# Patient Record
Sex: FEMALE | Race: WHITE | NOT HISPANIC OR LATINO | Employment: OTHER | URBAN - METROPOLITAN AREA
[De-identification: names, ages, dates, MRNs, and addresses within clinical notes are randomized per-mention and may not be internally consistent; named-entity substitution may affect disease eponyms.]

---

## 2017-03-24 DIAGNOSIS — A31.0 MAI (MYCOBACTERIUM AVIUM-INTRACELLULARE) INFECTION: Primary | ICD-10-CM

## 2017-03-31 ENCOUNTER — HOSPITAL ENCOUNTER (OUTPATIENT)
Dept: PULMONOLOGY | Facility: HOSPITAL | Age: 77
Discharge: HOME | End: 2017-03-31
Attending: INTERNAL MEDICINE
Payer: MEDICARE

## 2017-03-31 ENCOUNTER — HOSPITAL ENCOUNTER (OUTPATIENT)
Dept: RADIOLOGY | Facility: HOSPITAL | Age: 77
Discharge: HOME | End: 2017-03-31
Attending: INTERNAL MEDICINE
Payer: MEDICARE

## 2017-03-31 DIAGNOSIS — A31.0 MAI (MYCOBACTERIUM AVIUM-INTRACELLULARE) INFECTION: ICD-10-CM

## 2017-03-31 DIAGNOSIS — R06.02 SOB (SHORTNESS OF BREATH): ICD-10-CM

## 2017-03-31 PROCEDURE — 94010 BREATHING CAPACITY TEST: CPT

## 2017-03-31 PROCEDURE — 94060 EVALUATION OF WHEEZING: CPT

## 2017-03-31 PROCEDURE — 71250 CT THORAX DX C-: CPT | Mod: TC

## 2017-03-31 PROCEDURE — 94726 PLETHYSMOGRAPHY LUNG VOLUMES: CPT

## 2017-03-31 PROCEDURE — 94729 DIFFUSING CAPACITY: CPT

## 2017-03-31 PROCEDURE — 71250 CT THORAX DX C-: CPT | Mod: 26,,, | Performed by: RADIOLOGY

## 2017-04-04 NOTE — PROCEDURES
04/03/17   Pulmonary Function Test    Spirometry reveals moderate obstructive lung disease without significant response to bronchodilator challenge.  Lung volumes reveal air trapping but no hyperinflation or restriction.  DLCO is mildly reduced.  There has been no significant change compared to study of 8/23/16.    Rosalba Camarillo MD  4/3/2017  8:02 PM

## 2017-07-27 DIAGNOSIS — Z12.31 SCREENING MAMMOGRAM, ENCOUNTER FOR: Primary | ICD-10-CM

## 2017-07-27 DIAGNOSIS — M85.80 OSTEOPENIA: ICD-10-CM

## 2017-10-25 ENCOUNTER — HOSPITAL ENCOUNTER (OUTPATIENT)
Dept: RADIOLOGY | Facility: HOSPITAL | Age: 77
Discharge: HOME | End: 2017-10-25
Attending: INTERNAL MEDICINE
Payer: MEDICARE

## 2017-10-25 VITALS — WEIGHT: 167 LBS | HEIGHT: 68 IN | BODY MASS INDEX: 25.31 KG/M2

## 2017-10-25 DIAGNOSIS — M85.80 OSTEOPENIA: ICD-10-CM

## 2017-10-25 DIAGNOSIS — Z12.31 SCREENING MAMMOGRAM, ENCOUNTER FOR: ICD-10-CM

## 2017-10-25 PROCEDURE — 77080 DXA BONE DENSITY AXIAL: CPT | Mod: TC,PO

## 2017-10-25 PROCEDURE — 77067 SCR MAMMO BI INCL CAD: CPT | Mod: TC

## 2018-08-09 DIAGNOSIS — J44.9 COPD, MODERATE: Primary | ICD-10-CM

## 2018-08-10 DIAGNOSIS — H81.09 MENIERE DISEASE: ICD-10-CM

## 2018-08-10 DIAGNOSIS — H83.2X1 LABYRINTHINE DYSFUNCTION OF RIGHT EAR: Primary | ICD-10-CM

## 2018-09-05 ENCOUNTER — CLINICAL SUPPORT (OUTPATIENT)
Dept: REHABILITATION | Facility: HOSPITAL | Age: 78
End: 2018-09-05
Payer: MEDICARE

## 2018-09-05 DIAGNOSIS — R29.898 WEAKNESS OF BOTH LOWER EXTREMITIES: ICD-10-CM

## 2018-09-05 DIAGNOSIS — R26.89 DECREASED FUNCTIONAL MOBILITY: ICD-10-CM

## 2018-09-05 DIAGNOSIS — R26.81 UNSTEADY GAIT: ICD-10-CM

## 2018-09-05 DIAGNOSIS — R26.89 BALANCE PROBLEMS: ICD-10-CM

## 2018-09-05 PROCEDURE — 97162 PT EVAL MOD COMPLEX 30 MIN: CPT | Mod: PO

## 2018-09-05 PROCEDURE — G8979 MOBILITY GOAL STATUS: HCPCS | Mod: CK,PO

## 2018-09-05 PROCEDURE — G8978 MOBILITY CURRENT STATUS: HCPCS | Mod: CL,PO

## 2018-09-05 NOTE — PLAN OF CARE
[facility]  Physical Therapy Initial Evaluation    Name: Marilia Moise  Clinic Number: [id number]    Therapy Diagnosis:   Encounter Diagnoses   Name Primary?    Weakness of both lower extremities     Balance problems     Unsteady gait     Decreased functional mobility      Physician: Norah Ashford MD    Physician Orders: PT Eval and Treat   Medical Diagnosis from Referral: Labyrinthine dysfunction of right ear, Type 2 diabetes mellitus, uncontrolled, with neuropathy, Meniere disease  Evaluation Date: 9/5/2018  Authorization Period Expiration: 08/10/2019  Plan of Care Expiration: 11/05/2018  Visit # / Visits authorized: 1/ 1    Time In: 11:00  Time Out: 12:00  Total Billable Time: 60 minutes    Precautions: Standard and Fall    Subjective   Date of onset: Chronic  History of current condition - MARILIA reports: she was initially diagnosed with Meniere's Disease when she was 24 years old and does have a history of falls but has been falling more since breaking her ankles 3 years ago. Her most recent falls were a week ago when she fell backwards onto the bed after getting up ne morning and then she fell again in her bathroom. She did break both ankles 3 years ago and did receive home health therapy but nothing after being discharged from home health. She reports that her walking is limited more by her breathing than her balance. She does have a cane and a rollator but only uses the rollator when she has to go somewhere where there might not be a place to sit. Inside the home she has her furniture arranged so there is always something to hold as she walks through the house. She has to use a shower chair as she is unable to maintain her balance while standing to shower. She gets dizzy when trying to look between two objects, when trying to watch the news ticker on the bottom of the screen on television, difficulty ascending/descending steps, and gets dizzy when laying on her R side.         Past Medical History:   Diagnosis Date    Asthma     Diabetes mellitus     Hypertension     Lipidemia      Xi Moise  has a past surgical history that includes Hysterectomy;  section; Ankle surgery (Left); REMOVAL-HARDWARE-ANKLE (Left, 2016); OPEN REDUCTION INTERNAL FIXATION-ANKLE (Left, 10/27/2015); and EXAM UNDER ANESTHESIA (Right, 10/27/2015).    Xi has a current medication list which includes the following prescription(s): albuterol sulfate, atorvastatin, b-complex with vitamin c, escitalopram oxalate, fluticasone, fluticasone-salmeterol 500-50 mcg/dose, gabapentin, hydrochlorothiazide, hydrocodone-acetaminophen, insulin nph, lisinopril, magnesium oxide, metformin, metoprolol succinate, potassium gluconate, and vitamin d.    Review of patient's allergies indicates:  No Known Allergies     Imaging, none    Prior Therapy:  PT for ankles  Social History: two story home, bedroom on first floor, lives alone  Occupation: retired  Prior Level of Function: independent  Current Level of Function: independent    Pain:  Current 5/10, worst 10/10, best 1/10   Location:  back   Description: Dull, Throbbing and Tight  Aggravating Factors: Standing, Bending, Walking and Lifting  Easing Factors: pain medication, ice and heating pad    Pts goals: To not hurt    Objective     Observation: Patient is 78 year old female, who presents to the clinic in no apparent distress. She is wheezing with exhalation.       Posture: wide JAMES in standing with arms held away from her side.       Gait: shuffling gait, wide JAMES, often reaches outside JAMES to touch walls for balance.     Range of Motion: AROM (PROM):    Cervical Range of Motion:    Degrees Pain   Flexion 60    Extension 50    Right Rotation 80    Left Rotation 85    Right Side Bending 40    Left Side Bending 35 Ringing in ears     Hip Left Right   Flexion WNL WNL   Abduction WNL WNL     Knee Left Right   Extension WNL WNL   Flexion WNL WNL       Ankle  Left Right   Dorsiflexion WNL WNL   Plantarflexion WNL WNL   Inversion WNL WNL   Eversion WNL WNL       Strength:  Hip Left Right   Flexion 3/5 3/5   Abduction 3/5 3/5   Extension 3/5 3/5     Knee Left Right   Extension 4/5 4+/5   Flexion 5/5 5/5     Ankle Left Right   Dorsiflexion 4/5 5/5   Plantarflexion 5/5 5/5   Inversion 45 4/5   Eversion 4/5 4/5       Special Tests:  TUG 9:15 seconds   Sit to stand 7 reps   DGI 12/24   SLS  R 3.4 sec, L 5.82 sec   Saccades negative   Smooth pursuits Positive R   VOR Positive R     Joint Mobility: N/A    Palpation: N/A    Sensation: loss of protective sensation B feet 0/5 monofilament testing    Flexibility: N/A        CMS Impairment/Limitation/Restriction for FOTO vertigo Survey    Therapist reviewed FOTO scores for Marilia Moise on 9/5/2018.   FOTO documents entered into [employer] - see Media section.    Limitation Score: 61%  Category: Mobility    Current : CL = least 60% but < 80% impaired, limited or restricted  Goal: CK = at least 40% but < 60% impaired, limited or restricted  Discharge: N/A at this time         TREATMENT   Treatment Time In: 11:50  Treatment Time Out: 12:00  Total Treatment time separate from Evaluation: 10 minutes    MARILIA participated in gait training to improve functional mobility and safety for 10  minutes, including:  Correct sequence of events with SPC on even surfaces indoors.    Home Exercises and Patient Education Provided    Education provided:   - compliance with HEP.  - use of SPC indoors and rollator when ambulating in the community to decrease her fall risk.    Written Home Exercises Provided: Patient instructed to cont prior HEP.  Exercises were reviewed and MARILIA was able to demonstrate them prior to the end of the session.  MARILIA demonstrated good  understanding of the education provided.     See EMR under POC for exercises provided 9/5/2018.    Assessment   Marilia is a 78 y.o. female referred to outpatient Physical Therapy with a medical  diagnosis of Labyrinthine dysfunction of right ear, Type 2 diabetes mellitus, uncontrolled, with neuropathy, Meniere disease. Pt presents with B LE weakness, balance issues, and unsteady gait along with a history of falls. Her scores on DGI, TUG, Sit to Stand, and single leg stance all indicate she is at high risk for falls. Her loss of protective sensation and vestibular ocular dysfunction contribute to her balance issues. Her current score on FOTO Vertigo places her in the 60%<80% impaired, limited, or restricted category.     Pt prognosis is Fair.   Pt will benefit from skilled outpatient Physical Therapy to address the deficits stated above and in the chart below, provide pt/family education, and to maximize pt's level of independence.     Plan of care discussed with patient: Yes  Pt's spiritual, cultural and educational needs considered and patient is agreeable to the plan of care and goals as stated below:     Anticipated Barriers for therapy: breathing issues, asthma    Medical Necessity is demonstrated by the following  History  Co-morbidities and personal factors that may impact the plan of care Co-morbidities:   COPD/asthma, depression and history of CVA    Personal Factors:   attitudes     high   Examination  Body Structures and Functions, activity limitations and participation restrictions that may impact the plan of care Body Regions:   lower extremities    Body Systems:    strength  balance  gait    Participation Restrictions:   Difficulty with prolonged standing, ambulation, transfers, performing her usual ADLs, ascending/descending steps    Activity limitations:   Learning and applying knowledge  no deficits    General Tasks and Commands  no deficits    Communication  no deficits    Mobility  lifting and carrying objects  walking    Self care  washing oneself (bathing, drying, washing hands)    Domestic Life  shopping  cooking  doing house work (cleaning house, washing dishes,  laundry)    Interactions/Relationships  no deficits    Life Areas  no deficits    Community and Social Life  community life  recreation and leisure         moderate   Clinical Presentation evolving clinical presentation with changing clinical characteristics moderate   Decision Making/ Complexity Score: moderate     Goals:  Short Term Goals: 4 weeks   1. This patient will be independent with a basic HEP.  2. This patient will increase B LE strength by 1 grade in order to be able to perform shower transfers with no difficulty.   3. This patient will be able to ambulate greater than 150 feet on even surfaces with a normal gait pattern and no LOB with least restrictive AD.  4. Patient will be able to achieve greater than or equal to 16/24 on the Dynamic Gait Index decreasing patient's risk for falling on even surfaces.   5. Patient will be able to achieve greater than or equal to 50 on the FOTO Vertigo decreasing patient's risk for falling and improving patient to 40%<60% impaired, limited, or restricted category.   Long Term Goals: 8 weeks   1. This patient will be independent with an updated HEP.  2. This patient will increase B LE strength to 5/5 in order to be able to ascend/desccend stairs with a reciprocal gait pattern with no difficulty.   3. This patient will be able to ambulate greater than 200 yards on even surfaces with a normal gait pattern and no LOB with least restrictive AD.  4. Patient will be able to achieve greater than or equal to 21/24 on the Dynamic Gait Index decreasing patient's risk for falling on uneven surfaces.   5. Patient will be able to achieve greater than or equal to 60 on the FOTO Vertigo decreasing patient's risk for falling and improving patient to 40%<60% impaired, limited, or restricted category.     Plan   Plan of care Certification: 9/5/2018 to 11/5/18.    Outpatient Physical Therapy 2 times weekly for 8 weeks to include the following interventions: Gait Training, Manual Therapy,  Moist Heat/ Ice, Neuromuscular Re-ed, Patient Education and Therapeutic Exercise.     Tonie Vasquez, PT

## 2018-09-08 ENCOUNTER — HOSPITAL ENCOUNTER (EMERGENCY)
Facility: HOSPITAL | Age: 78
Discharge: HOME | End: 2018-09-08
Attending: EMERGENCY MEDICINE
Payer: MEDICARE

## 2018-09-08 VITALS
HEART RATE: 96 BPM | RESPIRATION RATE: 16 BRPM | BODY MASS INDEX: 24.86 KG/M2 | HEIGHT: 68 IN | TEMPERATURE: 98 F | DIASTOLIC BLOOD PRESSURE: 78 MMHG | OXYGEN SATURATION: 97 % | SYSTOLIC BLOOD PRESSURE: 172 MMHG | WEIGHT: 164 LBS

## 2018-09-08 DIAGNOSIS — R06.02 SHORTNESS OF BREATH: ICD-10-CM

## 2018-09-08 DIAGNOSIS — J45.901 MILD ASTHMA EXACERBATION: Primary | ICD-10-CM

## 2018-09-08 DIAGNOSIS — R06.2 WHEEZING: ICD-10-CM

## 2018-09-08 LAB
ALBUMIN SERPL BCP-MCNC: 4.1 G/DL
ALP SERPL-CCNC: 109 U/L
ALT SERPL W/O P-5'-P-CCNC: 13 U/L
ANION GAP SERPL CALC-SCNC: 11 MMOL/L
AST SERPL-CCNC: 14 U/L
BILIRUB SERPL-MCNC: 0.6 MG/DL
BILIRUB UR QL STRIP: NEGATIVE
BNP SERPL-MCNC: 16 PG/ML
BUN SERPL-MCNC: 28 MG/DL
CALCIUM SERPL-MCNC: 9.8 MG/DL
CHLORIDE SERPL-SCNC: 103 MMOL/L
CLARITY UR: CLEAR
CO2 SERPL-SCNC: 25 MMOL/L
COLOR UR: YELLOW
CREAT SERPL-MCNC: 1.5 MG/DL
D DIMER PPP IA.FEU-MCNC: 0.3 MG/L FEU
ERYTHROCYTE [DISTWIDTH] IN BLOOD BY AUTOMATED COUNT: 14.3 %
EST. GFR  (AFRICAN AMERICAN): 38 ML/MIN/1.73 M^2
EST. GFR  (NON AFRICAN AMERICAN): 33 ML/MIN/1.73 M^2
GLUCOSE SERPL-MCNC: 199 MG/DL
GLUCOSE UR QL STRIP: ABNORMAL
HCT VFR BLD AUTO: 38.5 %
HGB BLD-MCNC: 12.5 G/DL
HGB UR QL STRIP: NEGATIVE
KETONES UR QL STRIP: ABNORMAL
LEUKOCYTE ESTERASE UR QL STRIP: NEGATIVE
MCH RBC QN AUTO: 28.3 PG
MCHC RBC AUTO-ENTMCNC: 32.5 G/DL
MCV RBC AUTO: 87 FL
NITRITE UR QL STRIP: NEGATIVE
PH UR STRIP: 6 [PH] (ref 5–8)
PLATELET # BLD AUTO: 268 K/UL
PMV BLD AUTO: 8.3 FL
POTASSIUM SERPL-SCNC: 4.4 MMOL/L
PROT SERPL-MCNC: 6.6 G/DL
PROT UR QL STRIP: NEGATIVE
RBC # BLD AUTO: 4.41 M/UL
SODIUM SERPL-SCNC: 139 MMOL/L
SP GR UR STRIP: >=1.03 (ref 1–1.03)
TROPONIN I SERPL DL<=0.01 NG/ML-MCNC: <0.006 NG/ML
URN SPEC COLLECT METH UR: ABNORMAL
UROBILINOGEN UR STRIP-ACNC: NEGATIVE EU/DL
WBC # BLD AUTO: 8.67 K/UL

## 2018-09-08 PROCEDURE — 80053 COMPREHEN METABOLIC PANEL: CPT

## 2018-09-08 PROCEDURE — 84484 ASSAY OF TROPONIN QUANT: CPT

## 2018-09-08 PROCEDURE — 93005 ELECTROCARDIOGRAM TRACING: CPT

## 2018-09-08 PROCEDURE — 96374 THER/PROPH/DIAG INJ IV PUSH: CPT

## 2018-09-08 PROCEDURE — 85027 COMPLETE CBC AUTOMATED: CPT

## 2018-09-08 PROCEDURE — 25000242 PHARM REV CODE 250 ALT 637 W/ HCPCS: Performed by: EMERGENCY MEDICINE

## 2018-09-08 PROCEDURE — 81003 URINALYSIS AUTO W/O SCOPE: CPT

## 2018-09-08 PROCEDURE — 93010 ELECTROCARDIOGRAM REPORT: CPT | Mod: ,,, | Performed by: INTERNAL MEDICINE

## 2018-09-08 PROCEDURE — 63600175 PHARM REV CODE 636 W HCPCS: Performed by: EMERGENCY MEDICINE

## 2018-09-08 PROCEDURE — 85379 FIBRIN DEGRADATION QUANT: CPT

## 2018-09-08 PROCEDURE — 83880 ASSAY OF NATRIURETIC PEPTIDE: CPT

## 2018-09-08 PROCEDURE — 99284 EMERGENCY DEPT VISIT MOD MDM: CPT | Mod: 25

## 2018-09-08 PROCEDURE — 94640 AIRWAY INHALATION TREATMENT: CPT

## 2018-09-08 RX ORDER — ALBUTEROL SULFATE 2.5 MG/.5ML
2.5 SOLUTION RESPIRATORY (INHALATION) ONCE
Status: COMPLETED | OUTPATIENT
Start: 2018-09-08 | End: 2018-09-08

## 2018-09-08 RX ORDER — METHYLPREDNISOLONE SOD SUCC 125 MG
80 VIAL (EA) INJECTION
Status: COMPLETED | OUTPATIENT
Start: 2018-09-08 | End: 2018-09-08

## 2018-09-08 RX ADMIN — METHYLPREDNISOLONE SODIUM SUCCINATE 80 MG: 125 INJECTION, POWDER, FOR SOLUTION INTRAMUSCULAR; INTRAVENOUS at 08:09

## 2018-09-08 RX ADMIN — ALBUTEROL SULFATE 2.5 MG: 2.5 SOLUTION RESPIRATORY (INHALATION) at 08:09

## 2018-09-09 PROBLEM — R26.81 UNSTEADY GAIT: Status: ACTIVE | Noted: 2018-09-09

## 2018-09-09 PROBLEM — R29.898 WEAKNESS OF BOTH LOWER EXTREMITIES: Status: ACTIVE | Noted: 2018-09-09

## 2018-09-09 PROBLEM — R26.89 DECREASED FUNCTIONAL MOBILITY: Status: ACTIVE | Noted: 2018-09-09

## 2018-09-09 PROBLEM — R26.89 BALANCE PROBLEMS: Status: ACTIVE | Noted: 2018-09-09

## 2018-09-09 NOTE — ED PROVIDER NOTES
Encounter Date: 2018    SCRIBE #1 NOTE: I, Ovi Isidro, am scribing for, and in the presence of,  Dr. Zimmerman. I have scribed the entire note.       History     Chief Complaint   Patient presents with    Chest Pain     Patient reports CP and SOB. Pt fell 2 weeks ago and landed on her back on the L side.     Xi Moise is a 78 y.o. female who  has a past medical history of RIRI infection, Asthma, Diabetes mellitus, Hypertension, and Lipidemia.    The patient presents to the ED due to CP described as tightness with SOB for the past 2 weeks, worse today. Symptoms are associated with dry cough x 1 month. Patient reports that she fell 2 weeks ago out of bed, landing on her back and hitting her left side on her nightstand. She also reports a second fall in her bathroom, and has been having mild back pain since that time. Patient states that she was around multiple people smoking today, which she feels exacerbated her CP and SOB. Patient denies any abdominal pain, fever, nausea, vomiting, or any other concerning symptoms. The patient has a history of recurrent pneumonia and MALAIKA infection. She regularly takes Albuterol and uses inhalers and breathing treatments at home, but has not used any treatments today, as she has been out running errands all day. Patient sees her Pulmonology physician every 6 months, and is scheduled to be seen in October. She reports no history of cardiac issues.      The history is provided by the patient.     Review of patient's allergies indicates:  No Known Allergies  Past Medical History:   Diagnosis Date    Asthma     Diabetes mellitus     Hypertension     Lipidemia      Past Surgical History:   Procedure Laterality Date    ANKLE SURGERY Left      SECTION      x 3    EXAM UNDER ANESTHESIA Right 10/27/2015    [facility]    HYSTERECTOMY      OPEN REDUCTION INTERNAL FIXATION-ANKLE Left 10/27/2015    [facility]     REMOVAL-HARDWARE-ANKLE Left 8/2/2016    [facility]     Family History   Problem Relation Age of Onset    Hypertension Daughter      Social History     Tobacco Use    Smoking status: Never Smoker   Substance Use Topics    Alcohol use: No    Drug use: No     Review of Systems   Constitutional: Negative for chills and fever.   HENT: Negative for sore throat.    Respiratory: Positive for cough (dry) and shortness of breath.    Cardiovascular: Positive for chest pain.   Gastrointestinal: Negative for abdominal pain, nausea and vomiting.   Genitourinary: Negative for dysuria, frequency and urgency.   Musculoskeletal: Negative for back pain, neck pain and neck stiffness.   Skin: Negative for rash and wound.   Neurological: Negative for syncope and weakness.   Hematological: Does not bruise/bleed easily.   Psychiatric/Behavioral: Negative for agitation, behavioral problems and confusion.     Physical Exam     Initial Vitals [09/08/18 1933]   BP Pulse Resp Temp SpO2   (!) 182/81 109 (!) 24 98.7 °F (37.1 °C) 95 %      MAP       --         Physical Exam    Nursing note and vitals reviewed.  Constitutional: She appears well-developed and well-nourished. She is not diaphoretic. No distress.   Well-appearing, in NAD   HENT:   Head: Normocephalic and atraumatic.   Mouth/Throat: Oropharynx is clear and moist.   Eyes: EOM are normal. Pupils are equal, round, and reactive to light.   Neck: No tracheal deviation present.   Cardiovascular: Normal rate, regular rhythm, normal heart sounds and intact distal pulses.   Pulmonary/Chest: No stridor. No respiratory distress. She has wheezes. She has no rhonchi. She has no rales.   Fine inspiratory and expiratory wheezing; no rales or rhonchi   Abdominal: Soft. Bowel sounds are normal. She exhibits no distension and no mass. There is no tenderness.   Musculoskeletal: Normal range of motion. She exhibits no edema or tenderness.   No midline TTP, no back  TTP  No pitting edema to the legs   Neurological: She is alert and oriented to person, place, and time. She has normal strength. No cranial nerve deficit or sensory deficit.   Skin: Skin is warm and dry. Capillary refill takes less than 2 seconds. No pallor.   Psychiatric: She has a normal mood and affect. Her behavior is normal. Thought content normal.       ED Course   Procedures  Labs Reviewed   CBC WITHOUT DIFFERENTIAL - Abnormal; Notable for the following components:       Result Value    MPV 8.3 (*)     All other components within normal limits   COMPREHENSIVE METABOLIC PANEL - Abnormal; Notable for the following components:    Glucose 199 (*)     BUN, Bld 28 (*)     Creatinine 1.5 (*)     eGFR if  38 (*)     eGFR if non  33 (*)     All other components within normal limits   URINALYSIS - Abnormal; Notable for the following components:    Specific Gravity, UA >=1.030 (*)     Glucose, UA 1+ (*)     Ketones, UA Trace (*)     All other components within normal limits   TROPONIN I   B-TYPE NATRIURETIC PEPTIDE   D DIMER, QUANTITATIVE     EKG Readings: (Independently Interpreted)   Rhythm: Sinus Tachycardia. Heart Rate: 105.   No ST changes, no ischemia, normal intervals  Compared to prior EKG from 08/2016, grossly stable without significant change     X-Rays:   Independently Interpreted Readings:   Other Readings:  Reviewed by myself, read by radiology.    Imaging Results          X-Ray Chest AP Portable (Final result)  Result time 09/08/18 20:15:53    Final result by Mario Hurtado MD (09/08/18 20:15:53)                 Impression:      Mild bibasilar reticulonodular opacities and small effusions versus scarring at the costophrenic angles.  Overall, no detrimental change in lung aeration when compared with 08/01/2016.  Findings could reflect chronic infectious or inflammatory process as described on prior CTs.      Electronically signed by: Mario Hurtado  "MD  Date:    09/08/2018  Time:    20:15             Narrative:    EXAMINATION:  XR CHEST AP PORTABLE    CLINICAL HISTORY:  Provided history is "shortness of breath;  ".    TECHNIQUE:  One view of the chest.    COMPARISON:  Chest radiograph, 08/01/2016.  CT chest, 03/31/2017.    FINDINGS:  Cardiac wires overlie the chest.  Cardiac silhouette is not enlarged.  There are coarsened interstitial lung markings and mild bibasilar reticulonodular opacities as seen on prior CT.  Suspect small bilateral pleural effusions versus scarring at the costophrenic angles.  No detrimental change in lung aeration.                              Medical Decision Making:   Initial Assessment:   This is a 78 y.o. Female with history of asthma, CKD, and MALAIKA who presents with anterior chest tightness associated with Sob starting earlier today. She reports a history of asthma, but has not used her inhalers today. On arrival, patient is with mild diffuse wheezing and in NAD. Will treat asthma, obtain cardiac evaluation and CXR, and reassess.  Differential Diagnosis:   Differential Diagnosis includes, but is not limited to:  PE, MI/ACS, pneumothorax, pericardial effusion/tamonade, pneumonia, lung abscess, pericarditis/myocarditis, pleural effusion, lung mass, CHF exacerbation, asthma exacerbation, COPD exacerbation, aspirated/ingested foreign body, airway obstruction, CO poisoning, anemia, metabolic derangement, allergy/atopy, influenza, viral URI, viral syndrome.    Clinical Tests:   Lab Tests: Ordered and Reviewed  Radiological Study: Ordered and Reviewed  Medical Tests: Ordered and Reviewed  ED Management:  EKG without acute arrhythmia or ischemia.  CXR consistent with chronic scarring, no acute infiltrate or detrimental change from prior chest x-rays.  Labs grossly unremarkable.    21:13  On reassessment after breathing treatment and Solumedrol, patient reports significant improvement; she denies any CP or SOB at this time. Patient reports " she has all of her prescriptions and inhalers at home and is comfortable with resuming scheduled medications and follow up with PCP and pulmonology as scheduled.  Given return precautions including worsening symptoms or any other concerns.  Upon re-evaluation, the patient's status has improved.  After complete ED evaluation, clinical impression is most consistent with asthma exacerbation.  At this time, I feel there is no emergent condition requiring further evaluation or admission. I believe the patient is stable for discharge from the ED. The patient and any additional family present were updated with test results, overall clinical impression, and recommended further plan of care. All questions were answered. The patient expressed understanding and agreed with current plan for discharge with PCP follow-up within 1 week. Strict return precautions were provided, including return/worsening of current symptoms or any other concerns.                         Clinical Impression:     1. Mild asthma exacerbation    2. Shortness of breath    3. Wheezing      Disposition:   Disposition: Discharged  Condition: Stable       I, Dr. Bruce Zimmerman, personally performed the services described in this documentation. All medical record entries made by the scribe were at my direction and in my presence.  I have reviewed the chart and agree that the record reflects my personal performance and is accurate and complete.     Bruce Zimmerman MD.  9:17 PM 09/08/2018               Bruce Zimmerman MD  09/08/18 [id number]

## 2018-09-11 ENCOUNTER — CLINICAL SUPPORT (OUTPATIENT)
Dept: REHABILITATION | Facility: HOSPITAL | Age: 78
End: 2018-09-11
Payer: MEDICARE

## 2018-09-11 DIAGNOSIS — R29.898 WEAKNESS OF BOTH LOWER EXTREMITIES: ICD-10-CM

## 2018-09-11 DIAGNOSIS — R26.89 DECREASED FUNCTIONAL MOBILITY: ICD-10-CM

## 2018-09-11 DIAGNOSIS — R26.89 BALANCE PROBLEMS: ICD-10-CM

## 2018-09-11 DIAGNOSIS — R26.81 UNSTEADY GAIT: ICD-10-CM

## 2018-09-11 PROCEDURE — 97110 THERAPEUTIC EXERCISES: CPT | Mod: PO

## 2018-09-11 NOTE — PROGRESS NOTES
"  Physical Therapy Daily Treatment Note     Name: Marilia Moise  Clinic Number: [id number]    Therapy Diagnosis:   Encounter Diagnoses   Name Primary?    Weakness of both lower extremities     Balance problems     Unsteady gait     Decreased functional mobility      Physician: Norah Ashford MD    Visit Date: 9/11/2018  Physician Orders: PT Eval and Treat   Medical Diagnosis from Referral: Labyrinthine dysfunction of right ear, Type 2 diabetes mellitus, uncontrolled, with neuropathy, Meniere disease  Evaluation Date: 9/5/2018  Authorization Period Expiration: 08/10/2019  Plan of Care Expiration: 11/05/2018  Visit # / Visits authorized: 2/ 12    Time In: 1:00  Time Out: 1:40    TIMED  Procedure Min.   TE  40                     UNTIMED  Procedure Min.             Total Billable Time: 40 minutes    Precautions: Standard and Fall    Subjective      Pt reports: she was not compliant with home exercise program given last session. She has not been using her cane when walking in the house.   Response to previous treatment:increased muscle soreness after initial eval  Functional change: none    Pain: 2/10  Location: generalized    Objective     MARILIA received therapeutic exercises to develop strength for 40 minutes including:      Date  09/11/18   VISIT 2/12   Gcode 2/10  10/09/18   FOTO 2/5   Cap Visit   Cap Total 90.96  173.84   POC exp 11/09/18   MT --   Long Sit HS --   Gastroc Str. Next   Ankle   DF  PF  Inv  Ever GTB  1 x 10  1 x 10  1 x 10  1 x 10       SAQ 2 x 10   SLR 1 x 10   Hip Abd 1 x 10 RTB   Katharina Hip Add 15 x 3" w/ ball   LAQs 1 x 10   Seated Hip Flex Next?   HS curls Next?       Hip Abd --   Hip Flex --   Hip Ext --           Step Ups --   Step Downs --   Gait --   CP declined   Initials DG       Home Exercises Provided and Patient Education Provided     Education provided:   - compliance with using an AD when ambulating at all the time.  - compliance with HEP.    Written Home Exercises Provided:ankle " resistance band exercises 4 ways, SLR, hip abd, hip add, seated knee ext.  Exercises were reviewed and MARILIA was able to demonstrate them prior to the end of the session.  MARILIA demonstrated good  understanding of the education provided.     Pt received a written copy of exercises to perform at home.   See EMR under patient instructions for exercises given.     MARILIA demonstrated good  understanding of the education provided.     Assessment     Patient was able to perform all of today's activities with no increase in symptoms prior to leaving the clinic. She required frequent cues to avoid substitutions with all exercises, especially ankle inversion and eversion.   MARILIA is progressing well towards her goals.   Pt prognosis is Fair.     Pt will continue to benefit from skilled outpatient physical therapy to address the deficits listed in the problem list box on initial evaluation, provide pt/family education and to maximize pt's level of independence in the home and community environment.     Pt's spiritual, cultural and educational needs considered and pt agreeable to plan of care and goals.    Anticipated barriers to physical therapy: breathing issues, asthma    Goals:   Short Term Goals: 4 weeks   1. This patient will be independent with a basic HEP.  2. This patient will increase B LE strength by 1 grade in order to be able to perform shower transfers with no difficulty.   3. This patient will be able to ambulate greater than 150 feet on even surfaces with a normal gait pattern and no LOB with least restrictive AD.  4. Patient will be able to achieve greater than or equal to 16/24 on the Dynamic Gait Index decreasing patient's risk for falling on even surfaces.   5. Patient will be able to achieve greater than or equal to 50 on the FOTO Vertigo decreasing patient's risk for falling and improving patient to 40%<60% impaired, limited, or restricted category.   Long Term Goals: 8 weeks   1. This patient will be  independent with an updated HEP.  2. This patient will increase B LE strength to 5/5 in order to be able to ascend/desccend stairs with a reciprocal gait pattern with no difficulty.   3. This patient will be able to ambulate greater than 200 yards on even surfaces with a normal gait pattern and no LOB with least restrictive AD.  4. Patient will be able to achieve greater than or equal to 21/24 on the Dynamic Gait Index decreasing patient's risk for falling on uneven surfaces.   5. Patient will be able to achieve greater than or equal to 60 on the FOTO Vertigo decreasing patient's risk for falling and improving patient to 40%<60% impaired, limited, or restricted category.     Plan     Continue with the plan of care and progress as the patient tolerates.     Tonie Vasquez, PT

## 2018-09-11 NOTE — PATIENT INSTRUCTIONS
Dorsiflexion: Resisted        Facing anchor, tubing around left foot, pull toward face.   Repeat 10 times per set. Do 3 sets per session. Do 2 sessions per day.     [email or web address]     Copyright © [employer]. All rights reserved.   Plantar Flexion: Resisted        [place] behind, tubing around left foot, press down.  Repeat 10 times per set. Do 3 sets per session. Do 2 sessions per day.      https://[email or web address]     Copyright © [employer]. All rights reserved.   Inversion: Resisted        Cross legs with right leg underneath, foot in tubing loop. Hold tubing around other foot to resist and turn foot in.  Repeat 10 times per set. Do 3 sets per session. Do 2 sessions per day.     https://[email or web address]/12     Copyright © [employer]. All rights reserved.   Eversion: Resisted        With right foot in tubing loop, hold tubing around other foot to resist and turn foot out.  Repeat 10 times per set. Do 3 sets per session. Do 2 sessions per day.     https://[email or web address]/14     Copyright © [employer]. All rights reserved.     Strengthening: Hip Adduction - Isometric        With ball or folded pillow between knees, squeeze knees together. Hold 3 seconds.  Repeat 10 times per set. Do 3 sets per session. Do 2 sessions per day.     https://[email or web address]     Copyright © [employer]. All rights reserved.     Strengthening: Straight Leg Raise (Phase 1)        Tighten muscles on front of right thigh, then lift leg from surface, keeping knee locked. Repeat with the left thigh.  Repeat 10 times per set. Do 3 sets per session. Do 2 sessions per day.     [email or web address]     Copyright © [employer]. All rights reserved.     Strengthening: Hip Abductor - Resisted        With band looped around both legs above knees, push thighs apart.  Repeat 10 times per set. Do 3 sets per session. Do 2 sessions per day.     https://[email or web address]     Copyright © [employer]. All rights reserved.       Seated Leg Extension        Sit on chair, tighten abs and straighten knee and  return. Repeat with other leg.  Do 3 sets of 10 repetitions.    Copyright © [employer]. All rights reserved.

## 2018-09-18 ENCOUNTER — CLINICAL SUPPORT (OUTPATIENT)
Dept: REHABILITATION | Facility: HOSPITAL | Age: 78
End: 2018-09-18
Payer: MEDICARE

## 2018-09-18 DIAGNOSIS — R26.81 UNSTEADY GAIT: ICD-10-CM

## 2018-09-18 DIAGNOSIS — R26.89 DECREASED FUNCTIONAL MOBILITY: ICD-10-CM

## 2018-09-18 DIAGNOSIS — R29.898 WEAKNESS OF BOTH LOWER EXTREMITIES: ICD-10-CM

## 2018-09-18 DIAGNOSIS — R26.89 BALANCE PROBLEMS: ICD-10-CM

## 2018-09-18 PROCEDURE — 97110 THERAPEUTIC EXERCISES: CPT | Mod: PO

## 2018-09-18 NOTE — PATIENT INSTRUCTIONS
Stretching: Calf - Towel        Sit with knee straight and towel looped around left foot. Gently pull on towel until stretch is felt in calf. Hold 10 seconds.  Repeat 10 times per set. Do 1 sets per session. Do 2 sessions per day.     [email or web address]         Supine Hamstring Stretch    Raise your leg with belt around heel of foot. Raise leg until a stretch is felt in the back of the thigh. Keep knee straight. Hold 10 seconds.   Repeat 10 times each side per set. Do 1 sets per session. Do 2 sessions per day.      Seated Marching    Sit, both feet flat. Lift right knee toward ceiling. Lower and lift left knee toward the ceiling. Repeat, alternating sides.  Complete 1 sets of 10 repetitions. Perform 2 sessions per day.        Hamstring Curl: Resisted (Sitting)        Facing anchor with tubing on right ankle, leg straight out, bend knee.  Repeat 10 times per set. Do 1 sets per session. Do 2 sessions per day.      [email or web address]

## 2018-09-25 ENCOUNTER — CLINICAL SUPPORT (OUTPATIENT)
Dept: REHABILITATION | Facility: HOSPITAL | Age: 78
End: 2018-09-25
Payer: MEDICARE

## 2018-09-25 DIAGNOSIS — R26.89 DECREASED FUNCTIONAL MOBILITY: ICD-10-CM

## 2018-09-25 DIAGNOSIS — R29.898 WEAKNESS OF BOTH LOWER EXTREMITIES: ICD-10-CM

## 2018-09-25 DIAGNOSIS — R26.89 BALANCE PROBLEMS: ICD-10-CM

## 2018-09-25 DIAGNOSIS — R26.81 UNSTEADY GAIT: ICD-10-CM

## 2018-09-25 PROCEDURE — 97110 THERAPEUTIC EXERCISES: CPT | Mod: PO

## 2018-09-25 NOTE — PROGRESS NOTES
"  Physical Therapy Daily Treatment Note     Name: Marilia Moise  Clinic Number: [id number]    Therapy Diagnosis:   Encounter Diagnoses   Name Primary?    Weakness of both lower extremities     Balance problems     Unsteady gait     Decreased functional mobility      Physician: Norah Ashford MD    Visit Date: 9/25/2018  Physician Orders: PT Eval and Treat   Medical Diagnosis from Referral: Labyrinthine dysfunction of right ear, Type 2 diabetes mellitus, uncontrolled, with neuropathy, Meniere disease  Evaluation Date: 9/5/2018  Authorization Period Expiration: 08/10/2019  Plan of Care Expiration: 11/05/2018  Visit # / Visits authorized: 4/ 12    Time In: 1:00  Time Out: 2:00    TIMED  Procedure Min.   TE  30   TE sup 30NC                 UNTIMED  Procedure Min.             Total Billable Time: 30 minutes    Precautions: Standard and Fall    Subjective      Pt reports: she was not compliant with home exercise program, only performed them twice since last visit. She reports not feeling well today, so she been using her cane today.   Response to previous treatment: increased muscle soreness   Functional change: none    Pain: 4/10  Location: generalized    Objective     MARILIA received therapeutic exercises to develop strength for 30 minutes including:      Date  09/25/18 09/18/18 09/11/18   VISIT 4/12 3/12 2/12   Gcode 4/10  10/09/18 3/10  10/09/18 2/10  10/09/18   FOTO 4/5 3/5 2/5   Cap Visit   Cap Total 60.64  325.44 90.96  264.80 90.96  173.84   POC exp 11/09/18 11/09/18 11/09/18         Table:      HSS 10 x 10" 10 x 10" --   Gastroc Str. 10 x 10" 10 x 10" Next   Ankle   DF  PF  Inv  Ever GTB  2 x 10  2 x 10  2 x 10  2 x 10 GTB  2 x 10  2 x 10  2 x 10  2 x 10 GTB  1 x 10  1 x 10  1 x 10  1 x 10   SAQ 2 x 10 x 1# 3 x 10 2 x 10   Hip Abd 3 x 10 RTB 2 x 10 RTB 1 x 10 RTB   Katharina Hip Add 20 x 3" w/ ball 20 x 3" w/ ball 15 x 3" w/ ball   SLR 3 x 10 2 x 10 1 x 10   Seated:      LAQs 1 x 15 1 x 15 1 x 10   Seated Hip Flex " 1 x 10 1 x 10 Next?   HS curls oot 1 x 10 GTB Next?   Standing:      Hip Abd -- -- --   Hip Flex -- -- --   Hip Ext -- -- --   Step Ups -- -- --   Step Downs -- -- --   Gait -- -- --   CP declined declined declined         Initials DG GWA 1/6 DG       Home Exercises Provided and Patient Education Provided     Education provided:   - compliance with using an AD when ambulating at all the time.  - compliance with HEP.    Written Home Exercises Provided: continue with HEP.  Exercises were reviewed and MARILIA was able to demonstrate them prior to the end of the session.  MARILIA demonstrated good  understanding of the education provided.     Pt received a written copy of exercises to perform at home.   See EMR under patient instructions for exercises given.     MARILIA demonstrated good  understanding of the education provided.     Assessment     Patient continues to require cues to perform her exercises correctly, she was able to perform all of today's progressions with no increase in symptoms prior to leaving the clinic.     MARILIA is progressing well towards her goals.   Pt prognosis is Fair.     Pt will continue to benefit from skilled outpatient physical therapy to address the deficits listed in the problem list box on initial evaluation, provide pt/family education and to maximize pt's level of independence in the home and community environment.     Pt's spiritual, cultural and educational needs considered and pt agreeable to plan of care and goals.    Anticipated barriers to physical therapy: breathing issues, asthma    Goals:   Short Term Goals: 4 weeks   1. This patient will be independent with a basic HEP.  2. This patient will increase B LE strength by 1 grade in order to be able to perform shower transfers with no difficulty.   3. This patient will be able to ambulate greater than 150 feet on even surfaces with a normal gait pattern and no LOB with least restrictive AD.  4. Patient will be able to achieve greater  than or equal to 16/24 on the Dynamic Gait Index decreasing patient's risk for falling on even surfaces.   5. Patient will be able to achieve greater than or equal to 50 on the FOTO Vertigo decreasing patient's risk for falling and improving patient to 40%<60% impaired, limited, or restricted category.     Long Term Goals: 8 weeks   1. This patient will be independent with an updated HEP.  2. This patient will increase B LE strength to 5/5 in order to be able to ascend/desccend stairs with a reciprocal gait pattern with no difficulty.   3. This patient will be able to ambulate greater than 200 yards on even surfaces with a normal gait pattern and no LOB with least restrictive AD.  4. Patient will be able to achieve greater than or equal to 21/24 on the Dynamic Gait Index decreasing patient's risk for falling on uneven surfaces.   5. Patient will be able to achieve greater than or equal to 60 on the FOTO Vertigo decreasing patient's risk for falling and improving patient to 40%<60% impaired, limited, or restricted category.     Plan     Continue with the plan of care and progress as the patient tolerates.     Tonie Vasquez, PT

## 2018-10-31 DIAGNOSIS — A31.0 NONTUBERCULOUS MYCOBACTERIAL DISEASE OF LUNG: Primary | ICD-10-CM

## 2019-03-12 ENCOUNTER — HOSPITAL ENCOUNTER (OUTPATIENT)
Dept: RADIOLOGY | Facility: HOSPITAL | Age: 79
Discharge: HOME | End: 2019-03-12
Attending: INTERNAL MEDICINE
Payer: MEDICARE

## 2019-03-12 DIAGNOSIS — A31.0 NONTUBERCULOUS MYCOBACTERIAL DISEASE OF LUNG: ICD-10-CM

## 2019-03-12 PROCEDURE — 71046 X-RAY EXAM CHEST 2 VIEWS: CPT | Mod: TC,FY,PO

## 2019-04-15 ENCOUNTER — HOSPITAL ENCOUNTER (EMERGENCY)
Facility: HOSPITAL | Age: 79
Discharge: HOME | End: 2019-04-15
Payer: MEDICARE

## 2019-04-15 VITALS
HEIGHT: 68 IN | TEMPERATURE: 98 F | BODY MASS INDEX: 24.25 KG/M2 | OXYGEN SATURATION: 99 % | DIASTOLIC BLOOD PRESSURE: 75 MMHG | HEART RATE: 78 BPM | RESPIRATION RATE: 18 BRPM | SYSTOLIC BLOOD PRESSURE: 169 MMHG | WEIGHT: 160 LBS

## 2019-04-15 DIAGNOSIS — M25.572 LEFT ANKLE PAIN: ICD-10-CM

## 2019-04-15 DIAGNOSIS — M54.42 ACUTE LEFT-SIDED LOW BACK PAIN WITH LEFT-SIDED SCIATICA: Primary | ICD-10-CM
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
## 2019-04-15 PROCEDURE — 99283 EMERGENCY DEPT VISIT LOW MDM: CPT | Mod: 25,ER

## 2019-04-15 PROCEDURE — 25000003 PHARM REV CODE 250: Mod: ER | Performed by: PHYSICIAN ASSISTANT

## 2019-04-15 RX ORDER — METHOCARBAMOL 500 MG/1
500 TABLET, FILM COATED ORAL 2 TIMES DAILY PRN
Qty: 14 TABLET | Refills: 0 | Status: SHIPPED | OUTPATIENT
Start: 2019-04-15 | End: 2019-04-22

## 2019-04-15 RX ORDER — CLOPIDOGREL BISULFATE 75 MG/1
75 TABLET ORAL DAILY
COMMUNITY
End: 2023-03-06 | Stop reason: SDUPTHER

## 2019-04-15 RX ORDER — METHOCARBAMOL 500 MG/1
500 TABLET, FILM COATED ORAL
Status: COMPLETED | OUTPATIENT
Start: 2019-04-15 | End: 2019-04-15

## 2019-04-15 RX ORDER — FLUTICASONE FUROATE AND VILANTEROL 200; 25 UG/1; UG/1
1 POWDER RESPIRATORY (INHALATION) DAILY
COMMUNITY
End: 2023-03-06

## 2019-04-15 RX ADMIN — METHOCARBAMOL TABLETS 500 MG: 500 TABLET, COATED ORAL at 01:04

## 2019-04-15 NOTE — ED PROVIDER NOTES
Encounter Date: 4/15/2019       History     Chief Complaint   Patient presents with    Hip Pain     PT reports left hip and leg pain x 3 weeks after a fall    Leg Pain     Patient is a 79-year-old female presenting to ED today with complaints of left ankle pain and and left leg pain from hip down to her toes.  Patient reports falling down from a stepladder 3 weeks ago landing on her left leg.  Patient denies any head injury or LOC to during the incident.  Patient reports initially not experiencing any discomfort although she has progressively experience persistently worsening constant pain. Patient reports pain is worse with ambulation.  Patient reports intermittent shooting pain down left buttocks.  Patient denies any saddle paresthesias, urinary/bowel dysfunction or any other physical complaints this time.  Patient with baseline diabetic neuropathies for which she takes gabapentin for.  No alleviating factors noted. No other complaints at this time.    The history is provided by the patient.     Review of patient's allergies indicates:  No Known Allergies  Past Medical History:   Diagnosis Date    Asthma     Diabetes mellitus     Hypertension     Lipidemia      Past Surgical History:   Procedure Laterality Date    ANKLE SURGERY Left      SECTION      x 3    EXAM UNDER ANESTHESIA Right 10/27/2015    Performed by [facility] at [facility]    HYSTERECTOMY      OPEN REDUCTION INTERNAL FIXATION-ANKLE Left 10/27/2015    [facility]    REMOVAL-HARDWARE-ANKLE Left 2016    Performed by [facility] at [facility] OR     Family History   Problem Relation Age of Onset    Hypertension Daughter      Social History     Tobacco Use    Smoking status: Never Smoker    Smokeless tobacco: Never Used   Substance Use Topics    Alcohol use: No    Drug use: No     Review of Systems   Constitutional: Negative for chills, diaphoresis, fatigue and fever.   HENT: Negative for  congestion, ear discharge, ear pain, facial swelling, nosebleeds, sinus pain, sore throat and trouble swallowing.    Eyes: Negative for photophobia, pain, redness and visual disturbance.   Respiratory: Negative for cough, choking, chest tightness, shortness of breath, wheezing and stridor.    Cardiovascular: Negative for chest pain, palpitations and leg swelling.   Gastrointestinal: Negative for abdominal distention, abdominal pain, blood in stool, constipation, diarrhea, nausea and vomiting.   Endocrine: Negative.    Genitourinary: Negative for difficulty urinating, dysuria, flank pain, hematuria and pelvic pain.   Musculoskeletal: Positive for arthralgias. Negative for back pain, myalgias and neck pain.   Skin: Negative.    Allergic/Immunologic: Negative.    Neurological: Negative for dizziness, tremors, syncope, weakness, light-headedness, numbness and headaches.   Hematological: Negative.    Psychiatric/Behavioral: Negative.        Physical Exam     Initial Vitals [04/15/19 1240]   BP Pulse Resp Temp SpO2   (!) 178/79 76 20 97.7 °F (36.5 °C) 96 %      MAP       --         Physical Exam    Nursing note and vitals reviewed.  Constitutional: Vital signs are normal. She appears well-developed and well-nourished. She is not diaphoretic. No distress.   Patient is a 79-year-old female sitting upright in bed in no acute distress, nontoxic, AAO x4 and breathing comfortably on room air.   HENT:   Head: Normocephalic and atraumatic.   Right Ear: External ear normal.   Left Ear: External ear normal.   Nose: Nose normal.   Mouth/Throat: Oropharynx is clear and moist. No oropharyngeal exudate.   Eyes: Conjunctivae and EOM are normal. Pupils are equal, round, and reactive to light.   Neck: Trachea normal and normal range of motion. Neck supple.   Cardiovascular: Normal rate, regular rhythm, normal heart sounds and intact distal pulses.   Pulses:       Radial pulses are 2+ on the right side, and 2+ on the left side.         Dorsalis pedis pulses are 2+ on the right side, and 2+ on the left side.        Posterior tibial pulses are 2+ on the right side, and 2+ on the left side.   Pulmonary/Chest: Breath sounds normal. No respiratory distress. She has no wheezes. She exhibits no tenderness.   Abdominal: Soft. Bowel sounds are normal. She exhibits no distension. There is no tenderness. There is no rigidity, no rebound, no guarding and no CVA tenderness.   Musculoskeletal: Normal range of motion. She exhibits tenderness. She exhibits no edema.   Left hip full active range of motion in regards to flexion, extension, internal and external rotation without difficulty.  Left hip joint benign.  Left knee normal patellar tracking, full flexion and extension, no joint laxity in varus and valgus testing.  Stable left knee joint.  Left ankle full plantar flexion and dorsiflexion with mild appreciable discomfort.  No bony deformity.  Negative Achilles testing.  Distal pulses intact, normal sensation throughout.  Left lower lumbar muscular region mild tenderness with reproducible left-sided sciatic discomfort with deep palpation of the area.  Generalized lumbar midline spinal tenderness with no focal/point tenderness, no step-off or bony deformity.   Neurological: She is alert and oriented to person, place, and time. She has normal strength. She displays no tremor. No sensory deficit. She exhibits normal muscle tone. She displays no seizure activity. Coordination normal. GCS eye subscore is 4. GCS verbal subscore is 5. GCS motor subscore is 6.   Skin: Skin is warm and dry. Capillary refill takes less than 2 seconds.         ED Course   Procedures  Labs Reviewed - No data to display       Imaging Results          X-Ray Ankle Complete Left (Final result)  Result time 04/15/19 13:50:46    Final result by Amado Flynn III, MD (04/15/19 13:50:46)                 Impression:      No acute findings.  Chronic findings, as above.      Electronically signed  by: Amado Flynn MD  Date:    04/15/2019  Time:    13:50             Narrative:    EXAMINATION:  XR ANKLE COMPLETE 3 VIEW LEFT    CLINICAL HISTORY:  Pain in left ankle and joints of left foot    FINDINGS:  Comparison is made to prior x-ray from 07/27/2016 there has been interval hardware removal from the distal tibia and fibula without evidence of acute complication.  There are chronic healed bimalleolar fractures.  No evidence of fracture nonunion.  No acute fracture identified.  Joint alignment is anatomic.  No radiographic evidence of osteomyelitis.  No advanced arthritic change is evident.                               X-Ray Lumbar Spine Ap And Lateral (Final result)  Result time 04/15/19 13:53:18    Final result by Amado Flynn III, MD (04/15/19 13:53:18)                 Impression:      No acute abnormality identified in the lumbar spine.      Electronically signed by: Amado Flynn MD  Date:    04/15/2019  Time:    13:53             Narrative:    EXAMINATION:  XR LUMBAR SPINE AP AND LATERAL    CLINICAL HISTORY:  Low back pain, minor trauma;    TECHNIQUE:  AP, lateral and spot images were performed of the lumbar spine.    COMPARISON:  None    FINDINGS:  There is no evidence of fracture.  Stable mild thoracolumbar dextroscoliosis.  Vertebral body alignment is within normal limits.  There is degenerative disc disease from T12-L1 through L2-3 with disc height loss, endplate osteophytes and intradiscal vacuum phenomena at L1-2.  There are bridging calcifications across the L5-S1 disc with minimal disc height loss.  Small anterior osteophytes at L3-4 without significant disc height loss.  The remaining intervertebral disc heights appear relatively well preserved. The visualized sacrum and sacroiliac joints appear intact.                                 Medical Decision Making:   Initial Assessment:   Patient is a 79-year-old female presenting to ED today with complaints of left ankle pain and and left leg  pain from hip down to her toes.  Patient reports falling down from a stepladder 3 weeks ago landing on her left leg.  Patient denies any head injury or LOC to during the incident.  Patient reports initially not experiencing any discomfort although she has progressively experience persistently worsening constant pain. Patient reports pain is worse with ambulation.  Patient reports intermittent shooting pain down left buttocks.  Patient denies any saddle paresthesias, urinary/bowel dysfunction or any other physical complaints this time.  Patient with baseline diabetic neuropathies for which she takes gabapentin for.  No alleviating factors noted. No other complaints at this time.  Differential Diagnosis:   Ankle sprain  Sciatica  Muscle strain  Muscle spasm    Clinical Tests:   Radiological Study: Ordered and Reviewed  ED Management:  Discussed care plan with patient.  Discussed x-ray imaging of left ankle which was benign showing stable old healed fractures postoperative healing.  Also discussed x-ray imaging of lumbar spine which is benign.  Mild early arthritis.  Patient with fully intact neurovascular examination, able to ambulate despite her discomfort.  Patient educated on symptomatic management of her discomfort as well as the importance of PCP follow-up an ED return precautions.  Patient on blood thinner daily, already on gabapentin as well, will at [facility] to assist.  Patient is stable, no acute distress, nontoxic, neurovascular intact vital signs stable, all questions answered.                      Clinical Impression:       ICD-10-CM ICD-9-CM   1. Acute left-sided low back pain with left-sided sciatica M54.42 724.2     724.3   2. Left ankle pain M25.572 719.47   3. Fall, initial encounter W19.XXXA E888.9                                Lizzy Harrison PA-C  04/15/19 2931

## 2019-04-15 NOTE — ED NOTES
Patient states that the pain started after a fall 3 weeks ago. Patient states that the pain is constant. Patient states that the pain radiates from her left lower extremity up to her shoulder. Patient states that she's been using OTC medication and creams with out any relief.

## 2019-04-15 NOTE — DISCHARGE INSTRUCTIONS
Ambulate and range as tolerated.  Elevate and ice left ankle for discomfort.  Apply warm compresses to left lower back/upper buttocks.  Take prescribed medication to assist management.  Follow up with PCP for continued care.  Return to ED with any worsening of symptoms or condition.

## 2019-06-17 ENCOUNTER — HOSPITAL ENCOUNTER (EMERGENCY)
Facility: HOSPITAL | Age: 79
Discharge: HOME | End: 2019-06-17
Attending: EMERGENCY MEDICINE
Payer: MEDICARE

## 2019-06-17 VITALS
WEIGHT: 162 LBS | DIASTOLIC BLOOD PRESSURE: 103 MMHG | HEART RATE: 72 BPM | SYSTOLIC BLOOD PRESSURE: 224 MMHG | TEMPERATURE: 98 F | RESPIRATION RATE: 15 BRPM | BODY MASS INDEX: 24.63 KG/M2 | OXYGEN SATURATION: 96 %

## 2019-06-17 DIAGNOSIS — M53.3 COCCYX PAIN: ICD-10-CM

## 2019-06-17 DIAGNOSIS — R10.2 PELVIC PAIN: ICD-10-CM

## 2019-06-17 DIAGNOSIS — M54.50 ACUTE LOW BACK PAIN WITHOUT SCIATICA, UNSPECIFIED BACK PAIN LATERALITY: Primary | ICD-10-CM

## 2019-06-17 PROCEDURE — 96372 THER/PROPH/DIAG INJ SC/IM: CPT | Mod: ER

## 2019-06-17 PROCEDURE — 63600175 PHARM REV CODE 636 W HCPCS: Mod: ER | Performed by: EMERGENCY MEDICINE

## 2019-06-17 PROCEDURE — 99284 EMERGENCY DEPT VISIT MOD MDM: CPT | Mod: 25,ER

## 2019-06-17 RX ORDER — METHOCARBAMOL 500 MG/1
1000 TABLET, FILM COATED ORAL 3 TIMES DAILY
Qty: 30 TABLET | Refills: 0 | Status: SHIPPED | OUTPATIENT
Start: 2019-06-17 | End: 2019-06-22

## 2019-06-17 RX ORDER — KETOROLAC TROMETHAMINE 30 MG/ML
30 INJECTION, SOLUTION INTRAMUSCULAR; INTRAVENOUS
Status: COMPLETED | OUTPATIENT
Start: 2019-06-17 | End: 2019-06-17

## 2019-06-17 RX ORDER — TRAMADOL HYDROCHLORIDE 50 MG/1
50 TABLET ORAL EVERY 6 HOURS PRN
Qty: 20 TABLET | Refills: 0 | Status: SHIPPED | OUTPATIENT
Start: 2019-06-17 | End: 2023-03-06

## 2019-06-17 RX ADMIN — KETOROLAC TROMETHAMINE 30 MG: 30 INJECTION, SOLUTION INTRAMUSCULAR at 08:06

## 2019-06-17 NOTE — DISCHARGE INSTRUCTIONS
Moist heat  Avoid lifting  Take your blood pressure medication on return home  Follow-up with your primary care provider in 3-5 days  Return for worsening symptoms.

## 2019-06-17 NOTE — ED PROVIDER NOTES
Encounter Date: 2019       History     Chief Complaint   Patient presents with    Back Pain     I fell on my tail bone and then i picked up a cinder block and fell again. My tail bone and the right side of my lower back are hurting.     79-year-old female presents to the emergency department plan low back pain and tailbone pain onset Thursday after attempting to lift patient states she subsequently fell.  Patient denies any saddle anesthesia incontinence or ft drop.        Review of patient's allergies indicates:  No Known Allergies  Past Medical History:   Diagnosis Date    Asthma     Diabetes mellitus     Hypertension     Lipidemia      Past Surgical History:   Procedure Laterality Date    ANKLE SURGERY Left      SECTION      x 3    EXAM UNDER ANESTHESIA Right 10/27/2015    [facility]    HYSTERECTOMY      OPEN REDUCTION INTERNAL FIXATION-ANKLE Left 10/27/2015    [facility]    REMOVAL-HARDWARE-ANKLE Left 2016    [facility]     Family History   Problem Relation Age of Onset    Hypertension Daughter      Social History     Tobacco Use    Smoking status: Never Smoker    Smokeless tobacco: Never Used   Substance Use Topics    Alcohol use: No    Drug use: No     Review of Systems   HENT: Negative.    Respiratory: Negative.    Cardiovascular: Negative.    Gastrointestinal: Negative.    Genitourinary: Negative.    Musculoskeletal: Positive for back pain.   All other systems reviewed and are negative.      Physical Exam     Initial Vitals [19 0822]   BP Pulse Resp Temp SpO2   (!) 224/103 72 15 98 °F (36.7 °C) 96 %      MAP       --         Physical Exam    Constitutional: She appears well-developed and well-nourished.   HENT:   Head: Normocephalic.   Eyes: Pupils are equal, round, and reactive to light.   Pulmonary/Chest: No respiratory distress.   Abdominal: Soft. Bowel sounds are  normal.   Musculoskeletal:   Positive tenderness from L3 L4 through S1.         ED Course   Procedures  Labs Reviewed - No data to display       Imaging Results    None       X-Rays:   Independently Interpreted Readings:   Other Readings:  LS spine pelvis sacrum and coccyx films were reviewed and no acute fractures noted                         Clinical Impression:       ICD-10-CM ICD-9-CM   1. Acute low back pain without sciatica, unspecified back pain laterality M54.5 724.2   2. Pelvic pain R10.2 [id number]   3. Coccyx pain M53.3 724.79                                Jin Friedman DO  06/17/19 0910

## 2019-06-17 NOTE — ED NOTES
Pt reports 4 days ago this happened. I havent been home bc I came home from work. I havent taken my bp medicine.

## 2019-06-26 ENCOUNTER — HOSPITAL ENCOUNTER (EMERGENCY)
Facility: HOSPITAL | Age: 79
Discharge: HOME | End: 2019-06-26
Attending: FAMILY MEDICINE
Payer: MEDICARE

## 2019-06-26 VITALS
SYSTOLIC BLOOD PRESSURE: 133 MMHG | DIASTOLIC BLOOD PRESSURE: 64 MMHG | BODY MASS INDEX: 25.01 KG/M2 | HEIGHT: 68 IN | RESPIRATION RATE: 18 BRPM | TEMPERATURE: 98 F | WEIGHT: 165 LBS | HEART RATE: 89 BPM | OXYGEN SATURATION: 96 %

## 2019-06-26 DIAGNOSIS — R10.2 PELVIC PAIN: Primary | ICD-10-CM

## 2019-06-26 DIAGNOSIS — R52 PAIN: ICD-10-CM

## 2019-06-26 DIAGNOSIS — M54.50 ACUTE BILATERAL LOW BACK PAIN WITHOUT SCIATICA: ICD-10-CM

## 2019-06-26 PROCEDURE — 63600175 PHARM REV CODE 636 W HCPCS: Mod: ER | Performed by: FAMILY MEDICINE

## 2019-06-26 PROCEDURE — 99284 EMERGENCY DEPT VISIT MOD MDM: CPT | Mod: 25,ER

## 2019-06-26 PROCEDURE — 96372 THER/PROPH/DIAG INJ SC/IM: CPT | Mod: ER

## 2019-06-26 RX ORDER — CYCLOBENZAPRINE HCL 10 MG
10 TABLET ORAL 3 TIMES DAILY PRN
Qty: 30 TABLET | Refills: 0 | Status: SHIPPED | OUTPATIENT
Start: 2019-06-26 | End: 2023-03-06

## 2019-06-26 RX ORDER — DICLOFENAC SODIUM 50 MG/1
50 TABLET, DELAYED RELEASE ORAL 2 TIMES DAILY
Qty: 20 TABLET | Refills: 0 | Status: SHIPPED | OUTPATIENT
Start: 2019-06-26 | End: 2023-03-06

## 2019-06-26 RX ORDER — KETOROLAC TROMETHAMINE 30 MG/ML
30 INJECTION, SOLUTION INTRAMUSCULAR; INTRAVENOUS
Status: COMPLETED | OUTPATIENT
Start: 2019-06-26 | End: 2019-06-26

## 2019-06-26 RX ADMIN — KETOROLAC TROMETHAMINE 30 MG: 30 INJECTION, SOLUTION INTRAMUSCULAR at 01:06

## 2019-06-26 NOTE — ED PROVIDER NOTES
Encounter Date: 2019       History     Chief Complaint   Patient presents with    Back Pain     Lower back pain      79-year-old female slipped and fell about a week ago complains of bilateral pelvic pain. She was here in the ER and had x-rays of spine which were negative.  She was discharged on tramadol and Robaxin.  Patient claims her pain did not get better and persist.  Especially while sitting and sleeping in the bed.  There is no tingling or numbness in lower legs.  No bowel or bladder incontinence.  Patient tried to make appointment with PCP which is 2 weeks away from now.  S pain persists she came to the ER further evaluation management.    The history is provided by the patient.     Review of patient's allergies indicates:  No Known Allergies  Past Medical History:   Diagnosis Date    Asthma     Diabetes mellitus     Hypertension     Lipidemia      Past Surgical History:   Procedure Laterality Date    ANKLE SURGERY Left      SECTION      x 3    EXAM UNDER ANESTHESIA Right 10/27/2015    [facility]    HYSTERECTOMY      OPEN REDUCTION INTERNAL FIXATION-ANKLE Left 10/27/2015    [facility]    REMOVAL-HARDWARE-ANKLE Left 2016    [facility]     Family History   Problem Relation Age of Onset    Hypertension Daughter      Social History     Tobacco Use    Smoking status: Never Smoker    Smokeless tobacco: Never Used   Substance Use Topics    Alcohol use: No    Drug use: No     Review of Systems   Constitutional: Negative for activity change, appetite change, chills and fever.   HENT: Negative for congestion, ear discharge, rhinorrhea, sinus pressure, sinus pain, sore throat and trouble swallowing.    Eyes: Negative for photophobia, pain, discharge, redness, itching and visual disturbance.   Respiratory: Negative for cough, chest tightness, shortness of breath and wheezing.     Cardiovascular: Negative for chest pain, palpitations and leg swelling.   Gastrointestinal: Negative for abdominal distention, abdominal pain, constipation, diarrhea, nausea and vomiting.   Genitourinary: Negative for dysuria, flank pain, frequency and hematuria.   Musculoskeletal: Positive for back pain. Negative for gait problem, neck pain and neck stiffness.   Skin: Negative for rash and wound.   Neurological: Negative for dizziness, tremors, seizures, syncope, speech difficulty, weakness, light-headedness, numbness and headaches.   Psychiatric/Behavioral: Negative for behavioral problems, confusion, hallucinations and sleep disturbance. The patient is not nervous/anxious.    All other systems reviewed and are negative.      Physical Exam     Initial Vitals [06/26/19 1256]   BP Pulse Resp Temp SpO2   133/64 89 18 98.4 °F (36.9 °C) 96 %      MAP       --         Physical Exam    Nursing note and vitals reviewed.  Constitutional: Vital signs are normal. She appears well-developed and well-nourished. She is active.   HENT:   Head: Normocephalic and atraumatic.   Nose: Nose normal.   Mouth/Throat: Oropharynx is clear and moist.   Eyes: Conjunctivae and lids are normal.   Neck: Trachea normal, normal range of motion and full passive range of motion without pain. Neck supple. Normal range of motion present. No neck rigidity.   Cardiovascular: Normal rate, regular rhythm, S1 normal, S2 normal, normal heart sounds, intact distal pulses and normal pulses.   Pulmonary/Chest: Breath sounds normal.   Abdominal: Soft. Normal appearance and bowel sounds are normal. She exhibits no distension. There is no tenderness.   Musculoskeletal: Normal range of motion.        Left hip: She exhibits tenderness. She exhibits normal range of motion, normal strength and no deformity.        Legs:  Lymphadenopathy:     She has no cervical adenopathy.   Neurological: She is alert and oriented to person, place, and time. She has normal  strength and normal reflexes. No cranial nerve deficit or sensory deficit. GCS score is 15. GCS eye subscore is 4. GCS verbal subscore is 5. GCS motor subscore is 6.   Skin: Skin is warm and intact. Capillary refill takes less than 2 seconds. No abrasion, no bruising and no rash noted.   Psychiatric: She has a normal mood and affect. Her speech is normal and behavior is normal. Judgment and thought content normal. She is not actively hallucinating. Cognition and memory are normal. She is attentive.         ED Course   Procedures  Labs Reviewed - No data to display       Imaging Results    None          Medical Decision Making:   Differential Diagnosis:   Pelvic fracture, hip fracture, lumbar fracture, contusion, muscle strain.  Clinical Tests:   Radiological Study: Ordered and Reviewed  ED Management:  Patient seen last week for fall on her back had several x-rays done which without any acute fractures.  This patient continued to have pain and has difficulty in sitting and rolling in her bed.  She is able to bear weight and walk normally.  An x-ray of pelvis is negative except for arthritis.  She is given Toradol in the ER along with a prescription for diclofenac sodium and Flexeril.  Advised to follow up with the primary care physician if symptoms persist or worsen.                      Clinical Impression:       ICD-10-CM ICD-9-CM   1. Pelvic pain R10.2 [id number]   2. Pain R52 780.96   3. Acute bilateral low back pain without sciatica M54.5 724.2     338.19         Disposition:   Disposition: Discharged  Condition: Stable                        Parker Santacruz MD  06/26/19 3065

## 2019-08-18 ENCOUNTER — HOSPITAL ENCOUNTER (EMERGENCY)
Facility: HOSPITAL | Age: 79
Discharge: HOME | End: 2019-08-18
Attending: FAMILY MEDICINE
Payer: MEDICARE

## 2019-08-18 VITALS
TEMPERATURE: 99 F | OXYGEN SATURATION: 96 % | RESPIRATION RATE: 18 BRPM | HEART RATE: 83 BPM | SYSTOLIC BLOOD PRESSURE: 148 MMHG | DIASTOLIC BLOOD PRESSURE: 64 MMHG | WEIGHT: 165 LBS | BODY MASS INDEX: 25.01 KG/M2 | HEIGHT: 68 IN
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
## 2019-08-18 DIAGNOSIS — L03.116 CELLULITIS AND ABSCESS OF LEFT LEG: ICD-10-CM

## 2019-08-18 DIAGNOSIS — L02.416 CELLULITIS AND ABSCESS OF LEFT LEG: ICD-10-CM

## 2019-08-18 LAB — POCT GLUCOSE: 270 MG/DL (ref 70–110)

## 2019-08-18 PROCEDURE — 82962 GLUCOSE BLOOD TEST: CPT | Mod: ER

## 2019-08-18 PROCEDURE — 25000003 PHARM REV CODE 250: Mod: ER | Performed by: FAMILY MEDICINE

## 2019-08-18 PROCEDURE — 10060 I&D ABSCESS SIMPLE/SINGLE: CPT

## 2019-08-18 PROCEDURE — 99283 EMERGENCY DEPT VISIT LOW MDM: CPT | Mod: 25,ER

## 2019-08-18 RX ORDER — SULFAMETHOXAZOLE AND TRIMETHOPRIM 800; 160 MG/1; MG/1
1 TABLET ORAL 2 TIMES DAILY
Qty: 14 TABLET | Refills: 0 | Status: SHIPPED | OUTPATIENT
Start: 2019-08-18 | End: 2019-08-25

## 2019-08-18 RX ADMIN — BACITRACIN ZINC, NEOMYCIN SULFATE, POLYMYXIN B SULFATE 1 EACH: 3.5; 5000; 4 OINTMENT TOPICAL at 11:08

## 2019-08-18 RX ADMIN — LIDOCAINE HYDROCHLORIDE 5 ML: 10; .005 INJECTION, SOLUTION EPIDURAL; INFILTRATION; INTRACAUDAL; PERINEURAL at 11:08

## 2019-08-18 NOTE — ED PROVIDER NOTES
Encounter Date: 2019       History     Chief Complaint   Patient presents with    Insect Bite     Pt with c/o wound to L lower back that patient noticed yesterday. Pt reports wound drained yesterday but was unable to tell what drain out of the wound. pt reports she does have brown recluse spiders in her home. Her daughter was bitten by one last week. Pt denies fever.      79-year-old female presents with chief complaint of insect bite to her left buttock.  Patient reports has a history of diabetes.  Initially thought she had a and bite and was able to express some drainage from the area.  Has been applying antibiotic ointment but does not appear to be getting any better.  Patient reports became concerned that maybe she had an abscess tear.  Patient also reports has a history of diabetes.        Review of patient's allergies indicates:  No Known Allergies  Past Medical History:   Diagnosis Date    Asthma     Diabetes mellitus     Hypertension     Lipidemia      Past Surgical History:   Procedure Laterality Date    ANKLE SURGERY Left      SECTION      x 3    EXAM UNDER ANESTHESIA Right 10/27/2015    Performed [facility] at [facility]    HYSTERECTOMY      OPEN REDUCTION INTERNAL FIXATION-ANKLE Left 10/27/2015    [facility]    REMOVAL-HARDWARE-ANKLE Left 2016    Per[facility] by [facility] at [facility]     Family History   Problem Relation Age of Onset    Hypertension Daughter      Social History     Tobacco Use    Smoking status: Never Smoker    Smokeless tobacco: Never Used   Substance Use Topics    Alcohol use: No    Drug use: No     Review of Systems   Constitutional: Negative for chills and fever.   Skin: Positive for wound.   All other systems reviewed and are negative.      Physical Exam     Initial Vitals [19 1051]   BP Pulse Resp Temp SpO2   (!) 148/64 83 18 98.6 °F (37 °C) 96 %      MAP       --         Physical Exam    Nursing  note and vitals reviewed.  Constitutional: She appears well-developed and well-nourished.   HENT:   Head: Normocephalic and atraumatic.   Eyes: EOM are normal. Pupils are equal, round, and reactive to light.   Neck: Normal range of motion. Neck supple.   Cardiovascular: Normal rate, regular rhythm and normal heart sounds.   Pulmonary/Chest: Breath sounds normal.   Abdominal: Soft. Bowel sounds are normal.   Musculoskeletal: Normal range of motion.   Neurological: She is alert and oriented to person, place, and time. She has normal strength. GCS score is 15. GCS eye subscore is 4. GCS verbal subscore is 5. GCS motor subscore is 6.   Skin: Skin is warm. Capillary refill takes less than 2 seconds.   Psychiatric: She has a normal mood and affect. Her behavior is normal.         ED Course   I & D - Incision and Drainage  Date/Time: 8/18/2019 11:19 AM  Performed by: Brien Poe MD  Authorized by: Brien Poe MD   Pre-operative diagnosis: abscess  Post-operative diagnosis: abscess  Consent Done: Not Needed  Type: abscess  Location: left buttock.  Anesthesia: local infiltration    Anesthesia:  Local Anesthetic: lidocaine 1% with epinephrine  Anesthetic total: 2 mL  Patient sedated: no  Scalpel size: 11  Incision type: single straight  Complexity: simple  Drainage: pus  Drainage amount: scant  Wound treatment: incision  Complications: No  Specimens: No  Implants: No  Patient tolerance: Patient tolerated the procedure well with no immediate complications        Labs Reviewed   POCT GLUCOSE - Abnormal; Notable for the following components:       Result Value    POCT Glucose 270 (*)     All other components within normal limits   POCT GLUCOSE MONITORING CONTINUOUS          Imaging Results    None                               Clinical Impression:       ICD-10-CM ICD-9-CM   1. Insect bite, initial encounter W57.XXXA 919.4     E906.4   2. Cellulitis and abscess of left leg L03.116 682.6    L02.416                                  Brien Poe MD  08/18/19 3221

## 2019-08-21 ENCOUNTER — HOSPITAL ENCOUNTER (EMERGENCY)
Facility: HOSPITAL | Age: 79
Discharge: HOME | End: 2019-08-21
Attending: FAMILY MEDICINE
Payer: MEDICARE

## 2019-08-21 VITALS
WEIGHT: 165 LBS | RESPIRATION RATE: 18 BRPM | OXYGEN SATURATION: 95 % | SYSTOLIC BLOOD PRESSURE: 165 MMHG | DIASTOLIC BLOOD PRESSURE: 72 MMHG | BODY MASS INDEX: 25.01 KG/M2 | HEIGHT: 68 IN | TEMPERATURE: 99 F | HEART RATE: 82 BPM

## 2019-08-21 DIAGNOSIS — R21 RASH DUE TO ALLERGY: ICD-10-CM

## 2019-08-21 DIAGNOSIS — L02.31 ABSCESS, GLUTEAL, LEFT: Primary | ICD-10-CM

## 2019-08-21 DIAGNOSIS — T78.40XA RASH DUE TO ALLERGY: ICD-10-CM

## 2019-08-21 PROCEDURE — S0077 INJECTION, CLINDAMYCIN PHOSP: HCPCS | Mod: ER | Performed by: FAMILY MEDICINE

## 2019-08-21 PROCEDURE — 25000003 PHARM REV CODE 250: Mod: ER | Performed by: FAMILY MEDICINE

## 2019-08-21 PROCEDURE — 63600175 PHARM REV CODE 636 W HCPCS: Mod: ER | Performed by: FAMILY MEDICINE

## 2019-08-21 PROCEDURE — 99284 EMERGENCY DEPT VISIT MOD MDM: CPT | Mod: 25,ER

## 2019-08-21 PROCEDURE — 96372 THER/PROPH/DIAG INJ SC/IM: CPT | Mod: ER

## 2019-08-21 RX ORDER — DIPHENHYDRAMINE HCL 25 MG
25 CAPSULE ORAL
Status: COMPLETED | OUTPATIENT
Start: 2019-08-21 | End: 2019-08-21

## 2019-08-21 RX ORDER — CLINDAMYCIN PHOSPHATE 150 MG/ML
600 INJECTION, SOLUTION INTRAVENOUS
Status: COMPLETED | OUTPATIENT
Start: 2019-08-21 | End: 2019-08-21

## 2019-08-21 RX ORDER — PREDNISONE 20 MG/1
60 TABLET ORAL
Status: COMPLETED | OUTPATIENT
Start: 2019-08-21 | End: 2019-08-21

## 2019-08-21 RX ORDER — CLINDAMYCIN HYDROCHLORIDE 300 MG/1
300 CAPSULE ORAL EVERY 8 HOURS
Qty: 30 CAPSULE | Refills: 1 | Status: SHIPPED | OUTPATIENT
Start: 2019-08-21 | End: 2023-03-06

## 2019-08-21 RX ADMIN — PREDNISONE 60 MG: 20 TABLET ORAL at 12:08

## 2019-08-21 RX ADMIN — DIPHENHYDRAMINE HYDROCHLORIDE 25 MG: 25 CAPSULE ORAL at 12:08

## 2019-08-21 RX ADMIN — CLINDAMYCIN PHOSPHATE 600 MG: 150 INJECTION, SOLUTION INTRAMUSCULAR; INTRAVENOUS at 12:08

## 2019-08-21 NOTE — ED PROVIDER NOTES
Encounter Date: 2019       History     Chief Complaint   Patient presents with    Allergic Reaction     Pt reports she started taking Bactrim on 19 and yesterday started breaking out with a rash to her R leg. Pt denies any other complaints.      Patient claims to have insect bite left gluteal area.  She was seen and prescribed Bactrim.  Today she complains of rash in her right lower leg.  Left gluteal area has been draining pus.  No fever.  No nausea or vomiting. No chest pain or shortness of breath. Rash is not itching.  Patient does not know if she has any allergies to Bactrim.    The history is provided by the patient.     Review of patient's allergies indicates:  No Known Allergies  Past Medical History:   Diagnosis Date    Asthma     Diabetes mellitus     Hypertension     Lipidemia      Past Surgical History:   Procedure Laterality Date    ANKLE SURGERY Left      SECTION      x 3    EXAM UNDER ANESTHESIA Right 10/27/2015    [facility]    HYSTERECTOMY      OPEN REDUCTION INTERNAL FIXATION-ANKLE Left 10/27/2015    [facility]    REMOVAL-HARDWARE-ANKLE Left 2016    Per[facility] by [facility] at [facility]     Family History   Problem Relation Age of Onset    Hypertension Daughter      Social History     Tobacco Use    Smoking status: Never Smoker    Smokeless tobacco: Never Used   Substance Use Topics    Alcohol use: No    Drug use: No     Review of Systems   Constitutional: Negative for activity change, appetite change, chills and fever.   HENT: Negative for congestion.    Eyes: Negative for pain, discharge, redness and itching.   Respiratory: Negative for shortness of breath and wheezing.    Cardiovascular: Negative for chest pain and palpitations.   Gastrointestinal: Negative for abdominal distention, abdominal pain, nausea and vomiting.   Genitourinary: Negative for dysuria.   Musculoskeletal: Negative  for neck pain and neck stiffness.   Skin: Positive for rash.   Neurological: Negative for dizziness.   All other systems reviewed and are negative.      Physical Exam     Initial Vitals [08/21/19 1202]   BP Pulse Resp Temp SpO2   (!) 165/72 82 18 98.6 °F (37 °C) 95 %      MAP       --         Physical Exam    Nursing note and vitals reviewed.  Constitutional: Vital signs are normal. She appears well-developed and well-nourished. She is active.   HENT:   Head: Normocephalic and atraumatic.   Nose: Nose normal.   Mouth/Throat: Oropharynx is clear and moist.   Eyes: Conjunctivae and lids are normal.   Neck: Trachea normal, normal range of motion and full passive range of motion without pain. Neck supple. Normal range of motion present. No neck rigidity.   Cardiovascular: Normal rate, regular rhythm, S1 normal, S2 normal, normal heart sounds, intact distal pulses and normal pulses.   Pulmonary/Chest: Breath sounds normal. No respiratory distress. She has no wheezes. She has no rales.   Abdominal: Soft. Normal appearance and bowel sounds are normal. She exhibits no distension. There is no tenderness. There is no guarding.   Musculoskeletal: Normal range of motion.   Lymphadenopathy:     She has no cervical adenopathy.   Neurological: She is alert and oriented to person, place, and time. She has normal strength and normal reflexes. No cranial nerve deficit or sensory deficit. GCS score is 15. GCS eye subscore is 4. GCS verbal subscore is 5. GCS motor subscore is 6.   Skin: Skin is warm and intact. Capillary refill takes less than 2 seconds. Rash noted. No abrasion and no bruising noted. Rash is macular.   Macular erythematous rash in right lower leg.  Left gluteal area insect bite looks like an abscess.  Wound is draining pus.  I squeezed out approximately 2 cc of pus from the wound and dressed.   Psychiatric: She has a normal mood and affect. Her speech is normal and behavior is normal. Judgment and thought content  normal. She is not actively hallucinating. Cognition and memory are normal. She is attentive.         ED Course   Procedures  Labs Reviewed - No data to display       Imaging Results    None          Medical Decision Making:   Initial Assessment:   Left gluteal abscess  Questionable allergy to Bactrim.  Differential Diagnosis:   Left gluteal abscess., cellulitis, allergic reaction.  ED Management:  Left gluteal abscess has been drained by squeezing pus out of it without any further procedure.  Surrounding cellulitis noted.  Right lower leg with macular rash noted.  Patient is advised to stop Bactrim.  She is given clindamycin 600 mg IM in ER.  Prednisone 60 mg p.o. and Benadryl.  Patient antibiotics will be changed to clindamycin 300 mg 3 times a day.  She is advised to follow up with PCP in 2 days for evaluation.  Follow up ER with any worsening symptoms like spreading rash, fever, nausea or vomiting.                      Clinical Impression:       ICD-10-CM ICD-9-CM   1. Abscess, gluteal, left L02.31 682.5   2. Rash due to allergy R21 782.1         Disposition:   Disposition: Discharged  Condition: Stable                        Parker Santacruz MD  08/21/19 7875

## 2019-12-19 ENCOUNTER — TELEPHONE (OUTPATIENT)
Dept: UROLOGY | Facility: CLINIC | Age: 79
End: 2019-12-19

## 2019-12-19 NOTE — TELEPHONE ENCOUNTER
DR. GASTON PROBIOTIC STUDY    Returned patient's call regarding interest in Probiotic Study. States she has recurrent urinary tract infections however there are no documented cultures in her chart. States she gets antibiotics refilled whenever she thinks she has an infection: pelvic and back pain, frequency and urgency. Offered an appointment for an evaluation and asked her to obtain culture reports. Mailed a consent form for review. She will contact me when she becomes symptomatic.

## 2020-06-13 ENCOUNTER — HOSPITAL ENCOUNTER (EMERGENCY)
Facility: HOSPITAL | Age: 80
Discharge: HOME | End: 2020-06-13
Attending: EMERGENCY MEDICINE
Payer: MEDICARE

## 2020-06-13 VITALS
HEART RATE: 77 BPM | SYSTOLIC BLOOD PRESSURE: 188 MMHG | BODY MASS INDEX: 24.86 KG/M2 | TEMPERATURE: 98 F | HEIGHT: 68 IN | RESPIRATION RATE: 16 BRPM | WEIGHT: 164 LBS | OXYGEN SATURATION: 98 % | DIASTOLIC BLOOD PRESSURE: 81 MMHG

## 2020-06-13 DIAGNOSIS — M54.50 ACUTE BILATERAL LOW BACK PAIN WITHOUT SCIATICA: Primary | ICD-10-CM

## 2020-06-13 PROCEDURE — 25000003 PHARM REV CODE 250: Mod: ER | Performed by: EMERGENCY MEDICINE

## 2020-06-13 PROCEDURE — 96372 THER/PROPH/DIAG INJ SC/IM: CPT | Mod: ER

## 2020-06-13 PROCEDURE — 63600175 PHARM REV CODE 636 W HCPCS: Mod: ER | Performed by: EMERGENCY MEDICINE

## 2020-06-13 PROCEDURE — 99284 EMERGENCY DEPT VISIT MOD MDM: CPT | Mod: 25,ER

## 2020-06-13 RX ORDER — DEXAMETHASONE SODIUM PHOSPHATE 4 MG/ML
8 INJECTION, SOLUTION INTRA-ARTICULAR; INTRALESIONAL; INTRAMUSCULAR; INTRAVENOUS; SOFT TISSUE
Status: COMPLETED | OUTPATIENT
Start: 2020-06-13 | End: 2020-06-13

## 2020-06-13 RX ORDER — TRAMADOL HYDROCHLORIDE AND ACETAMINOPHEN 37.5; 325 MG/1; MG/1
1 TABLET, FILM COATED ORAL EVERY 6 HOURS PRN
Qty: 9 TABLET | Refills: 0 | Status: SHIPPED | OUTPATIENT
Start: 2020-06-13 | End: 2023-03-06

## 2020-06-13 RX ORDER — TRAMADOL HYDROCHLORIDE 50 MG/1
50 TABLET ORAL
Status: COMPLETED | OUTPATIENT
Start: 2020-06-13 | End: 2020-06-13

## 2020-06-13 RX ORDER — KETOROLAC TROMETHAMINE 30 MG/ML
30 INJECTION, SOLUTION INTRAMUSCULAR; INTRAVENOUS
Status: COMPLETED | OUTPATIENT
Start: 2020-06-13 | End: 2020-06-13

## 2020-06-13 RX ADMIN — KETOROLAC TROMETHAMINE 30 MG: 30 INJECTION, SOLUTION INTRAMUSCULAR at 10:06

## 2020-06-13 RX ADMIN — TRAMADOL HYDROCHLORIDE 50 MG: 50 TABLET, FILM COATED ORAL at 10:06

## 2020-06-13 RX ADMIN — DEXAMETHASONE SODIUM PHOSPHATE 8 MG: 4 INJECTION, SOLUTION INTRAMUSCULAR; INTRAVENOUS at 10:06

## 2020-06-13 NOTE — ED PROVIDER NOTES
Chief Complaint  Chief Complaint   Patient presents with    Back Pain     Pt reports back pain X few months. Denies urinary symptoms. Pt reports dry cough. No falls or injuries. MD at bedside.        HPI  Xi Moise is a 80 y.o. female who presents with back pain.  Patient reports bilateral low back pain.  She reports that she has this pain every single day.  She is very familiar with that and there is no change today.  She reports the pain is moderate.  She denies any urinary symptoms or burning or irritation or frequency.  She denies any trauma or sudden no direct blow recently.  She reports that she has an appointment this Thursday with her doctor but just could not wait.  She normally takes in an NSAID but has not gotten significant relief.  No exacerbating or relieving factors    Past medical history  Past Medical History:   Diagnosis Date    Asthma     Diabetes mellitus     Hypertension     Lipidemia        Current Medications  No current facility-administered medications for this encounter.     Current Outpatient Medications:     ALBUTEROL SULFATE (PROAIR HFA INHL), Inhale into the lungs., Disp: , Rfl:     atorvastatin (LIPITOR) 80 MG tablet, Take 80 mg by mouth once daily., Disp: , Rfl:     B-complex with vitamin C (Z-BEC OR EQUIV) tablet, Take 1 tablet by mouth once daily., Disp: , Rfl:     clindamycin (CLEOCIN) 300 MG capsule, Take 1 capsule (300 mg total) by mouth every 8 (eight) hours., Disp: 30 capsule, Rfl: 1    clopidogrel (PLAVIX) 75 mg tablet, Take 75 mg by mouth once daily., Disp: , Rfl:     cyclobenzaprine (FLEXERIL) 10 MG tablet, Take 1 tablet (10 mg total) by mouth 3 (three) times daily as needed for Muscle spasms., Disp: 30 tablet, Rfl: 0    diclofenac (VOLTAREN) 50 MG EC tablet, Take 1 tablet (50 mg total) by mouth 2 (two) times daily., Disp: 20 tablet, Rfl: 0    escitalopram oxalate (LEXAPRO) 10 MG tablet, Take 10 mg by mouth once daily., Disp: , Rfl:     fluticasone  (FLONASE) 50 mcg/actuation nasal spray, 1 spray by Each Nare route once daily., Disp: , Rfl:     fluticasone-salmeterol 500-50 mcg/dose (ADVAIR DISKUS) 500-50 mcg/dose DsDv diskus inhaler, Inhale 1 puff into the lungs 2 (two) times daily., Disp: , Rfl:     fluticasone-vilanterol (BREO ELLIPTA) 200-25 mcg/dose DsDv diskus inhaler, Inhale 1 puff into the lungs once daily. Controller, Disp: , Rfl:     gabapentin (NEURONTIN) 300 MG capsule, Take 1 capsule (300 mg total) by mouth 2 (two) times daily., Disp: 60 capsule, Rfl: 0    hydrochlorothiazide (HYDRODIURIL) 25 MG tablet, Take 25 mg by mouth once daily., Disp: , Rfl:     hydrocodone-acetaminophen 5-325mg (NORCO) 5-325 mg per tablet, Take 1 tablet by mouth every 6 (six) hours as needed for Pain., Disp: 15 tablet, Rfl: 0    insulin NPH (NOVOLIN N) 100 unit/mL injection, Inject 15 Units into the skin 2 (two) times daily before meals., Disp: 108 mL, Rfl: 0    lisinopril (PRINIVIL,ZESTRIL) 30 MG tablet, Take 1 tablet (30 mg total) by mouth once daily., Disp: 90 tablet, Rfl: 3    magnesium oxide (MAG-OX) 400 mg tablet, Take 400 mg by mouth once daily., Disp: , Rfl:     metformin (GLUCOPHAGE) 1000 MG tablet, Take 1,000 mg by mouth 2 (two) times daily with meals., Disp: , Rfl:     metoprolol succinate (TOPROL-XL) 100 MG 24 hr tablet, Take 100 mg by mouth once daily., Disp: , Rfl:     potassium gluconate 595 mg (99 mg) TbSR, Take by mouth., Disp: , Rfl:     traMADol (ULTRAM) 50 mg tablet, Take 1 tablet (50 mg total) by mouth every 6 (six) hours as needed for Pain., Disp: 20 tablet, Rfl: 0    tramadol-acetaminophen 37.5-325 mg (ULTRACET) 37.5-325 mg Tab, Take 1 tablet by mouth every 6 (six) hours as needed for Pain., Disp: 9 tablet, Rfl: 0    vitamin D 185 MG Tab, Take 185 mg by mouth once daily., Disp: , Rfl:     Allergies  Review of patient's allergies indicates:   Allergen Reactions    Sulfa (sulfonamide antibiotics) Hives       Surgical history  Past  Surgical History:   Procedure Laterality Date    ANKLE SURGERY Left      SECTION      x 3    HYSTERECTOMY         Social history  Social History     Socioeconomic History    Marital status: Single     Spouse name: Not on file    Number of children: Not on file    Years of education: Not on file    Highest education level: Not on file   Occupational History    Not on file   Social Needs    Financial resource strain: Not on file    Food insecurity     Worry: Not on file     Inability: Not on file    Transportation needs     Medical: Not on file     Non-medical: Not on file   Tobacco Use    Smoking status: Never Smoker    Smokeless tobacco: Never Used   Substance and Sexual Activity    Alcohol use: No    Drug use: No    Sexual activity: Not on file   Lifestyle    Physical activity     Days per week: Not on file     Minutes per session: Not on file    Stress: Not on file   Relationships    Social connections     Talks on phone: Not on file     Gets together: Not on file     Attends Zoroastrian service: Not on file     Active member of club or organization: Not on file     Attends meetings of clubs or organizations: Not on file     Relationship status: Not on file   Other Topics Concern    Not on file   Social History Narrative    Not on file       Family History  Family History   Problem Relation Age of Onset    Hypertension Daughter        Review of systems  Constitutional: No fever or weakness.  Eyes: No redness, pain, or discharge.  HENT: No ear pain, no sudden onset headache, no throat pain.  Respiratory: No wheezing, cough, or shortness of breath.  Cardiovascular: No chest pain or palpitations.  GI: No abdominal pain, nausea, vomiting, or diarrhea.  Neurologic: No new focal weakness or sensory changes.  All systems otherwise negative except as noted in ROS and HPI    Physical Exam  Vital signs: BP (!) 188/81 Comment: Md aware  Pulse 77   Temp 98.3 °F (36.8 °C) (Oral)   Resp 16   Ht  "5' 8" (1.727 m)   Wt 74.4 kg (164 lb)   SpO2 98%   BMI 24.94 kg/m²   Constitutional: No acute distress.  Well developed, alert, oriented and appropriate.  HENT: Normocephalic, atraumatic. Normal ear, nose, and throat.  Eyes: PERRL, EOMI, normal conjunctiva.  Neck: Normal range of motion, no tenderness; supple.  Respiratory: Nonlabored breathing with normal breath sounds.  Cardiovascular: RRR with no pulse deficit.  GI: Soft, nontender, no rebound or guarding.  No flank tenderness  Musculoskeletal: Normal ROM, no tenderness, injury, or edema.  Skin: Warm, dry skin without infection or injury.  Neurologic: Normal motor, sensation with no new focal deficit.  Psychiatric: Affect normal, judgement normal, mood normal.  No SI, HI, and not gravely disabled.    Labs  Pertinent labs reviewed (see chart for details)  Labs Reviewed - No data to display    ECG    ECG interpreted by ED MD    Radiology  No orders to display       Procedures  Procedures    Medications   ketorolac injection 30 mg (30 mg Intramuscular Given 6/13/20 1046)   dexamethasone injection 8 mg (8 mg Intramuscular Given 6/13/20 1046)   traMADoL tablet 50 mg (50 mg Oral Given 6/13/20 1045)       ED course and medical decision making    ED Course as of Jun 13 1516   Sat Jun 13, 2020   1115 Blood pressure improved.  Diastolic in the 80s.  Patient will still follow-up outpatient for re-evaluation    [MB]      ED Course User Index  [MB] Pritesh Dixon MD       Patient feels good comfortable plan to go home.  She will follow up outpatient and have her blood pressure re-evaluated.  She understands reasons to return emergency department including any change or worsening symptoms.  We briefly discussed testing the urine but the patient politely declined urinalysis testing at this time    Disposition    Patient discharged in stable condition      Final impression  1. Acute bilateral low back pain without sciatica        Critical care time spent with this " patient was 0 minutes excluding the procedure time.          Pritesh Dixon MD  06/13/20 [id number]

## 2020-06-24 ENCOUNTER — HOSPITAL ENCOUNTER (OUTPATIENT)
Dept: RADIOLOGY | Facility: HOSPITAL | Age: 80
Discharge: HOME | End: 2020-06-24
Attending: ANESTHESIOLOGY
Payer: MEDICARE

## 2020-06-24 DIAGNOSIS — M47.896 OTHER OSTEOARTHRITIS OF SPINE, LUMBAR REGION: ICD-10-CM

## 2020-06-24 PROCEDURE — 72100 X-RAY EXAM L-S SPINE 2/3 VWS: CPT | Mod: TC,FY,PO

## 2020-08-18 DIAGNOSIS — M85.80 DECREASED BONE MASS: Primary | ICD-10-CM

## 2020-09-24 ENCOUNTER — HOSPITAL ENCOUNTER (OUTPATIENT)
Dept: RADIOLOGY | Facility: HOSPITAL | Age: 80
Discharge: HOME | End: 2020-09-24
Attending: INTERNAL MEDICINE
Payer: MEDICARE

## 2020-09-24 DIAGNOSIS — J47.9 BRONCHIECTASIS WITHOUT ACUTE EXACERBATION: ICD-10-CM

## 2020-09-24 PROCEDURE — 71250 CT THORAX DX C-: CPT | Mod: TC,PO

## 2020-10-11 ENCOUNTER — HOSPITAL ENCOUNTER (EMERGENCY)
Facility: HOSPITAL | Age: 80
Discharge: HOME | End: 2020-10-11
Attending: EMERGENCY MEDICINE
Payer: MEDICARE

## 2020-10-11 VITALS
DIASTOLIC BLOOD PRESSURE: 99 MMHG | HEIGHT: 68 IN | WEIGHT: 160 LBS | TEMPERATURE: 98 F | BODY MASS INDEX: 24.25 KG/M2 | SYSTOLIC BLOOD PRESSURE: 209 MMHG | HEART RATE: 67 BPM | RESPIRATION RATE: 18 BRPM | OXYGEN SATURATION: 96 %

## 2020-10-11 DIAGNOSIS — N39.0 ACUTE UTI (URINARY TRACT INFECTION): Primary | ICD-10-CM

## 2020-10-11 LAB
BACTERIA #/AREA URNS AUTO: ABNORMAL /HPF
BILIRUB UR QL STRIP: ABNORMAL
CLARITY UR REFRACT.AUTO: ABNORMAL
COLOR UR AUTO: ABNORMAL
GLUCOSE UR QL STRIP: ABNORMAL
HGB UR QL STRIP: ABNORMAL
KETONES UR QL STRIP: NEGATIVE
LEUKOCYTE ESTERASE UR QL STRIP: ABNORMAL
MICROSCOPIC COMMENT: ABNORMAL
NITRITE UR QL STRIP: POSITIVE
PH UR STRIP: 7 [PH] (ref 5–8)
PROT UR QL STRIP: ABNORMAL
RBC #/AREA URNS AUTO: 1 /HPF (ref 0–4)
SP GR UR STRIP: 1.01 (ref 1–1.03)
URN SPEC COLLECT METH UR: ABNORMAL
UROBILINOGEN UR STRIP-ACNC: >=8 EU/DL
WBC #/AREA URNS AUTO: 25 /HPF (ref 0–5)

## 2020-10-11 PROCEDURE — 87186 SC STD MICRODIL/AGAR DIL: CPT | Mod: ER

## 2020-10-11 PROCEDURE — 87077 CULTURE AEROBIC IDENTIFY: CPT | Mod: ER

## 2020-10-11 PROCEDURE — 87088 URINE BACTERIA CULTURE: CPT | Mod: ER

## 2020-10-11 PROCEDURE — 81000 URINALYSIS NONAUTO W/SCOPE: CPT | Mod: ER

## 2020-10-11 PROCEDURE — 87086 URINE CULTURE/COLONY COUNT: CPT | Mod: ER

## 2020-10-11 PROCEDURE — 63600175 PHARM REV CODE 636 W HCPCS: Mod: ER | Performed by: EMERGENCY MEDICINE

## 2020-10-11 PROCEDURE — 99284 EMERGENCY DEPT VISIT MOD MDM: CPT | Mod: 25,ER

## 2020-10-11 PROCEDURE — 25000003 PHARM REV CODE 250: Mod: ER | Performed by: EMERGENCY MEDICINE

## 2020-10-11 PROCEDURE — 96372 THER/PROPH/DIAG INJ SC/IM: CPT | Mod: ER

## 2020-10-11 RX ORDER — TRAMADOL HYDROCHLORIDE 50 MG/1
50 TABLET ORAL
Status: COMPLETED | OUTPATIENT
Start: 2020-10-11 | End: 2020-10-11

## 2020-10-11 RX ORDER — KETOROLAC TROMETHAMINE 30 MG/ML
30 INJECTION, SOLUTION INTRAMUSCULAR; INTRAVENOUS
Status: COMPLETED | OUTPATIENT
Start: 2020-10-11 | End: 2020-10-11

## 2020-10-11 RX ORDER — CEFTRIAXONE 1 G/1
1 INJECTION, POWDER, FOR SOLUTION INTRAMUSCULAR; INTRAVENOUS
Status: COMPLETED | OUTPATIENT
Start: 2020-10-11 | End: 2020-10-11

## 2020-10-11 RX ORDER — CEPHALEXIN 250 MG/1
250 CAPSULE ORAL EVERY 12 HOURS
Qty: 10 CAPSULE | Refills: 0 | Status: SHIPPED | OUTPATIENT
Start: 2020-10-11 | End: 2020-10-16

## 2020-10-11 RX ORDER — PHENAZOPYRIDINE HYDROCHLORIDE 100 MG/1
200 TABLET, FILM COATED ORAL
Status: DISCONTINUED | OUTPATIENT
Start: 2020-10-11 | End: 2020-10-11

## 2020-10-11 RX ADMIN — KETOROLAC TROMETHAMINE 30 MG: 30 INJECTION, SOLUTION INTRAMUSCULAR; INTRAVENOUS at 10:10

## 2020-10-11 RX ADMIN — TRAMADOL HYDROCHLORIDE 50 MG: 50 TABLET, COATED ORAL at 10:10

## 2020-10-11 RX ADMIN — CEFTRIAXONE SODIUM 1 G: 1 INJECTION, POWDER, FOR SOLUTION INTRAMUSCULAR; INTRAVENOUS at 11:10

## 2020-10-11 NOTE — ED NOTES
Ceftriaxone not available in [facility].  Flaca RN and this RN attempted to pull and even override.  Phone call to pharmacy. Patient and provider aware

## 2020-10-11 NOTE — ED NOTES
Ceftriaxone available, was pulled earlier by another RN and waiting for med to mix with sterile water.  Med given

## 2020-10-11 NOTE — ED PROVIDER NOTES
Chief Complaint  Chief Complaint   Patient presents with    Abdominal Pain     Pt with c/o lower abdominal pain with urinary frequency that started last night       HPI  Xi Moise is a 80 y.o. female who presents with midline lower abdominal pain as well as urinary frequency.  She reports that she has had this many times.  She drinks a lot of caffeine and not as much water as she could.  She denies any vomiting.  No diarrhea.  She reports the pain is moderate exacerbated by touching her lower abdomen and relieved by nothing.  No trauma or suspicious food.  No fever cough    Past medical history  Past Medical History:   Diagnosis Date    Asthma     Diabetes mellitus     Hypertension     Lipidemia        Current Medications  No current facility-administered medications for this encounter.     Current Outpatient Medications:     ALBUTEROL SULFATE (PROAIR HFA INHL), Inhale into the lungs., Disp: , Rfl:     atorvastatin (LIPITOR) 80 MG tablet, Take 80 mg by mouth once daily., Disp: , Rfl:     B-complex with vitamin C (Z-BEC OR EQUIV) tablet, Take 1 tablet by mouth once daily., Disp: , Rfl:     cephALEXin (KEFLEX) 250 MG capsule, Take 1 capsule (250 mg total) by mouth every 12 (twelve) hours. for 5 days, Disp: 10 capsule, Rfl: 0    clindamycin (CLEOCIN) 300 MG capsule, Take 1 capsule (300 mg total) by mouth every 8 (eight) hours., Disp: 30 capsule, Rfl: 1    clopidogrel (PLAVIX) 75 mg tablet, Take 75 mg by mouth once daily., Disp: , Rfl:     cyclobenzaprine (FLEXERIL) 10 MG tablet, Take 1 tablet (10 mg total) by mouth 3 (three) times daily as needed for Muscle spasms., Disp: 30 tablet, Rfl: 0    diclofenac (VOLTAREN) 50 MG EC tablet, Take 1 tablet (50 mg total) by mouth 2 (two) times daily., Disp: 20 tablet, Rfl: 0    escitalopram oxalate (LEXAPRO) 10 MG tablet, Take 10 mg by mouth once daily., Disp: , Rfl:     fluticasone (FLONASE) 50 mcg/actuation nasal spray, 1 spray by Each Nare route once daily.,  Disp: , Rfl:     fluticasone-salmeterol 500-50 mcg/dose (ADVAIR DISKUS) 500-50 mcg/dose DsDv diskus inhaler, Inhale 1 puff into the lungs 2 (two) times daily., Disp: , Rfl:     fluticasone-vilanterol (BREO ELLIPTA) 200-25 mcg/dose DsDv diskus inhaler, Inhale 1 puff into the lungs once daily. Controller, Disp: , Rfl:     gabapentin (NEURONTIN) 300 MG capsule, Take 1 capsule (300 mg total) by mouth 2 (two) times daily., Disp: 60 capsule, Rfl: 0    hydrochlorothiazide (HYDRODIURIL) 25 MG tablet, Take 25 mg by mouth once daily., Disp: , Rfl:     hydrocodone-acetaminophen 5-325mg (NORCO) 5-325 mg per tablet, Take 1 tablet by mouth every 6 (six) hours as needed for Pain., Disp: 15 tablet, Rfl: 0    insulin NPH (NOVOLIN N) 100 unit/mL injection, Inject 15 Units into the skin 2 (two) times daily before meals., Disp: 108 mL, Rfl: 0    lisinopril (PRINIVIL,ZESTRIL) 30 MG tablet, Take 1 tablet (30 mg total) by mouth once daily., Disp: 90 tablet, Rfl: 3    magnesium oxide (MAG-OX) 400 mg tablet, Take 400 mg by mouth once daily., Disp: , Rfl:     metformin (GLUCOPHAGE) 1000 MG tablet, Take 1,000 mg by mouth 2 (two) times daily with meals., Disp: , Rfl:     metoprolol succinate (TOPROL-XL) 100 MG 24 hr tablet, Take 100 mg by mouth once daily., Disp: , Rfl:     potassium gluconate 595 mg (99 mg) TbSR, Take by mouth., Disp: , Rfl:     traMADol (ULTRAM) 50 mg tablet, Take 1 tablet (50 mg total) by mouth every 6 (six) hours as needed for Pain., Disp: 20 tablet, Rfl: 0    tramadol-acetaminophen 37.5-325 mg (ULTRACET) 37.5-325 mg Tab, Take 1 tablet by mouth every 6 (six) hours as needed for Pain., Disp: 9 tablet, Rfl: 0    vitamin D 185 MG Tab, Take 185 mg by mouth once daily., Disp: , Rfl:     Allergies  Review of patient's allergies indicates:   Allergen Reactions    Bactrim [sulfamethoxazole-trimethoprim] Hives    Sulfa (sulfonamide antibiotics) Hives       Surgical history  Past Surgical History:   Procedure  "Laterality Date    ANKLE SURGERY Left      SECTION      x 3    HYSTERECTOMY         Social history  Social History     Socioeconomic History    Marital status: Single     Spouse name: Not on file    Number of children: Not on file    Years of education: Not on file    Highest education level: Not on file   Occupational History    Not on file   Social Needs    Financial resource strain: Not on file    Food insecurity     Worry: Not on file     Inability: Not on file    Transportation needs     Medical: Not on file     Non-medical: Not on file   Tobacco Use    Smoking status: Never Smoker    Smokeless tobacco: Never Used   Substance and Sexual Activity    Alcohol use: No    Drug use: No    Sexual activity: Not on file   Lifestyle    Physical activity     Days per week: Not on file     Minutes per session: Not on file    Stress: Not on file   Relationships    Social connections     Talks on phone: Not on file     Gets together: Not on file     Attends Buddhist service: Not on file     Active member of club or organization: Not on file     Attends meetings of clubs or organizations: Not on file     Relationship status: Not on file   Other Topics Concern    Not on file   Social History Narrative    Not on file       Family History  Family History   Problem Relation Age of Onset    Hypertension Daughter        Review of systems  Constitutional: No fever or weakness.  Musculoskeletal: No injury; full range of motion.  Skin: No rash, abscess, or laceration.  Neurologic: No new focal weakness or sensory changes.  All systems otherwise negative except as noted in ROS and HPI    Physical Exam  Vital signs: BP (!) 209/99 (BP Location: Left arm, Patient Position: Sitting)   Pulse 67   Temp 98.2 °F (36.8 °C) (Oral)   Resp 18   Ht 5' 8" (1.727 m)   Wt 72.6 kg (160 lb)   SpO2 96%   BMI 24.33 kg/m²   Constitutional: No acute distress.  Well developed, alert, oriented and appropriate.  HENT: " Normocephalic, atraumatic. Normal ear, nose, and throat.  Eyes: PERRL, EOMI, normal conjunctiva.  Neck: Normal range of motion, no tenderness; supple.  Respiratory: Nonlabored breathing with normal breath sounds.  Cardiovascular: RRR with no pulse deficit.  GI:  Mild tenderness in the midline lower abdomen but no rebound or guarding or peritonitis  Musculoskeletal: Normal ROM, no tenderness, injury, or edema.  Skin: Warm, dry skin without infection or injury.  Neurologic: Normal motor, sensation with no new focal deficit.  Psychiatric: Affect normal, judgement normal, mood normal.  No SI, HI, and not gravely disabled.    Labs  Pertinent labs reviewed (see chart for details)  Labs Reviewed   URINALYSIS, REFLEX TO URINE CULTURE - Abnormal; Notable for the following components:       Result Value    Color, UA Orange (*)     Appearance, UA Hazy (*)     Protein, UA Trace (*)     Glucose, UA 2+ (*)     Bilirubin (UA) 3+ (*)     Occult Blood UA 1+ (*)     Nitrite, UA Positive (*)     Urobilinogen, UA >=8.0 (*)     Leukocytes, UA 3+ (*)     All other components within normal limits    Narrative:     Specimen Source->Urine   URINALYSIS MICROSCOPIC - Abnormal; Notable for the following components:    WBC, UA 25 (*)     Bacteria Many (*)     All other components within normal limits    Narrative:     Specimen Source->Urine   CULTURE, URINE       ECG    ECG interpreted by ED MD    Radiology  No orders to display       Procedures  Procedures    Medications   ketorolac injection 30 mg (30 mg Intramuscular Given 10/11/20 1010)   traMADoL tablet 50 mg (50 mg Oral Given 10/11/20 1009)   cefTRIAXone injection 1 g (1 g Intramuscular Given 10/11/20 1113)       ED course           Medical Decision Making:    History:  Old medical records:  I decided to obtain old medical records.  Old records summarized:  Seen in June in the ER for low back pain by me    Initial assessment:  80-year-old female with lower abdominal pain and urinary  frequency    Differential diagnosis:  Appendicitis, diverticulitis, cholecystitis, urinary tract infection, pyelonephritis, ovarian cyst, ovarian abscess, ovarian torsion, vaginal infection, retained tampon or foreign body, gastritis or GERD, traumatic injury or internal bleeding, colitis, gastroenteritis, or pancreatitis    Clinical tests:   Labs:  Ordered and reviewed      ED management:  Patient was having comfortable plan to go home at this time and understands reasons to return emergency department.  She will work on avoiding dehydrating substances like caffeine and alcohol and increase p.o. plain water consumption      Disposition    Patient discharged in stable condition      Final impression  1. Acute UTI (urinary tract infection)        Critical care time spent with this patient was 0 minutes excluding the procedure time.          Pritesh Dixon MD  10/11/20 0884

## 2020-10-14 LAB — BACTERIA UR CULT: ABNORMAL

## 2020-11-11 DIAGNOSIS — S09.90XA HEAD TRAUMA, INITIAL ENCOUNTER: ICD-10-CM

## 2020-11-11 DIAGNOSIS — R27.0 ATAXIA, UNSPECIFIED: ICD-10-CM

## 2020-11-13 ENCOUNTER — TELEPHONE (OUTPATIENT)
Dept: NEUROLOGY | Facility: CLINIC | Age: 80
End: 2020-11-13

## 2020-11-13 NOTE — TELEPHONE ENCOUNTER
Called pt to schedule neurology appt. Pt stated she fell about 1 month ago and hit her head real hard. She has been having problems walking ever sense. She stated that she is suppose to have CT of brain done as well. I scheduled the CT for pt. Instructed not sure if she should be seen with movement specialist or TBI specialist. Instructed I would get clarification and let her know but to have the CT done. Pt verbalized understanding.

## 2020-11-17 ENCOUNTER — HOSPITAL ENCOUNTER (OUTPATIENT)
Dept: RADIOLOGY | Facility: HOSPITAL | Age: 80
Discharge: HOME | End: 2020-11-17
Attending: INTERNAL MEDICINE
Payer: MEDICARE

## 2020-11-17 DIAGNOSIS — R27.0 ATAXIA, UNSPECIFIED: ICD-10-CM

## 2020-11-17 DIAGNOSIS — S09.90XA HEAD TRAUMA, INITIAL ENCOUNTER: ICD-10-CM

## 2020-11-17 PROCEDURE — 70450 CT HEAD/BRAIN W/O DYE: CPT | Mod: TC,PO

## 2020-12-01 NOTE — TELEPHONE ENCOUNTER
Called and spoke with pt. She had the CT scan done. She will be seeing Dr. Jaffe tomorrow (at [facility]). She stated she is feeling much better and the chirping in her head has gone. If she still needs appt she will call back to schedule.

## 2021-01-15 ENCOUNTER — IMMUNIZATION (OUTPATIENT)
Dept: FAMILY MEDICINE | Facility: CLINIC | Age: 81
End: 2021-01-15
Payer: MEDICARE

## 2021-01-15 DIAGNOSIS — Z23 NEED FOR VACCINATION: Primary | ICD-10-CM

## 2021-01-15 PROCEDURE — 91300 COVID-19, MRNA, LNP-S, PF, 30 MCG/0.3 ML DOSE VACCINE: CPT | Mod: PBBFAC | Performed by: FAMILY MEDICINE

## 2021-02-02 ENCOUNTER — HOSPITAL ENCOUNTER (OUTPATIENT)
Dept: RADIOLOGY | Facility: HOSPITAL | Age: 81
Discharge: HOME | End: 2021-02-02
Attending: INTERNAL MEDICINE
Payer: MEDICARE

## 2021-02-02 DIAGNOSIS — M85.80 DECREASED BONE MASS: ICD-10-CM

## 2021-02-02 PROCEDURE — 77080 DXA BONE DENSITY AXIAL: CPT | Mod: GA,TC,PO

## 2021-02-05 ENCOUNTER — IMMUNIZATION (OUTPATIENT)
Dept: FAMILY MEDICINE | Facility: CLINIC | Age: 81
End: 2021-02-05
Payer: MEDICARE

## 2021-02-05 DIAGNOSIS — Z23 NEED FOR VACCINATION: Primary | ICD-10-CM

## 2021-02-05 PROCEDURE — 91300 COVID-19, MRNA, LNP-S, PF, 30 MCG/0.3 ML DOSE VACCINE: CPT | Mod: PBBFAC | Performed by: FAMILY MEDICINE

## 2021-02-05 PROCEDURE — 0002A COVID-19, MRNA, LNP-S, PF, 30 MCG/0.3 ML DOSE VACCINE: CPT | Mod: PBBFAC | Performed by: FAMILY MEDICINE

## 2021-09-04 ENCOUNTER — HOSPITAL ENCOUNTER (EMERGENCY)
Facility: HOSPITAL | Age: 81
Discharge: HOME | End: 2021-09-04
Attending: EMERGENCY MEDICINE
Payer: MEDICARE

## 2021-09-04 VITALS
RESPIRATION RATE: 20 BRPM | HEIGHT: 68 IN | TEMPERATURE: 98 F | SYSTOLIC BLOOD PRESSURE: 204 MMHG | BODY MASS INDEX: 25.46 KG/M2 | DIASTOLIC BLOOD PRESSURE: 96 MMHG | OXYGEN SATURATION: 98 % | WEIGHT: 168 LBS | HEART RATE: 73 BPM

## 2021-09-04 DIAGNOSIS — M54.50 LOW BACK PAIN WITHOUT SCIATICA, UNSPECIFIED BACK PAIN LATERALITY, UNSPECIFIED CHRONICITY: Primary | ICD-10-CM

## 2021-09-04 DIAGNOSIS — I10 UNCONTROLLED HYPERTENSION: ICD-10-CM

## 2021-09-04 DIAGNOSIS — M54.9 BACK PAIN, UNSPECIFIED BACK LOCATION, UNSPECIFIED BACK PAIN LATERALITY, UNSPECIFIED CHRONICITY: ICD-10-CM

## 2021-09-04 DIAGNOSIS — R05.9 COUGH: ICD-10-CM

## 2021-09-04 LAB
BILIRUB UR QL STRIP: NEGATIVE
CLARITY UR: CLEAR
COLOR UR: YELLOW
GLUCOSE UR QL STRIP: ABNORMAL
HGB UR QL STRIP: ABNORMAL
KETONES UR QL STRIP: NEGATIVE
LEUKOCYTE ESTERASE UR QL STRIP: NEGATIVE
NITRITE UR QL STRIP: NEGATIVE
PH UR STRIP: 6 [PH] (ref 5–8)
PROT UR QL STRIP: ABNORMAL
SP GR UR STRIP: 1.02 (ref 1–1.03)
URN SPEC COLLECT METH UR: ABNORMAL
UROBILINOGEN UR STRIP-ACNC: NEGATIVE EU/DL

## 2021-09-04 PROCEDURE — 81003 URINALYSIS AUTO W/O SCOPE: CPT | Performed by: EMERGENCY MEDICINE

## 2021-09-04 PROCEDURE — 99284 EMERGENCY DEPT VISIT MOD MDM: CPT | Mod: 25

## 2021-09-04 PROCEDURE — 96372 THER/PROPH/DIAG INJ SC/IM: CPT

## 2021-09-04 PROCEDURE — 25000003 PHARM REV CODE 250: Performed by: EMERGENCY MEDICINE

## 2021-09-04 PROCEDURE — 63600175 PHARM REV CODE 636 W HCPCS: Performed by: EMERGENCY MEDICINE

## 2021-09-04 RX ORDER — METHOCARBAMOL 500 MG/1
500 TABLET, FILM COATED ORAL
Status: COMPLETED | OUTPATIENT
Start: 2021-09-04 | End: 2021-09-04

## 2021-09-04 RX ORDER — DEXAMETHASONE SODIUM PHOSPHATE 4 MG/ML
4 INJECTION, SOLUTION INTRA-ARTICULAR; INTRALESIONAL; INTRAMUSCULAR; INTRAVENOUS; SOFT TISSUE
Status: COMPLETED | OUTPATIENT
Start: 2021-09-04 | End: 2021-09-04

## 2021-09-04 RX ORDER — METHOCARBAMOL 750 MG/1
750 TABLET, FILM COATED ORAL 3 TIMES DAILY
Qty: 30 TABLET | Refills: 0 | Status: SHIPPED | OUTPATIENT
Start: 2021-09-04 | End: 2021-09-09

## 2021-09-04 RX ADMIN — METHOCARBAMOL TABLETS 500 MG: 500 TABLET, COATED ORAL at 09:09

## 2021-09-04 RX ADMIN — DEXAMETHASONE SODIUM PHOSPHATE 4 MG: 4 INJECTION, SOLUTION INTRAMUSCULAR; INTRAVENOUS at 10:09

## 2022-03-29 ENCOUNTER — HOSPITAL ENCOUNTER (EMERGENCY)
Facility: HOSPITAL | Age: 82
Discharge: HOME | End: 2022-03-29
Attending: EMERGENCY MEDICINE
Payer: MEDICARE

## 2022-03-29 VITALS
OXYGEN SATURATION: 100 % | HEART RATE: 89 BPM | DIASTOLIC BLOOD PRESSURE: 79 MMHG | WEIGHT: 168 LBS | RESPIRATION RATE: 15 BRPM | TEMPERATURE: 98 F | SYSTOLIC BLOOD PRESSURE: 171 MMHG | BODY MASS INDEX: 25.54 KG/M2

## 2022-03-29 DIAGNOSIS — R10.32 LEFT LOWER QUADRANT PAIN: ICD-10-CM
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
## 2022-03-29 DIAGNOSIS — S80.00XA CONTUSION OF KNEE, UNSPECIFIED LATERALITY, INITIAL ENCOUNTER: ICD-10-CM

## 2022-03-29 LAB
ALBUMIN SERPL BCP-MCNC: 4.3 G/DL (ref 3.5–5.2)
ALP SERPL-CCNC: 113 U/L (ref 38–126)
ALT SERPL W/O P-5'-P-CCNC: 22 U/L (ref 10–44)
ANION GAP SERPL CALC-SCNC: 12 MMOL/L (ref 8–16)
AST SERPL-CCNC: 24 U/L (ref 15–46)
BACTERIA #/AREA URNS AUTO: NORMAL /HPF
BASOPHILS # BLD AUTO: 0.04 K/UL (ref 0–0.2)
BASOPHILS NFR BLD: 0.5 % (ref 0–1.9)
BILIRUB SERPL-MCNC: 0.7 MG/DL (ref 0.1–1)
BILIRUB UR QL STRIP: NEGATIVE
CALCIUM SERPL-MCNC: 9.3 MG/DL (ref 8.7–10.5)
CHLORIDE SERPL-SCNC: 95 MMOL/L (ref 95–110)
CLARITY UR REFRACT.AUTO: CLEAR
CO2 SERPL-SCNC: 26 MMOL/L (ref 23–29)
COLOR UR AUTO: YELLOW
CREAT SERPL-MCNC: 0.94 MG/DL (ref 0.5–1.4)
DIFFERENTIAL METHOD: ABNORMAL
EOSINOPHIL # BLD AUTO: 0.3 K/UL (ref 0–0.5)
EOSINOPHIL NFR BLD: 3.2 % (ref 0–8)
ERYTHROCYTE [DISTWIDTH] IN BLOOD BY AUTOMATED COUNT: 13.2 % (ref 11.5–14.5)
EST. GFR  (AFRICAN AMERICAN): >60 ML/MIN/1.73 M^2
EST. GFR  (NON AFRICAN AMERICAN): 56.7 ML/MIN/1.73 M^2
GLUCOSE SERPL-MCNC: 415 MG/DL (ref 70–110)
GLUCOSE UR QL STRIP: ABNORMAL
HCT VFR BLD AUTO: 42.2 % (ref 37–48.5)
HGB BLD-MCNC: 13.9 G/DL (ref 12–16)
HGB UR QL STRIP: NEGATIVE
IMM GRANULOCYTES # BLD AUTO: 0.02 K/UL (ref 0–0.04)
IMM GRANULOCYTES NFR BLD AUTO: 0.2 % (ref 0–0.5)
KETONES UR QL STRIP: NEGATIVE
LEUKOCYTE ESTERASE UR QL STRIP: NEGATIVE
LIPASE SERPL-CCNC: 146 U/L (ref 23–300)
LYMPHOCYTES # BLD AUTO: 2.3 K/UL (ref 1–4.8)
LYMPHOCYTES NFR BLD: 28 % (ref 18–48)
MCH RBC QN AUTO: 30.2 PG (ref 27–31)
MCHC RBC AUTO-ENTMCNC: 32.9 G/DL (ref 32–36)
MCV RBC AUTO: 92 FL (ref 82–98)
MICROSCOPIC COMMENT: NORMAL
MONOCYTES # BLD AUTO: 0.6 K/UL (ref 0.3–1)
MONOCYTES NFR BLD: 6.7 % (ref 4–15)
NEUTROPHILS # BLD AUTO: 5.1 K/UL (ref 1.8–7.7)
NEUTROPHILS NFR BLD: 61.4 % (ref 38–73)
NITRITE UR QL STRIP: NEGATIVE
NRBC BLD-RTO: 0 /100 WBC
PH UR STRIP: 6 [PH] (ref 5–8)
PLATELET # BLD AUTO: 275 K/UL (ref 150–450)
PMV BLD AUTO: 8.7 FL (ref 9.2–12.9)
POTASSIUM SERPL-SCNC: 4.3 MMOL/L (ref 3.5–5.1)
PROT SERPL-MCNC: 7 G/DL (ref 6–8.4)
PROT UR QL STRIP: NEGATIVE
RBC # BLD AUTO: 4.61 M/UL (ref 4–5.4)
SODIUM SERPL-SCNC: 133 MMOL/L (ref 136–145)
SP GR UR STRIP: 1.01 (ref 1–1.03)
URN SPEC COLLECT METH UR: ABNORMAL
UROBILINOGEN UR STRIP-ACNC: NEGATIVE EU/DL
UUN UR-MCNC: 29 MG/DL (ref 7–17)
WBC # BLD AUTO: 8.22 K/UL (ref 3.9–12.7)
WBC #/AREA URNS AUTO: 2 /HPF (ref 0–5)
YEAST UR QL AUTO: NORMAL

## 2022-03-29 PROCEDURE — 96374 THER/PROPH/DIAG INJ IV PUSH: CPT | Mod: ER,59

## 2022-03-29 PROCEDURE — 93005 ELECTROCARDIOGRAM TRACING: CPT | Mod: ER

## 2022-03-29 PROCEDURE — 93010 ELECTROCARDIOGRAM REPORT: CPT | Mod: ,,, | Performed by: INTERNAL MEDICINE

## 2022-03-29 PROCEDURE — 80053 COMPREHEN METABOLIC PANEL: CPT | Mod: ER | Performed by: EMERGENCY MEDICINE

## 2022-03-29 PROCEDURE — 99285 EMERGENCY DEPT VISIT HI MDM: CPT | Mod: 25,ER

## 2022-03-29 PROCEDURE — 93010 EKG 12-LEAD: ICD-10-PCS | Mod: ,,, | Performed by: INTERNAL MEDICINE

## 2022-03-29 PROCEDURE — 25500020 PHARM REV CODE 255: Mod: ER | Performed by: EMERGENCY MEDICINE

## 2022-03-29 PROCEDURE — 94760 N-INVAS EAR/PLS OXIMETRY 1: CPT | Mod: ER

## 2022-03-29 PROCEDURE — 85025 COMPLETE CBC W/AUTO DIFF WBC: CPT | Mod: ER | Performed by: EMERGENCY MEDICINE

## 2022-03-29 PROCEDURE — 63600175 PHARM REV CODE 636 W HCPCS: Mod: ER | Performed by: EMERGENCY MEDICINE

## 2022-03-29 PROCEDURE — 96375 TX/PRO/DX INJ NEW DRUG ADDON: CPT | Mod: ER

## 2022-03-29 PROCEDURE — 81000 URINALYSIS NONAUTO W/SCOPE: CPT | Mod: ER | Performed by: EMERGENCY MEDICINE

## 2022-03-29 PROCEDURE — 83690 ASSAY OF LIPASE: CPT | Mod: ER | Performed by: EMERGENCY MEDICINE

## 2022-03-29 RX ORDER — FENTANYL CITRATE 50 UG/ML
25 INJECTION, SOLUTION INTRAMUSCULAR; INTRAVENOUS
Status: COMPLETED | OUTPATIENT
Start: 2022-03-29 | End: 2022-03-29

## 2022-03-29 RX ORDER — ONDANSETRON 2 MG/ML
4 INJECTION INTRAMUSCULAR; INTRAVENOUS
Status: COMPLETED | OUTPATIENT
Start: 2022-03-29 | End: 2022-03-29

## 2022-03-29 RX ADMIN — FENTANYL CITRATE 25 MCG: 50 INJECTION INTRAMUSCULAR; INTRAVENOUS at 03:03

## 2022-03-29 RX ADMIN — ONDANSETRON 4 MG: 2 INJECTION INTRAMUSCULAR; INTRAVENOUS at 03:03

## 2022-03-29 RX ADMIN — IOHEXOL 100 ML: 350 INJECTION, SOLUTION INTRAVENOUS at 04:03
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
## 2022-09-14 ENCOUNTER — HOSPITAL ENCOUNTER (EMERGENCY)
Facility: HOSPITAL | Age: 82
Discharge: HOME | End: 2022-09-15
Attending: EMERGENCY MEDICINE
Payer: MEDICARE

## 2022-09-14 VITALS
OXYGEN SATURATION: 92 % | BODY MASS INDEX: 26 KG/M2 | WEIGHT: 171 LBS | DIASTOLIC BLOOD PRESSURE: 67 MMHG | HEART RATE: 67 BPM | SYSTOLIC BLOOD PRESSURE: 147 MMHG | RESPIRATION RATE: 22 BRPM | TEMPERATURE: 99 F

## 2022-09-14 DIAGNOSIS — M25.511 ACUTE PAIN OF RIGHT SHOULDER: ICD-10-CM

## 2022-09-14 DIAGNOSIS — Z79.4 TYPE 2 DIABETES MELLITUS WITH HYPERGLYCEMIA, WITH LONG-TERM CURRENT USE OF INSULIN: ICD-10-CM

## 2022-09-14 DIAGNOSIS — S80.11XA CONTUSION OF RIGHT LOWER LEG, INITIAL ENCOUNTER: ICD-10-CM

## 2022-09-14 DIAGNOSIS — R29.6 FREQUENT FALLS: Primary | ICD-10-CM

## 2022-09-14 DIAGNOSIS — J45.901 EXACERBATION OF ASTHMA, UNSPECIFIED ASTHMA SEVERITY, UNSPECIFIED WHETHER PERSISTENT: ICD-10-CM

## 2022-09-14 DIAGNOSIS — E11.65 TYPE 2 DIABETES MELLITUS WITH HYPERGLYCEMIA, WITH LONG-TERM CURRENT USE OF INSULIN: ICD-10-CM

## 2022-09-14 LAB
ALBUMIN SERPL BCP-MCNC: 4 G/DL (ref 3.5–5.2)
ALP SERPL-CCNC: 74 U/L (ref 38–126)
ALT SERPL W/O P-5'-P-CCNC: 20 U/L (ref 10–44)
ANION GAP SERPL CALC-SCNC: 9 MMOL/L (ref 8–16)
AST SERPL-CCNC: 31 U/L (ref 15–46)
BASOPHILS # BLD AUTO: 0.07 K/UL (ref 0–0.2)
BASOPHILS NFR BLD: 0.9 % (ref 0–1.9)
BILIRUB SERPL-MCNC: 0.6 MG/DL (ref 0.1–1)
CALCIUM SERPL-MCNC: 8.7 MG/DL (ref 8.7–10.5)
CHLORIDE SERPL-SCNC: 100 MMOL/L (ref 95–110)
CO2 SERPL-SCNC: 28 MMOL/L (ref 23–29)
CREAT SERPL-MCNC: 0.83 MG/DL (ref 0.5–1.4)
DIFFERENTIAL METHOD: ABNORMAL
EOSINOPHIL # BLD AUTO: 0.6 K/UL (ref 0–0.5)
EOSINOPHIL NFR BLD: 7 % (ref 0–8)
ERYTHROCYTE [DISTWIDTH] IN BLOOD BY AUTOMATED COUNT: 13.3 % (ref 11.5–14.5)
EST. GFR  (NO RACE VARIABLE): >60 ML/MIN/1.73 M^2
GLUCOSE SERPL-MCNC: 280 MG/DL (ref 70–110)
HCT VFR BLD AUTO: 40 % (ref 37–48.5)
HGB BLD-MCNC: 13.2 G/DL (ref 12–16)
IMM GRANULOCYTES # BLD AUTO: 0.02 K/UL (ref 0–0.04)
IMM GRANULOCYTES NFR BLD AUTO: 0.3 % (ref 0–0.5)
LYMPHOCYTES # BLD AUTO: 3.2 K/UL (ref 1–4.8)
LYMPHOCYTES NFR BLD: 40 % (ref 18–48)
MCH RBC QN AUTO: 30.3 PG (ref 27–31)
MCHC RBC AUTO-ENTMCNC: 33 G/DL (ref 32–36)
MCV RBC AUTO: 92 FL (ref 82–98)
MONOCYTES # BLD AUTO: 0.5 K/UL (ref 0.3–1)
MONOCYTES NFR BLD: 6.6 % (ref 4–15)
NEUTROPHILS # BLD AUTO: 3.6 K/UL (ref 1.8–7.7)
NEUTROPHILS NFR BLD: 45.2 % (ref 38–73)
NRBC BLD-RTO: 0 /100 WBC
NT-PROBNP SERPL-MCNC: 187 PG/ML (ref 5–1800)
PLATELET # BLD AUTO: 234 K/UL (ref 150–450)
PLATELET BLD QL SMEAR: ABNORMAL
PMV BLD AUTO: 9.1 FL (ref 9.2–12.9)
POTASSIUM SERPL-SCNC: 4.2 MMOL/L (ref 3.5–5.1)
PROT SERPL-MCNC: 6.2 G/DL (ref 6–8.4)
RBC # BLD AUTO: 4.36 M/UL (ref 4–5.4)
SODIUM SERPL-SCNC: 137 MMOL/L (ref 136–145)
TROPONIN I SERPL-MCNC: <0.012 NG/ML (ref 0.01–0.03)
UUN UR-MCNC: 25 MG/DL (ref 7–17)
WBC # BLD AUTO: 7.87 K/UL (ref 3.9–12.7)
WBC NRBC COR # BLD: 7.87 K/UL (ref 3.9–12.7)

## 2022-09-14 PROCEDURE — 99900035 HC TECH TIME PER 15 MIN (STAT): Mod: ER

## 2022-09-14 PROCEDURE — 27100098 HC SPACER: Mod: ER

## 2022-09-14 PROCEDURE — 84484 ASSAY OF TROPONIN QUANT: CPT | Mod: ER | Performed by: EMERGENCY MEDICINE

## 2022-09-14 PROCEDURE — 80053 COMPREHEN METABOLIC PANEL: CPT | Mod: ER | Performed by: EMERGENCY MEDICINE

## 2022-09-14 PROCEDURE — 93010 ELECTROCARDIOGRAM REPORT: CPT | Mod: ,,, | Performed by: INTERNAL MEDICINE

## 2022-09-14 PROCEDURE — 99285 EMERGENCY DEPT VISIT HI MDM: CPT | Mod: 25,ER

## 2022-09-14 PROCEDURE — 94760 N-INVAS EAR/PLS OXIMETRY 1: CPT | Mod: ER

## 2022-09-14 PROCEDURE — 93010 EKG 12-LEAD: ICD-10-PCS | Mod: ,,, | Performed by: INTERNAL MEDICINE

## 2022-09-14 PROCEDURE — 93005 ELECTROCARDIOGRAM TRACING: CPT | Mod: ER

## 2022-09-14 PROCEDURE — 83880 ASSAY OF NATRIURETIC PEPTIDE: CPT | Mod: ER | Performed by: EMERGENCY MEDICINE

## 2022-09-14 PROCEDURE — 85025 COMPLETE CBC W/AUTO DIFF WBC: CPT | Mod: ER | Performed by: EMERGENCY MEDICINE

## 2022-09-14 PROCEDURE — 25000242 PHARM REV CODE 250 ALT 637 W/ HCPCS: Mod: ER | Performed by: EMERGENCY MEDICINE

## 2022-09-14 PROCEDURE — 94640 AIRWAY INHALATION TREATMENT: CPT | Mod: ER

## 2022-09-14 RX ORDER — ALBUTEROL SULFATE 90 UG/1
4 AEROSOL, METERED RESPIRATORY (INHALATION) ONCE
Status: COMPLETED | OUTPATIENT
Start: 2022-09-14 | End: 2022-09-14

## 2022-09-14 RX ADMIN — ALBUTEROL SULFATE 4 PUFF: 90 AEROSOL, METERED RESPIRATORY (INHALATION) at 05:09

## 2022-09-14 NOTE — ED PROVIDER NOTES
Encounter Date: 2022       History     Chief Complaint   Patient presents with    Fall     Reports to ED c c/o fall. States this is 3rd fall in 2 weeks. Reports R shoulder pain. Unable to use R arm. Reports hit head from previous fall. +blood thinners      Xi Moise is a 82 y.o. female who  has a past medical history of Asthma, Diabetes mellitus, Hypertension, and Lipidemia.    The patient presents to the ED due to of fall.  Patient reports she has had 3 falls in the past 2-3 weeks.  The 1st fall she states she slipped.  Other 2nd fall she was helping set up a mattress when the mattress unfolded suddenly and caused her to fall.  Today she was painting and bent over and fell on to her right shoulder.  She denies loss of consciousness she recalls the incident but is unclear how she fell.  She did not hit her head on this fall but hit her head on her previous fall. She reports intermittent headache and a mild increase of her shortness of breath over the past week.  She has a history of asthma.  She denies any chest pain abdominal pain numbness new numbness or any other symptoms.  Her right shoulder is hurting her she reports pain to her right leg    Review of patient's allergies indicates:   Allergen Reactions    Bactrim [sulfamethoxazole-trimethoprim] Hives    Sulfa (sulfonamide antibiotics) Hives     Past Medical History:   Diagnosis Date    Asthma     Diabetes mellitus     Hypertension     Lipidemia      Past Surgical History:   Procedure Laterality Date    ANKLE SURGERY Left      SECTION      x 3    HYSTERECTOMY       Family History   Problem Relation Age of Onset    Hypertension Daughter      Social History     Tobacco Use    Smoking status: Never    Smokeless tobacco: Never   Substance Use Topics    Alcohol use: No    Drug use: No     Review of Systems   Constitutional:  Negative for chills and fever.        Increased falls   HENT:  Negative for sore throat.    Respiratory:  Positive for  shortness of breath. Negative for cough.    Cardiovascular:  Negative for chest pain.   Gastrointestinal:  Negative for nausea and vomiting.   Genitourinary:  Negative for dysuria, frequency and urgency.   Musculoskeletal:  Positive for arthralgias. Negative for back pain, neck pain and neck stiffness.   Skin:  Negative for rash and wound.   Neurological:  Negative for syncope and weakness.   Hematological:  Does not bruise/bleed easily.   Psychiatric/Behavioral:  Negative for agitation, behavioral problems and confusion.      Physical Exam     Initial Vitals [09/14/22 1640]   BP Pulse Resp Temp SpO2   (!) 147/67 73 18 98.5 °F (36.9 °C) 95 %      MAP       --         Physical Exam    Constitutional: No distress.   HENT:   Head: Normocephalic and atraumatic.   Eyes: Conjunctivae are normal.   Cardiovascular:  Intact distal pulses.           Pulmonary/Chest: No respiratory distress.   Abdominal: Abdomen is soft. She exhibits no distension. There is no abdominal tenderness. There is no rebound.   Musculoskeletal:      Comments: No C/T/L-spine tenderness      RUE:  tenderness to palpation of right shoulder/trapezius.  No elbow forearm or wrist tenderness.  Radial pulse palpable    Left upper extremity:  No apparent tenderness to palpation.     Right lower extremity:  DP pulse palpable small hematoma to anterior shin.  No knee tenderness or hip tenderness    Left lower extremity:  No apparent tenderness     Neurological: She is alert. She has normal strength. No cranial nerve deficit or sensory deficit.   Skin: Skin is warm and dry.   Psychiatric: She has a normal mood and affect.       ED Course   Procedures  Labs Reviewed   CBC W/ AUTO DIFFERENTIAL   NT-PRO NATRIURETIC PEPTIDE   TROPONIN I   COMPREHENSIVE METABOLIC PANEL          Imaging Results              CT Head Without Contrast (In process)  Result time 09/14/22 18:07:13                     X-Ray Tibia Fibula 2 View Right (In process)                      X-Ray  Chest AP Portable (In process)  Result time 09/14/22 18:05:42                     X-ray Shoulder 2 or More Views Right (In process)    Procedure changed from X-Ray Shoulder Trauma Right                    Medications   albuterol inhaler 4 puff (4 puffs Inhalation Given 9/14/22 1724)     Medical Decision Making:   Initial Assessment:   82-year-old woman with 3 falls.  Vital signs are stable here.  On chart review patient has a history of frequent falls.  Given this is her 3rd fall will plan to obtain labs and reassess.  Differential Diagnosis:   Differential Diagnosis includes, but is not limited to:  Polytrauma, fall/syncope, traumatic SAH/intracranial bleed, skull/c-spine/facial fracture, concussion, neck injury, chest trauma, intraabdominal bleed, solid organ injury, pelvic fracture, long bone fracture/dislocation, nerve injury/palsy, vascular injury, hemarthrosis, septic joint, osteoarthritis, compartment syndrome, rhabdomyolysis, soft tissue contusion, muscle strain, ligament tear/sprain, foreign body, laceration, abrasion.             ED Course as of 09/15/22 1123   Wed Sep 14, 2022   1719 EKG: Rate 73.  Sinus rhythm with occasional PVCs.  No STEMI. [RN]   1806 Patient is resting comfortably on reassessment.  She is pending lab work at this time.  Care will be signed out to oncoming physician with labs imaging and reassessment pending for disposition [RN]      ED Course User Index  [RN] Layton Mensah Jr., MD                 Clinical Impression:   Final diagnoses:  [W19.XXXA] Falls  [W19.XXXA] Fall     Portions of this note were dictated using voice recognition software and may contain dictation related errors in spelling/grammar/syntax not found on text review            Layton Mensah Jr., MD  09/14/22 1807       Layton Mensah Jr., MD  09/15/22 1124

## 2023-01-19 ENCOUNTER — TELEPHONE (OUTPATIENT)
Dept: FAMILY MEDICINE | Facility: CLINIC | Age: 83
End: 2023-01-19
Payer: MEDICARE

## 2023-01-19 DIAGNOSIS — E11.9 TYPE 2 DIABETES MELLITUS WITHOUT COMPLICATION, UNSPECIFIED WHETHER LONG TERM INSULIN USE: ICD-10-CM

## 2023-01-19 DIAGNOSIS — E78.5 HYPERLIPIDEMIA, UNSPECIFIED HYPERLIPIDEMIA TYPE: Primary | ICD-10-CM

## 2023-01-19 NOTE — TELEPHONE ENCOUNTER
NEED to get records from her PCP before her office visit so I have baseline for patient.    Then I need Fasting labs and URINE and NEW patient visit for diabetes, lab results.

## 2023-01-19 NOTE — TELEPHONE ENCOUNTER
Patient advised of message, said she will come by tomorrow to sign OSWALDO- advised her tat NP Dolores will like records before I can schedule appt.

## 2023-01-19 NOTE — TELEPHONE ENCOUNTER
Patient said she will like to establish care with you- non concerns- said she is a diabetic and have HTN and need help with her diabetes.

## 2023-01-19 NOTE — TELEPHONE ENCOUNTER
----- Message from Jennifer Rowland sent at 1/19/2023  8:55 AM CST -----  Type:  Needs Medical Advice    Who Called:  pt  Symptoms (please be specific):  would like to get a call back to schedule NP appt      Would the patient rather a call back or a response via [facility]?  Call   Best Call Back Number:  [phone]  Additional Information:

## 2023-01-20 NOTE — TELEPHONE ENCOUNTER
Patient came by office today to sign OSWALDO to get records from her previous Dr(Byron Feng). Office number is [phone].

## 2023-01-30 LAB
LEFT EYE DM RETINOPATHY: NEGATIVE
RIGHT EYE DM RETINOPATHY: NEGATIVE

## 2023-01-31 ENCOUNTER — TELEPHONE (OUTPATIENT)
Dept: FAMILY MEDICINE | Facility: CLINIC | Age: 83
End: 2023-01-31
Payer: MEDICARE

## 2023-01-31 DIAGNOSIS — E11.69 HYPERLIPIDEMIA ASSOCIATED WITH TYPE 2 DIABETES MELLITUS: ICD-10-CM

## 2023-01-31 DIAGNOSIS — N18.2 TYPE 2 DIABETES MELLITUS WITH STAGE 2 CHRONIC KIDNEY DISEASE, WITHOUT LONG-TERM CURRENT USE OF INSULIN: ICD-10-CM

## 2023-01-31 DIAGNOSIS — N18.2 HYPERTENSIVE HEART AND KIDNEY DISEASE WITHOUT HEART FAILURE AND WITH STAGE 2 CHRONIC KIDNEY DISEASE: Primary | ICD-10-CM

## 2023-01-31 DIAGNOSIS — E11.22 TYPE 2 DIABETES MELLITUS WITH STAGE 2 CHRONIC KIDNEY DISEASE, WITHOUT LONG-TERM CURRENT USE OF INSULIN: ICD-10-CM

## 2023-01-31 DIAGNOSIS — I25.10 ATHEROSCLEROSIS OF CORONARY ARTERY, UNSPECIFIED VESSEL OR LESION TYPE, UNSPECIFIED WHETHER ANGINA PRESENT, UNSPECIFIED WHETHER NATIVE OR TRANSPLANTED HEART: ICD-10-CM

## 2023-01-31 DIAGNOSIS — E78.5 HYPERLIPIDEMIA ASSOCIATED WITH TYPE 2 DIABETES MELLITUS: ICD-10-CM

## 2023-01-31 DIAGNOSIS — I13.10 HYPERTENSIVE HEART AND KIDNEY DISEASE WITHOUT HEART FAILURE AND WITH STAGE 2 CHRONIC KIDNEY DISEASE: Primary | ICD-10-CM

## 2023-01-31 DIAGNOSIS — E11.22 CKD STAGE 2 DUE TO TYPE 2 DIABETES MELLITUS: ICD-10-CM

## 2023-01-31 DIAGNOSIS — N18.2 CKD STAGE 2 DUE TO TYPE 2 DIABETES MELLITUS: ICD-10-CM

## 2023-01-31 DIAGNOSIS — E11.42 DIABETIC POLYNEUROPATHY ASSOCIATED WITH TYPE 2 DIABETES MELLITUS: ICD-10-CM

## 2023-01-31 NOTE — TELEPHONE ENCOUNTER
I call Dr Son office to see when was OSWALDO was faxed I told her medical records was faxed on 01/24, I call patient, she inform me she is having upper eyelid surgery on 03/15 and will need a preop clearance, patient will drop off papers tomorrow, I schedule patient for appt on 03/06 to establish care/preop. Please advised if this ok.

## 2023-01-31 NOTE — TELEPHONE ENCOUNTER
----- Message from Magdalena Braxton sent at 1/31/2023  9:40 AM CST -----  Needs advice from nurse:      Who Called:pt  Regarding:patient would like to become established, needs to surgical clearance before 3/15  Would the patient rather a call back or VIA [facility]?  Best Call Back number:[phone]  Additional Info:

## 2023-02-02 ENCOUNTER — PATIENT OUTREACH (OUTPATIENT)
Dept: ADMINISTRATIVE | Facility: HOSPITAL | Age: 83
End: 2023-02-02
Payer: MEDICARE

## 2023-02-09 PROBLEM — I25.10 ATHEROSCLEROSIS OF CORONARY ARTERY: Status: ACTIVE | Noted: 2023-02-09

## 2023-02-09 PROBLEM — E11.42 DIABETIC POLYNEUROPATHY ASSOCIATED WITH TYPE 2 DIABETES MELLITUS: Status: ACTIVE | Noted: 2023-02-09

## 2023-02-09 PROBLEM — J30.9 ALLERGIC RHINITIS: Status: ACTIVE | Noted: 2018-10-31

## 2023-02-09 PROBLEM — N18.2 HYPERTENSIVE HEART AND KIDNEY DISEASE WITHOUT HEART FAILURE AND WITH STAGE 2 CHRONIC KIDNEY DISEASE: Status: ACTIVE | Noted: 2023-02-09

## 2023-02-09 PROBLEM — I13.10 HYPERTENSIVE HEART AND KIDNEY DISEASE WITHOUT HEART FAILURE AND WITH STAGE 2 CHRONIC KIDNEY DISEASE: Status: ACTIVE | Noted: 2023-02-09

## 2023-02-09 NOTE — TELEPHONE ENCOUNTER
Schedule fasting labs and urine PRIOR to office visit - ordered through [facility] due to [facility].  Make sure it is done a few days before visit.

## 2023-02-10 ENCOUNTER — TELEPHONE (OUTPATIENT)
Dept: FAMILY MEDICINE | Facility: CLINIC | Age: 83
End: 2023-02-10
Payer: MEDICARE

## 2023-02-10 NOTE — TELEPHONE ENCOUNTER
----- Message from Woody Dang sent at 2/10/2023  9:08 AM CST -----  .Type:  Patient Returning Call    Who Called:Pt  Who Left Message for Patient:Isis  Would the patient rather a call back or a response via [facility]? Call  Best Call Back Number:[phone]

## 2023-02-10 NOTE — TELEPHONE ENCOUNTER
HPI Comments: Patient is a 60 yo male with bilateral leg swelling and pain, worse on left. States first noticed symptoms middle of last week, worsening since that time. Barrera Duet the bus [place] from [place] for an event for his sister. States been taking tylenol for pain and to prevent fevers for the past few days. No nausea or vomiting, no abdominal pain or chest pain, no further complaints. Febrile on arrival, no trauma or falls    Patient is a 59 y.o. male presenting with lower extremity edema. The history is provided by the patient. No  was used. Leg Swelling    Pertinent negatives include no back pain and no neck pain. History reviewed. No pertinent past medical history. Past Surgical History:   Procedure Laterality Date    HX ORTHOPAEDIC      back         History reviewed. No pertinent family history. Social History     Social History    Marital status: N/A     Spouse name: N/A    Number of children: N/A    Years of education: N/A     Occupational History    Not on file. Social History Main Topics    Smoking status: Never Smoker    Smokeless tobacco: Never Used    Alcohol use No    Drug use: No    Sexual activity: Not on file     Other Topics Concern    Not on file     Social History Narrative    No narrative on file         ALLERGIES: Review of patient's allergies indicates no known allergies. Review of Systems   Constitutional: Negative for chills and fever. HENT: Negative for rhinorrhea and sore throat. Eyes: Negative for visual disturbance. Respiratory: Negative for cough and shortness of breath. Cardiovascular: Positive for leg swelling. Negative for chest pain. Gastrointestinal: Negative for abdominal pain, diarrhea, nausea and vomiting. Genitourinary: Negative for dysuria. Musculoskeletal: Negative for back pain and neck pain. Skin: Positive for color change and rash (left leg warm and swollen, red).    Neurological: Negative for Patient advised of message, labs and urine schedule for 02/20.   weakness and headaches. Psychiatric/Behavioral: The patient is not nervous/anxious. Vitals:    02/13/18 1335   BP: 128/74   Pulse: (!) 120   Resp: 16   Temp: (!) 100.9 °F (38.3 °C)   SpO2: 96%   Weight: 113.4 kg (250 lb)   Height: 5' 8\" (1.727 m)            Physical Exam   Constitutional: He is oriented to person, place, and time. He appears well-developed and well-nourished. HENT:   Head: Normocephalic. Right Ear: External ear normal.   Left Ear: External ear normal.   Eyes: Conjunctivae and EOM are normal. Pupils are equal, round, and reactive to light. Neck: Normal range of motion. Neck supple. No tracheal deviation present. Cardiovascular: Normal rate, regular rhythm, normal heart sounds and intact distal pulses. No murmur heard. Pulmonary/Chest: Effort normal and breath sounds normal. No respiratory distress. Abdominal: Soft. He exhibits no distension. There is no tenderness. There is no rebound. Musculoskeletal: Normal range of motion. He exhibits edema. Left leg swollen, red, pitting edema to level of mid calf. Right leg mildly swollen without warmth or erythema appreciated. No neck stiffness or pain, non-tender to palpation of C, T and L spine. DP pulse 2+ bilaterally   Neurological: He is alert and oriented to person, place, and time. No cranial nerve deficit. Skin: No rash noted. Nursing note and vitals reviewed.        MDM  Number of Diagnoses or Management Options  Fever, unspecified fever cause: new and requires workup  Left leg swelling: new and requires workup     Amount and/or Complexity of Data Reviewed  Clinical lab tests: ordered and reviewed  Tests in the radiology section of CPT®: ordered and reviewed  Tests in the medicine section of CPT®: ordered and reviewed  Review and summarize past medical records: yes    Risk of Complications, Morbidity, and/or Mortality  Presenting problems: high  Diagnostic procedures: high  Management options: high    Patient Progress  Patient progress: stable        ED Course       Procedures     Recent Results (from the past 12 hour(s))   CBC WITH AUTOMATED DIFF    Collection Time: 02/13/18  3:33 PM   Result Value Ref Range    WBC 13.7 (H) 4.3 - 11.1 K/uL    RBC 3.44 (L) 4.23 - 5.67 M/uL    HGB 8.5 (L) 13.6 - 17.2 g/dL    HCT 27.0 (L) 41.1 - 50.3 %    MCV 78.5 (L) 79.6 - 97.8 FL    MCH 24.7 (L) 26.1 - 32.9 PG    MCHC 31.5 31.4 - 35.0 g/dL    RDW 16.0 (H) 11.9 - 14.6 %    PLATELET 210 (H) 337 - 450 K/uL    MPV 9.3 (L) 10.8 - 14.1 FL    DF AUTOMATED      NEUTROPHILS 73 43 - 78 %    LYMPHOCYTES 13 13 - 44 %    MONOCYTES 12 4.0 - 12.0 %    EOSINOPHILS 1 0.5 - 7.8 %    BASOPHILS 0 0.0 - 2.0 %    IMMATURE GRANULOCYTES 1 0.0 - 5.0 %    ABS. NEUTROPHILS 10.0 (H) 1.7 - 8.2 K/UL    ABS. LYMPHOCYTES 1.7 0.5 - 4.6 K/UL    ABS. MONOCYTES 1.6 (H) 0.1 - 1.3 K/UL    ABS. EOSINOPHILS 0.1 0.0 - 0.8 K/UL    ABS. BASOPHILS 0.0 0.0 - 0.2 K/UL    ABS. IMM. GRANS. 0.2 0.0 - 0.5 K/UL   METABOLIC PANEL, COMPREHENSIVE    Collection Time: 02/13/18  3:33 PM   Result Value Ref Range    Sodium 137 136 - 145 mmol/L    Potassium 4.3 3.5 - 5.1 mmol/L    Chloride 102 98 - 107 mmol/L    CO2 27 21 - 32 mmol/L    Anion gap 8 7 - 16 mmol/L    Glucose 134 (H) 65 - 100 mg/dL    BUN 20 8 - 23 MG/DL    Creatinine 1.17 0.8 - 1.5 MG/DL    GFR est AA >60 >60 ml/min/1.73m2    GFR est non-AA >60 >60 ml/min/1.73m2    Calcium 9.7 8.3 - 10.4 MG/DL    Bilirubin, total 1.3 (H) 0.2 - 1.1 MG/DL    ALT (SGPT) 141 (H) 12 - 65 U/L    AST (SGOT) 174 (H) 15 - 37 U/L    Alk.  phosphatase 212 (H) 50 - 136 U/L    Protein, total 8.1 6.3 - 8.2 g/dL    Albumin 2.0 (L) 3.2 - 4.6 g/dL    Globulin 6.1 (H) 2.3 - 3.5 g/dL    A-G Ratio 0.3 (L) 1.2 - 3.5     TROPONIN I    Collection Time: 02/13/18  3:33 PM   Result Value Ref Range    Troponin-I, Qt. <0.04 0.02 - 0.05 NG/ML   BNP    Collection Time: 02/13/18  3:33 PM   Result Value Ref Range    BNP 2 pg/mL   EKG, 12 LEAD, INITIAL    Collection Time: 02/13/18 3:39 PM   Result Value Ref Range    Ventricular Rate 107 BPM    Atrial Rate 107 BPM    P-R Interval 164 ms    QRS Duration 80 ms    Q-T Interval 340 ms    QTC Calculation (Bezet) 453 ms    Calculated P Axis 65 degrees    Calculated R Axis 49 degrees    Calculated T Axis 54 degrees    Diagnosis       !! AGE AND GENDER SPECIFIC ECG ANALYSIS !! Sinus tachycardia  Otherwise normal ECG  No previous ECGs available  Confirmed by Yanni Pavon MD (), Jasmin Rico ([id number]) on 2/13/2018 4:16:51 PM       Xr Chest Pa Lat    Result Date: 2/13/2018  PA AND LATERAL CHEST X-RAY  2/13/2018 HISTORY:  Fever and leg swelling COMPARISON:  None FINDINGS:  PA and lateral views demonstrate small linear band of scarring or atelectasis in the lingula. No pulmonary edema, focal infiltrate, or pleural effusion. Heart is not enlarged. IMPRESSION:  Minimal linear atelectasis or scarring lingula. No focal infiltrate or pulmonary edema. Duplex Lower Ext Venous Left    Result Date: 2/13/2018  LEFT LOWER EXTREMITY VENOUS ULTRASOUND INDICATION:  pain, swelling following 12 hour trip COMPARISON: None. FINDINGS: Duplex Doppler sonography of the left lower extremity was performed from the groin to the calf. The left common femoral, femoral and popliteal veins are compressible with normal color-flow and wave forms and response to physiologic maneuvers including Valsalva and augmentation. Anechoic fluid collection in the soft tissues suggests popliteal cyst, probably ruptured. IMPRESSION:  No deep venous thrombosis identified. 60 yo male fever and leg pain/swelling/redness      Fever, elevated WBC, cellulitis of right leg. Anemia of unknown etiology or chronicity (fecal occult negative).   BC drawn, BSA, admit for further management

## 2023-02-17 ENCOUNTER — TELEPHONE (OUTPATIENT)
Dept: FAMILY MEDICINE | Facility: CLINIC | Age: 83
End: 2023-02-17
Payer: MEDICARE

## 2023-02-17 DIAGNOSIS — E11.22 TYPE 2 DIABETES MELLITUS WITH STAGE 2 CHRONIC KIDNEY DISEASE, WITHOUT LONG-TERM CURRENT USE OF INSULIN: Primary | ICD-10-CM

## 2023-02-17 DIAGNOSIS — N18.2 TYPE 2 DIABETES MELLITUS WITH STAGE 2 CHRONIC KIDNEY DISEASE, WITHOUT LONG-TERM CURRENT USE OF INSULIN: Primary | ICD-10-CM

## 2023-02-21 LAB
ALBUMIN SERPL-MCNC: 4.5 G/DL (ref 3.6–5.1)
ALBUMIN/CREAT UR: 13 MCG/MG CREAT
ALBUMIN/GLOB SERPL: 1.8 (CALC) (ref 1–2.5)
ALP SERPL-CCNC: 76 U/L (ref 37–153)
ALT SERPL-CCNC: 16 U/L (ref 6–29)
AST SERPL-CCNC: 20 U/L (ref 10–35)
BASOPHILS # BLD AUTO: 51 CELLS/UL (ref 0–200)
BASOPHILS NFR BLD AUTO: 0.6 %
BILIRUB SERPL-MCNC: 0.9 MG/DL (ref 0.2–1.2)
BUN SERPL-MCNC: 18 MG/DL (ref 7–25)
BUN/CREAT SERPL: 17 (CALC) (ref 6–22)
CALCIUM SERPL-MCNC: 9.9 MG/DL (ref 8.6–10.4)
CHLORIDE SERPL-SCNC: 98 MMOL/L (ref 98–110)
CHOLEST SERPL-MCNC: 169 MG/DL
CHOLEST/HDLC SERPL: 2.6 (CALC)
CO2 SERPL-SCNC: 31 MMOL/L (ref 20–32)
CREAT SERPL-MCNC: 1.05 MG/DL (ref 0.6–0.95)
CREAT UR-MCNC: 55 MG/DL (ref 20–275)
EGFR: 53 ML/MIN/1.73M2
EOSINOPHIL # BLD AUTO: 289 CELLS/UL (ref 15–500)
EOSINOPHIL NFR BLD AUTO: 3.4 %
ERYTHROCYTE [DISTWIDTH] IN BLOOD BY AUTOMATED COUNT: 13.6 % (ref 11–15)
GLOBULIN SER CALC-MCNC: 2.5 G/DL (CALC) (ref 1.9–3.7)
GLUCOSE SERPL-MCNC: 133 MG/DL (ref 65–99)
HBA1C MFR BLD: 7.4 % OF TOTAL HGB
HCT VFR BLD AUTO: 43.4 % (ref 35–45)
HDLC SERPL-MCNC: 66 MG/DL
HGB BLD-MCNC: 14.3 G/DL (ref 11.7–15.5)
LDLC SERPL CALC-MCNC: 72 MG/DL (CALC)
LYMPHOCYTES # BLD AUTO: 2933 CELLS/UL (ref 850–3900)
LYMPHOCYTES NFR BLD AUTO: 34.5 %
MCH RBC QN AUTO: 29.1 PG (ref 27–33)
MCHC RBC AUTO-ENTMCNC: 32.9 G/DL (ref 32–36)
MCV RBC AUTO: 88.4 FL (ref 80–100)
MICROALBUMIN UR-MCNC: 0.7 MG/DL
MONOCYTES # BLD AUTO: 578 CELLS/UL (ref 200–950)
MONOCYTES NFR BLD AUTO: 6.8 %
NEUTROPHILS # BLD AUTO: 4650 CELLS/UL (ref 1500–7800)
NEUTROPHILS NFR BLD AUTO: 54.7 %
NONHDLC SERPL-MCNC: 103 MG/DL (CALC)
PLATELET # BLD AUTO: 329 THOUSAND/UL (ref 140–400)
PMV BLD REES-ECKER: 8.7 FL (ref 7.5–12.5)
POTASSIUM SERPL-SCNC: 4 MMOL/L (ref 3.5–5.3)
PROT SERPL-MCNC: 7 G/DL (ref 6.1–8.1)
RBC # BLD AUTO: 4.91 MILLION/UL (ref 3.8–5.1)
SODIUM SERPL-SCNC: 139 MMOL/L (ref 135–146)
TRIGL SERPL-MCNC: 217 MG/DL
TSH SERPL-ACNC: 3.42 MIU/L (ref 0.4–4.5)
WBC # BLD AUTO: 8.5 THOUSAND/UL (ref 3.8–10.8)

## 2023-03-06 ENCOUNTER — OFFICE VISIT (OUTPATIENT)
Dept: FAMILY MEDICINE | Facility: CLINIC | Age: 83
End: 2023-03-06
Payer: MEDICARE

## 2023-03-06 VITALS
SYSTOLIC BLOOD PRESSURE: 134 MMHG | DIASTOLIC BLOOD PRESSURE: 68 MMHG | WEIGHT: 167.31 LBS | OXYGEN SATURATION: 96 % | TEMPERATURE: 98 F | BODY MASS INDEX: 26.26 KG/M2 | HEART RATE: 94 BPM | HEIGHT: 67 IN

## 2023-03-06 DIAGNOSIS — Z79.4 TYPE 2 DIABETES MELLITUS WITH STAGE 3A CHRONIC KIDNEY DISEASE, WITH LONG-TERM CURRENT USE OF INSULIN: Primary | ICD-10-CM

## 2023-03-06 DIAGNOSIS — Z86.73 HISTORY OF CVA (CEREBROVASCULAR ACCIDENT) WITHOUT RESIDUAL DEFICITS: Chronic | ICD-10-CM

## 2023-03-06 DIAGNOSIS — Z01.818 PREOPERATIVE GENERAL PHYSICAL EXAMINATION: ICD-10-CM

## 2023-03-06 DIAGNOSIS — J44.9 COPD, MODERATE: ICD-10-CM

## 2023-03-06 DIAGNOSIS — R91.8 ABNORMAL CHEST X-RAY WITH MULTIPLE LUNG NODULES: ICD-10-CM

## 2023-03-06 DIAGNOSIS — E11.22 TYPE 2 DIABETES MELLITUS WITH STAGE 3A CHRONIC KIDNEY DISEASE, WITH LONG-TERM CURRENT USE OF INSULIN: Primary | ICD-10-CM

## 2023-03-06 DIAGNOSIS — K21.9 CHRONIC GERD: ICD-10-CM

## 2023-03-06 DIAGNOSIS — I13.10 HYPERTENSIVE HEART AND KIDNEY DISEASE WITHOUT HEART FAILURE AND WITH STAGE 3A CHRONIC KIDNEY DISEASE: ICD-10-CM

## 2023-03-06 DIAGNOSIS — H02.403 PTOSIS OF BOTH EYELIDS: ICD-10-CM

## 2023-03-06 DIAGNOSIS — N18.31 HYPERTENSIVE HEART AND KIDNEY DISEASE WITHOUT HEART FAILURE AND WITH STAGE 3A CHRONIC KIDNEY DISEASE: ICD-10-CM

## 2023-03-06 DIAGNOSIS — Z86.19 HISTORY OF MAC INFECTION: Chronic | ICD-10-CM

## 2023-03-06 DIAGNOSIS — I25.119 ATHEROSCLEROSIS OF NATIVE CORONARY ARTERY OF NATIVE HEART WITH ANGINA PECTORIS: ICD-10-CM

## 2023-03-06 DIAGNOSIS — I70.0 AORTIC ATHEROSCLEROSIS: ICD-10-CM

## 2023-03-06 DIAGNOSIS — J84.10 CALCIFIED GRANULOMA OF LUNG: ICD-10-CM

## 2023-03-06 DIAGNOSIS — M85.80 OSTEOPENIA, UNSPECIFIED LOCATION: ICD-10-CM

## 2023-03-06 DIAGNOSIS — N18.30 CKD STAGE 3 DUE TO TYPE 2 DIABETES MELLITUS: ICD-10-CM

## 2023-03-06 DIAGNOSIS — J47.9 BRONCHIECTASIS WITHOUT COMPLICATION: ICD-10-CM

## 2023-03-06 DIAGNOSIS — N18.31 TYPE 2 DIABETES MELLITUS WITH STAGE 3A CHRONIC KIDNEY DISEASE, WITH LONG-TERM CURRENT USE OF INSULIN: Primary | ICD-10-CM

## 2023-03-06 DIAGNOSIS — Z79.02 LONG TERM (CURRENT) USE OF ANTITHROMBOTICS/ANTIPLATELETS: ICD-10-CM

## 2023-03-06 DIAGNOSIS — F33.41 RECURRENT MAJOR DEPRESSIVE DISORDER, IN PARTIAL REMISSION: ICD-10-CM

## 2023-03-06 DIAGNOSIS — E11.42 DIABETIC POLYNEUROPATHY ASSOCIATED WITH TYPE 2 DIABETES MELLITUS: ICD-10-CM

## 2023-03-06 DIAGNOSIS — E11.22 CKD STAGE 3 DUE TO TYPE 2 DIABETES MELLITUS: ICD-10-CM

## 2023-03-06 DIAGNOSIS — E78.5 HYPERLIPIDEMIA ASSOCIATED WITH TYPE 2 DIABETES MELLITUS: ICD-10-CM

## 2023-03-06 DIAGNOSIS — E11.69 HYPERLIPIDEMIA ASSOCIATED WITH TYPE 2 DIABETES MELLITUS: ICD-10-CM

## 2023-03-06 PROCEDURE — 3075F SYST BP GE 130 - 139MM HG: CPT | Mod: CPTII,S$GLB,, | Performed by: NURSE PRACTITIONER

## 2023-03-06 PROCEDURE — 1160F RVW MEDS BY RX/DR IN RCRD: CPT | Mod: CPTII,S$GLB,, | Performed by: NURSE PRACTITIONER

## 2023-03-06 PROCEDURE — 1101F PR PT FALLS ASSESS DOC 0-1 FALLS W/OUT INJ PAST YR: ICD-10-PCS | Mod: CPTII,S$GLB,, | Performed by: NURSE PRACTITIONER

## 2023-03-06 PROCEDURE — 3078F DIAST BP <80 MM HG: CPT | Mod: CPTII,S$GLB,, | Performed by: NURSE PRACTITIONER

## 2023-03-06 PROCEDURE — 99205 PR OFFICE/OUTPT VISIT, NEW, LEVL V, 60-74 MIN: ICD-10-PCS | Mod: S$GLB,,, | Performed by: NURSE PRACTITIONER

## 2023-03-06 PROCEDURE — 1159F PR MEDICATION LIST DOCUMENTED IN MEDICAL RECORD: ICD-10-PCS | Mod: CPTII,S$GLB,, | Performed by: NURSE PRACTITIONER

## 2023-03-06 PROCEDURE — 99205 OFFICE O/P NEW HI 60 MIN: CPT | Mod: S$GLB,,, | Performed by: NURSE PRACTITIONER

## 2023-03-06 PROCEDURE — 3288F PR FALLS RISK ASSESSMENT DOCUMENTED: ICD-10-PCS | Mod: CPTII,S$GLB,, | Performed by: NURSE PRACTITIONER

## 2023-03-06 PROCEDURE — 99417 PR PROLONGED SVC, OUTPT, W/WO DIRECT PT CONTACT,  EA ADDTL 15 MIN: ICD-10-PCS | Mod: S$GLB,,, | Performed by: NURSE PRACTITIONER

## 2023-03-06 PROCEDURE — 99499 UNLISTED E&M SERVICE: CPT | Mod: S$GLB,,, | Performed by: NURSE PRACTITIONER

## 2023-03-06 PROCEDURE — 3288F FALL RISK ASSESSMENT DOCD: CPT | Mod: CPTII,S$GLB,, | Performed by: NURSE PRACTITIONER

## 2023-03-06 PROCEDURE — 1101F PT FALLS ASSESS-DOCD LE1/YR: CPT | Mod: CPTII,S$GLB,, | Performed by: NURSE PRACTITIONER

## 2023-03-06 PROCEDURE — 99999 PR PBB SHADOW E&M-EST. PATIENT-LVL V: ICD-10-PCS | Mod: PBBFAC,,, | Performed by: NURSE PRACTITIONER

## 2023-03-06 PROCEDURE — 1126F AMNT PAIN NOTED NONE PRSNT: CPT | Mod: CPTII,S$GLB,, | Performed by: NURSE PRACTITIONER

## 2023-03-06 PROCEDURE — 1126F PR PAIN SEVERITY QUANTIFIED, NO PAIN PRESENT: ICD-10-PCS | Mod: CPTII,S$GLB,, | Performed by: NURSE PRACTITIONER

## 2023-03-06 PROCEDURE — 99999 PR PBB SHADOW E&M-EST. PATIENT-LVL V: CPT | Mod: PBBFAC,,, | Performed by: NURSE PRACTITIONER

## 2023-03-06 PROCEDURE — 1160F PR REVIEW ALL MEDS BY PRESCRIBER/CLIN PHARMACIST DOCUMENTED: ICD-10-PCS | Mod: CPTII,S$GLB,, | Performed by: NURSE PRACTITIONER

## 2023-03-06 PROCEDURE — 99417 PROLNG OP E/M EACH 15 MIN: CPT | Mod: S$GLB,,, | Performed by: NURSE PRACTITIONER

## 2023-03-06 PROCEDURE — 99499 RISK ADDL DX/OHS AUDIT: ICD-10-PCS | Mod: S$GLB,,, | Performed by: NURSE PRACTITIONER

## 2023-03-06 PROCEDURE — 3078F PR MOST RECENT DIASTOLIC BLOOD PRESSURE < 80 MM HG: ICD-10-PCS | Mod: CPTII,S$GLB,, | Performed by: NURSE PRACTITIONER

## 2023-03-06 PROCEDURE — 3075F PR MOST RECENT SYSTOLIC BLOOD PRESS GE 130-139MM HG: ICD-10-PCS | Mod: CPTII,S$GLB,, | Performed by: NURSE PRACTITIONER

## 2023-03-06 PROCEDURE — 1159F MED LIST DOCD IN RCRD: CPT | Mod: CPTII,S$GLB,, | Performed by: NURSE PRACTITIONER

## 2023-03-06 RX ORDER — GABAPENTIN 300 MG/1
300 CAPSULE ORAL 2 TIMES DAILY
Qty: 180 CAPSULE | Refills: 3 | Status: SHIPPED | OUTPATIENT
Start: 2023-03-06 | End: 2024-01-30 | Stop reason: SDUPTHER

## 2023-03-06 RX ORDER — AMLODIPINE BESYLATE 2.5 MG/1
2.5 TABLET ORAL
COMMUNITY
Start: 2022-12-24 | End: 2023-03-06 | Stop reason: SDUPTHER

## 2023-03-06 RX ORDER — MULTIVIT WITH IRON,MINERALS
TABLET ORAL
COMMUNITY
End: 2023-04-03

## 2023-03-06 RX ORDER — AMLODIPINE BESYLATE 2.5 MG/1
2.5 TABLET ORAL DAILY
Qty: 30 TABLET | Refills: 0 | Status: SHIPPED | OUTPATIENT
Start: 2023-03-06 | End: 2023-04-03

## 2023-03-06 RX ORDER — METFORMIN HYDROCHLORIDE 1000 MG/1
1000 TABLET ORAL 2 TIMES DAILY WITH MEALS
Qty: 180 TABLET | Refills: 3 | Status: SHIPPED | OUTPATIENT
Start: 2023-03-06 | End: 2023-07-10 | Stop reason: SDUPTHER

## 2023-03-06 RX ORDER — ALBUTEROL SULFATE 90 UG/1
2 AEROSOL, METERED RESPIRATORY (INHALATION) EVERY 4 HOURS PRN
COMMUNITY

## 2023-03-06 RX ORDER — LISINOPRIL 5 MG/1
5 TABLET ORAL DAILY
Qty: 30 TABLET | Refills: 0 | Status: SHIPPED | OUTPATIENT
Start: 2023-03-06 | End: 2023-04-03

## 2023-03-06 RX ORDER — LISINOPRIL 30 MG/1
40 TABLET ORAL
COMMUNITY
End: 2023-03-06

## 2023-03-06 RX ORDER — ESCITALOPRAM OXALATE 10 MG/1
10 TABLET ORAL DAILY
Qty: 30 TABLET | Refills: 0 | Status: SHIPPED | OUTPATIENT
Start: 2023-03-06 | End: 2023-04-03 | Stop reason: SDUPTHER

## 2023-03-06 RX ORDER — ONDANSETRON HYDROCHLORIDE 8 MG/1
8 TABLET, FILM COATED ORAL EVERY 8 HOURS PRN
COMMUNITY

## 2023-03-06 RX ORDER — VITAMIN B COMPLEX
1 CAPSULE ORAL DAILY
COMMUNITY
End: 2023-04-03 | Stop reason: SDUPTHER

## 2023-03-06 RX ORDER — FLUTICASONE PROPIONATE 50 MCG
2 SPRAY, SUSPENSION (ML) NASAL
COMMUNITY
End: 2023-03-06 | Stop reason: SDUPTHER

## 2023-03-06 RX ORDER — LANOLIN ALCOHOL/MO/W.PET/CERES
1000 CREAM (GRAM) TOPICAL DAILY
COMMUNITY

## 2023-03-06 RX ORDER — DULOXETIN HYDROCHLORIDE 60 MG/1
60 CAPSULE, DELAYED RELEASE ORAL
COMMUNITY
Start: 2023-02-01 | End: 2023-03-06 | Stop reason: SDUPTHER

## 2023-03-06 RX ORDER — DICLOFENAC POTASSIUM 50 MG/1
50 TABLET, FILM COATED ORAL 2 TIMES DAILY
COMMUNITY
Start: 2022-10-03 | End: 2023-04-03

## 2023-03-06 RX ORDER — GABAPENTIN 300 MG/1
300 CAPSULE ORAL 2 TIMES DAILY
COMMUNITY
End: 2023-03-06 | Stop reason: SDUPTHER

## 2023-03-06 RX ORDER — ESCITALOPRAM OXALATE 20 MG/1
20 TABLET ORAL
COMMUNITY
Start: 2022-12-29 | End: 2023-03-06 | Stop reason: DRUGHIGH

## 2023-03-06 RX ORDER — METFORMIN HYDROCHLORIDE 1000 MG/1
1000 TABLET, FILM COATED, EXTENDED RELEASE ORAL 2 TIMES DAILY
COMMUNITY
End: 2023-03-06 | Stop reason: SDUPTHER

## 2023-03-06 RX ORDER — PANTOPRAZOLE SODIUM 40 MG/1
40 TABLET, DELAYED RELEASE ORAL
COMMUNITY
End: 2023-04-03

## 2023-03-06 RX ORDER — MECLIZINE HYDROCHLORIDE 25 MG/1
25 TABLET ORAL 2 TIMES DAILY PRN
COMMUNITY
Start: 2022-11-30

## 2023-03-06 RX ORDER — ASCORBIC ACID 500 MG
500 TABLET ORAL DAILY
COMMUNITY

## 2023-03-06 RX ORDER — NEOMYCIN SULFATE, POLYMYXIN B SULFATE AND DEXAMETHASONE 3.5; 10000; 1 MG/ML; [USP'U]/ML; MG/ML
1 SUSPENSION/ DROPS OPHTHALMIC 3 TIMES DAILY
COMMUNITY
End: 2023-04-03

## 2023-03-06 RX ORDER — ATORVASTATIN CALCIUM 80 MG/1
80 TABLET, FILM COATED ORAL DAILY
Qty: 90 TABLET | Refills: 3 | Status: SHIPPED | OUTPATIENT
Start: 2023-03-06 | End: 2024-01-30

## 2023-03-06 RX ORDER — DULOXETIN HYDROCHLORIDE 60 MG/1
60 CAPSULE, DELAYED RELEASE ORAL DAILY
Qty: 90 CAPSULE | Refills: 3 | Status: SHIPPED | OUTPATIENT
Start: 2023-03-06 | End: 2023-11-24

## 2023-03-06 RX ORDER — METOPROLOL SUCCINATE 100 MG/1
100 TABLET, EXTENDED RELEASE ORAL DAILY
Qty: 30 TABLET | Refills: 0 | Status: SHIPPED | OUTPATIENT
Start: 2023-03-06 | End: 2023-04-03

## 2023-03-06 RX ORDER — TIOTROPIUM BROMIDE INHALATION SPRAY 3.12 UG/1
2 SPRAY, METERED RESPIRATORY (INHALATION)
COMMUNITY
End: 2023-03-06

## 2023-03-06 RX ORDER — CLOPIDOGREL BISULFATE 75 MG/1
75 TABLET ORAL DAILY
Qty: 90 TABLET | Refills: 3 | Status: SHIPPED | OUTPATIENT
Start: 2023-03-06 | End: 2024-01-30 | Stop reason: SDUPTHER

## 2023-03-06 RX ORDER — CHOLECALCIFEROL (VITAMIN D3) 25 MCG
1000 TABLET ORAL
COMMUNITY
End: 2023-04-03 | Stop reason: ALTCHOICE

## 2023-03-06 NOTE — PROGRESS NOTES
"Subjective:       Patient ID: Xi Moise is a 83 y.o. female.    Chief Complaint: Establish Care and Pre-op Exam (Eyelids lifted on 03/15)    [facility]    ####NEW PATIENT - Previous patient of Dr. Son####    Patient is an 83 year old white female with Aortic Atherosclerosis, History of Stroke, Type 2 Diabetes, CKD Stage 3, diabetic neuropathy, Hypertension/Hypertensive heart disease, Hyperlipidemia, moderate COPD/Asthma with Bronchiectasis with history of Mycobacterium avium-intracellulare infection, Calcified granuloma of lung, Recurrent Depression, chronic GERD, and Osteopenia that is here today to establish care with new provider and treat chronic health problems.  Patient is also here today for PRE-OPERATIVE exam for eyelid surgery on 3/15/2023.  She had her MEDICARE WELLNESS EXAM with [facility] last done on 5/5/2022.    Aortic Atherosclerosis and CAD  Seen on CT chest  4/28/2016 and more recent CT  ON Plavix 75 mg daily,  Atorvastatin 80 mg daily, ACE, BB, and CCB    History of Stroke  Around 2016  On Plavix 75 mg daily    Type 2 Diabetes  Taking NPH insulin 15 units twice daily before meals, metformin a 1000 mg twice daily  Fasting blood glucose 133 with hemoglobin A1c 7.4%.    Diabetic urine screening is negative   Will increase the NPH to 16 units twice daily  Recheck in 3 months.    CDK Stage 3  Creatinine 1.05 with eGFR 53  AVOID NSAIDs  Monitoring    Diabetic Neuropathy  On Cymbalta 60 mg daily and gabapentin 300 mg twice daily    Hypertension/hypertensive heart disease  Taking lisinopril 5 mg daily, metoprolol  mg daily, and amlodipine 2.5 mg daily.  /68 (BP Location: Right arm, Patient Position: Sitting, BP Method: Large (Manual))   Pulse 94   Temp 98.2 °F (36.8 °C) (Temporal)   Ht 5' 6.54" (1.69 m)   Wt 75.9 kg (167 lb 5.3 oz)   SpO2 96%   BMI 26.58 kg/m²     Hyperlipidemia   Taking atorvastatin 80 mg daily   LDL 72   Triglycerides elevated at 217   Advised on lifestyle " modifications    Bronchiectasis, COPD/Asthma Overlap, History of Pulmonary MAC, SHANTI positive  Noted on last Pulmonary Progress note - Dr. Lee at [facility] - see under encounters  Patient reports now followed by Dr. Alannah Noe with [facility] - will request records  Reports only on Albuterol inhaler prn  Reports NO controller inhalers    Recurrent depression  Currently taking Cymbalta 60 mg and Lexapro 20 mg daily  Reports she is doing well and wants to wean off medicine  Will decrease the Lexapro to 10 mg daily and reassess at next visit    Osteopenia  Last DEXA scan 2/2/21  Advised to take calcium with vitamin-D supplementation    Chronic GERD  Taking Protonix 40 mg daily    Current labs:  CBC within normal limits  CMP with impaired fasting glucose 133, CKD stage 3 with creatinine 1.05 and GFR 53, liver enzymes normal   Cholesterol controlled on present medicine LDL 72   Hemoglobin A1c 7.4%   TSH within normal limits  Diabetic urine screen negative    Health maintenance:   Up-to-date    Preoperative exam for eyelid surgery: See form below      Component      Latest Ref Rng & Units 2/20/2023 9/14/2022 3/29/2022   WBC      3.8 - 10.8 Thousand/uL 8.5 7.87 8.22   WBC-Corrected for NRBC's      3.90 - 12.70 K/uL  7.87    RBC      3.80 - 5.10 Million/uL 4.91 4.36 4.61   Hemoglobin      11.7 - 15.5 g/dL 14.3 13.2 13.9   Hematocrit      35.0 - 45.0 % 43.4 40.0 42.2   MCV      80.0 - 100.0 fL 88.4 92 92   MCH      27.0 - 33.0 pg 29.1 30.3 30.2   MCHC      32.0 - 36.0 g/dL 32.9 33.0 32.9   RDW      11.0 - 15.0 % 13.6 13.3 13.2   Platelets      140 - 400 Thousand/uL 329 234 275   MPV      7.5 - 12.5 fL 8.7 9.1 (L) 8.7 (L)   Immature Granulocytes      0.0 - 0.5 %  0.3 0.2   Gran # (ANC)      1.8 - 7.7 K/uL  3.6 5.1   Immature Grans (Abs)      0.00 - 0.04 K/uL  0.02 0.02   Lymph #      850 - 3,900 cells/uL 2,933 3.2 2.3   Mono #      200 - 950 cells/uL 578 0.5 0.6   Eos #      15 - 500 cells/uL 289 0.6 (H) 0.3   Baso  #      0 - 200 cells/uL 51 0.07 0.04   nRBC      0 /100 WBC  0 0   Gran %      38.0 - 73.0 %  45.2 61.4   Lymph %      % 34.5 40.0 28.0   Mono %      % 6.8 6.6 6.7   Eosinophil %      % 3.4 7.0 3.2   Basophil %      % 0.6 0.9 0.5   Platelet Estimate        Appears normal    Differential Method        Automated Automated   Neutrophils, Abs      1,500 - 7,800 cells/uL 4,650     Neutrophils Relative      % 54.7     Glucose      65 - 99 mg/dL 133 (H) 280 (H) 415 (H)   BUN      7 - 25 mg/dL 18 25 (H) 29 (H)   Creatinine      0.60 - 0.95 mg/dL 1.05 (H) 0.83 0.94   eGFR      > OR = 60 mL/min/1.73m2 53 (L)     BUN/CREAT RATIO      6 - 22 (calc) 17     Sodium      135 - 146 mmol/L 139 137 133 (L)   Potassium      3.5 - 5.3 mmol/L 4.0 4.2 4.3   Chloride      98 - 110 mmol/L 98 100 95   CO2      20 - 32 mmol/L 31 28 26   Calcium      8.6 - 10.4 mg/dL 9.9 8.7 9.3   PROTEIN TOTAL      6.1 - 8.1 g/dL 7.0 6.2 7.0   Albumin      3.6 - 5.1 g/dL 4.5 4.0 4.3   Globulin, Total      1.9 - 3.7 g/dL (calc) 2.5     Albumin/Globulin Ratio      1.0 - 2.5 (calc) 1.8     BILIRUBIN TOTAL      0.2 - 1.2 mg/dL 0.9 0.6 0.7   Alkaline Phosphatase      37 - 153 U/L 76 74 113   AST      10 - 35 U/L 20 31 24   ALT      6 - 29 U/L 16 20 22   Cholesterol      <200 mg/dL 169     HDL      > OR = 50 mg/dL 66     Triglycerides      <150 mg/dL 217 (H)     LDL Cholesterol External      mg/dL (calc) 72     HDL/Cholesterol Ratio      <5.0 (calc) 2.6     Non HDL Chol. (LDL+VLDL)      <130 mg/dL (calc) 103     Hemoglobin A1C External      <5.7 % of total Hgb 7.4 (H)     TSH      0.40 - 4.50 mIU/L 3.42       Component      Latest Ref Rng & Units 2/21/2023   Creatinine, Urine      20 - 275 mg/dL 55     Review of Systems   Constitutional:  Negative for chills and fever.   HENT: Negative.     Eyes:         Eyelid surgery scheduled - drooping eyelids   Respiratory: Negative.     Cardiovascular: Negative.    Gastrointestinal: Negative.    Genitourinary: Negative.   "  Neurological: Negative.    Psychiatric/Behavioral: Negative.         Objective:     Vitals:    03/06/23 1333   BP: 134/68   BP Location: Right arm   Patient Position: Sitting   BP Method: Large (Manual)   Pulse: 94   Temp: 98.2 °F (36.8 °C)   TempSrc: Temporal   SpO2: 96%   Weight: 75.9 kg (167 lb 5.3 oz)   Height: 5' 6.54" (1.69 m)          Physical Exam  Constitutional:       General: She is not in acute distress.     Appearance: Normal appearance. She is not ill-appearing, toxic-appearing or diaphoretic.      Comments: Body mass index is 26.58 kg/m².   HENT:      Head: Normocephalic and atraumatic.      Right Ear: External ear normal.      Left Ear: External ear normal.      Nose: No congestion or rhinorrhea.      Mouth/Throat:      Pharynx: No oropharyngeal exudate or posterior oropharyngeal erythema.   Eyes:      Extraocular Movements: Extraocular movements intact.      Conjunctiva/sclera: Conjunctivae normal.      Comments: Drooping eyelids   Cardiovascular:      Rate and Rhythm: Normal rate and regular rhythm.   Pulmonary:      Effort: Pulmonary effort is normal. No respiratory distress.      Breath sounds: Normal breath sounds.   Abdominal:      General: Bowel sounds are normal. There is no distension.      Palpations: Abdomen is soft. There is no mass.      Tenderness: There is no abdominal tenderness. There is no guarding.      Hernia: No hernia is present.   Musculoskeletal:      Right lower leg: No edema.      Left lower leg: No edema.   Skin:     General: Skin is warm and dry.   Neurological:      Mental Status: She is alert and oriented to person, place, and time.   Psychiatric:         Mood and Affect: Mood normal.         Behavior: Behavior normal.         Thought Content: Thought content normal.         Judgment: Judgment normal.         Assessment:         ICD-10-CM ICD-9-CM   1. Type 2 diabetes mellitus with stage 3a chronic kidney disease, with long-term current use of insulin  E11.22 250.40    " N18.31 585.3    Z79.4 V58.67   2. CKD stage 3 due to type 2 diabetes mellitus  E11.22 250.40    N18.30 585.3   3. Diabetic polyneuropathy associated with type 2 diabetes mellitus  E11.42 250.60     357.2   4. 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 Hyperlipidemia associated with type 2 diabetes mellitus  E11.69 250.80    E78.5 272.4   6. Recurrent major depressive disorder, in partial remission  F33.41 296.35   7. COPD, moderate  J44.9 496   8. Calcified granuloma of lung  J84.10 515   9. Bronchiectasis without complication  J47.9 494.0   10. Abnormal chest x-ray with multiple lung nodules  R91.8 793.19   11. History of MAC infection  Z86.19 V12.09   12. Aortic atherosclerosis  I70.0 440.0   13. Atherosclerosis of native coronary artery of native heart with angina pectoris  I25.119 414.01     413.9   14. Long term (current) use of antithrombotics/antiplatelets  Z79.02 V58.63   15. History of CVA (cerebrovascular accident) without residual deficits  Z86.73 V12.54   16. Chronic GERD  K21.9 530.81   17. BMI 26.0-26.9,adult  Z68.26 V85.22   18. Osteopenia, unspecified location  M85.80 733.90   19. Preoperative general physical examination  Z01.818 V72.83   20. Ptosis of both eyelids  H02.403 374.30       Plan:       Type 2 diabetes mellitus with stage 3a chronic kidney disease, with long-term current use of insulin  Increase NPH to 16 units twice daily.  Continue metformin present dose   Repeat labs and follow-up in 4 months  -     Discontinue: insulin  unit/mL injection; Inject 16 Units into the skin 2 (two) times daily before meals.  Dispense: 28.8 mL; Refill: 3  -     metFORMIN (GLUCOPHAGE) 1000 MG tablet; Take 1 tablet (1,000 mg total) by mouth 2 (two) times daily with meals.  Dispense: 180 tablet; Refill: 3  -     Cancel: Comprehensive Metabolic Panel; Future; Expected date: 06/05/2023  -     Cancel: Hemoglobin  A1C; Future; Expected date: 06/05/2023  -     insulin  unit/mL injection; Inject 16 Units into the skin 2 (two) times daily before meals.  Dispense: 30 mL; Refill: 3  -     Hemoglobin A1C; Future; Expected date: 06/05/2023  -     Comprehensive Metabolic Panel; Future; Expected date: 06/05/2023    CKD stage 3 due to type 2 diabetes mellitus  Avoid NSAID use and we will continue to monitor    Diabetic polyneuropathy associated with type 2 diabetes mellitus  Continue Cymbalta and gabapentin at present doses  -     DULoxetine (CYMBALTA) 60 MG capsule; Take 1 capsule (60 mg total) by mouth once daily.  Dispense: 90 capsule; Refill: 3  -     gabapentin (NEURONTIN) 300 MG capsule; Take 1 capsule (300 mg total) by mouth 2 (two) times daily.  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  Recheck in 4 months  -     lisinopriL (PRINIVIL,ZESTRIL) 5 MG tablet; Take 1 tablet (5 mg total) by mouth once daily.  Dispense: 30 tablet; Refill: 0  -     amLODIPine (NORVASC) 2.5 MG tablet; Take 1 tablet (2.5 mg total) by mouth once daily.  Dispense: 30 tablet; Refill: 0  -     metoprolol succinate (TOPROL-XL) 100 MG 24 hr tablet; Take 1 tablet (100 mg total) by mouth once daily.  Dispense: 30 tablet; Refill: 0    Hyperlipidemia associated with type 2 diabetes mellitus  Continue atorvastatin at present dose.  Recheck in 12 months  -     atorvastatin (LIPITOR) 80 MG tablet; Take 1 tablet (80 mg total) by mouth once daily.  Dispense: 90 tablet; Refill: 3    Recurrent major depressive disorder, in partial remission  Decrease Lexapro down to 10 mg daily.  Continue Cymbalta 60 mg daily.  Recheck in 4 months  -     EScitalopram oxalate (LEXAPRO) 10 MG tablet; Take 1 tablet (10 mg total) by mouth once daily.  Dispense: 30 tablet; Refill: 0  -     DULoxetine (CYMBALTA) 60 MG capsule;  Take 1 capsule (60 mg total) by mouth once daily.  Dispense: 90 capsule; Refill: 3    COPD, moderate  Monitor by pulmonologist Dr. Noe    Calcified granuloma of lung  Monitor by pulmonologist Dr. Noe    Bronchiectasis without complication  Monitor by pulmonologist Dr. Noe    Abnormal chest x-ray with multiple lung nodules  Monitor by pulmonologist Dr. Noe    History of MAC infection  Monitor by pulmonologist Dr. Noe    Aortic atherosclerosis  On Plavix and statin therapy    Atherosclerosis of native coronary artery of native heart with angina pectoris  On Plavix and statin therapy    Long term (current) use of antithrombotics/antiplatelets    History of CVA (cerebrovascular accident) without residual deficits  On Plavix and statin therapy  -     clopidogreL (PLAVIX) 75 mg tablet; Take 1 tablet (75 mg total) by mouth once daily.  Dispense: 90 tablet; Refill: 3    Chronic GERD  Controlled on Protonix 40 mg daily    BMI 26.0-26.9,adult    Osteopenia, unspecified location  Advised to take calcium with vitamin-D supplement    Preoperative general physical examination  Medically clear for surgery.  See preoperative clearance form above.  Advised to renally dose any medicines.    Ptosis of both eyelids  Surgery scheduled      Follow up in about 4 months (around 7/6/2023) for fasting labs [facility] and follow up.     Patient's Medications   New Prescriptions    ESCITALOPRAM OXALATE (LEXAPRO) 10 MG TABLET    Take 1 tablet (10 mg total) by mouth once daily.    LISINOPRIL (PRINIVIL,ZESTRIL) 5 MG TABLET    Take 1 tablet (5 mg total) by mouth once daily.   Previous Medications    ALBUTEROL (PROVENTIL/VENTOLIN HFA) 90 MCG/ACTUATION INHALER    Inhale 2 puffs into the lungs.    ASCORBIC ACID, VITAMIN C, (VITAMIN C) 500 MG TABLET    Take 500 mg by mouth.    B COMPLEX VITAMINS CAPSULE    Take 1 capsule by mouth once daily.    CAMPHOR-METHYL SALICYL-MENTHOL 3.1-10-6 % PTMD    Apply topically 2 (two) times daily.     CYANOCOBALAMIN (VITAMIN B-12) 1000 MCG TABLET    Take 1,000 mcg by mouth.    DICLOFENAC (CATAFLAM) 50 MG TABLET    Take 50 mg by mouth 2 (two) times daily.    FLUTICASONE (FLONASE) 50 MCG/ACTUATION NASAL SPRAY    1 spray by Each Nare route once daily.    GABAPENTIN (NEURONTIN) 300 MG CAPSULE    Take 1 capsule (300 mg total) by mouth 2 (two) times daily.    MAGNESIUM OXIDE (MAG-OX) 400 MG TABLET    Take 400 mg by mouth once daily.    MECLIZINE (ANTIVERT) 25 MG TABLET    Take 25 mg by mouth 2 (two) times daily as needed.    MULTIVITAMIN CAPSULE    Take 1 capsule by mouth once daily.    NEOMYCIN-POLYMYXIN-DEXAMETHASONE (MAXITROL) 3.5MG/ML-10,000 UNIT/ML-0.1 % DRPS    1 drop 3 (three) times daily.    ONDANSETRON (ZOFRAN) 8 MG TABLET    Take 8 mg by mouth every 8 (eight) hours as needed.    PANTOPRAZOLE (PROTONIX) 40 MG TABLET    Take 40 mg by mouth.    POTASSIUM GLUCONATE 2.5 MEQ TAB    Take by mouth.    POTASSIUM GLUCONATE 595 MG (99 MG) TBSR    Take by mouth.    VITAMIN D (VITAMIN D3) 1000 UNITS TAB    Take 1,000 Units by mouth.   Modified Medications    Modified Medication Previous Medication    AMLODIPINE (NORVASC) 2.5 MG TABLET amLODIPine (NORVASC) 2.5 MG tablet       Take 1 tablet (2.5 mg total) by mouth once daily.    Take 2.5 mg by mouth.    ATORVASTATIN (LIPITOR) 80 MG TABLET atorvastatin (LIPITOR) 80 MG tablet       Take 1 tablet (80 mg total) by mouth once daily.    Take 80 mg by mouth once daily.    CLOPIDOGREL (PLAVIX) 75 MG TABLET clopidogrel (PLAVIX) 75 mg tablet       Take 1 tablet (75 mg total) by mouth once daily.    Take 75 mg by mouth once daily.    DULOXETINE (CYMBALTA) 60 MG CAPSULE DULoxetine (CYMBALTA) 60 MG capsule       Take 1 capsule (60 mg total) by mouth once daily.    Take 60 mg by mouth.    GABAPENTIN (NEURONTIN) 300 MG CAPSULE gabapentin (NEURONTIN) 300 MG capsule       Take 1 capsule (300 mg total) by mouth 2 (two) times daily.    Take 300 mg by mouth 2 (two) times daily.    INSULIN NPH  100 UNIT/ML INJECTION insulin NPH (NOVOLIN N) 100 unit/mL injection       Inject 16 Units into the skin 2 (two) times daily before meals.    Inject 15 Units into the skin 2 (two) times daily before meals.    METFORMIN (GLUCOPHAGE) 1000 MG TABLET metformin (GLUCOPHAGE) 1000 MG tablet       Take 1 tablet (1,000 mg total) by mouth 2 (two) times daily with meals.    Take 1,000 mg by mouth 2 (two) times daily with meals.    METOPROLOL SUCCINATE (TOPROL-XL) 100 MG 24 HR TABLET metoprolol succinate (TOPROL-XL) 100 MG 24 hr tablet       Take 1 tablet (100 mg total) by mouth once daily.    Take 100 mg by mouth once daily.   Discontinued Medications    ALBUTEROL SULFATE (PROAIR HFA INHL)    Inhale into the lungs.    B-COMPLEX WITH VITAMIN C (Z-BEC OR EQUIV) TABLET    Take 1 tablet by mouth once daily.    CLINDAMYCIN (CLEOCIN) 300 MG CAPSULE    Take 1 capsule (300 mg total) by mouth every 8 (eight) hours.    CYCLOBENZAPRINE (FLEXERIL) 10 MG TABLET    Take 1 tablet (10 mg total) by mouth 3 (three) times daily as needed for Muscle spasms.    DICLOFENAC (VOLTAREN) 50 MG EC TABLET    Take 1 tablet (50 mg total) by mouth 2 (two) times daily.    ESCITALOPRAM OXALATE (LEXAPRO) 10 MG TABLET    Take 10 mg by mouth once daily.    ESCITALOPRAM OXALATE (LEXAPRO) 20 MG TABLET    Take 20 mg by mouth.    FLUTICASONE PROPIONATE (FLONASE) 50 MCG/ACTUATION NASAL SPRAY    2 sprays by Nasal route.    FLUTICASONE-SALMETEROL 500-50 MCG/DOSE (ADVAIR DISKUS) 500-50 MCG/DOSE DSDV DISKUS INHALER    Inhale 1 puff into the lungs 2 (two) times daily.    FLUTICASONE-VILANTEROL (BREO ELLIPTA) 200-25 MCG/DOSE DSDV DISKUS INHALER    Inhale 1 puff into the lungs once daily. Controller    HYDROCHLOROTHIAZIDE (HYDRODIURIL) 25 MG TABLET    Take 25 mg by mouth once daily.    HYDROCODONE-ACETAMINOPHEN 5-325MG (NORCO) 5-325 MG PER TABLET    Take 1 tablet by mouth every 6 (six) hours as needed for Pain.    INSULIN  UNIT/ML INJECTION    Inject 15 Units into the  skin 2 (two) times daily before meals.    LISINOPRIL (PRINIVIL,ZESTRIL) 30 MG TABLET    Take 1 tablet (30 mg total) by mouth once daily.    LISINOPRIL (PRINIVIL,ZESTRIL) 30 MG TABLET    Take 40 mg by mouth.    METFORMIN (GLUMETZA) 1000 MG (MOD) 24HR TABLET    Take 1,000 mg by mouth 2 (two) times daily.    TIOTROPIUM BROMIDE (SPIRIVA RESPIMAT) 2.5 MCG/ACTUATION INHALER    Inhale 2 puffs into the lungs.    TRAMADOL (ULTRAM) 50 MG TABLET    Take 1 tablet (50 mg total) by mouth every 6 (six) hours as needed for Pain.    TRAMADOL-ACETAMINOPHEN 37.5-325 MG (ULTRACET) 37.5-325 MG TAB    Take 1 tablet by mouth every 6 (six) hours as needed for Pain.    VITAMIN D 185 MG TAB    Take 185 mg by mouth once daily.       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    Lipidemia     Meniere disease 2016    Pulmonary Mycobacterium avium-intracellulare infection 2016    followed by Pulmonologist at [facility]    Type 2 diabetes mellitus with stage 2 chronic kidney disease, without long-term current use of insulin 10/28/2015       Past Surgical History:   Procedure Laterality Date    ANKLE SURGERY Left     CATARACT EXTRACTION, BILATERAL       SECTION      x 3    eyelid surgery      HYSTERECTOMY         Family History   Problem Relation Age of Onset    Depression Mother     Bipolar disorder Mother     Diabetes Father     Hypertension Father     Hyperlipidemia Father     Heart disease Father      Hypertension Daughter     Early death Maternal Aunt        Social History     Socioeconomic History    Marital status: Single   Tobacco Use    Smoking status: Never    Smokeless tobacco: Never   Substance and Sexual Activity    Alcohol use: No    Drug use: No

## 2023-03-09 PROBLEM — I25.119 ATHEROSCLEROSIS OF NATIVE CORONARY ARTERY OF NATIVE HEART WITH ANGINA PECTORIS: Status: ACTIVE | Noted: 2023-02-09

## 2023-03-09 PROBLEM — Z79.02 LONG TERM (CURRENT) USE OF ANTITHROMBOTICS/ANTIPLATELETS: Status: ACTIVE | Noted: 2023-03-09

## 2023-03-09 PROBLEM — I70.0 AORTIC ATHEROSCLEROSIS: Status: ACTIVE | Noted: 2023-03-09

## 2023-03-10 PROBLEM — M85.80 OSTEOPENIA: Status: ACTIVE | Noted: 2023-03-10

## 2023-03-10 PROBLEM — R26.89 DECREASED FUNCTIONAL MOBILITY: Status: RESOLVED | Noted: 2018-09-09 | Resolved: 2023-03-10

## 2023-03-10 PROBLEM — J47.9 BRONCHIECTASIS WITHOUT COMPLICATION: Status: ACTIVE | Noted: 2023-03-10

## 2023-03-10 PROBLEM — J98.4 CALCIFIED GRANULOMA OF LUNG: Status: ACTIVE | Noted: 2023-03-10

## 2023-03-10 PROBLEM — R26.81 UNSTEADY GAIT: Status: RESOLVED | Noted: 2018-09-09 | Resolved: 2023-03-10

## 2023-03-10 PROBLEM — K21.9 CHRONIC GERD: Status: ACTIVE | Noted: 2023-03-10

## 2023-03-10 PROBLEM — J84.10 CALCIFIED GRANULOMA OF LUNG: Status: ACTIVE | Noted: 2023-03-10

## 2023-03-31 ENCOUNTER — TELEPHONE (OUTPATIENT)
Dept: FAMILY MEDICINE | Facility: CLINIC | Age: 83
End: 2023-03-31
Payer: MEDICARE

## 2023-03-31 NOTE — TELEPHONE ENCOUNTER
----- Message from Hussein Britton sent at 3/31/2023  3:52 PM CDT -----  Contact: Pt  .Type:  Needs Medical Advice    Who Called: Pt  Would the patient rather a call back or a response via [facility]? call  Best Call Back Number: [phone]  Additional Information:   Pt stated she will be dropping off a urine specimen today.  Pt stated she was told by Dr. Bernal that she could drop it off anytime she had one.   Pt stated if today is not a good day to drop it off to please let her know.

## 2023-04-02 DIAGNOSIS — I13.10 HYPERTENSIVE HEART AND KIDNEY DISEASE WITHOUT HEART FAILURE AND WITH STAGE 3A CHRONIC KIDNEY DISEASE: ICD-10-CM

## 2023-04-02 DIAGNOSIS — N18.31 HYPERTENSIVE HEART AND KIDNEY DISEASE WITHOUT HEART FAILURE AND WITH STAGE 3A CHRONIC KIDNEY DISEASE: ICD-10-CM

## 2023-04-03 ENCOUNTER — OFFICE VISIT (OUTPATIENT)
Dept: FAMILY MEDICINE | Facility: CLINIC | Age: 83
End: 2023-04-03
Payer: MEDICARE

## 2023-04-03 VITALS
SYSTOLIC BLOOD PRESSURE: 124 MMHG | HEART RATE: 80 BPM | WEIGHT: 167 LBS | DIASTOLIC BLOOD PRESSURE: 68 MMHG | HEIGHT: 67 IN | OXYGEN SATURATION: 97 % | TEMPERATURE: 99 F | BODY MASS INDEX: 26.21 KG/M2

## 2023-04-03 DIAGNOSIS — G89.29 CHRONIC TOE PAIN, RIGHT FOOT: ICD-10-CM

## 2023-04-03 DIAGNOSIS — E11.42 DIABETIC POLYNEUROPATHY ASSOCIATED WITH TYPE 2 DIABETES MELLITUS: ICD-10-CM

## 2023-04-03 DIAGNOSIS — Z79.4 TYPE 2 DIABETES MELLITUS WITH STAGE 3A CHRONIC KIDNEY DISEASE, WITH LONG-TERM CURRENT USE OF INSULIN: ICD-10-CM

## 2023-04-03 DIAGNOSIS — E11.22 TYPE 2 DIABETES MELLITUS WITH STAGE 3A CHRONIC KIDNEY DISEASE, WITH LONG-TERM CURRENT USE OF INSULIN: ICD-10-CM

## 2023-04-03 DIAGNOSIS — M79.674 CHRONIC TOE PAIN, RIGHT FOOT: ICD-10-CM

## 2023-04-03 DIAGNOSIS — N18.31 TYPE 2 DIABETES MELLITUS WITH STAGE 3A CHRONIC KIDNEY DISEASE, WITH LONG-TERM CURRENT USE OF INSULIN: ICD-10-CM

## 2023-04-03 DIAGNOSIS — N18.31 HYPERTENSIVE HEART AND KIDNEY DISEASE WITHOUT HEART FAILURE AND WITH STAGE 3A CHRONIC KIDNEY DISEASE: ICD-10-CM

## 2023-04-03 DIAGNOSIS — F33.0 MILD EPISODE OF RECURRENT MAJOR DEPRESSIVE DISORDER: Primary | ICD-10-CM

## 2023-04-03 DIAGNOSIS — I13.10 HYPERTENSIVE HEART AND KIDNEY DISEASE WITHOUT HEART FAILURE AND WITH STAGE 3A CHRONIC KIDNEY DISEASE: ICD-10-CM

## 2023-04-03 PROCEDURE — 99214 OFFICE O/P EST MOD 30 MIN: CPT | Mod: S$GLB,,, | Performed by: NURSE PRACTITIONER

## 2023-04-03 PROCEDURE — 1159F MED LIST DOCD IN RCRD: CPT | Mod: CPTII,S$GLB,, | Performed by: NURSE PRACTITIONER

## 2023-04-03 PROCEDURE — 1101F PR PT FALLS ASSESS DOC 0-1 FALLS W/OUT INJ PAST YR: ICD-10-PCS | Mod: CPTII,S$GLB,, | Performed by: NURSE PRACTITIONER

## 2023-04-03 PROCEDURE — 1159F PR MEDICATION LIST DOCUMENTED IN MEDICAL RECORD: ICD-10-PCS | Mod: CPTII,S$GLB,, | Performed by: NURSE PRACTITIONER

## 2023-04-03 PROCEDURE — 1126F PR PAIN SEVERITY QUANTIFIED, NO PAIN PRESENT: ICD-10-PCS | Mod: CPTII,S$GLB,, | Performed by: NURSE PRACTITIONER

## 2023-04-03 PROCEDURE — 3288F PR FALLS RISK ASSESSMENT DOCUMENTED: ICD-10-PCS | Mod: CPTII,S$GLB,, | Performed by: NURSE PRACTITIONER

## 2023-04-03 PROCEDURE — 1160F PR REVIEW ALL MEDS BY PRESCRIBER/CLIN PHARMACIST DOCUMENTED: ICD-10-PCS | Mod: CPTII,S$GLB,, | Performed by: NURSE PRACTITIONER

## 2023-04-03 PROCEDURE — 3078F PR MOST RECENT DIASTOLIC BLOOD PRESSURE < 80 MM HG: ICD-10-PCS | Mod: CPTII,S$GLB,, | Performed by: NURSE PRACTITIONER

## 2023-04-03 PROCEDURE — 1126F AMNT PAIN NOTED NONE PRSNT: CPT | Mod: CPTII,S$GLB,, | Performed by: NURSE PRACTITIONER

## 2023-04-03 PROCEDURE — 99999 PR PBB SHADOW E&M-EST. PATIENT-LVL IV: CPT | Mod: PBBFAC,,, | Performed by: NURSE PRACTITIONER

## 2023-04-03 PROCEDURE — 3078F DIAST BP <80 MM HG: CPT | Mod: CPTII,S$GLB,, | Performed by: NURSE PRACTITIONER

## 2023-04-03 PROCEDURE — 1160F RVW MEDS BY RX/DR IN RCRD: CPT | Mod: CPTII,S$GLB,, | Performed by: NURSE PRACTITIONER

## 2023-04-03 PROCEDURE — 1101F PT FALLS ASSESS-DOCD LE1/YR: CPT | Mod: CPTII,S$GLB,, | Performed by: NURSE PRACTITIONER

## 2023-04-03 PROCEDURE — 3074F PR MOST RECENT SYSTOLIC BLOOD PRESSURE < 130 MM HG: ICD-10-PCS | Mod: CPTII,S$GLB,, | Performed by: NURSE PRACTITIONER

## 2023-04-03 PROCEDURE — 3288F FALL RISK ASSESSMENT DOCD: CPT | Mod: CPTII,S$GLB,, | Performed by: NURSE PRACTITIONER

## 2023-04-03 PROCEDURE — 99999 PR PBB SHADOW E&M-EST. PATIENT-LVL IV: ICD-10-PCS | Mod: PBBFAC,,, | Performed by: NURSE PRACTITIONER

## 2023-04-03 PROCEDURE — 99499 RISK ADDL DX/OHS AUDIT: ICD-10-PCS | Mod: S$GLB,,, | Performed by: NURSE PRACTITIONER

## 2023-04-03 PROCEDURE — 3074F SYST BP LT 130 MM HG: CPT | Mod: CPTII,S$GLB,, | Performed by: NURSE PRACTITIONER

## 2023-04-03 PROCEDURE — 99214 PR OFFICE/OUTPT VISIT, EST, LEVL IV, 30-39 MIN: ICD-10-PCS | Mod: S$GLB,,, | Performed by: NURSE PRACTITIONER

## 2023-04-03 PROCEDURE — 99499 UNLISTED E&M SERVICE: CPT | Mod: S$GLB,,, | Performed by: NURSE PRACTITIONER

## 2023-04-03 RX ORDER — PYRIDOXINE HCL (VITAMIN B6) 100 MG
100 TABLET ORAL DAILY
COMMUNITY
End: 2023-12-08

## 2023-04-03 RX ORDER — METOPROLOL SUCCINATE 100 MG/1
TABLET, EXTENDED RELEASE ORAL
Qty: 30 TABLET | Refills: 3 | Status: SHIPPED | OUTPATIENT
Start: 2023-04-03 | End: 2023-04-03 | Stop reason: SDUPTHER

## 2023-04-03 RX ORDER — ESCITALOPRAM OXALATE 10 MG/1
10 TABLET ORAL DAILY
Qty: 90 TABLET | Refills: 1 | Status: SHIPPED | OUTPATIENT
Start: 2023-04-03 | End: 2023-07-10 | Stop reason: SDUPTHER

## 2023-04-03 RX ORDER — CIPROFLOXACIN HYDROCHLORIDE 3 MG/ML
SOLUTION/ DROPS OPHTHALMIC
COMMUNITY
Start: 2023-02-20 | End: 2023-04-03 | Stop reason: ALTCHOICE

## 2023-04-03 RX ORDER — LISINOPRIL 5 MG/1
TABLET ORAL
Qty: 30 TABLET | Refills: 3 | Status: SHIPPED | OUTPATIENT
Start: 2023-04-03 | End: 2023-04-03 | Stop reason: SDUPTHER

## 2023-04-03 RX ORDER — AMLODIPINE BESYLATE 2.5 MG/1
TABLET ORAL
Qty: 30 TABLET | Refills: 3 | Status: SHIPPED | OUTPATIENT
Start: 2023-04-03 | End: 2023-07-10 | Stop reason: HOSPADM

## 2023-04-03 RX ORDER — CHROMIUM PICOLINATE 200 MCG
200 TABLET ORAL DAILY
COMMUNITY
End: 2024-03-01

## 2023-04-03 RX ORDER — CIPROFLOXACIN 500 MG/1
500 TABLET ORAL 2 TIMES DAILY
COMMUNITY
End: 2023-05-16

## 2023-04-03 RX ORDER — NEOMYCIN SULFATE, POLYMYXIN B SULFATE, AND DEXAMETHASONE 3.5; 10000; 1 MG/G; [USP'U]/G; MG/G
OINTMENT OPHTHALMIC
COMMUNITY
Start: 2023-02-20 | End: 2023-04-03

## 2023-04-03 RX ORDER — ESCITALOPRAM OXALATE 20 MG/1
20 TABLET ORAL DAILY
COMMUNITY
End: 2023-04-03 | Stop reason: DRUGHIGH

## 2023-04-03 RX ORDER — CHOLECALCIFEROL (VITAMIN D3) 50 MCG
2000 TABLET ORAL DAILY
COMMUNITY

## 2023-04-03 RX ORDER — RIBOFLAVIN (VITAMIN B2) 100 MG
1 TABLET ORAL DAILY
COMMUNITY
End: 2023-12-07

## 2023-04-03 RX ORDER — MAGNESIUM 250 MG
500 TABLET ORAL DAILY
COMMUNITY

## 2023-04-03 RX ORDER — NITROFURANTOIN 25; 75 MG/1; MG/1
100 CAPSULE ORAL 2 TIMES DAILY
COMMUNITY
End: 2023-05-16

## 2023-04-03 RX ORDER — GLUCOSAMINE/CHONDRO SU A 500-400 MG
1 TABLET ORAL 3 TIMES DAILY
COMMUNITY
End: 2023-11-24

## 2023-04-03 RX ORDER — PREDNISOLONE ACETATE 10 MG/ML
SUSPENSION/ DROPS OPHTHALMIC
COMMUNITY
Start: 2023-02-20 | End: 2023-04-03 | Stop reason: ALTCHOICE

## 2023-04-03 RX ORDER — LISINOPRIL 5 MG/1
5 TABLET ORAL DAILY
Qty: 90 TABLET | Refills: 1 | Status: SHIPPED | OUTPATIENT
Start: 2023-04-03 | End: 2023-07-10 | Stop reason: SDUPTHER

## 2023-04-03 RX ORDER — METOPROLOL SUCCINATE 100 MG/1
100 TABLET, EXTENDED RELEASE ORAL DAILY
Qty: 90 TABLET | Refills: 1 | Status: ON HOLD | OUTPATIENT
Start: 2023-04-03 | End: 2023-11-25 | Stop reason: HOSPADM

## 2023-04-03 NOTE — PROGRESS NOTES
"Subjective:       Patient ID: Xi Moise is a 83 y.o. female.    Chief Complaint: Follow-up (4 weeks F/U)    HPI    Patient is an 83 year old white female with Aortic Atherosclerosis, History of Stroke, Type 2 Diabetes, CKD Stage 3, diabetic neuropathy, Hypertension/Hypertensive heart disease, Hyperlipidemia, moderate COPD/Asthma with Bronchiectasis with history of Mycobacterium avium-intracellulare infection, Calcified granuloma of lung, Recurrent Depression, chronic GERD, and Osteopenia that is here today follow up. She had her MEDICARE WELLNESS EXAM with [facility] last done on 5/5/2022.     Aortic Atherosclerosis and CAD  Seen on CT chest  4/28/2016 and more recent CT  ON Plavix 75 mg daily,  Atorvastatin 80 mg daily, ACE, BB, and CCB     History of Stroke  Around 2016  On Plavix 75 mg daily     Type 2 Diabetes  Taking NPH insulin 16 units twice daily before meals, metformin a 1000 mg twice daily  Fasting blood glucose 133 with hemoglobin A1c 7.4%.    Diabetic urine screening was negative   Increase the NPH to 16 units twice daily last month with stricter lifestyle modifications  Recheck in 3 months.     CDK Stage 3  Creatinine 1.05 with eGFR 53 last month  AVOID NSAIDs  Monitoring     Diabetic Neuropathy  On Cymbalta 60 mg daily and gabapentin 300 mg twice daily  Complaint of right little toe pain - asking for referral to podiatry     Hypertension/hypertensive heart disease  Taking lisinopril 5 mg daily, metoprolol  mg daily, and amlodipine 2.5 mg daily.  /68 (BP Location: Left arm, Patient Position: Sitting, BP Method: Large (Manual))   Pulse 80   Temp 99 °F (37.2 °C) (Temporal)   Ht 5' 6.54" (1.69 m)   Wt 75.8 kg (167 lb)   SpO2 97%   BMI 26.52 kg/m²     Hyperlipidemia   Taking atorvastatin 80 mg daily   LDL 72   Triglycerides elevated at 217   Advised on lifestyle modifications     Bronchiectasis, COPD/Asthma Overlap, History of Pulmonary MAC, SHANTI positive  Noted on last Pulmonary " "Progress note - Dr. Lee at [facility] - see under encounters  Patient reports now followed by Dr. Alannah Noe with [facility] - will request records  Reports only on Albuterol inhaler prn  Reports NO controller inhalers     Recurrent depression  Currently taking Cymbalta 60 mg ONLY  She was on Lexapro 20 mg daily that we were going to WEAN down to 10 mg daily last month  Patient reports the pharmacy did NOT fill the prescription for Lexapro 10 mg that I sent last month and she RAN OUT OF Lexapro 20 mg for past month - so stopped cold-turkey  Complains of depression and increased crying  Will start back on Lexapro 10 mg daily - advised to make sure the pharmacy fills her medication.    Osteopenia  Last DEXA scan 2/2/21  Advised to take calcium with vitamin-D supplementation     Chronic GERD  Taking Protonix 40 mg daily      Review of Systems   Musculoskeletal:  Positive for arthralgias.        Right little toe pain   Neurological:  Positive for numbness.        Diabetic neuropathy   Psychiatric/Behavioral:  Positive for dysphoric mood.        Objective:     Vitals:    04/03/23 1044   BP: 124/68   BP Location: Left arm   Patient Position: Sitting   BP Method: Large (Manual)   Pulse: 80   Temp: 99 °F (37.2 °C)   TempSrc: Temporal   SpO2: 97%   Weight: 75.8 kg (167 lb)   Height: 5' 6.54" (1.69 m)          Physical Exam  Constitutional:       General: She is not in acute distress.     Appearance: Normal appearance. She is not ill-appearing, toxic-appearing or diaphoretic.      Comments: Body mass index is 26.52 kg/m².     HENT:      Head: Normocephalic and atraumatic.   Eyes:      Extraocular Movements: Extraocular movements intact.      Conjunctiva/sclera: Conjunctivae normal.   Cardiovascular:      Rate and Rhythm: Normal rate and regular rhythm.   Pulmonary:      Effort: Pulmonary effort is normal. No respiratory distress.   Abdominal:      General: There is no distension.   Musculoskeletal:         General: " Normal range of motion.      Right lower leg: No edema.      Left lower leg: No edema.   Skin:     General: Skin is warm and dry.   Neurological:      Mental Status: She is alert and oriented to person, place, and time.   Psychiatric:         Mood and Affect: Mood normal.         Behavior: Behavior normal.         Thought Content: Thought content normal.         Judgment: Judgment normal.         Assessment:         ICD-10-CM ICD-9-CM   1. Mild episode of recurrent major depressive disorder  F33.0 296.31   2. Type 2 diabetes mellitus with stage 3a chronic kidney disease, with long-term current use of insulin  E11.22 250.40    N18.31 585.3    Z79.4 V58.67   3. 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 Diabetic polyneuropathy associated with type 2 diabetes mellitus  E11.42 250.60     357.2   5. Chronic toe pain, right foot  M79.674 729.5    G89.29 338.29       Plan:       Mild episode of recurrent major depressive disorder  Get back on Lexapro 10 mg daily  Continue Cymbalta 60 mg daily  Follow up in couple months.  -     EScitalopram oxalate (LEXAPRO) 10 MG tablet; Take 1 tablet (10 mg total) by mouth once daily.  Dispense: 90 tablet; Refill: 1    Type 2 diabetes mellitus with stage 3a chronic kidney disease, with long-term current use of insulin  Metformin 1000 mg twice daily and insulin 16 units twice daily  Stricter lifestyel modifications  Recheck in 3 months.    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  Follow up in 3 months.  -     lisinopriL (PRINIVIL,ZESTRIL) 5 MG tablet; Take 1 tablet (5 mg total) by mouth once daily.  Dispense: 90 tablet; Refill: 1  -     metoprolol succinate (TOPROL-XL) 100 MG 24 hr tablet; Take 1 tablet (100 mg total) by mouth once daily.  Dispense: 90 tablet; Refill: 1    Diabetic polyneuropathy  associated with type 2 diabetes mellitus  On cymbalta and gabapentin    Chronic toe pain, right foot  Asking for refer podiatry  -     Ambulatory referral/consult to Podiatry; Future; Expected date: 04/10/2023      Follow up in about 3 months (around 7/3/2023) for fasting labs and follow up.     Patient's Medications   New Prescriptions    No medications on file   Previous Medications    ALBUTEROL (PROVENTIL/VENTOLIN HFA) 90 MCG/ACTUATION INHALER    Inhale 2 puffs into the lungs.    AMLODIPINE (NORVASC) 2.5 MG TABLET    TAKE ONE TABLET BY MOUTH EVERY DAY    ASCORBIC ACID, VITAMIN C, (VITAMIN C) 500 MG TABLET    Take 500 mg by mouth.    ATORVASTATIN (LIPITOR) 80 MG TABLET    Take 1 tablet (80 mg total) by mouth once daily.    CAMPHOR-METHYL SALICYL-MENTHOL 3.1-10-6 % PTMD    Apply topically 2 (two) times daily.    CHOLECALCIFEROL, VITAMIN D3, (VITAMIN D3) 50 MCG (2,000 UNIT) TAB    Take by mouth once daily.    CHROMIUM PICOLINATE 200 MCG TAB    Take by mouth.    CIPROFLOXACIN HCL (CIPRO) 500 MG TABLET    Take 500 mg by mouth 2 (two) times daily.    CLOPIDOGREL (PLAVIX) 75 MG TABLET    Take 1 tablet (75 mg total) by mouth once daily.    CYANOCOBALAMIN (VITAMIN B-12) 1000 MCG TABLET    Take 1,000 mcg by mouth.    D-MANNOSE ORAL    Take by mouth.    DULOXETINE (CYMBALTA) 60 MG CAPSULE    Take 1 capsule (60 mg total) by mouth once daily.    FLUTICASONE (FLONASE) 50 MCG/ACTUATION NASAL SPRAY    1 spray by Each Nare route once daily.    GABAPENTIN (NEURONTIN) 300 MG CAPSULE    Take 1 capsule (300 mg total) by mouth 2 (two) times daily.    GLUCOSAMINE-CHONDROITIN 500-400 MG TABLET    Take 1 tablet by mouth 3 (three) times daily.    INSULIN  UNIT/ML INJECTION    Inject 16 Units into the skin 2 (two) times daily before meals.    MAGNESIUM ORAL    Take 500 mg by mouth once daily.    MECLIZINE (ANTIVERT) 25 MG TABLET    Take 25 mg by mouth 2 (two) times daily as needed.    METFORMIN (GLUCOPHAGE) 1000 MG TABLET    Take 1  tablet (1,000 mg total) by mouth 2 (two) times daily with meals.    MULTIVIT-MIN/FA/LYCOPEN/LUTEIN (SENTRY SENIOR ORAL)    Take by mouth.    MULTIVITAMIN CAPSULE    Take 1 capsule by mouth once daily.    NITROFURANTOIN, MACROCRYSTAL-MONOHYDRATE, (MACROBID) 100 MG CAPSULE    Take 100 mg by mouth 2 (two) times daily.    ONDANSETRON (ZOFRAN) 8 MG TABLET    Take 8 mg by mouth every 8 (eight) hours as needed.    POTASSIUM GLUCONATE 595 MG (99 MG) TBSR    Take by mouth.    PYRIDOXINE, VITAMIN B6, (B-6) 100 MG TAB    Take 50 mg by mouth once daily.    VITAMIN A PALMITATE 3,000 MCG (10,000 UNIT) TAB    Take by mouth.   Modified Medications    Modified Medication Previous Medication    ESCITALOPRAM OXALATE (LEXAPRO) 10 MG TABLET EScitalopram oxalate (LEXAPRO) 10 MG tablet       Take 1 tablet (10 mg total) by mouth once daily.    Take 1 tablet (10 mg total) by mouth once daily.    LISINOPRIL (PRINIVIL,ZESTRIL) 5 MG TABLET lisinopriL (PRINIVIL,ZESTRIL) 5 MG tablet       Take 1 tablet (5 mg total) by mouth once daily.    TAKE ONE TABLET BY MOUTH EVERY DAY    METOPROLOL SUCCINATE (TOPROL-XL) 100 MG 24 HR TABLET metoprolol succinate (TOPROL-XL) 100 MG 24 hr tablet       Take 1 tablet (100 mg total) by mouth once daily.    TAKE ONE TABLET BY MOUTH EVERY DAY   Discontinued Medications    B COMPLEX VITAMINS CAPSULE    Take 1 capsule by mouth once daily.    CIPROFLOXACIN HCL (CILOXAN) 0.3 % OPHTHALMIC SOLUTION        DICLOFENAC (CATAFLAM) 50 MG TABLET    Take 50 mg by mouth 2 (two) times daily.    ESCITALOPRAM OXALATE (LEXAPRO) 20 MG TABLET    Take 20 mg by mouth once daily.    MAGNESIUM OXIDE (MAG-OX) 400 MG TABLET    Take 400 mg by mouth once daily.    NEOMYCIN-POLYMYXIN-DEXAMETHASONE (DEXACINE) 3.5 MG/G-10,000 UNIT/G-0.1 % OINT        NEOMYCIN-POLYMYXIN-DEXAMETHASONE (MAXITROL) 3.5MG/ML-10,000 UNIT/ML-0.1 % DRPS    1 drop 3 (three) times daily.    PANTOPRAZOLE (PROTONIX) 40 MG TABLET    Take 40 mg by mouth.    POTASSIUM GLUCONATE  2.5 MEQ TAB    Take by mouth.    PREDNISOLONE ACETATE (PRED FORTE) 1 % DRPS        VITAMIN D (VITAMIN D3) 1000 UNITS TAB    Take 1,000 Units by mouth.       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    Lipidemia     Meniere disease 2016    Pulmonary Mycobacterium avium-intracellulare infection 2016    follow[facility] Pulmonologist at [facility]    Type 2 diabetes mellitus with stage 2 chronic kidney disease, without long-term current use of insulin 10/28/2015       Past Surgical History:   Procedure Laterality Date    ANKLE SURGERY Left     CATARACT EXTRACTION, BILATERAL       SECTION      x 3    eyelid surgery      HYSTERECTOMY         Family History   Problem Relation Age of Onset    Depression Mother     Bipolar disorder Mother     Diabetes Father     Hypertension Father     Hyperlipidemia Father     Heart disease Father     Hypertension Daughter     Early death Maternal Aunt        Social History     Socioeconomic History    Marital status: Single   Tobacco Use    Smoking status: Never     Passive exposure: Past    Smokeless tobacco: Never   Substance and Sexual Activity    Alcohol use: No    Drug use: No

## 2023-04-11 DIAGNOSIS — M79.671 RIGHT FOOT PAIN: Primary | ICD-10-CM

## 2023-04-12 ENCOUNTER — OFFICE VISIT (OUTPATIENT)
Dept: PODIATRY | Facility: CLINIC | Age: 83
End: 2023-04-12
Payer: MEDICARE

## 2023-04-12 VITALS
BODY MASS INDEX: 26.43 KG/M2 | WEIGHT: 164.44 LBS | SYSTOLIC BLOOD PRESSURE: 149 MMHG | HEART RATE: 64 BPM | DIASTOLIC BLOOD PRESSURE: 75 MMHG | HEIGHT: 66 IN | RESPIRATION RATE: 18 BRPM

## 2023-04-12 DIAGNOSIS — S92.354A CLOSED NONDISPLACED FRACTURE OF FIFTH METATARSAL BONE OF RIGHT FOOT, INITIAL ENCOUNTER: ICD-10-CM

## 2023-04-12 DIAGNOSIS — S92.514A CLOSED NONDISPLACED FRACTURE OF PROXIMAL PHALANX OF LESSER TOE OF RIGHT FOOT, INITIAL ENCOUNTER: Primary | ICD-10-CM

## 2023-04-12 PROCEDURE — 1159F PR MEDICATION LIST DOCUMENTED IN MEDICAL RECORD: ICD-10-PCS | Mod: CPTII,S$GLB,, | Performed by: PODIATRIST

## 2023-04-12 PROCEDURE — 1160F PR REVIEW ALL MEDS BY PRESCRIBER/CLIN PHARMACIST DOCUMENTED: ICD-10-PCS | Mod: CPTII,S$GLB,, | Performed by: PODIATRIST

## 2023-04-12 PROCEDURE — 1159F MED LIST DOCD IN RCRD: CPT | Mod: CPTII,S$GLB,, | Performed by: PODIATRIST

## 2023-04-12 PROCEDURE — 3077F PR MOST RECENT SYSTOLIC BLOOD PRESSURE >= 140 MM HG: ICD-10-PCS | Mod: CPTII,S$GLB,, | Performed by: PODIATRIST

## 2023-04-12 PROCEDURE — 28510 TREATMENT OF TOE FRACTURE: CPT | Mod: 51,T9,S$GLB, | Performed by: PODIATRIST

## 2023-04-12 PROCEDURE — 28470 PR CLOSED RX METATARSAL FX: ICD-10-PCS | Mod: RT,S$GLB,, | Performed by: PODIATRIST

## 2023-04-12 PROCEDURE — 3078F PR MOST RECENT DIASTOLIC BLOOD PRESSURE < 80 MM HG: ICD-10-PCS | Mod: CPTII,S$GLB,, | Performed by: PODIATRIST

## 2023-04-12 PROCEDURE — 3288F FALL RISK ASSESSMENT DOCD: CPT | Mod: CPTII,S$GLB,, | Performed by: PODIATRIST

## 2023-04-12 PROCEDURE — 99203 OFFICE O/P NEW LOW 30 MIN: CPT | Mod: 57,25,S$GLB, | Performed by: PODIATRIST

## 2023-04-12 PROCEDURE — 1125F PR PAIN SEVERITY QUANTIFIED, PAIN PRESENT: ICD-10-PCS | Mod: CPTII,S$GLB,, | Performed by: PODIATRIST

## 2023-04-12 PROCEDURE — 3077F SYST BP >= 140 MM HG: CPT | Mod: CPTII,S$GLB,, | Performed by: PODIATRIST

## 2023-04-12 PROCEDURE — 28510 PR CLOSED RX TOE FX: ICD-10-PCS | Mod: 51,T9,S$GLB, | Performed by: PODIATRIST

## 2023-04-12 PROCEDURE — 1101F PT FALLS ASSESS-DOCD LE1/YR: CPT | Mod: CPTII,S$GLB,, | Performed by: PODIATRIST

## 2023-04-12 PROCEDURE — 99999 PR PBB SHADOW E&M-EST. PATIENT-LVL IV: ICD-10-PCS | Mod: PBBFAC,,, | Performed by: PODIATRIST

## 2023-04-12 PROCEDURE — 1125F AMNT PAIN NOTED PAIN PRSNT: CPT | Mod: CPTII,S$GLB,, | Performed by: PODIATRIST

## 2023-04-12 PROCEDURE — 28470 CLTX METATARSAL FX WO MNP EA: CPT | Mod: RT,S$GLB,, | Performed by: PODIATRIST

## 2023-04-12 PROCEDURE — 3288F PR FALLS RISK ASSESSMENT DOCUMENTED: ICD-10-PCS | Mod: CPTII,S$GLB,, | Performed by: PODIATRIST

## 2023-04-12 PROCEDURE — 1101F PR PT FALLS ASSESS DOC 0-1 FALLS W/OUT INJ PAST YR: ICD-10-PCS | Mod: CPTII,S$GLB,, | Performed by: PODIATRIST

## 2023-04-12 PROCEDURE — 3078F DIAST BP <80 MM HG: CPT | Mod: CPTII,S$GLB,, | Performed by: PODIATRIST

## 2023-04-12 PROCEDURE — 99999 PR PBB SHADOW E&M-EST. PATIENT-LVL IV: CPT | Mod: PBBFAC,,, | Performed by: PODIATRIST

## 2023-04-12 PROCEDURE — 99203 PR OFFICE/OUTPT VISIT, NEW, LEVL III, 30-44 MIN: ICD-10-PCS | Mod: 57,25,S$GLB, | Performed by: PODIATRIST

## 2023-04-12 PROCEDURE — 1160F RVW MEDS BY RX/DR IN RCRD: CPT | Mod: CPTII,S$GLB,, | Performed by: PODIATRIST

## 2023-04-12 NOTE — PATIENT INSTRUCTIONS
Fractures of the Fifth Metatarsal  What Is a Fifth Metatarsal Fracture?   Fractures (breaks) are common in the fifth metatarsal--the long bone on the outside of the foot that connects to the little toe. Two types of fractures that often occur in the fifth metatarsal are:           Dancers Fracture - A distal (closer to the 5th toe) 5th metatarsal shaft Dancers fracture is an injury that is usually treated non-operatively. The fracture has a very high rate of healing with non-operative treatment. Treatment involves relative rest and time to allow the fracture to heal. Typically, patients are placed in a walking boot. Although weight bearing is allowed, for the first few weeks, they will have to significantly limit their walking and may need crutches due to pain and discomfort. As the swelling settles and the fracture starts to heal, they can begin walking more extensively in the boot. This is a frustrating injury because it takes a long time for healing to occur. Patients are often still symptomatic 6 to 8 weeks or more after this injury. They can usually resume dance activities by 10-12 weeks and return to performance level by 19 weeks.  It can be many months before the bone is completely healed and a full recovery has been achieved. Surgery may be needed when there is complete displacement of the fracture fragments or failure to heal with non-operative treatment after several months. In these cases, the bone fragments are repositioned and stabilized with screws and/or a plate.  Avulsion fracture. In an avulsion fracture, a small piece of bone is pulled off the main portion of the bone by a tendon or ligament. This type of fracture is the result of an injury in which the ankle rolls. Avulsion fractures are often overlooked when they occur with an ankle sprain.   Surgery may be needed when there is complete displacement of the fracture fragments or failure to heal with non-operative treatment after several  months. In these cases, the bone fragments are repositioned and stabilized with screws and/or a plate.  Kincaid fracture. Kincaid fractures occur in a small area of the fifth metatarsal that receives less blood and is therefore more prone to difficulties in healing. A Kincaid fracture can be either a stress fracture (a tiny hairline break that occurs over time) or an acute (sudden) break. Kincaid fractures are caused by overuse, repetitive stress or trauma. They are less common and more difficult to treat than avulsion fractures. Other types of fractures can occur in the fifth metatarsal. Examples include midshaft fractures, which usually result from trauma or twisting, and fractures of the metatarsal head and neck.  Symptoms  Avulsion and Kincaid fractures have the same signs and symptoms. These include:  Pain, swelling and tenderness on the outside of the foot   Difficulty walking   Bruising  Diagnosis  Anyone who has symptoms of a fifth metatarsal fracture should see a foot and ankle surgeon as soon as possible for proper diagnosis and treatment. To arrive at a diagnosis, the surgeon will ask how the injury occurred or when the pain started. The foot will be examined, with the doctor gently pressing on different areas of the foot to determine where there is pain. The surgeon will also order x-rays. Because a Kincaid fracture sometimes does not show up on initial x-rays, additional imaging studies may be needed.  Nonsurgical Treatment  Until you are able to see a foot and ankle surgeon, the RICE method of care should be performed:  Rest: Stay off the injured foot. Walking may cause further injury.   Ice: Apply an ice pack to the injured area, placing a thin towel between the ice and the skin. Use ice for 20 minutes and then wait at least 40 minutes before icing again.   Compression: An elastic wrap should be used to control swelling.   Elevation: The foot should be raised slightly above the level of your heart to reduce  swelling.    The foot and ankle surgeon may use one of these nonsurgical options for treatment of a fifth metatarsal fracture:  Immobilization. Depending on the severity of the injury, the foot is kept immobile with a cast, cast boot or stiff-soled shoe. Crutches may also be needed to avoid placing weight on the injured foot.   Bone stimulation. A pain-free external device is used to speed the healing of some fractures. Bone stimulation, most commonly used for Kincaid fractures, may be used as part of the treatment or following an inadequate response to immobilization.  When Is Surgery Needed?  If the injury involves a displaced bone, multiple breaks or has failed to adequately heal, surgery may be required. The foot and ankle surgeon will determine the type of procedure that is best suited to the individual patient.      Toe and Metatarsal Fractures (Broken Toes)  The structure of the foot is complex, consisting of bones, muscles, tendons and other soft tissues. Of the 28 bones in the foot, 19 are toe bones (phalanges) and metatarsal bones (the long bones in the midfoot). Fractures of the toe and metatarsal bones are common and require evaluation by a specialist. A foot and ankle surgeon should be seen for proper diagnosis and treatment, even if initial treatment has been received in an emergency room.  What Is a Fracture?  A fracture is a break in the bone. Fractures can be divided into two categories: traumatic fractures and stress fractures.      Traumatic fractures (also called acute fractures) are caused by a direct blow or impact, such as seriously stubbing your toe. Traumatic fractures can be displaced or nondisplaced. If the fracture is displaced, the bone is broken in such a way that it has changed in position (malpositioned).  Signs and symptoms of a traumatic fracture include:  You may hear a sound at the time of the break.   Pinpoint pain (pain at the place of impact) at the time the fracture occurs and  perhaps for a few hours later, but often the pain goes away after several hours.   Crooked or abnormal appearance of the toe.   Bruising and swelling the next day.  It is not true that if you can walk on it, it's not broken. Evaluation by a foot and ankle surgeon is always recommended.  Stress fractures are tiny hairline breaks usually caused by repetitive stress. Stress fractures often afflict athletes who, for example, too rapidly increase their running mileage. They can also be caused by an abnormal foot structure, deformities or osteoporosis. Improper footwear may also lead to stress fractures. Stress fractures should not be ignored. They require proper medical attention to heal correctly.  Symptoms of stress fractures include:  Pain with or after normal activity   Pain that goes away when resting and then returns when standing or during activity   Pinpoint pain (pain at the site of the fracture) when touched   Swelling but no bruising  Consequences of Improper Treatment  Some people say that the doctor can't do anything for a broken bone in the foot. This is usually not true. In fact, if a fractured toe or metatarsal bone is not treated correctly, serious complications may develop. For example:  A deformity in the bony architecture, which may limit the ability to move the foot or cause difficulty in fitting shoes.   Arthritis, which may be caused by a fracture in a joint (the juncture where two bones meet), or may be a result of angular deformities that develop when a displaced fracture is severe or has not been properly corrected.   Chronic pain and deformity.   Nonunion, or failure to heal, can lead to subsequent surgery or chronic pain.  Treatment of Toe Fractures  Fractures of the toe bones are almost always traumatic fractures. Treatment for traumatic fractures depends on the break itself and may include these options:  Rest. Sometimes rest is all that is needed to treat a traumatic fracture of the  toe.   Splinting. The toe may be fitted with a splint to keep it in a fixed position.   Rigid or stiff-soled shoe. Wearing a stiff-soled shoe protects the toe and helps keep it properly positioned. Use of a postoperative shoe or bootwalker is also helpful.   Daniel taping the fractured toe to another toe is sometimes appropriate, but in other cases, it may be harmful.   Surgery. If the break is badly displaced or if the joint is affected, surgery may be necessary. Surgery often involves the use of fixation devices, such as pins.  Treatment of Metatarsal Fractures  Breaks in the metatarsal bones may be either stress or traumatic fractures. Certain kinds of fractures of the metatarsal bones present unique challenges.  For example, sometimes a fracture of the first metatarsal bone (behind the big toe) can lead to arthritis. Since the big toe is used so frequently and bears more weight than other toes, arthritis in that area can make it painful to walk, bend or even stand.  Another type of break, called a Kincaid fracture, occurs at the base of the fifth metatarsal bone (behind the little toe). It is often misdiagnosed as an ankle sprain, and misdiagnosis can have serious consequences since sprains and fractures require different treatments. Your foot and ankle surgeon is an expert in correctly identifying these conditions as well as other problems of the foot.  Treatment of metatarsal fractures depends on the type and extent of the fracture and may include:  Rest. Sometimes rest is the only treatment needed to promote healing of a stress or traumatic fracture of a metatarsal bone.   Avoid the offending activity. Because stress fractures result from repetitive stress, it is important to avoid the activity that led to the fracture. Crutches or a wheelchair are sometimes required to offload weight from the foot to give it time to heal.   Immobilization, casting or rigid shoe. A stiff-soled shoe or other form of  immobilization may be used to protect the fractured bone while it is healing. Use of a postoperative shoe or bootwalker is also helpful.   Surgery. Some traumatic fractures of the metatarsal bones require surgery, especially if the break is badly displaced.   Follow-up care. Your foot and ankle surgeon will provide instructions for care following surgical or nonsurgical treatment. Physical therapy, exercises and rehabilitation may be included in a schedule for return to normal activities.      Bone Healing    How Does a Bone Heal?    All broken bones go through the same healing process. This is true whether a bone has been cut as part of a surgical procedure or fractured through an injury.     The bone healing process has three overlapping stages: inflammation, bone production and bone remodeling.        Inflammation starts immediately after the bone is fractured and lasts for several days. When the bone is fractured, there is bleeding into the area, leading to inflammation and clotting of blood at the fracture site. This provides the initial structural stability and framework for producing new bone.        Bone production begins when the clotted blood formed by inflammation is replaced with fibrous tissue and cartilage (known as soft callus). As healing progresses, the soft callus is replaced with hard bone (known as hard callus), which is visible on x-rays several weeks after the fracture.        Bone remodeling, the final phase of bone healing, goes on for several months. In remodeling, bone continues to form and becomes compact, returning to its original shape. In addition, blood circulation in the area improves. Once adequate bone healing has occurred, weightbearing (such as standing or walking) encourages bone remodeling.?  How Long Does Bone Healing Take?   Bone generally takes 6 to 12 weeks to heal to a significant degree. In general, children's bones heal faster than those of adults. The foot and ankle  surgeon will determine when the patient is ready to bear weight on the area. This will depend on the location and severity of the fracture, the type of surgical procedure performed and other considerations.    What Helps Promote Bone Healing?  If a bone will be cut during a planned surgical procedure, some steps can be taken pre- and postoperatively to help optimize healing. The surgeon may offer advice on diet and nutritional supplements that are essential to bone growth. Smoking cessation and adequate control of blood sugar levels in people living with diabetes are important. Smoking and high glucose levels interfere with bone healing.     For all patients with fractured bones, immobilization is a critical part of treatment because any movement of bone fragments slows down the initial healing process. Depending on the type of fracture or surgical procedure, the surgeon may use some form of fixation (such as screws, plates or wires) on the fractured bone and/or a cast to keep the bone from moving. During the immobilization period, weightbearing is restricted as instructed by the surgeon.     Once the bone is adequately healed, physical therapy often plays a key role in rehabilitation. An exercise program designed for the patient can help in regaining strength and balance and can assist in returning to normal activities.    What Can Hinder Bone Healing?   A wide variety of factors can slow down the healing process. These include:    Movement of the bone fragments; weightbearing too soon  Smoking, which constricts the blood vessels and decreases circulation  Medical conditions, such as diabetes, hormone-related problems or vascular disease  Some medications, such as corticosteroids and other immunosuppressants  Fractures that are severe, complicated or become infected  Advanced age  Poor nutrition or impaired metabolism  Low levels of calcium and vitamin D     How Can Slow Healing Be Treated?   If the bone is not  healing as well as expected or fails to heal, the foot and ankle surgeon can choose from a variety of treatment options to enhance bone growth, such as continued immobilization for a longer period, bone stimulation or surgery with bone grafting or use of bone growth proteins      Toe and Metatarsal Fractures (Broken Toes)        The structure of the foot is complex, consisting of bones, muscles, tendons and other soft tissues. Of the 28 bones in the foot, 19 are toe bones (phalanges) and metatarsal bones (the long bones in the midfoot). Fractures of the toe and metatarsal bones are common and require evaluation by a specialist. A foot and ankle surgeon should be seen for proper diagnosis and treatment, even if initial treatment has been received in an emergency room.    What Is a Fracture?  A fracture is a break in the bone. Fractures can be divided into two categories: traumatic fractures and stress fractures.     Traumatic fractures (also called acute fractures) are caused by a direct blow or impact, such as seriously stubbing your toe. Traumatic fractures can be displaced or nondisplaced. If the fracture is displaced, the bone is broken in such a way that it has changed in position (malpositioned).    Signs and symptoms of a traumatic fracture include:    You may hear a sound at the time of the break.  Pinpoint pain (pain at the place of impact) at the time the fracture occurs and perhaps for a few hours later, but often the pain goes away after several hours.  Crooked or abnormal appearance of the toe.  Bruising and swelling the next day.     It is not true that if you can walk on it, its not broken. Evaluation by a foot and ankle surgeon is always recommended.    Stress fractures are tiny hairline breaks usually caused by repetitive stress. Stress fractures often afflict athletes who, for example, too rapidly increase their running mileage. They can also be caused by an abnormal foot structure, deformities  or osteoporosis. Improper footwear may also lead to stress fractures. Stress fractures should not be ignored. They require proper medical attention to heal correctly.    Symptoms of stress fractures include:    Pain with or after normal activity  Pain that goes away when resting and then returns when standing or during activity  Pinpoint pain (pain at the site of the fracture) when touched  Swelling but no bruising     Consequences of Improper Treatment  Some people say that the doctor cant do anything for a broken bone in the foot. This is usually not true. In fact, if a fractured toe or metatarsal bone is not treated correctly, serious complications may develop. For example:    A deformity in the bony architecture, which may limit the ability to move the foot or cause difficulty in fitting shoes.  Arthritis, which may be caused by a fracture in a joint (the juncture where two bones meet), or may be a result of angular deformities that develop when a displaced fracture is severe or has not been properly corrected.  Chronic pain and deformity.  Nonunion, or failure to heal, can lead to subsequent surgery or chronic pain.     Treatment of Toe Fractures  Fractures of the toe bones are almost always traumatic fractures. Treatment for traumatic fractures depends on the break itself and may include these options:    Rest. Sometimes rest is all that is needed to treat a traumatic fracture of the toe.  Splinting. The toe may be fitted with a splint to keep it in a fixed position.  Rigid or stiff-soled shoe. Wearing a stiff-soled shoe protects the toe and helps keep it properly positioned. Use of a postoperative shoe or bootwalker is also helpful.  Daniel taping the fractured toe to another toe is sometimes appropriate, but in other cases, it may be harmful.  Surgery. If the break is badly displaced or if the joint is affected, surgery may be necessary. Surgery often involves the use of fixation devices, such as pins.      Treatment of Metatarsal Fractures  Breaks in the metatarsal bones may be either stress or traumatic fractures. Certain kinds of fractures of the metatarsal bones present unique challenges.    For example, sometimes a fracture of the first metatarsal bone (behind the big toe) can lead to arthritis. Since the big toe is used so frequently and bears more weight than other toes, arthritis in that area can make it painful to walk, bend or even stand.    Another type of break, called a Kincaid fracture, occurs at the base of the fifth metatarsal bone (behind the little toe). It is often misdiagnosed as an ankle sprain, and misdiagnosis can have serious consequences since sprains and fractures require different treatments. Your foot and ankle surgeon is an expert in correctly identifying these conditions as well as other problems of the foot.    Treatment of metatarsal fractures depends on the type and extent of the fracture and may include:    Rest. Sometimes rest is the only treatment needed to promote healing of a stress or traumatic fracture of a metatarsal bone.  Avoid the offending activity. Because stress fractures result from repetitive stress, it is important to avoid the activity that led to the fracture. Crutches or a wheelchair are sometimes required to offload weight from the foot to give it time to heal.  Immobilization, casting or rigid shoe. A stiff-soled shoe or other form of immobilization may be used to protect the fractured bone while it is healing. Use of a postoperative shoe or bootwalker is also helpful.  Surgery. Some traumatic fractures of the metatarsal bones require surgery, especially if the break is badly displaced.  Follow-up care. Your foot and ankle surgeon will provide instructions for care following surgical or nonsurgical treatment. Physical therapy, exercises and rehabilitation may be included in a schedule for return to normal activities.          Bone Healing  How Does a Bone Heal?     All broken bones go through the same healing process. This is true whether a bone has been cut as part of a surgical procedure or fractured through an injury.     The bone healing process has three overlapping stages: inflammation, bone production and bone remodeling.        Inflammation starts immediately after the bone is fractured and lasts for several days. When the bone is fractured, there is bleeding into the area, leading to inflammation and clotting of blood at the fracture site. This provides the initial structural stability and framework for producing new bone.        Bone production begins when the clotted blood formed by inflammation is replaced with fibrous tissue and cartilage (known as soft callus). As healing progresses, the soft callus is replaced with hard bone (known as hard callus), which is visible on x-rays several weeks after the fracture.        Bone remodeling, the final phase of bone healing, goes on for several months. In remodeling, bone continues to form and becomes compact, returning to its original shape. In addition, blood circulation in the area improves. Once adequate bone healing has occurred, weightbearing (such as standing or walking) encourages bone remodeling.?  How Long Does Bone Healing Take?   Bone generally takes 6 to 12 weeks to heal to a significant degree. In general, children's bones heal faster than those of adults. The foot and ankle surgeon will determine when the patient is ready to bear weight on the area. This will depend on the location and severity of the fracture, the type of surgical procedure performed and other considerations.    What Helps Promote Bone Healing?  If a bone will be cut during a planned surgical procedure, some steps can be taken pre- and postoperatively to help optimize healing. The surgeon may offer advice on diet and nutritional supplements that are essential to bone growth. Smoking cessation and adequate control of blood sugar levels in  people living with diabetes are important. Smoking and high glucose levels interfere with bone healing.     For all patients with fractured bones, immobilization is a critical part of treatment because any movement of bone fragments slows down the initial healing process. Depending on the type of fracture or surgical procedure, the surgeon may use some form of fixation (such as screws, plates or wires) on the fractured bone and/or a cast to keep the bone from moving. During the immobilization period, weightbearing is restricted as instructed by the surgeon.     Once the bone is adequately healed, physical therapy often plays a key role in rehabilitation. An exercise program designed for the patient can help in regaining strength and balance and can assist in returning to normal activities.    What Can Hinder Bone Healing?   A wide variety of factors can slow down the healing process. These include:    Movement of the bone fragments; weightbearing too soon  Smoking, which constricts the blood vessels and decreases circulation  Medical conditions, such as diabetes, hormone-related problems or vascular disease  Some medications, such as corticosteroids and other immunosuppressants  Fractures that are severe, complicated or become infected  Advanced age  Poor nutrition or impaired metabolism  Low levels of calcium and vitamin D     How Can Slow Healing Be Treated?   If the bone is not healing as well as expected or fails to heal, the foot and ankle surgeon can choose from a variety of treatment options to enhance bone growth, such as continued immobilization for a longer period, bone stimulation or surgery with bone grafting or use of bone growth proteins

## 2023-04-12 NOTE — PROGRESS NOTES
Formerly named [facility] - PODIATRY  [address] [place] [address], [address]  [facility] [address]  Dept: [phone]  Dept Fax: [phone]    Sanjeev Anguiano Jr., DPJOVANY Anguiano Jr.     New Patient Visit  Assessment:     1. Closed nondisplaced fracture of proximal phalanx of lesser toe of right foot, initial encounter  Ambulatory referral/consult to Podiatry    X-Ray Foot Complete Right      2. Closed nondisplaced fracture of fifth metatarsal bone of right foot, initial encounter  X-Ray Foot Complete Right        1. Closed nondisplaced fracture of proximal phalanx of lesser toe of right foot, initial encounter  -     Ambulatory referral/consult to Podiatry  -     X-Ray Foot Complete Right; Future; Expected date: 04/12/2023    2. Closed nondisplaced fracture of fifth metatarsal bone of right foot, initial encounter  Overview:  Seen on CT chest 4/28/2016    Orders:  -     X-Ray Foot Complete Right; Future; Expected date: 04/12/2023      Plan:     MDM    Coding    - pt seen evaluated and treated  - discussed surgical and conservative tx options  - all alternatives, risks, benefits of all tx options were discussed in detail  -pt would benefit from / Pt opts for a trial of non-operative management  -XR/imaging reviewed by me: nonop 5th toe and 5th MT base fxs observed  -labs reviewed by me: ok for vgel  -implemented icing/stretching regimen  -offloading pads dispensed    - rx dispensed: none  - referrals: none  -Continue use of assistive devices  Weight bearing restriction: wbat in CAM RLE  -Fracture care principles discussed  Patient education pamphlet shared and discussed and sent with patient  -XR b4 nxt visit     Follow up in about 8 weeks (around 6/7/2023).    Subjective:      Patient ID: Xi Moise is a 83 y.o. female.    Chief Complaint:   Chief Complaint   Patient presents with    Foot Pain     Bilateral foot pain    Ankle Pain     Bilateral ankle pain    Toe Pain     5th digit right foot pain        CC - ankle/foot injury: Patient complains of injury to the right ankle/foot. This is evaluated as a personal injury. The injury occurred a few weeks ago, and occurred while trip and fall.  The patient states the ankle/foot rolled inward at the time of injury.  Patient did not hear or sense a pop or snap at the time of the injury. The patient notes pain and moderate swelling of the foot/ankle since the injury. Patient has treated the ankle with ice. Pain is localized to the lateral area. Patients rates pain 7/10 on pain scale.    Able to ambulate: with pain    HPI      Outside reports reviewed: historical medical records.  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    Lipidemia     Meniere disease 2016    Pulmonary Mycobacterium avium-intracellulare infection 2016    followed by Pulmonologist at [facility]    Type 2 diabetes mellitus with stage 2 chronic kidney disease, without long-term current use of insulin 10/28/2015     Past Surgical History:   Procedure Laterality Date    ANKLE SURGERY Left     CATARACT EXTRACTION, BILATERAL       SECTION      x 3    eyelid surgery      HYSTERECTOMY       Family History   Problem Relation Age of Onset    Depression Mother     Bipolar disorder Mother     Diabetes Father     Hypertension Father     Hyperlipidemia Father     Heart disease Father     Hypertension Daughter     Early death Maternal Aunt       Current Outpatient Medications   Medication Sig Dispense Refill    albuterol (PROVENTIL/VENTOLIN HFA) 90 mcg/actuation inhaler Inhale 2 puffs into the lungs.      amLODIPine (NORVASC) 2.5 MG tablet TAKE ONE TABLET BY MOUTH EVERY DAY 30 tablet 3    ascorbic acid, vitamin C, (VITAMIN C) 500 MG tablet Take 500 mg by mouth.      atorvastatin (LIPITOR) 80 MG tablet Take 1 tablet (80 mg total) by mouth once daily. 90 tablet 3    camphor-methyl salicyl-menthoL 3.1-10-6 % PtMd Apply topically 2 (two) times daily.      cholecalciferol, vitamin D3, (VITAMIN D3) 50 mcg (2,000 unit) Tab Take by mouth once daily.      chromium picolinate 200 mcg Tab Take by mouth.      ciprofloxacin HCl (CIPRO) 500 MG tablet Take 500 mg by mouth 2 (two) times daily.      clopidogreL (PLAVIX) 75 mg tablet Take 1 tablet (75 mg total) by mouth once daily. 90 tablet 3    cyanocobalamin (VITAMIN B-12) 1000 MCG tablet Take 1,000 mcg by mouth.      D-MANNOSE ORAL Take by mouth.      DULoxetine (CYMBALTA) 60 MG capsule Take 1 capsule (60 mg total) by mouth once daily. 90 capsule 3    EScitalopram oxalate (LEXAPRO) 10 MG tablet Take 1 tablet (10 mg total) by mouth once daily. 90 tablet 1    fluticasone (FLONASE) 50 mcg/actuation nasal spray 1 spray by Each Nare route once daily.      gabapentin (NEURONTIN) 300 MG capsule Take 1 capsule (300 mg total) by mouth 2 (two) times daily. 180 capsule 3    glucosamine-chondroitin 500-400 mg tablet Take 1 tablet by mouth 3 (three) times daily.      insulin  unit/mL injection Inject 16 Units into the skin 2 (two) times daily before meals. 30 mL 3    lisinopriL (PRINIVIL,ZESTRIL) 5 MG tablet Take 1 tablet (5 mg total) by mouth once daily. 90 tablet 1    MAGNESIUM ORAL Take 500 mg by mouth once daily.      meclizine (ANTIVERT) 25 mg tablet Take 25 mg by mouth 2 (two) times daily as needed.      metFORMIN (GLUCOPHAGE) 1000 MG tablet Take 1 tablet (1,000 mg total) by mouth 2 (two) times daily with meals. 180  tablet 3    metoprolol succinate (TOPROL-XL) 100 MG 24 hr tablet Take 1 tablet (100 mg total) by mouth once daily. 90 tablet 1    multivit-min/FA/lycopen/lutein (SENTRY SENIOR ORAL) Take by mouth.      multivitamin capsule Take 1 capsule by mouth once daily.      nitrofurantoin, macrocrystal-monohydrate, (MACROBID) 100 MG capsule Take 100 mg by mouth 2 (two) times daily.      ondansetron (ZOFRAN) 8 MG tablet Take 8 mg by mouth every 8 (eight) hours as needed.      potassium gluconate 595 mg (99 mg) TbSR Take by mouth.      pyridoxine, vitamin B6, (B-6) 100 MG Tab Take 50 mg by mouth once daily.      vitamin A palmitate 3,000 mcg (10,000 unit) Tab Take by mouth.       No current facility-administered medications for this visit.     Review of patient's allergies indicates:   Allergen Reactions    Bactrim [sulfamethoxazole-trimethoprim] Hives    Sulfa (sulfonamide antibiotics) Hives     Social History     Socioeconomic History    Marital status: Single   Tobacco Use    Smoking status: Never     Passive exposure: Past    Smokeless tobacco: Never   Substance and Sexual Activity    Alcohol use: No    Drug use: No       ROS    REVIEW OF SYSTEMS: Negative as documented below as well as positive findings in bold.       Constitutional  Respiratory  Gastrointestinal  Skin   - Fever - Cough - Heartburn - Rash   - Chills - Spit blood - Nausea - Itching   - Weight Loss - Shortness of breath - Vomiting - Nail pain   - Malaise/Fatigue - Wheezing - Abdominal Pain  Wound/Ulcer   - Weight Gain   - Blood in Stool  Poor wound healing       - Diarrhea          Cardiovascular  Genitourinary  Neurological  HEENT   - Chest Pain - Dysuria - Dizziness - Headache   - Palpitations - Hematuria - Tingling - Congestion   - Pain at night in legs - Flank Pain - Tremor - Sore Throat   - Cramping   - Sensory Change - Blurred Vision   - Leg Swelling   - Speech Change - Double Vision   - Dizzy when standing   - Focal Weakness - Eye Redness       -  "Seizures - Dry Eyes       - Loss of Consciousness          Endocrine  Musculoskeletal  Psychiatric   - Cold intolerance - Muscle Pain - Depression   - Heat intolerance - Neck Pain - Insomnia   - Anemia - Joint Pain - Memory Loss   -  Easy bruising, bleeding - Heel pain - Anxiety      Toe Pain        Leg/Ankle/Foot Pain         Objective:     BP (!) 149/75 (BP Location: Left arm, Patient Position: Sitting, BP Method: Large (Automatic))   Pulse 64   Resp 18   Ht 5' 6" (1.676 m)   Wt 74.6 kg (164 lb 7.4 oz)   BMI 26.55 kg/m²     Physical Exam    General Appearance:   Patient appears well developed, well nourished  Patient appears stated age    Psychiatric:   Patient is oriented to time, place, and person  Patient has appropriate mood and affect    Neck:  Trachea Midline  No visible masses    Respiratory/Ears:  No distress or labored breathing.  Able to differentiate between normal talking voice and whisper.  Able to follow commands    Eyes:  Visual Acuity intact  Lids and conjunctivae normal. No discoloration noted.    Foot/Ankle Musculoskeletal Exam  Ortho Exam  Foot Exam  R LE exam con't:  V:  DP 2/4, PT 2/4   CRT< 3s to all digits tested   Tibial and popliteal lymph nodes are w/o abnormality   Edema present, varicose veins present    N: Patient displays normal ankle reflexes   SILT in SP/DP/T/Nkechi/Saph distributions    Ortho: +Motor EHL/FHL/TA/GA   +TTP R 5th proximal phalanx base and 5th MT base   Instability: none   Clinical alignment: adequate   Compartments soft/compressible. No pain on passive stretch of big toe. No calf  Pain.    Derm: skin isintact, skin warm and dry, skin without ulcers or lesions, skin without induration, nails normal,+ecchymosis and swelling R foot          Imaging / Labs:    No results found.      Note: This was dictated using a computer transcription program. Although proofread, it may contain computer transcription errors and phonetic errors. Other human proofreading errors may also " exist. Corrections may be performed at a later time. Please contact us for any clarification if needed.    Sanjeev Anguiano DPM  [facility]

## 2023-04-14 ENCOUNTER — HOSPITAL ENCOUNTER (EMERGENCY)
Facility: HOSPITAL | Age: 83
Discharge: HOME | End: 2023-04-14
Attending: EMERGENCY MEDICINE
Payer: MEDICARE

## 2023-04-14 VITALS
DIASTOLIC BLOOD PRESSURE: 65 MMHG | HEART RATE: 80 BPM | RESPIRATION RATE: 18 BRPM | SYSTOLIC BLOOD PRESSURE: 159 MMHG | BODY MASS INDEX: 26.52 KG/M2 | WEIGHT: 165 LBS | TEMPERATURE: 99 F | HEIGHT: 66 IN | OXYGEN SATURATION: 95 %

## 2023-04-14 DIAGNOSIS — M79.642 LEFT HAND PAIN: Primary | ICD-10-CM

## 2023-04-14 DIAGNOSIS — R07.81 RIB PAIN ON LEFT SIDE: ICD-10-CM
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
## 2023-04-14 DIAGNOSIS — M25.552 LEFT HIP PAIN: ICD-10-CM

## 2023-04-14 DIAGNOSIS — M54.9 UPPER BACK PAIN: ICD-10-CM

## 2023-04-14 PROCEDURE — 25000003 PHARM REV CODE 250: Mod: ER | Performed by: PHYSICIAN ASSISTANT

## 2023-04-14 PROCEDURE — 99284 EMERGENCY DEPT VISIT MOD MDM: CPT | Mod: ER

## 2023-04-14 RX ORDER — ONDANSETRON 4 MG/1
8 TABLET, ORALLY DISINTEGRATING ORAL
Status: COMPLETED | OUTPATIENT
Start: 2023-04-14 | End: 2023-04-14

## 2023-04-14 RX ADMIN — ONDANSETRON 8 MG: 4 TABLET, ORALLY DISINTEGRATING ORAL at 01:04

## 2023-04-14 NOTE — ED PROVIDER NOTES
Encounter Date: 2023       History     Chief Complaint   Patient presents with    Fall     Pt reports multiple falls in past week. Pt states last fall was 3 days ago. C/o pain back and left hand. No LOC.     83 year old patient presents with pain in upper back, left hand, left ribs, and left hip. She states she has fallen in the last week.  She did not hit her head.  She said she was wearing a boot because of a fracture of her right foot and she slipped.  No pain in any other joints.  She did not feel weak or dizzy before falling.  She also said she feels nauseated right now but has no pain, vomiting, or diarrhea.      The history is provided by the patient.   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    Lipidemia     Meniere disease 2016    Pulmonary Mycobacterium avium-intracellulare infection 2016    followed by Pulmonologist at [facility]    Type 2 diabetes mellitus with stage 2 chronic kidney disease, without long-term current use of insulin 10/28/2015     Past Surgical History:   Procedure Laterality Date    ANKLE SURGERY Left     CATARACT EXTRACTION, BILATERAL       SECTION      x 3    eyelid surgery      HYSTERECTOMY       Family History   Problem Relation Age of Onset    Depression Mother     Bipolar  disorder Mother     Diabetes Father     Hypertension Father     Hyperlipidemia Father     Heart disease Father     Hypertension Daughter     Early death Maternal Aunt      Social History     Tobacco Use    Smoking status: Never     Passive exposure: Past    Smokeless tobacco: Never   Substance Use Topics    Alcohol use: No    Drug use: No     Review of Systems   Constitutional: Negative.    HENT: Negative.     Eyes: Negative.    Respiratory: Negative.     Cardiovascular: Negative.    Gastrointestinal: Negative.    Endocrine: Negative.    Genitourinary: Negative.    Musculoskeletal:  Positive for back pain, joint swelling and myalgias.   Skin:  Positive for color change.   Allergic/Immunologic: Negative.    Neurological: Negative.    Hematological: Negative.    Psychiatric/Behavioral: Negative.       Physical Exam     Initial Vitals [04/14/23 1259]   BP Pulse Resp Temp SpO2   (!) 162/77 72 20 98.8 °F (37.1 °C) (!) 93 %      MAP       --         Physical Exam    Constitutional: Vital signs are normal. She appears well-developed and well-nourished.   HENT:   Head: Normocephalic.   Eyes: Lids are normal.   Neck: Trachea normal. Neck supple. No crepitus.   Cardiovascular:  Normal rate and regular rhythm.           Pulmonary/Chest: Effort normal and breath sounds normal.   Abdominal: Abdomen is soft. Bowel sounds are normal. There is no abdominal tenderness.   Musculoskeletal:      Right shoulder: Tenderness present.      Left shoulder: Tenderness present.      Left hand: Swelling and tenderness present. No deformity or bony tenderness.        Arms:       Cervical back: Neck supple. Tenderness and bony tenderness present. No crepitus.      Thoracic back: Tenderness present. No bony tenderness.      Lumbar back: Normal.        Back:       Right hip: Normal range of motion.      Left hip: Tenderness present. Normal range of motion.        Legs:       Comments: Ecchymosis on diagram. Purple in color          ED Course    Procedures  Labs Reviewed - No data to display       Imaging Results              X-Ray Hand 3 view Left (Final result)  Result time 04/14/23 14:36:03      Final result by SHALINI Nguyen Sr., MD (04/14/23 14:36:03)                   Impression:      1. There is a possible fracture in the medial aspect of the distal portion of the proximal phalanx of the small finger.  2. There are several osseous densities projected medial to the carpal bones.  One of the larger ones measures 7 mm.  These are characteristic of fracture fragments and/or unfused accessory ossification centers.  3. There are mild to moderate osteoarthritic changes in the interphalangeal joints of the left hand.  4. There are moderate osteoarthritic changes in the 1st metacarpal-carpal joint.      Electronically signed by: Sanjeev Nguyen MD  Date:    04/14/2023  Time:    14:36               Narrative:    EXAMINATION:  XR HAND COMPLETE 3 VIEW LEFT    CLINICAL HISTORY:  fall;    COMPARISON:  None    FINDINGS:  There is a possible fracture in the medial aspect of the distal portion of the proximal phalanx of the small finger.  There are mild to moderate osteoarthritic changes in the interphalangeal joints of the left hand.  There are moderate osteoarthritic changes in the 1st metacarpal-carpal joint.  There are several osseous densities projected medial to the carpal bones.  One of the larger ones measures 7 mm.  There is no dislocation.                                       X-Ray Ribs 2 View Left (Final result)  Result time 04/14/23 14:48:48      Final result by SHALINI Nguyen Sr., MD (04/14/23 14:48:48)                   Impression:      There is no acute rib fracture visualized. .      Electronically signed by: Sanjeev Nguyen MD  Date:    04/14/2023  Time:    14:48               Narrative:    EXAMINATION:  XR RIBS 2 VIEW LEFT    CLINICAL HISTORY:  Unspecified fall, initial encounter    COMPARISON:  Comparison was made to a chest x-ray performed  on 09/14/2022.    FINDINGS:  There is no acute rib fracture visualized.  The visualized portion of the lungs is clear.  There is no pneumothorax.  The left costophrenic angle is sharp.                                       X-Ray Hip 2 or 3 views Left (with Pelvis when performed) (Final result)  Result time 04/14/23 14:30:33      Final result by SHALINI Nguyen Sr., MD (04/14/23 14:30:33)                   Impression:      1. No fracture or dislocation  2. There is moderate narrowing of the joint space of the left hip.  3. There are moderate osteoarthritic changes in the pubic symphysis.      Electronically signed by: Sanjeev Nguyen MD  Date:    04/14/2023  Time:    14:30               Narrative:    EXAMINATION:  XR HIP WITH PELVIS WHEN PERFORMED, 2 OR 3 VIEWS LEFT    CLINICAL HISTORY:  Unspecified fall, initial encounter    COMPARISON:  03/29/2022    FINDINGS:  There is no fracture. There is no dislocation.  There is moderate narrowing of the joint space of the left hip.  There are moderate osteoarthritic changes in the pubic symphysis.                                       X-Ray Thoracic Spine AP Lateral (Final result)  Result time 04/14/23 14:15:26      Final result by SHALINI Nguyen Sr., MD (04/14/23 14:15:26)                   Impression:      Normal study.      Electronically signed by: Sanjeev Nguyen MD  Date:    04/14/2023  Time:    14:15               Narrative:    EXAMINATION:  XR THORACIC SPINE AP LATERAL    CLINICAL HISTORY:  Unspecified fall, initial encounter    COMPARISON:  None    FINDINGS:  There is no fracture, spondylolisthesis, or scoliosis. There is normal thoracic kyphosis.                                       Medications   ondansetron disintegrating tablet 8 mg (8 mg Oral Given 4/14/23 1350)                              Clinical Impression:   Final diagnoses:  [W19.XXXA] Fall  [M79.642] Left hand pain (Primary)  [M25.552] Left hip pain  [R07.81] Rib pain on left side  [M54.9] Upper back  pain        ED Disposition Condition    Discharge Stable          ED Prescriptions    None       Follow-up Information       Follow up With Specialties Details Why Contact Info    Dolores Corado NP Family Medicine   [address] [address]  [address]  [facility] [address]  [phone]               Donnie Mckeon PA-C  04/14/23 1507

## 2023-05-12 ENCOUNTER — PES CALL (OUTPATIENT)
Dept: ADMINISTRATIVE | Facility: CLINIC | Age: 83
End: 2023-05-12
Payer: MEDICARE

## 2023-05-16 ENCOUNTER — OFFICE VISIT (OUTPATIENT)
Dept: FAMILY MEDICINE | Facility: CLINIC | Age: 83
End: 2023-05-16
Payer: MEDICARE

## 2023-05-16 VITALS
HEIGHT: 66 IN | DIASTOLIC BLOOD PRESSURE: 70 MMHG | SYSTOLIC BLOOD PRESSURE: 134 MMHG | WEIGHT: 155 LBS | TEMPERATURE: 98 F | HEART RATE: 109 BPM | BODY MASS INDEX: 24.91 KG/M2 | OXYGEN SATURATION: 95 %

## 2023-05-16 DIAGNOSIS — N39.0 URINARY TRACT INFECTION WITH HEMATURIA, SITE UNSPECIFIED: ICD-10-CM

## 2023-05-16 DIAGNOSIS — S00.83XA FACIAL HEMATOMA, INITIAL ENCOUNTER: Primary | ICD-10-CM

## 2023-05-16 DIAGNOSIS — R30.0 DYSURIA: ICD-10-CM

## 2023-05-16 DIAGNOSIS — R29.6 MULTIPLE FALLS: ICD-10-CM

## 2023-05-16 DIAGNOSIS — R31.9 URINARY TRACT INFECTION WITH HEMATURIA, SITE UNSPECIFIED: ICD-10-CM

## 2023-05-16 DIAGNOSIS — R51.9 FACE PAIN: ICD-10-CM

## 2023-05-16 DIAGNOSIS — S09.90XA HEAD TRAUMA, INITIAL ENCOUNTER: ICD-10-CM

## 2023-05-16 DIAGNOSIS — J06.9 URI WITH COUGH AND CONGESTION: ICD-10-CM

## 2023-05-16 LAB
BILIRUB SERPL-MCNC: NEGATIVE MG/DL
BLOOD URINE, POC: 250
COLOR, POC UA: NORMAL
CTP QC/QA: YES
GLUCOSE UR QL STRIP: 500
KETONES UR QL STRIP: NEGATIVE
LEUKOCYTE ESTERASE URINE, POC: NORMAL
NITRITE, POC UA: POSITIVE
PH, POC UA: 6
PROTEIN, POC: NEGATIVE
SARS-COV-2 RDRP RESP QL NAA+PROBE: NEGATIVE
SPECIFIC GRAVITY, POC UA: 1.01
UROBILINOGEN, POC UA: NORMAL

## 2023-05-16 PROCEDURE — 3078F DIAST BP <80 MM HG: CPT | Mod: CPTII,,, | Performed by: NURSE PRACTITIONER

## 2023-05-16 PROCEDURE — 1159F MED LIST DOCD IN RCRD: CPT | Mod: CPTII,,, | Performed by: NURSE PRACTITIONER

## 2023-05-16 PROCEDURE — 87086 URINE CULTURE/COLONY COUNT: CPT | Performed by: NURSE PRACTITIONER

## 2023-05-16 PROCEDURE — 3288F PR FALLS RISK ASSESSMENT DOCUMENTED: ICD-10-PCS | Mod: CPTII,,, | Performed by: NURSE PRACTITIONER

## 2023-05-16 PROCEDURE — 87635 SARS-COV-2 COVID-19 AMP PRB: CPT | Mod: QW,,, | Performed by: NURSE PRACTITIONER

## 2023-05-16 PROCEDURE — 87088 URINE BACTERIA CULTURE: CPT | Performed by: NURSE PRACTITIONER

## 2023-05-16 PROCEDURE — 3078F PR MOST RECENT DIASTOLIC BLOOD PRESSURE < 80 MM HG: ICD-10-PCS | Mod: CPTII,,, | Performed by: NURSE PRACTITIONER

## 2023-05-16 PROCEDURE — 1126F PR PAIN SEVERITY QUANTIFIED, NO PAIN PRESENT: ICD-10-PCS | Mod: CPTII,,, | Performed by: NURSE PRACTITIONER

## 2023-05-16 PROCEDURE — 1126F AMNT PAIN NOTED NONE PRSNT: CPT | Mod: CPTII,,, | Performed by: NURSE PRACTITIONER

## 2023-05-16 PROCEDURE — 3075F SYST BP GE 130 - 139MM HG: CPT | Mod: CPTII,,, | Performed by: NURSE PRACTITIONER

## 2023-05-16 PROCEDURE — 81001 URINALYSIS AUTO W/SCOPE: CPT | Mod: ,,, | Performed by: NURSE PRACTITIONER

## 2023-05-16 PROCEDURE — 3075F PR MOST RECENT SYSTOLIC BLOOD PRESS GE 130-139MM HG: ICD-10-PCS | Mod: CPTII,,, | Performed by: NURSE PRACTITIONER

## 2023-05-16 PROCEDURE — 1100F PTFALLS ASSESS-DOCD GE2>/YR: CPT | Mod: CPTII,,, | Performed by: NURSE PRACTITIONER

## 2023-05-16 PROCEDURE — 87635: ICD-10-PCS | Mod: QW,,, | Performed by: NURSE PRACTITIONER

## 2023-05-16 PROCEDURE — 1159F PR MEDICATION LIST DOCUMENTED IN MEDICAL RECORD: ICD-10-PCS | Mod: CPTII,,, | Performed by: NURSE PRACTITIONER

## 2023-05-16 PROCEDURE — 99215 PR OFFICE/OUTPT VISIT, EST, LEVL V, 40-54 MIN: ICD-10-PCS | Mod: ,,, | Performed by: NURSE PRACTITIONER

## 2023-05-16 PROCEDURE — 3288F FALL RISK ASSESSMENT DOCD: CPT | Mod: CPTII,,, | Performed by: NURSE PRACTITIONER

## 2023-05-16 PROCEDURE — 1100F PR PT FALLS ASSESS DOC 2+ FALLS/FALL W/INJURY/YR: ICD-10-PCS | Mod: CPTII,,, | Performed by: NURSE PRACTITIONER

## 2023-05-16 PROCEDURE — 99999 PR PBB SHADOW E&M-EST. PATIENT-LVL V: ICD-10-PCS | Mod: PBBFAC,,, | Performed by: NURSE PRACTITIONER

## 2023-05-16 PROCEDURE — 99215 OFFICE O/P EST HI 40 MIN: CPT | Mod: ,,, | Performed by: NURSE PRACTITIONER

## 2023-05-16 PROCEDURE — 87077 CULTURE AEROBIC IDENTIFY: CPT | Performed by: NURSE PRACTITIONER

## 2023-05-16 PROCEDURE — 87186 SC STD MICRODIL/AGAR DIL: CPT | Performed by: NURSE PRACTITIONER

## 2023-05-16 PROCEDURE — 99999 PR PBB SHADOW E&M-EST. PATIENT-LVL V: CPT | Mod: PBBFAC,,, | Performed by: NURSE PRACTITIONER

## 2023-05-16 PROCEDURE — 81001 POCT URINALYSIS, DIPSTICK OR TABLET REAGENT, AUTOMATED, WITH MICROSCOP: ICD-10-PCS | Mod: ,,, | Performed by: NURSE PRACTITIONER

## 2023-05-16 RX ORDER — AMOXICILLIN AND CLAVULANATE POTASSIUM 875; 125 MG/1; MG/1
1 TABLET, FILM COATED ORAL 2 TIMES DAILY
Qty: 20 TABLET | Refills: 0 | Status: SHIPPED | OUTPATIENT
Start: 2023-05-16 | End: 2023-05-26

## 2023-05-16 NOTE — PROGRESS NOTES
Subjective:       Patient ID: Xi Moise is a 83 y.o. female.    Chief Complaint: Urinary Tract Infection (Patient report having pressure/pain in lower stomach/urgency), Sore Throat, Chest Congestion (Patient report her chest hurt), multiple falls, and facial brusing from fall (Fell on Monday last week 5/8/23 - facial bruising around left eye with forehead hematoma that is tender.)    Patient is an 83 year old white female with Aortic Atherosclerosis, History of Stroke, Type 2 Diabetes, CKD Stage 3, diabetic neuropathy, Hypertension/Hypertensive heart disease, Hyperlipidemia, moderate COPD/Asthma with Bronchiectasis with history of Mycobacterium avium-intracellulare infection, Calcified granuloma of lung, Recurrent Depression, chronic GERD, and Osteopenia that is here today for multiple complaints.     Multiple Falls with last fall 5/8/2023  Seen in ER in September 2022 for multiple falls - CT head:  Atrophy and chronic white matter changes.    No hemorrhage mass effect or midline shift.  Seen in ER on 4/14/2023 for fall:   Upper back pain xray okay:  There is no fracture, spondylolisthesis, or scoliosis. There is normal thoracic kyphosis.  TODAY - reports has had multiple falls in past several weeks.    LAST FALL 5/8/2023:  she did not seek care   She had forehead hematoma and left orbital bruising present - see pictures below   Will get CT head and face   Will order PT for evaluation multiple falls.        Urinary Tract Infection   This is a new problem. The current episode started in the past 7 days. The problem has been gradually worsening. The quality of the pain is described as aching. The pain is at a severity of 1/10. There has been no fever. Associated symptoms include frequency, hesitancy and urgency. Pertinent negatives include no discharge. Associated symptoms comments: Bladder pressure and low back pain; urine with odor and dysuria. Treatments tried: AZO, cranberry pills. The treatment provided  "moderate relief.   URI   This is a new problem. The current episode started in the past 7 days. The problem has been unchanged. Associated symptoms include chest pain, congestion, coughing, dysuria, rhinorrhea, sneezing and a sore throat. Treatments tried: nothing.     Review of Systems   HENT:  Positive for congestion, rhinorrhea, sneezing and sore throat.         Forehead hematoma and orbital bruising from fall.   Respiratory:  Positive for cough.    Cardiovascular:  Positive for chest pain.   Genitourinary:  Positive for dysuria, frequency, hesitancy and urgency.   Musculoskeletal:  Positive for back pain and myalgias.        Reports generalized weakness and multiple falls despite use of walker at home.       Objective:     Vitals:    05/16/23 1539 05/16/23 1624   BP: (!) 146/70 134/70   BP Location: Right arm    Patient Position: Sitting    BP Method: Large (Manual)    Pulse: 109    Temp: 98.1 °F (36.7 °C)    TempSrc: Temporal    SpO2: 95%    Weight: 70.3 kg (154 lb 15.7 oz)    Height: 5' 6" (1.676 m)           Physical Exam  Constitutional:       General: She is not in acute distress.     Appearance: Normal appearance. She is not ill-appearing, toxic-appearing or diaphoretic.      Comments: Body mass index is 25.01 kg/m².       HENT:      Head: Contusion present.        Comments: Forehead hematoma and orbital bruising surrounding the left eye. See pictures below.     Nose: No mucosal edema or congestion.      Mouth/Throat:      Pharynx: No oropharyngeal exudate or posterior oropharyngeal erythema.      Tonsils: No tonsillar exudate.   Eyes:      Extraocular Movements: Extraocular movements intact.      Conjunctiva/sclera: Conjunctivae normal.      Pupils: Pupils are equal, round, and reactive to light.        Comments: + periorbital bruising - see picture below.   Cardiovascular:      Rate and Rhythm: Normal rate and regular rhythm.   Pulmonary:      Effort: Pulmonary effort is normal. No respiratory distress. "   Abdominal:      General: There is no distension.   Musculoskeletal:         General: Normal range of motion.      Right lower leg: No edema.      Left lower leg: No edema.   Skin:     General: Skin is warm and dry.   Neurological:      Mental Status: She is alert and oriented to person, place, and time.   Psychiatric:         Mood and Affect: Mood normal.         Behavior: Behavior normal.         Thought Content: Thought content normal.         Judgment: Judgment normal.                 Office Visit on 05/16/2023   Component Date Value Ref Range Status    Color, UA 05/16/2023 Dark Yellow   Final    Spec Grav UA 05/16/2023 1.015   Final    pH, UA 05/16/2023 6   Final    WBC, UA 05/16/2023 ++   Final    Nitrite, UA 05/16/2023 positive   Final    Protein, POC 05/16/2023 negative   Final    Glucose, UA 05/16/2023 500   Final    Ketones, UA 05/16/2023 negative   Final    Urobilinogen, UA 05/16/2023 normal   Final    Bilirubin, POC 05/16/2023 negative   Final    Blood, UA 05/16/2023 250   Final    POC Rapid COVID 05/16/2023 Negative  Negative Final     Acceptable 05/16/2023 Yes   Final       Assessment:         ICD-10-CM ICD-9-CM   1. Facial hematoma, initial encounter  S00.83XA 920   2. Head trauma, initial encounter  S09.90XA 959.01   3. Multiple falls  R29.6 V15.88   4. Dysuria  R30.0 788.1   5. Urinary tract infection with hematuria, site unspecified  N39.0 599.0    R31.9 599.70   6. URI with cough and congestion  J06.9 465.9   7. Face pain  R51.9 784.0       Plan:       Facial hematoma, initial encounter  CT head and face to further evaluate  -     CT Maxillofacial Without Contrast; Future; Expected date: 05/16/2023  -     CT Head Without Contrast; Future; Expected date: 05/16/2023    Head trauma, initial encounter  -     CT Head Without Contrast; Future; Expected date: 05/16/2023    Multiple falls  CT head and face  Refer to PT  -     Ambulatory referral/consult to Physical/Occupational Therapy;  Future; Expected date: 05/23/2023  -     CT Maxillofacial Without Contrast; Future; Expected date: 05/16/2023  -     CT Head Without Contrast; Future; Expected date: 05/16/2023    Dysuria  + UTI  -     POCT urinalysis, dipstick or tablet reag    Urinary tract infection with hematuria, site unspecified  Treat with Augmentin and send for urine culture.  -     Urine culture  -     amoxicillin-clavulanate 875-125mg (AUGMENTIN) 875-125 mg per tablet; Take 1 tablet by mouth 2 (two) times daily. for 10 days  Dispense: 20 tablet; Refill: 0    URI with cough and congestion  Negative COVID  -     POCT COVID-19 Rapid Screening    Face pain  -     CT Maxillofacial Without Contrast; Future; Expected date: 05/16/2023      I spent a total of 50 minutes on the day of the visit.This includes face to face time and non-face to face time preparing to see the patient (eg, review of tests), obtaining and/or reviewing separately obtained history, documenting clinical information in the electronic or other health record, independently interpreting results and communicating results to the patient/family/caregiver, or care coordinator.     Follow up for pending CT results..     Patient's Medications   New Prescriptions    AMOXICILLIN-CLAVULANATE 875-125MG (AUGMENTIN) 875-125 MG PER TABLET    Take 1 tablet by mouth 2 (two) times daily. for 10 days   Previous Medications    ALBUTEROL (PROVENTIL/VENTOLIN HFA) 90 MCG/ACTUATION INHALER    Inhale 2 puffs into the lungs.    AMLODIPINE (NORVASC) 2.5 MG TABLET    TAKE ONE TABLET BY MOUTH EVERY DAY    ASCORBIC ACID, VITAMIN C, (VITAMIN C) 500 MG TABLET    Take 500 mg by mouth.    ATORVASTATIN (LIPITOR) 80 MG TABLET    Take 1 tablet (80 mg total) by mouth once daily.    CAMPHOR-METHYL SALICYL-MENTHOL 3.1-10-6 % PTMD    Apply topically 2 (two) times daily.    CHOLECALCIFEROL, VITAMIN D3, (VITAMIN D3) 50 MCG (2,000 UNIT) TAB    Take by mouth once daily.    CHROMIUM PICOLINATE 200 MCG TAB    Take by  mouth.    CIPROFLOXACIN HCL (CIPRO) 500 MG TABLET    Take 500 mg by mouth 2 (two) times daily.    CLOPIDOGREL (PLAVIX) 75 MG TABLET    Take 1 tablet (75 mg total) by mouth once daily.    CYANOCOBALAMIN (VITAMIN B-12) 1000 MCG TABLET    Take 1,000 mcg by mouth.    D-MANNOSE ORAL    Take by mouth.    DULOXETINE (CYMBALTA) 60 MG CAPSULE    Take 1 capsule (60 mg total) by mouth once daily.    ESCITALOPRAM OXALATE (LEXAPRO) 10 MG TABLET    Take 1 tablet (10 mg total) by mouth once daily.    FLUTICASONE (FLONASE) 50 MCG/ACTUATION NASAL SPRAY    1 spray by Each Nare route once daily.    GABAPENTIN (NEURONTIN) 300 MG CAPSULE    Take 1 capsule (300 mg total) by mouth 2 (two) times daily.    GLUCOSAMINE-CHONDROITIN 500-400 MG TABLET    Take 1 tablet by mouth 3 (three) times daily.    INSULIN  UNIT/ML INJECTION    Inject 16 Units into the skin 2 (two) times daily before meals.    LISINOPRIL (PRINIVIL,ZESTRIL) 5 MG TABLET    Take 1 tablet (5 mg total) by mouth once daily.    MAGNESIUM ORAL    Take 500 mg by mouth once daily.    MECLIZINE (ANTIVERT) 25 MG TABLET    Take 25 mg by mouth 2 (two) times daily as needed.    METFORMIN (GLUCOPHAGE) 1000 MG TABLET    Take 1 tablet (1,000 mg total) by mouth 2 (two) times daily with meals.    METOPROLOL SUCCINATE (TOPROL-XL) 100 MG 24 HR TABLET    Take 1 tablet (100 mg total) by mouth once daily.    MULTIVIT-MIN/FA/LYCOPEN/LUTEIN (SENTRY SENIOR ORAL)    Take by mouth.    MULTIVITAMIN CAPSULE    Take 1 capsule by mouth once daily.    NITROFURANTOIN, MACROCRYSTAL-MONOHYDRATE, (MACROBID) 100 MG CAPSULE    Take 100 mg by mouth 2 (two) times daily.    ONDANSETRON (ZOFRAN) 8 MG TABLET    Take 8 mg by mouth every 8 (eight) hours as needed.    POTASSIUM GLUCONATE 595 MG (99 MG) TBSR    Take by mouth.    PYRIDOXINE, VITAMIN B6, (B-6) 100 MG TAB    Take 50 mg by mouth once daily.    UNABLE TO FIND    medication name: Pumpkin Seed Oil    VITAMIN A PALMITATE 3,000 MCG (10,000 UNIT) TAB    Take  by mouth.   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    Lipidemia     Meniere disease 2016    Pulmonary Mycobacterium avium-intracellulare infection 2016    followed by Pulmonologist at [facility]    Type 2 diabetes mellitus with stage 2 chronic kidney disease, without long-term current use of insulin 10/28/2015       Past Surgical History:   Procedure Laterality Date    ANKLE SURGERY Left     CATARACT EXTRACTION, BILATERAL       SECTION      x 3    eyelid surgery      HYSTERECTOMY         Family History   Problem Relation Age of Onset    Depression Mother     Bipolar disorder Mother     Diabetes Father     Hypertension Father     Hyperlipidemia Father     Heart disease Father     Hypertension Daughter     Early death Maternal Aunt        Social History     Socioeconomic History    Marital status: Single   Tobacco Use    Smoking status: Never     Passive exposure: Past    Smokeless tobacco: Never   Substance and Sexual Activity    Alcohol use: No    Drug use: No

## 2023-05-19 LAB — BACTERIA UR CULT: ABNORMAL

## 2023-06-07 ENCOUNTER — OFFICE VISIT (OUTPATIENT)
Dept: PODIATRY | Facility: CLINIC | Age: 83
End: 2023-06-07
Payer: MEDICARE

## 2023-06-07 VITALS
HEIGHT: 66 IN | WEIGHT: 160.19 LBS | BODY MASS INDEX: 25.74 KG/M2 | SYSTOLIC BLOOD PRESSURE: 145 MMHG | DIASTOLIC BLOOD PRESSURE: 81 MMHG | HEART RATE: 69 BPM | RESPIRATION RATE: 18 BRPM

## 2023-06-07 DIAGNOSIS — S92.354D CLOSED NONDISPLACED FRACTURE OF FIFTH METATARSAL BONE OF RIGHT FOOT WITH ROUTINE HEALING, SUBSEQUENT ENCOUNTER: Primary | ICD-10-CM

## 2023-06-07 DIAGNOSIS — M79.671 RIGHT FOOT PAIN: Primary | ICD-10-CM

## 2023-06-07 DIAGNOSIS — S92.514D CLOSED NONDISPLACED FRACTURE OF PROXIMAL PHALANX OF LESSER TOE OF RIGHT FOOT WITH ROUTINE HEALING, SUBSEQUENT ENCOUNTER: ICD-10-CM

## 2023-06-07 PROCEDURE — 99999 PR PBB SHADOW E&M-EST. PATIENT-LVL III: ICD-10-PCS | Mod: PBBFAC,,, | Performed by: PODIATRIST

## 2023-06-07 PROCEDURE — 3077F SYST BP >= 140 MM HG: CPT | Mod: CPTII,S$GLB,, | Performed by: PODIATRIST

## 2023-06-07 PROCEDURE — 1101F PT FALLS ASSESS-DOCD LE1/YR: CPT | Mod: CPTII,S$GLB,, | Performed by: PODIATRIST

## 2023-06-07 PROCEDURE — 1160F PR REVIEW ALL MEDS BY PRESCRIBER/CLIN PHARMACIST DOCUMENTED: ICD-10-PCS | Mod: CPTII,S$GLB,, | Performed by: PODIATRIST

## 2023-06-07 PROCEDURE — 3079F PR MOST RECENT DIASTOLIC BLOOD PRESSURE 80-89 MM HG: ICD-10-PCS | Mod: CPTII,S$GLB,, | Performed by: PODIATRIST

## 2023-06-07 PROCEDURE — 1101F PR PT FALLS ASSESS DOC 0-1 FALLS W/OUT INJ PAST YR: ICD-10-PCS | Mod: CPTII,S$GLB,, | Performed by: PODIATRIST

## 2023-06-07 PROCEDURE — 3288F FALL RISK ASSESSMENT DOCD: CPT | Mod: CPTII,S$GLB,, | Performed by: PODIATRIST

## 2023-06-07 PROCEDURE — 3079F DIAST BP 80-89 MM HG: CPT | Mod: CPTII,S$GLB,, | Performed by: PODIATRIST

## 2023-06-07 PROCEDURE — 1126F AMNT PAIN NOTED NONE PRSNT: CPT | Mod: CPTII,S$GLB,, | Performed by: PODIATRIST

## 2023-06-07 PROCEDURE — 1159F MED LIST DOCD IN RCRD: CPT | Mod: CPTII,S$GLB,, | Performed by: PODIATRIST

## 2023-06-07 PROCEDURE — 1159F PR MEDICATION LIST DOCUMENTED IN MEDICAL RECORD: ICD-10-PCS | Mod: CPTII,S$GLB,, | Performed by: PODIATRIST

## 2023-06-07 PROCEDURE — 3288F PR FALLS RISK ASSESSMENT DOCUMENTED: ICD-10-PCS | Mod: CPTII,S$GLB,, | Performed by: PODIATRIST

## 2023-06-07 PROCEDURE — 99999 PR PBB SHADOW E&M-EST. PATIENT-LVL III: CPT | Mod: PBBFAC,,, | Performed by: PODIATRIST

## 2023-06-07 PROCEDURE — 99024 PR POST-OP FOLLOW-UP VISIT: ICD-10-PCS | Mod: S$GLB,,, | Performed by: PODIATRIST

## 2023-06-07 PROCEDURE — 3077F PR MOST RECENT SYSTOLIC BLOOD PRESSURE >= 140 MM HG: ICD-10-PCS | Mod: CPTII,S$GLB,, | Performed by: PODIATRIST

## 2023-06-07 PROCEDURE — 1126F PR PAIN SEVERITY QUANTIFIED, NO PAIN PRESENT: ICD-10-PCS | Mod: CPTII,S$GLB,, | Performed by: PODIATRIST

## 2023-06-07 PROCEDURE — 99024 POSTOP FOLLOW-UP VISIT: CPT | Mod: S$GLB,,, | Performed by: PODIATRIST

## 2023-06-07 PROCEDURE — 1160F RVW MEDS BY RX/DR IN RCRD: CPT | Mod: CPTII,S$GLB,, | Performed by: PODIATRIST

## 2023-06-07 NOTE — PROGRESS NOTES
[facility] - PODIATRY  [id number] [place] [address], [address]  [facility] [address]  Dept: [phone]  Dept Fax: [phone]    Sanjeev Anguiano Jr., UMBERTO Anguiano Jr.     Established Patient Visit  Assessment:     1. Closed nondisplaced fracture of fifth metatarsal bone of right foot with routine healing, subsequent encounter  X-Ray Foot Complete Right      2. Closed nondisplaced fracture of proximal phalanx of lesser toe of right foot with routine healing, subsequent encounter  X-Ray Foot Complete Right        1. Closed nondisplaced fracture of fifth metatarsal bone of right foot with routine healing, subsequent encounter  -     X-Ray Foot Complete Right; Future; Expected date: 06/07/2023    2. Closed nondisplaced fracture of proximal phalanx of lesser toe of right foot with routine healing, subsequent encounter  -     X-Ray Foot Complete Right; Future; Expected date: 06/07/2023      Plan:     MDM    Coding    - pt seen evaluated and treated  - discussed surgical and conservative tx options  - all alternatives, risks, benefits of all tx options were discussed in detail  -pt would benefit from / Pt opts for a trial of non-operative management  -XR/imaging reviewed by me: nonop 5th toe and 5th MT base fxs observed  healing not healed    - rx dispensed: none  - referrals: none  -Continue use of assistive devices  Weight bearing restriction: wbat in CAM RLE  -Fracture care principles discussed  Patient education pamphlet shared and discussed and sent with patient       Follow up in 4 weeks (on 7/5/2023), or if symptoms worsen or fail to improve.    Subjective:      Patient ID: Xi Moise is a 83 y.o. female.    Chief Complaint:   Chief Complaint   Patient presents with    Foot Injury     Right        CC - ankle/foot injury: Patient complains of injury to the right ankle/foot. This is evaluated as a personal injury. The injury occurred a few weeks ago, and occurred while trip and fall.  The  patient states the ankle/foot rolled inward at the time of injury.  Patient did not hear or sense a pop or snap at the time of the injury. The patient notes pain and moderate swelling of the foot/ankle since the injury. Patient has treated the ankle with ice. Pain is localized to the lateral area. Patients rates pain 7/10 on pain scale.    Able to ambulate: with pain    23:  Hx as above. F/u for nonop R 5th MT and toe fxs. No pain.        Outside reports reviewed: historical medical records.  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    Lipidemia     Meniere disease 2016    Pulmonary Mycobacterium avium-intracellulare infection 2016    followed by Pulmonologist at [facility]    Type 2 diabetes mellitus with stage 2 chronic kidney disease, without long-term current use of insulin 10/28/2015     Past Surgical History:   Procedure Laterality Date    ANKLE SURGERY Left     CATARACT EXTRACTION, BILATERAL       SECTION      x 3    eyelid surgery      HYSTERECTOMY       Family History   Problem Relation Age of Onset    Depression Mother     Bipolar disorder Mother     Diabetes Father     Hypertension Father     Hyperlipidemia Father     Heart disease Father     Hypertension Daughter     Early death Maternal Aunt      Current Outpatient Medications   Medication Sig Dispense Refill    albuterol (PROVENTIL/VENTOLIN HFA) 90 mcg/actuation inhaler Inhale 2  puffs into the lungs.      amLODIPine (NORVASC) 2.5 MG tablet TAKE ONE TABLET BY MOUTH EVERY DAY 30 tablet 3    ascorbic acid, vitamin C, (VITAMIN C) 500 MG tablet Take 500 mg by mouth.      atorvastatin (LIPITOR) 80 MG tablet Take 1 tablet (80 mg total) by mouth once daily. 90 tablet 3    camphor-methyl salicyl-menthoL 3.1-10-6 % PtMd Apply topically 2 (two) times daily.      cholecalciferol, vitamin D3, (VITAMIN D3) 50 mcg (2,000 unit) Tab Take by mouth once daily.      chromium picolinate 200 mcg Tab Take by mouth.      clopidogreL (PLAVIX) 75 mg tablet Take 1 tablet (75 mg total) by mouth once daily. 90 tablet 3    cyanocobalamin (VITAMIN B-12) 1000 MCG tablet Take 1,000 mcg by mouth.      D-MANNOSE ORAL Take by mouth.      DULoxetine (CYMBALTA) 60 MG capsule Take 1 capsule (60 mg total) by mouth once daily. 90 capsule 3    EScitalopram oxalate (LEXAPRO) 10 MG tablet Take 1 tablet (10 mg total) by mouth once daily. 90 tablet 1    fluticasone (FLONASE) 50 mcg/actuation nasal spray 1 spray by Each Nare route once daily.      gabapentin (NEURONTIN) 300 MG capsule Take 1 capsule (300 mg total) by mouth 2 (two) times daily. 180 capsule 3    glucosamine-chondroitin 500-400 mg tablet Take 1 tablet by mouth 3 (three) times daily.      lisinopriL (PRINIVIL,ZESTRIL) 5 MG tablet Take 1 tablet (5 mg total) by mouth once daily. 90 tablet 1    MAGNESIUM ORAL Take 500 mg by mouth once daily.      meclizine (ANTIVERT) 25 mg tablet Take 25 mg by mouth 2 (two) times daily as needed.      metFORMIN (GLUCOPHAGE) 1000 MG tablet Take 1 tablet (1,000 mg total) by mouth 2 (two) times daily with meals. 180 tablet 3    metoprolol succinate (TOPROL-XL) 100 MG 24 hr tablet Take 1 tablet (100 mg total) by mouth once daily. 90 tablet 1    multivit-min/FA/lycopen/lutein (SENTRY SENIOR ORAL) Take by mouth.      multivitamin capsule Take 1 capsule by mouth once daily.      ondansetron (ZOFRAN) 8 MG tablet Take 8 mg by mouth every 8 (eight)  hours as needed.      potassium gluconate 595 mg (99 mg) TbSR Take by mouth.      pyridoxine, vitamin B6, (B-6) 100 MG Tab Take 50 mg by mouth once daily.      UNABLE TO FIND medication name: Pumpkin Seed Oil      vitamin A palmitate 3,000 mcg (10,000 unit) Tab Take by mouth.      insulin  unit/mL injection Inject 16 Units into the skin 2 (two) times daily before meals. 30 mL 3     No current facility-administered medications for this visit.     Review of patient's allergies indicates:   Allergen Reactions    Bactrim [sulfamethoxazole-trimethoprim] Hives    Sulfa (sulfonamide antibiotics) Hives     Social History     Socioeconomic History    Marital status: Single   Tobacco Use    Smoking status: Never     Passive exposure: Past    Smokeless tobacco: Never   Substance and Sexual Activity    Alcohol use: No    Drug use: No       ROS    REVIEW OF SYSTEMS: Negative as documented below as well as positive findings in bold.       Constitutional  Respiratory  Gastrointestinal  Skin   - Fever - Cough - Heartburn - Rash   - Chills - Spit blood - Nausea - Itching   - Weight Loss - Shortness of breath - Vomiting - Nail pain   - Malaise/Fatigue - Wheezing - Abdominal Pain  Wound/Ulcer   - Weight Gain   - Blood in Stool  Poor wound healing       - Diarrhea          Cardiovascular  Genitourinary  Neurological  HEENT   - Chest Pain - Dysuria - Dizziness - Headache   - Palpitations - Hematuria - Tingling - Congestion   - Pain at night in legs - Flank Pain - Tremor - Sore Throat   - Cramping   - Sensory Change - Blurred Vision   - Leg Swelling   - Speech Change - Double Vision   - Dizzy when standing   - Focal Weakness - Eye Redness       - Seizures - Dry Eyes       - Loss of Consciousness          Endocrine  Musculoskeletal  Psychiatric   - Cold intolerance - Muscle Pain - Depression   - Heat intolerance - Neck Pain - Insomnia   - Anemia - Joint Pain - Memory Loss   -  Easy bruising, bleeding - Heel pain - Anxiety      Toe  "Pain        Leg/Ankle/Foot Pain         Objective:     BP (!) 145/81 (BP Location: Left arm, Patient Position: Sitting, BP Method: X-Large (Automatic))   Pulse 69   Resp 18   Ht 5' 6" (1.676 m)   Wt 72.7 kg (160 lb 2.6 oz)   BMI 25.85 kg/m²     Physical Exam    General Appearance:   Patient appears well developed, well nourished  Patient appears stated age    Psychiatric:   Patient is oriented to time, place, and person  Patient has appropriate mood and affect    Neck:  Trachea Midline  No visible masses    Respiratory/Ears:  No distress or labored breathing.  Able to differentiate between normal talking voice and whisper.  Able to follow commands    Eyes:  Visual Acuity intact  Lids and conjunctivae normal. No discoloration noted.    Foot/Ankle Musculoskeletal Exam  Ortho Exam  Foot Exam  R LE exam con't:  V:  DP 2/4, PT 2/4   CRT< 3s to all digits tested   Tibial and popliteal lymph nodes are w/o abnormality   Edema present, varicose veins present    N: Patient displays normal ankle reflexes   SILT in SP/DP/T/Nkechi/Saph distributions    Ortho: +Motor EHL/FHL/TA/GA   No TTP R 5th proximal phalanx base and 5th MT base   Instability: none   Clinical alignment: adequate   Compartments soft/compressible. No pain on passive stretch of big toe. No calf  Pain.    Derm: skin isintact, skin warm and dry, skin without ulcers or lesions, skin without induration, nails normal,+ecchymosis and swelling R foot          Imaging / Labs:    No results found.      Note: This was dictated using a computer transcription program. Although proofread, it may contain computer transcription errors and phonetic errors. Other human proofreading errors may also exist. Corrections may be performed at a later time. Please contact us for any clarification if needed.    Sanjeev Anguiano DPM  [facility]      "

## 2023-06-09 ENCOUNTER — TELEPHONE (OUTPATIENT)
Dept: FAMILY MEDICINE | Facility: CLINIC | Age: 83
End: 2023-06-09
Payer: MEDICARE

## 2023-06-16 PROBLEM — R29.6 MULTIPLE FALLS: Status: ACTIVE | Noted: 2023-06-16

## 2023-06-16 PROBLEM — Z79.4 LONG TERM (CURRENT) USE OF INSULIN: Status: ACTIVE | Noted: 2023-06-16

## 2023-07-07 LAB
ALBUMIN SERPL-MCNC: 4.3 G/DL (ref 3.6–5.1)
ALBUMIN/GLOB SERPL: 1.7 (CALC) (ref 1–2.5)
ALP SERPL-CCNC: 83 U/L (ref 37–153)
ALT SERPL-CCNC: 12 U/L (ref 6–29)
AST SERPL-CCNC: 22 U/L (ref 10–35)
BILIRUB SERPL-MCNC: 0.7 MG/DL (ref 0.2–1.2)
BUN SERPL-MCNC: 22 MG/DL (ref 7–25)
BUN/CREAT SERPL: 19 (CALC) (ref 6–22)
CALCIUM SERPL-MCNC: 9.5 MG/DL (ref 8.6–10.4)
CHLORIDE SERPL-SCNC: 97 MMOL/L (ref 98–110)
CO2 SERPL-SCNC: 30 MMOL/L (ref 20–32)
CREAT SERPL-MCNC: 1.15 MG/DL (ref 0.6–0.95)
EGFR: 47 ML/MIN/1.73M2
GLOBULIN SER CALC-MCNC: 2.6 G/DL (CALC) (ref 1.9–3.7)
GLUCOSE SERPL-MCNC: 162 MG/DL (ref 65–99)
HBA1C MFR BLD: 7 % OF TOTAL HGB
POTASSIUM SERPL-SCNC: 4.5 MMOL/L (ref 3.5–5.3)
PROT SERPL-MCNC: 6.9 G/DL (ref 6.1–8.1)
SODIUM SERPL-SCNC: 138 MMOL/L (ref 135–146)

## 2023-07-10 ENCOUNTER — OFFICE VISIT (OUTPATIENT)
Dept: FAMILY MEDICINE | Facility: CLINIC | Age: 83
End: 2023-07-10
Payer: MEDICARE

## 2023-07-10 VITALS
DIASTOLIC BLOOD PRESSURE: 54 MMHG | HEIGHT: 66 IN | SYSTOLIC BLOOD PRESSURE: 104 MMHG | TEMPERATURE: 98 F | BODY MASS INDEX: 26.08 KG/M2 | OXYGEN SATURATION: 95 % | WEIGHT: 162.25 LBS | HEART RATE: 64 BPM

## 2023-07-10 DIAGNOSIS — E11.22 TYPE 2 DIABETES MELLITUS WITH STAGE 3A CHRONIC KIDNEY DISEASE, WITH LONG-TERM CURRENT USE OF INSULIN: ICD-10-CM

## 2023-07-10 DIAGNOSIS — N39.0 RECURRENT UTI: ICD-10-CM

## 2023-07-10 DIAGNOSIS — F33.0 MILD EPISODE OF RECURRENT MAJOR DEPRESSIVE DISORDER: Primary | ICD-10-CM

## 2023-07-10 DIAGNOSIS — E78.5 HYPERLIPIDEMIA ASSOCIATED WITH TYPE 2 DIABETES MELLITUS: ICD-10-CM

## 2023-07-10 DIAGNOSIS — E11.69 HYPERLIPIDEMIA ASSOCIATED WITH TYPE 2 DIABETES MELLITUS: ICD-10-CM

## 2023-07-10 DIAGNOSIS — N18.31 TYPE 2 DIABETES MELLITUS WITH STAGE 3A CHRONIC KIDNEY DISEASE, WITH LONG-TERM CURRENT USE OF INSULIN: ICD-10-CM

## 2023-07-10 DIAGNOSIS — I13.10 HYPERTENSIVE HEART AND KIDNEY DISEASE WITHOUT HEART FAILURE AND WITH STAGE 3A CHRONIC KIDNEY DISEASE: ICD-10-CM

## 2023-07-10 DIAGNOSIS — N18.31 HYPERTENSIVE HEART AND KIDNEY DISEASE WITHOUT HEART FAILURE AND WITH STAGE 3A CHRONIC KIDNEY DISEASE: ICD-10-CM

## 2023-07-10 DIAGNOSIS — Z79.4 TYPE 2 DIABETES MELLITUS WITH STAGE 3A CHRONIC KIDNEY DISEASE, WITH LONG-TERM CURRENT USE OF INSULIN: ICD-10-CM

## 2023-07-10 LAB
BILIRUB SERPL-MCNC: ABNORMAL MG/DL
BLOOD URINE, POC: 250
COLOR, POC UA: ABNORMAL
GLUCOSE UR QL STRIP: ABNORMAL
KETONES UR QL STRIP: ABNORMAL
LEUKOCYTE ESTERASE URINE, POC: ABNORMAL
NITRITE, POC UA: POSITIVE
PH, POC UA: ABNORMAL
PROTEIN, POC: ABNORMAL
SPECIFIC GRAVITY, POC UA: ABNORMAL
UROBILINOGEN, POC UA: ABNORMAL

## 2023-07-10 PROCEDURE — 99214 OFFICE O/P EST MOD 30 MIN: CPT | Mod: S$GLB,,, | Performed by: NURSE PRACTITIONER

## 2023-07-10 PROCEDURE — 81001 POCT URINALYSIS, DIPSTICK OR TABLET REAGENT, AUTOMATED, WITH MICROSCOP: ICD-10-PCS | Mod: S$GLB,,, | Performed by: NURSE PRACTITIONER

## 2023-07-10 PROCEDURE — 1126F AMNT PAIN NOTED NONE PRSNT: CPT | Mod: CPTII,S$GLB,, | Performed by: NURSE PRACTITIONER

## 2023-07-10 PROCEDURE — 87186 SC STD MICRODIL/AGAR DIL: CPT | Performed by: NURSE PRACTITIONER

## 2023-07-10 PROCEDURE — 1159F MED LIST DOCD IN RCRD: CPT | Mod: CPTII,S$GLB,, | Performed by: NURSE PRACTITIONER

## 2023-07-10 PROCEDURE — 87086 URINE CULTURE/COLONY COUNT: CPT | Performed by: NURSE PRACTITIONER

## 2023-07-10 PROCEDURE — 87088 URINE BACTERIA CULTURE: CPT | Performed by: NURSE PRACTITIONER

## 2023-07-10 PROCEDURE — 1100F PTFALLS ASSESS-DOCD GE2>/YR: CPT | Mod: CPTII,S$GLB,, | Performed by: NURSE PRACTITIONER

## 2023-07-10 PROCEDURE — 99214 PR OFFICE/OUTPT VISIT, EST, LEVL IV, 30-39 MIN: ICD-10-PCS | Mod: S$GLB,,, | Performed by: NURSE PRACTITIONER

## 2023-07-10 PROCEDURE — 99999 PR PBB SHADOW E&M-EST. PATIENT-LVL V: ICD-10-PCS | Mod: PBBFAC,,, | Performed by: NURSE PRACTITIONER

## 2023-07-10 PROCEDURE — 81001 URINALYSIS AUTO W/SCOPE: CPT | Mod: S$GLB,,, | Performed by: NURSE PRACTITIONER

## 2023-07-10 PROCEDURE — 3078F DIAST BP <80 MM HG: CPT | Mod: CPTII,S$GLB,, | Performed by: NURSE PRACTITIONER

## 2023-07-10 PROCEDURE — 1160F RVW MEDS BY RX/DR IN RCRD: CPT | Mod: CPTII,S$GLB,, | Performed by: NURSE PRACTITIONER

## 2023-07-10 PROCEDURE — 1159F PR MEDICATION LIST DOCUMENTED IN MEDICAL RECORD: ICD-10-PCS | Mod: CPTII,S$GLB,, | Performed by: NURSE PRACTITIONER

## 2023-07-10 PROCEDURE — 1126F PR PAIN SEVERITY QUANTIFIED, NO PAIN PRESENT: ICD-10-PCS | Mod: CPTII,S$GLB,, | Performed by: NURSE PRACTITIONER

## 2023-07-10 PROCEDURE — 3288F FALL RISK ASSESSMENT DOCD: CPT | Mod: CPTII,S$GLB,, | Performed by: NURSE PRACTITIONER

## 2023-07-10 PROCEDURE — 87077 CULTURE AEROBIC IDENTIFY: CPT | Performed by: NURSE PRACTITIONER

## 2023-07-10 PROCEDURE — 3074F SYST BP LT 130 MM HG: CPT | Mod: CPTII,S$GLB,, | Performed by: NURSE PRACTITIONER

## 2023-07-10 PROCEDURE — 1100F PR PT FALLS ASSESS DOC 2+ FALLS/FALL W/INJURY/YR: ICD-10-PCS | Mod: CPTII,S$GLB,, | Performed by: NURSE PRACTITIONER

## 2023-07-10 PROCEDURE — 1160F PR REVIEW ALL MEDS BY PRESCRIBER/CLIN PHARMACIST DOCUMENTED: ICD-10-PCS | Mod: CPTII,S$GLB,, | Performed by: NURSE PRACTITIONER

## 2023-07-10 PROCEDURE — 3288F PR FALLS RISK ASSESSMENT DOCUMENTED: ICD-10-PCS | Mod: CPTII,S$GLB,, | Performed by: NURSE PRACTITIONER

## 2023-07-10 PROCEDURE — 3078F PR MOST RECENT DIASTOLIC BLOOD PRESSURE < 80 MM HG: ICD-10-PCS | Mod: CPTII,S$GLB,, | Performed by: NURSE PRACTITIONER

## 2023-07-10 PROCEDURE — 99999 PR PBB SHADOW E&M-EST. PATIENT-LVL V: CPT | Mod: PBBFAC,,, | Performed by: NURSE PRACTITIONER

## 2023-07-10 PROCEDURE — 3074F PR MOST RECENT SYSTOLIC BLOOD PRESSURE < 130 MM HG: ICD-10-PCS | Mod: CPTII,S$GLB,, | Performed by: NURSE PRACTITIONER

## 2023-07-10 RX ORDER — ESCITALOPRAM OXALATE 10 MG/1
10 TABLET ORAL DAILY
Qty: 90 TABLET | Refills: 3 | Status: SHIPPED | OUTPATIENT
Start: 2023-07-10 | End: 2023-11-24 | Stop reason: DRUGHIGH

## 2023-07-10 RX ORDER — METFORMIN HYDROCHLORIDE 1000 MG/1
1000 TABLET ORAL 2 TIMES DAILY WITH MEALS
Qty: 180 TABLET | Refills: 3 | Status: SHIPPED | OUTPATIENT
Start: 2023-07-10 | End: 2023-12-08 | Stop reason: SINTOL

## 2023-07-10 RX ORDER — NITROFURANTOIN 25; 75 MG/1; MG/1
100 CAPSULE ORAL 2 TIMES DAILY
Qty: 14 CAPSULE | Refills: 0 | Status: SHIPPED | OUTPATIENT
Start: 2023-07-10 | End: 2023-07-17

## 2023-07-10 RX ORDER — LISINOPRIL 5 MG/1
5 TABLET ORAL DAILY
Qty: 90 TABLET | Refills: 3 | Status: SHIPPED | OUTPATIENT
Start: 2023-07-10

## 2023-07-10 RX ORDER — HYDROCHLOROTHIAZIDE 25 MG/1
TABLET ORAL
COMMUNITY
Start: 2023-04-03 | End: 2023-07-10

## 2023-07-10 NOTE — PROGRESS NOTES
"Subjective:       Patient ID: Xi Moise is a 83 y.o. female.    Chief Complaint: Follow-up (4 months F/U) and Urinary Tract Infection (Back and stomach pains, pressure when urinating)        Patient is an 83 year old white female with Aortic Atherosclerosis, History of Stroke, Type 2 Diabetes, CKD Stage 3, diabetic neuropathy, Hypertension/Hypertensive heart disease, Hyperlipidemia, moderate COPD/Asthma with Bronchiectasis with history of Mycobacterium avium-intracellulare infection, Calcified granuloma of lung, Recurrent Depression, chronic GERD, and Osteopenia that is here today follow up. She had her MEDICARE WELLNESS EXAM with [employer] last done around  6/5/2023 with [employer] representative..     Aortic Atherosclerosis and CAD  Seen on CT chest  4/28/2016 and more recent CT  ON Plavix 75 mg daily,  Atorvastatin 80 mg daily, ACE and BB.  Calcium channel blocker being STOPPED today due to low BP     History of Stroke  Around 2016  On Plavix 75 mg daily     Type 2 Diabetes  Taking NPH insulin 16 units twice daily before meals, metformin a 1000 mg twice daily  Fasting blood glucose 162 with hemoglobin A1c 7.0%.    Diabetic urine screening was negative   Continue current medications  Recheck in 3 months.     CKD Stage 3  Creatinine 1.05 with eGFR 53 Feb. 2023  Creatinine now 1.15 with eGFR 47  Will refer Nephrology for monitoring  AVOID NSAIDs       Diabetic Neuropathy  On Cymbalta 60 mg daily and gabapentin 300 mg twice daily  Complaint of right little toe pain - followed by podiatry     Hypertension/hypertensive heart disease  Taking lisinopril 5 mg daily, metoprolol  mg daily, and amlodipine 2.5 mg daily.  BP (!) 104/54 (BP Location: Left arm, Patient Position: Sitting, BP Method: Large (Manual))   Pulse 64   Temp 98.2 °F (36.8 °C) (Temporal)   Ht 5' 6" (1.676 m)   Wt 73.6 kg (162 lb 4.1 oz)   SpO2 95%   BMI 26.19 kg/m²   STOP the Amlodipine and continue other meds  Recheck in 3 " months.     Hyperlipidemia   Taking atorvastatin 80 mg daily   LDL 72 when last checked  Advised on lifestyle modifications     Bronchiectasis, COPD/Asthma Overlap, History of Pulmonary MAC, SHANTI positive  Noted on last Pulmonary Progress note - Dr. Lee at [facility] - see under encounters  Patient reports now followed by Dr. Alannah Noe with [facility] - will request records  Reports only on Albuterol inhaler prn  Reports NO controller inhalers     Recurrent depression  Currently taking Cymbalta 60 mg and the Lexapro 10 mg daily       Osteopenia  Last DEXA scan 2/2/21  Advised to take calcium with vitamin-D supplementation     Chronic GERD  Taking Protonix 40 mg daily     Urinary Tract Infection   This is a recurrent problem. Episode onset: reports UTI recurrent - treated 3 times in past 2 months - went to urgent care twice. The problem occurs every urination. The problem has been gradually worsening. The quality of the pain is described as aching. The pain is at a severity of 8/10. The pain is moderate. There has been no fever. Pertinent negatives include no discharge. She has tried antibiotics (I treated with Augmentin in May 2023.  Patient reports treated twice more with antibiotics by Urgent Care in [place].) for the symptoms. Her past medical history is significant for recurrent UTIs. + CKD     Office Visit on 07/10/2023   Component Date Value Ref Range Status    Color, UA 07/10/2023 Dark Danette   Final    WBC, UA 07/10/2023 +   Final    Nitrite, UA 07/10/2023 positive   Final    Blood, UA 07/10/2023 250   Final     Component      Latest Ref Rng & Units 7/6/2023 2/20/2023 9/14/2022   Glucose      65 - 99 mg/dL 162 (H) 133 (H) 280 (H)   BUN      7 - 25 mg/dL 22 18 25 (H)   Creatinine      0.60 - 0.95 mg/dL 1.15 (H) 1.05 (H) 0.83   eGFR      > OR = 60 mL/min/1.73m2 47 (L) 53 (L)    BUN/CREAT RATIO      6 - 22 (calc) 19 17    Sodium      135 - 146 mmol/L 138 139 137   Potassium      3.5 - 5.3 mmol/L 4.5 4.0  "4.2   Chloride      98 - 110 mmol/L 97 (L) 98 100   CO2      20 - 32 mmol/L 30 31 28   Calcium      8.6 - 10.4 mg/dL 9.5 9.9 8.7   PROTEIN TOTAL      6.1 - 8.1 g/dL 6.9 7.0 6.2   Albumin      3.6 - 5.1 g/dL 4.3 4.5 4.0   Globulin, Total      1.9 - 3.7 g/dL (calc) 2.6 2.5    Albumin/Globulin Ratio      1.0 - 2.5 (calc) 1.7 1.8    BILIRUBIN TOTAL      0.2 - 1.2 mg/dL 0.7 0.9 0.6   Alkaline Phosphatase      37 - 153 U/L 83 76 74   AST      10 - 35 U/L 22 20 31   ALT      6 - 29 U/L 12 16 20   Hemoglobin A1C External      <5.7 % of total Hgb 7.0 (H) 7.4 (H)          Review of Systems   Genitourinary:  Positive for dysuria and frequency.   Musculoskeletal:  Positive for back pain and myalgias.       Objective:     Vitals:    07/10/23 0825   BP: (!) 104/54   BP Location: Left arm   Patient Position: Sitting   BP Method: Large (Manual)   Pulse: 64   Temp: 98.2 °F (36.8 °C)   TempSrc: Temporal   SpO2: 95%   Weight: 73.6 kg (162 lb 4.1 oz)   Height: 5' 6" (1.676 m)          Physical Exam  Constitutional:       General: She is not in acute distress.     Appearance: Normal appearance. She is not ill-appearing, toxic-appearing or diaphoretic.      Comments: Body mass index is 26.19 kg/m².   HENT:      Head: Normocephalic and atraumatic.   Eyes:      Extraocular Movements: Extraocular movements intact.      Conjunctiva/sclera: Conjunctivae normal.   Cardiovascular:      Rate and Rhythm: Normal rate and regular rhythm.   Pulmonary:      Effort: Pulmonary effort is normal. No respiratory distress.   Abdominal:      General: There is no distension.   Musculoskeletal:         General: Normal range of motion.      Right lower leg: No edema.      Left lower leg: No edema.   Skin:     General: Skin is warm and dry.   Neurological:      Mental Status: She is alert and oriented to person, place, and time.   Psychiatric:         Mood and Affect: Mood normal.         Behavior: Behavior normal.         Thought Content: Thought content " normal.         Judgment: Judgment normal.         Assessment:         ICD-10-CM ICD-9-CM   1. Mild episode of recurrent major depressive disorder  F33.0 296.31   2. Type 2 diabetes mellitus with stage 3a chronic kidney disease, with long-term current use of insulin  E11.22 250.40    N18.31 585.3    Z79.4 V58.67   3. 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 Hyperlipidemia associated with type 2 diabetes mellitus  E11.69 250.80    E78.5 272.4   5. Recurrent UTI  N39.0 599.0       Plan:       Mild episode of recurrent major depressive disorder  Continue Cymbalta and lexapro at present doses.  -     EScitalopram oxalate (LEXAPRO) 10 MG tablet; Take 1 tablet (10 mg total) by mouth once daily.  Dispense: 90 tablet; Refill: 3    Type 2 diabetes mellitus with stage 3a chronic kidney disease, with long-term current use of insulin  Continue NPH 16 units twice daily and Metforkmin 1000 mg twice daily  Recheck in 3 months.  -     Comprehensive Metabolic Panel; Future; Expected date: 07/10/2023  -     Hemoglobin A1C; Future; Expected date: 07/10/2023  -     metFORMIN (GLUCOPHAGE) 1000 MG tablet; Take 1 tablet (1,000 mg total) by mouth 2 (two) times daily with meals.  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  Dispense: 90 tablet; Refill: 3  -     Ambulatory referral/consult to Nephrology; Future; Expected date: 07/17/2023    Hyperlipidemia associated with type 2 diabetes mellitus  Continue current medication(s).  Follow up in 3 months.  -     Lipid Panel; Future; Expected date:  07/10/2023    Recurrent UTI  Referral to Urology for further evaluation  Urine sent for culture  Macrobid 100 mg twice daily for 7 days.  -     Ambulatory referral/consult to Urology; Future; Expected date: 07/17/2023  -     POCT urinalysis, dipstick or tablet reag  -     Urine culture  -     nitrofurantoin, macrocrystal-monohydrate, (MACROBID) 100 MG capsule; Take 1 capsule (100 mg total) by mouth 2 (two) times daily. for 7 days  Dispense: 14 capsule; Refill: 0      Follow up in about 3 months (around 10/10/2023) for fasting labs and follow up.     Patient's Medications   New Prescriptions    NITROFURANTOIN, MACROCRYSTAL-MONOHYDRATE, (MACROBID) 100 MG CAPSULE    Take 1 capsule (100 mg total) by mouth 2 (two) times daily. for 7 days   Previous Medications    ALBUTEROL (PROVENTIL/VENTOLIN HFA) 90 MCG/ACTUATION INHALER    Inhale 2 puffs into the lungs.    ASCORBIC ACID, VITAMIN C, (VITAMIN C) 500 MG TABLET    Take 500 mg by mouth.    ATORVASTATIN (LIPITOR) 80 MG TABLET    Take 1 tablet (80 mg total) by mouth once daily.    CAMPHOR-METHYL SALICYL-MENTHOL 3.1-10-6 % PTMD    Apply topically 2 (two) times daily.    CHOLECALCIFEROL, VITAMIN D3, (VITAMIN D3) 50 MCG (2,000 UNIT) TAB    Take by mouth once daily.    CHROMIUM PICOLINATE 200 MCG TAB    Take by mouth.    CLOPIDOGREL (PLAVIX) 75 MG TABLET    Take 1 tablet (75 mg total) by mouth once daily.    CYANOCOBALAMIN (VITAMIN B-12) 1000 MCG TABLET    Take 1,000 mcg by mouth.    D-MANNOSE ORAL    Take by mouth.    DULOXETINE (CYMBALTA) 60 MG CAPSULE    Take 1 capsule (60 mg total) by mouth once daily.    FLUTICASONE (FLONASE) 50 MCG/ACTUATION NASAL SPRAY    1 spray by Each Nare route once daily.    GABAPENTIN (NEURONTIN) 300 MG CAPSULE    Take 1 capsule (300 mg total) by mouth 2 (two) times daily.    GLUCOSAMINE-CHONDROITIN 500-400 MG TABLET    Take 1 tablet by mouth 3 (three) times daily.    INSULIN  UNIT/ML INJECTION    Inject 16 Units into the skin 2 (two) times  daily before meals.    MAGNESIUM ORAL    Take 500 mg by mouth once daily.    MECLIZINE (ANTIVERT) 25 MG TABLET    Take 25 mg by mouth 2 (two) times daily as needed.    METOPROLOL SUCCINATE (TOPROL-XL) 100 MG 24 HR TABLET    Take 1 tablet (100 mg total) by mouth once daily.    MULTIVIT-MIN/FA/LYCOPEN/LUTEIN (SENTRY SENIOR ORAL)    Take by mouth.    MULTIVITAMIN CAPSULE    Take 1 capsule by mouth once daily.    ONDANSETRON (ZOFRAN) 8 MG TABLET    Take 8 mg by mouth every 8 (eight) hours as needed.    POTASSIUM GLUCONATE 595 MG (99 MG) TBSR    Take by mouth.    PYRIDOXINE, VITAMIN B6, (B-6) 100 MG TAB    Take 50 mg by mouth once daily.    UNABLE TO FIND    medication name: Pumpkin Seed Oil    VITAMIN A PALMITATE 3,000 MCG (10,000 UNIT) TAB    Take by mouth.   Modified Medications    Modified Medication Previous Medication    ESCITALOPRAM OXALATE (LEXAPRO) 10 MG TABLET EScitalopram oxalate (LEXAPRO) 10 MG tablet       Take 1 tablet (10 mg total) by mouth once daily.    Take 1 tablet (10 mg total) by mouth once daily.    LISINOPRIL (PRINIVIL,ZESTRIL) 5 MG TABLET lisinopriL (PRINIVIL,ZESTRIL) 5 MG tablet       Take 1 tablet (5 mg total) by mouth once daily.    Take 1 tablet (5 mg total) by mouth once daily.    METFORMIN (GLUCOPHAGE) 1000 MG TABLET metFORMIN (GLUCOPHAGE) 1000 MG tablet       Take 1 tablet (1,000 mg total) by mouth 2 (two) times daily with meals.    Take 1 tablet (1,000 mg total) by mouth 2 (two) times daily with meals.   Discontinued Medications    AMLODIPINE (NORVASC) 2.5 MG TABLET    TAKE ONE TABLET BY MOUTH EVERY DAY    HYDROCHLOROTHIAZIDE (HYDRODIURIL) 25 MG TABLET           Past Medical History:   Diagnosis Date    Asthma     Chronic kidney disease (CKD), stage III (moderate) 04/28/2016    COPD, moderate     Depression 04/29/2016    Diabetes mellitus     Diabetic polyneuropathy associated with type 2 diabetes mellitus 02/09/2023    History of CVA (cerebrovascular accident) without residual deficits  2016    History of MAC infection 2016    Hyperlipidemia associated with type 2 diabetes mellitus 2016    Hypertension     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    Lipidemia     Long term (current) use of insulin 2023    Meniere disease 2016    Multiple falls 2023    Pulmonary Mycobacterium avium-intracellulare infection 2016    followed [facility] Pulmonologist at [facility]    Type 2 diabetes mellitus with stage 2 chronic kidney disease, without long-term current use of insulin 10/28/2015       Past Surgical History:   Procedure Laterality Date    ANKLE SURGERY Left     CATARACT EXTRACTION, BILATERAL       SECTION      x 3    eyelid surgery      HYSTERECTOMY         Family History   Problem Relation Age of Onset    Depression Mother     Bipolar disorder Mother     Diabetes Father     Hypertension Father     Hyperlipidemia Father     Heart disease Father     Hypertension Daughter     Early death Maternal Aunt        Social History     Socioeconomic History    Marital status: Single   Tobacco Use    Smoking status: Never     Passive exposure: Past    Smokeless tobacco: Never   Substance and Sexual Activity    Alcohol use: No    Drug use: No

## 2023-07-12 LAB — BACTERIA UR CULT: ABNORMAL

## 2023-07-12 NOTE — PROGRESS NOTES
Advise patient that the macrobid prescribed will treat the UTI based on urine culture - keep appt with urology for recurrent UTIs.

## 2023-08-07 ENCOUNTER — OFFICE VISIT (OUTPATIENT)
Dept: UROLOGY | Facility: CLINIC | Age: 83
End: 2023-08-07
Payer: MEDICARE

## 2023-08-07 VITALS
HEART RATE: 121 BPM | BODY MASS INDEX: 24.15 KG/M2 | WEIGHT: 159.38 LBS | SYSTOLIC BLOOD PRESSURE: 145 MMHG | DIASTOLIC BLOOD PRESSURE: 63 MMHG | HEIGHT: 68 IN

## 2023-08-07 DIAGNOSIS — R39.15 URINARY URGENCY: ICD-10-CM

## 2023-08-07 DIAGNOSIS — N39.0 RECURRENT UTI: Primary | ICD-10-CM

## 2023-08-07 DIAGNOSIS — N39.41 URGE URINARY INCONTINENCE: ICD-10-CM

## 2023-08-07 DIAGNOSIS — N39.3 SUI (STRESS URINARY INCONTINENCE, FEMALE): ICD-10-CM

## 2023-08-07 DIAGNOSIS — R35.1 NOCTURIA: ICD-10-CM

## 2023-08-07 LAB
BACTERIA #/AREA URNS AUTO: ABNORMAL /HPF
BILIRUB UR QL STRIP: NEGATIVE
CLARITY UR REFRACT.AUTO: CLEAR
COLOR UR AUTO: YELLOW
GLUCOSE UR QL STRIP: ABNORMAL
HGB UR QL STRIP: NEGATIVE
KETONES UR QL STRIP: NEGATIVE
LEUKOCYTE ESTERASE UR QL STRIP: NEGATIVE
MICROSCOPIC COMMENT: ABNORMAL
NITRITE UR QL STRIP: NEGATIVE
PH UR STRIP: 8 [PH] (ref 5–8)
PROT UR QL STRIP: ABNORMAL
RBC #/AREA URNS AUTO: 9 /HPF (ref 0–4)
SP GR UR STRIP: 1.02 (ref 1–1.03)
SQUAMOUS #/AREA URNS AUTO: 6 /HPF
URN SPEC COLLECT METH UR: ABNORMAL
WBC #/AREA URNS AUTO: 7 /HPF (ref 0–5)
YEAST UR QL AUTO: ABNORMAL

## 2023-08-07 PROCEDURE — 3078F PR MOST RECENT DIASTOLIC BLOOD PRESSURE < 80 MM HG: ICD-10-PCS | Mod: CPTII,S$GLB,, | Performed by: NURSE PRACTITIONER

## 2023-08-07 PROCEDURE — 99999 PR PBB SHADOW E&M-EST. PATIENT-LVL III: CPT | Mod: PBBFAC,,, | Performed by: NURSE PRACTITIONER

## 2023-08-07 PROCEDURE — 1159F MED LIST DOCD IN RCRD: CPT | Mod: CPTII,S$GLB,, | Performed by: NURSE PRACTITIONER

## 2023-08-07 PROCEDURE — 1160F RVW MEDS BY RX/DR IN RCRD: CPT | Mod: CPTII,S$GLB,, | Performed by: NURSE PRACTITIONER

## 2023-08-07 PROCEDURE — 1101F PR PT FALLS ASSESS DOC 0-1 FALLS W/OUT INJ PAST YR: ICD-10-PCS | Mod: CPTII,S$GLB,, | Performed by: NURSE PRACTITIONER

## 2023-08-07 PROCEDURE — 3078F DIAST BP <80 MM HG: CPT | Mod: CPTII,S$GLB,, | Performed by: NURSE PRACTITIONER

## 2023-08-07 PROCEDURE — 99999 PR PBB SHADOW E&M-EST. PATIENT-LVL III: ICD-10-PCS | Mod: PBBFAC,,, | Performed by: NURSE PRACTITIONER

## 2023-08-07 PROCEDURE — 3288F FALL RISK ASSESSMENT DOCD: CPT | Mod: CPTII,S$GLB,, | Performed by: NURSE PRACTITIONER

## 2023-08-07 PROCEDURE — 1160F PR REVIEW ALL MEDS BY PRESCRIBER/CLIN PHARMACIST DOCUMENTED: ICD-10-PCS | Mod: CPTII,S$GLB,, | Performed by: NURSE PRACTITIONER

## 2023-08-07 PROCEDURE — 3077F SYST BP >= 140 MM HG: CPT | Mod: CPTII,S$GLB,, | Performed by: NURSE PRACTITIONER

## 2023-08-07 PROCEDURE — 3288F PR FALLS RISK ASSESSMENT DOCUMENTED: ICD-10-PCS | Mod: CPTII,S$GLB,, | Performed by: NURSE PRACTITIONER

## 2023-08-07 PROCEDURE — 81001 URINALYSIS AUTO W/SCOPE: CPT | Performed by: NURSE PRACTITIONER

## 2023-08-07 PROCEDURE — 1101F PT FALLS ASSESS-DOCD LE1/YR: CPT | Mod: CPTII,S$GLB,, | Performed by: NURSE PRACTITIONER

## 2023-08-07 PROCEDURE — 99204 PR OFFICE/OUTPT VISIT, NEW, LEVL IV, 45-59 MIN: ICD-10-PCS | Mod: S$GLB,,, | Performed by: NURSE PRACTITIONER

## 2023-08-07 PROCEDURE — 87086 URINE CULTURE/COLONY COUNT: CPT | Performed by: NURSE PRACTITIONER

## 2023-08-07 PROCEDURE — 1126F PR PAIN SEVERITY QUANTIFIED, NO PAIN PRESENT: ICD-10-PCS | Mod: CPTII,S$GLB,, | Performed by: NURSE PRACTITIONER

## 2023-08-07 PROCEDURE — 1126F AMNT PAIN NOTED NONE PRSNT: CPT | Mod: CPTII,S$GLB,, | Performed by: NURSE PRACTITIONER

## 2023-08-07 PROCEDURE — 99204 OFFICE O/P NEW MOD 45 MIN: CPT | Mod: S$GLB,,, | Performed by: NURSE PRACTITIONER

## 2023-08-07 PROCEDURE — 1159F PR MEDICATION LIST DOCUMENTED IN MEDICAL RECORD: ICD-10-PCS | Mod: CPTII,S$GLB,, | Performed by: NURSE PRACTITIONER

## 2023-08-07 PROCEDURE — 3077F PR MOST RECENT SYSTOLIC BLOOD PRESSURE >= 140 MM HG: ICD-10-PCS | Mod: CPTII,S$GLB,, | Performed by: NURSE PRACTITIONER

## 2023-08-07 NOTE — PROGRESS NOTES
Subjective:       Patient ID: Xi Moise is a 83 y.o. female.    Chief Complaint: Recurrent Uti's     Patient is new to me. She is a 84 yo WF who is here today for evaluation of recurrent UTIs. Patient reports dealing with a UTI for the past 6 weeks. She also reports getting a UTI approximately 12 times a year. Patient surgical hx includes a hysterectomy at the age of 29 yrs old. Patient reports wearing depends only when going out places for her urinary leakage. She changes once daily when wearing depends.     Urinary Tract Infection   This is a recurrent problem. The current episode started more than 1 year ago (current episode 6 weeks ago). The problem has been waxing and waning. The pain is at a severity of 0/10. The patient is experiencing no pain. There has been no fever. She is Not sexually active. There is No history of pyelonephritis. Associated symptoms include urgency (with leakage). Pertinent negatives include no behavior changes, chills, discharge, flank pain, frequency, hematuria, hesitancy, nausea, possible pregnancy, vomiting, weight loss, bubble bath use, constipation, rash or withholding. She has tried antibiotics for the symptoms. The treatment provided mild relief. Her past medical history is significant for diabetes mellitus, hypertension and recurrent UTIs. There is no history of kidney stones or a single kidney.     Review of Systems   Constitutional:  Negative for chills, fever and weight loss.   Gastrointestinal:  Positive for diarrhea. Negative for abdominal pain, change in bowel habit, constipation, nausea, vomiting and change in bowel habit.   Genitourinary:  Positive for bladder incontinence (laughing, coughing, sneezing, urgency, and position changes), nocturia (x3), pelvic pain and urgency (with leakage). Negative for decreased urine volume, difficulty urinating, dysuria, flank pain, frequency, hematuria and hesitancy.   Musculoskeletal:  Positive for back pain (chronic low back  pain).   Integumentary:  Negative for rash.   Psychiatric/Behavioral:  Positive for sleep disturbance.          Objective:      Physical Exam  Vitals and nursing note reviewed.   Constitutional:       General: She is not in acute distress.     Appearance: She is well-developed and normal weight. She is not ill-appearing.   HENT:      Head: Normocephalic and atraumatic.   Eyes:      Pupils: Pupils are equal, round, and reactive to light.   Cardiovascular:      Rate and Rhythm: Regular rhythm. Tachycardia present.      Heart sounds: Normal heart sounds.   Pulmonary:      Effort: Pulmonary effort is normal. No respiratory distress.      Breath sounds: Normal breath sounds.   Abdominal:      Palpations: Abdomen is soft.      Tenderness: There is no abdominal tenderness.   Musculoskeletal:         General: Normal range of motion.      Cervical back: Normal range of motion.   Skin:     General: Skin is warm and dry.   Neurological:      Mental Status: She is alert and oriented to person, place, and time.      Coordination: Coordination normal.   Psychiatric:         Mood and Affect: Mood normal.         Behavior: Behavior normal.         Thought Content: Thought content normal.         Judgment: Judgment normal.         Assessment:       Problem List Items Addressed This Visit    None  Visit Diagnoses       Recurrent UTI    -  Primary    Relevant Orders    Urinalysis (Completed)    Urine culture    Urinary urgency        Relevant Orders    Urinalysis (Completed)    Urine culture    Urge urinary incontinence        Relevant Orders    Urinalysis (Completed)    Urine culture    HELIO (stress urinary incontinence, female)        Relevant Orders    Urinalysis (Completed)    Urine culture    Nocturia        Relevant Orders    Urinalysis (Completed)    Urine culture            Plan:             Xi was seen today for recurrent uti's .    Diagnoses and all orders for this visit:    Recurrent UTI  -     Urinalysis  -     Urine  culture    Urinary urgency  -     Urinalysis  -     Urine culture    Urge urinary incontinence  -     Urinalysis  -     Urine culture    HELIO (stress urinary incontinence, female)  -     Urinalysis  -     Urine culture    Nocturia  -     Urinalysis  -     Urine culture    Follow-up pending urine cx results.    YFredo Solis, NP

## 2023-08-08 LAB — BACTERIA UR CULT: NO GROWTH

## 2023-08-11 ENCOUNTER — TELEPHONE (OUTPATIENT)
Dept: UROLOGY | Facility: CLINIC | Age: 83
End: 2023-08-11
Payer: MEDICARE

## 2023-08-11 DIAGNOSIS — Z87.440 HISTORY OF RECURRENT UTIS: ICD-10-CM

## 2023-08-11 DIAGNOSIS — R35.1 NOCTURIA: ICD-10-CM

## 2023-08-11 DIAGNOSIS — R39.15 URINARY URGENCY: Primary | ICD-10-CM

## 2023-08-11 DIAGNOSIS — N39.41 URGE URINARY INCONTINENCE: ICD-10-CM

## 2023-08-11 DIAGNOSIS — N39.3 SUI (STRESS URINARY INCONTINENCE, FEMALE): ICD-10-CM

## 2023-08-11 RX ORDER — ESTRADIOL 0.1 MG/G
CREAM VAGINAL
Qty: 114 G | Refills: 0 | Status: SHIPPED | OUTPATIENT
Start: 2023-08-11 | End: 2023-09-25

## 2023-08-11 RX ORDER — SOLIFENACIN SUCCINATE 5 MG/1
5 TABLET, FILM COATED ORAL DAILY
Qty: 30 TABLET | Refills: 11 | Status: SHIPPED | OUTPATIENT
Start: 2023-08-11 | End: 2023-09-25

## 2023-08-11 NOTE — TELEPHONE ENCOUNTER
----- Message from Shahzad Solis NP sent at 8/11/2023  3:42 PM CDT -----  Please inform patient via telephone that her urine cx was normal. Her UTI has resolved. I would like patient to start trial of OAB medication for management of her urinary symptoms. Script for solifenacin sent to [facility]. I also would like patient to start trial of estrace vaginal cream to help with UTI prevention. Insert a fingertip of cream into the vaginal opening daily for two weeks, then twice a week thereafter. Follow-up in 6 weeks. Patient notify us if she can't get her medications. Thanks.

## 2023-08-11 NOTE — PROGRESS NOTES
Diagnoses and all orders for this visit:    Urinary urgency  -     solifenacin (VESICARE) 5 MG tablet; Take 1 tablet (5 mg total) by mouth once daily.    Urge urinary incontinence  -     solifenacin (VESICARE) 5 MG tablet; Take 1 tablet (5 mg total) by mouth once daily.    HELIO (stress urinary incontinence, female)    Nocturia    History of recurrent UTIs  -     estradioL (ESTRACE) 0.01 % (0.1 mg/gram) vaginal cream; Place 1 g vaginally once daily for 14 days, THEN 1 g twice a week.    Follow-up in 6 weeks.     Shahzad Solis NP

## 2023-09-25 ENCOUNTER — OFFICE VISIT (OUTPATIENT)
Dept: UROLOGY | Facility: CLINIC | Age: 83
End: 2023-09-25
Payer: MEDICARE

## 2023-09-25 VITALS
SYSTOLIC BLOOD PRESSURE: 167 MMHG | DIASTOLIC BLOOD PRESSURE: 81 MMHG | BODY MASS INDEX: 24.35 KG/M2 | HEIGHT: 68 IN | WEIGHT: 160.69 LBS | HEART RATE: 94 BPM

## 2023-09-25 DIAGNOSIS — N39.41 URGE URINARY INCONTINENCE: ICD-10-CM

## 2023-09-25 DIAGNOSIS — R39.15 URINARY URGENCY: ICD-10-CM

## 2023-09-25 DIAGNOSIS — Z87.440 HISTORY OF RECURRENT UTIS: ICD-10-CM

## 2023-09-25 PROCEDURE — 3288F PR FALLS RISK ASSESSMENT DOCUMENTED: ICD-10-PCS | Mod: CPTII,S$GLB,, | Performed by: NURSE PRACTITIONER

## 2023-09-25 PROCEDURE — 3288F FALL RISK ASSESSMENT DOCD: CPT | Mod: CPTII,S$GLB,, | Performed by: NURSE PRACTITIONER

## 2023-09-25 PROCEDURE — 1160F PR REVIEW ALL MEDS BY PRESCRIBER/CLIN PHARMACIST DOCUMENTED: ICD-10-PCS | Mod: CPTII,S$GLB,, | Performed by: NURSE PRACTITIONER

## 2023-09-25 PROCEDURE — 1126F PR PAIN SEVERITY QUANTIFIED, NO PAIN PRESENT: ICD-10-PCS | Mod: CPTII,S$GLB,, | Performed by: NURSE PRACTITIONER

## 2023-09-25 PROCEDURE — 1101F PR PT FALLS ASSESS DOC 0-1 FALLS W/OUT INJ PAST YR: ICD-10-PCS | Mod: CPTII,S$GLB,, | Performed by: NURSE PRACTITIONER

## 2023-09-25 PROCEDURE — 1160F RVW MEDS BY RX/DR IN RCRD: CPT | Mod: CPTII,S$GLB,, | Performed by: NURSE PRACTITIONER

## 2023-09-25 PROCEDURE — 1101F PT FALLS ASSESS-DOCD LE1/YR: CPT | Mod: CPTII,S$GLB,, | Performed by: NURSE PRACTITIONER

## 2023-09-25 PROCEDURE — 99213 PR OFFICE/OUTPT VISIT, EST, LEVL III, 20-29 MIN: ICD-10-PCS | Mod: S$GLB,,, | Performed by: NURSE PRACTITIONER

## 2023-09-25 PROCEDURE — 3079F DIAST BP 80-89 MM HG: CPT | Mod: CPTII,S$GLB,, | Performed by: NURSE PRACTITIONER

## 2023-09-25 PROCEDURE — 1159F MED LIST DOCD IN RCRD: CPT | Mod: CPTII,S$GLB,, | Performed by: NURSE PRACTITIONER

## 2023-09-25 PROCEDURE — 1159F PR MEDICATION LIST DOCUMENTED IN MEDICAL RECORD: ICD-10-PCS | Mod: CPTII,S$GLB,, | Performed by: NURSE PRACTITIONER

## 2023-09-25 PROCEDURE — 1126F AMNT PAIN NOTED NONE PRSNT: CPT | Mod: CPTII,S$GLB,, | Performed by: NURSE PRACTITIONER

## 2023-09-25 PROCEDURE — 3077F PR MOST RECENT SYSTOLIC BLOOD PRESSURE >= 140 MM HG: ICD-10-PCS | Mod: CPTII,S$GLB,, | Performed by: NURSE PRACTITIONER

## 2023-09-25 PROCEDURE — 99999 PR PBB SHADOW E&M-EST. PATIENT-LVL V: CPT | Mod: PBBFAC,,, | Performed by: NURSE PRACTITIONER

## 2023-09-25 PROCEDURE — 3077F SYST BP >= 140 MM HG: CPT | Mod: CPTII,S$GLB,, | Performed by: NURSE PRACTITIONER

## 2023-09-25 PROCEDURE — 99999 PR PBB SHADOW E&M-EST. PATIENT-LVL V: ICD-10-PCS | Mod: PBBFAC,,, | Performed by: NURSE PRACTITIONER

## 2023-09-25 PROCEDURE — 3079F PR MOST RECENT DIASTOLIC BLOOD PRESSURE 80-89 MM HG: ICD-10-PCS | Mod: CPTII,S$GLB,, | Performed by: NURSE PRACTITIONER

## 2023-09-25 PROCEDURE — 99213 OFFICE O/P EST LOW 20 MIN: CPT | Mod: S$GLB,,, | Performed by: NURSE PRACTITIONER

## 2023-09-25 RX ORDER — ESTRADIOL 0.1 MG/G
CREAM VAGINAL
Qty: 114 G | Refills: 0 | Status: SHIPPED | OUTPATIENT
Start: 2023-09-25 | End: 2023-11-24

## 2023-09-25 RX ORDER — SOLIFENACIN SUCCINATE 5 MG/1
5 TABLET, FILM COATED ORAL DAILY
Qty: 30 TABLET | Refills: 11 | Status: SHIPPED | OUTPATIENT
Start: 2023-09-25 | End: 2023-11-24

## 2023-09-25 NOTE — PROGRESS NOTES
Subjective:       Patient ID: Xi Moise is a 83 y.o. female.    Chief Complaint: Follow-up    Patient is here today for a 6 week follow-up for OAB. Patient was started on solifenacin 5 mg daily and estrace vaginal cream 5 weeks ago. Patient reports solifenacin is effective. However, she report not getting estrace cream because both medications were approximately $45 per medication.     Follow-up  This is a chronic (OAB) problem. The current episode started more than 1 month ago. The problem occurs daily. The problem has been gradually improving. Pertinent negatives include no abdominal pain, change in bowel habit, chills, fatigue, fever, headaches, nausea, swollen glands, urinary symptoms, vomiting or weakness. Nothing aggravates the symptoms. Treatments tried: solifenacin 5 mg daily. The treatment provided significant relief.     Review of Systems   Constitutional:  Negative for chills, fatigue and fever.   Gastrointestinal:  Negative for abdominal pain, change in bowel habit, constipation, diarrhea, nausea and vomiting.   Genitourinary:  Negative for decreased urine volume, difficulty urinating, dysuria, flank pain, frequency, hematuria, nocturia, pelvic pain, urgency, vaginal bleeding, vaginal discharge and vaginal pain.   Neurological:  Negative for weakness and headaches.   Psychiatric/Behavioral: Negative.           Objective:      Physical Exam  Vitals and nursing note reviewed.   Constitutional:       General: She is not in acute distress.     Appearance: She is well-developed and normal weight. She is not ill-appearing.   HENT:      Head: Normocephalic and atraumatic.   Eyes:      Pupils: Pupils are equal, round, and reactive to light.   Cardiovascular:      Rate and Rhythm: Normal rate.   Pulmonary:      Effort: Pulmonary effort is normal. No respiratory distress.   Abdominal:      Palpations: Abdomen is soft.      Tenderness: There is no abdominal tenderness.   Musculoskeletal:         General: Normal  range of motion.      Cervical back: Normal range of motion.   Skin:     General: Skin is warm and dry.   Neurological:      Mental Status: She is alert and oriented to person, place, and time.      Coordination: Coordination normal.   Psychiatric:         Mood and Affect: Mood normal.         Behavior: Behavior normal.         Thought Content: Thought content normal.         Judgment: Judgment normal.         Assessment:       Problem List Items Addressed This Visit    None  Visit Diagnoses       Urinary urgency        Relevant Medications    solifenacin (VESICARE) 5 MG tablet    Urge urinary incontinence        Relevant Medications    solifenacin (VESICARE) 5 MG tablet    History of recurrent UTIs        Relevant Medications    estradioL (ESTRACE) 0.01 % (0.1 mg/gram) vaginal cream            Plan:           Xi was seen today for follow-up.    Diagnoses and all orders for this visit:    Urinary urgency  -     solifenacin (VESICARE) 5 MG tablet; Take 1 tablet (5 mg total) by mouth once daily.    Urge urinary incontinence  -     solifenacin (VESICARE) 5 MG tablet; Take 1 tablet (5 mg total) by mouth once daily.    History of recurrent UTIs  -     estradioL (ESTRACE) 0.01 % (0.1 mg/gram) vaginal cream; Place 1 g vaginally once daily for 14 days, THEN 1 g twice a week.    Other orders  Continue taking taking solifenacin  (Vesicare) 5 mg daily for OAB.   Start using estrace vaginal cream as previously prescribed.     NOTE: Goodrx coupons provided for both medications.    Follow-up in 6 months or sooner if needed.     Shahzad Solis NP

## 2023-09-25 NOTE — PATIENT INSTRUCTIONS
Continue taking taking solifenacin  (Vesicare) 5 mg daily for OAB.   Start using estrace vaginal cream as previously prescribed.   Follow-up in 6 months or sooner if needed.

## 2023-10-10 ENCOUNTER — OFFICE VISIT (OUTPATIENT)
Dept: FAMILY MEDICINE | Facility: CLINIC | Age: 83
End: 2023-10-10
Payer: MEDICARE

## 2023-10-10 VITALS
WEIGHT: 160.5 LBS | DIASTOLIC BLOOD PRESSURE: 70 MMHG | TEMPERATURE: 98 F | OXYGEN SATURATION: 95 % | HEIGHT: 68 IN | HEART RATE: 90 BPM | SYSTOLIC BLOOD PRESSURE: 124 MMHG | BODY MASS INDEX: 24.32 KG/M2

## 2023-10-10 DIAGNOSIS — N18.30 CKD STAGE 3 DUE TO TYPE 2 DIABETES MELLITUS: ICD-10-CM

## 2023-10-10 DIAGNOSIS — D69.2 SENILE PURPURA: ICD-10-CM

## 2023-10-10 DIAGNOSIS — Z79.4 LONG TERM (CURRENT) USE OF INSULIN: ICD-10-CM

## 2023-10-10 DIAGNOSIS — I13.10 HYPERTENSIVE HEART AND KIDNEY DISEASE WITHOUT HEART FAILURE AND WITH STAGE 3A CHRONIC KIDNEY DISEASE: ICD-10-CM

## 2023-10-10 DIAGNOSIS — J44.9 COPD, MODERATE: ICD-10-CM

## 2023-10-10 DIAGNOSIS — J84.10 CALCIFIED GRANULOMA OF LUNG: ICD-10-CM

## 2023-10-10 DIAGNOSIS — I25.119 ATHEROSCLEROSIS OF NATIVE CORONARY ARTERY OF NATIVE HEART WITH ANGINA PECTORIS: ICD-10-CM

## 2023-10-10 DIAGNOSIS — Z86.19 HISTORY OF MAC INFECTION: Chronic | ICD-10-CM

## 2023-10-10 DIAGNOSIS — I70.0 AORTIC ATHEROSCLEROSIS: ICD-10-CM

## 2023-10-10 DIAGNOSIS — K21.9 CHRONIC GERD: ICD-10-CM

## 2023-10-10 DIAGNOSIS — Z79.02 LONG TERM (CURRENT) USE OF ANTITHROMBOTICS/ANTIPLATELETS: ICD-10-CM

## 2023-10-10 DIAGNOSIS — E11.22 CKD STAGE 3 DUE TO TYPE 2 DIABETES MELLITUS: ICD-10-CM

## 2023-10-10 DIAGNOSIS — J45.20 MILD INTERMITTENT ASTHMA WITHOUT COMPLICATION: ICD-10-CM

## 2023-10-10 DIAGNOSIS — E11.42 DIABETIC POLYNEUROPATHY ASSOCIATED WITH TYPE 2 DIABETES MELLITUS: ICD-10-CM

## 2023-10-10 DIAGNOSIS — E11.69 HYPERLIPIDEMIA ASSOCIATED WITH TYPE 2 DIABETES MELLITUS: ICD-10-CM

## 2023-10-10 DIAGNOSIS — N18.31 TYPE 2 DIABETES MELLITUS WITH STAGE 3A CHRONIC KIDNEY DISEASE, WITH LONG-TERM CURRENT USE OF INSULIN: Primary | ICD-10-CM

## 2023-10-10 DIAGNOSIS — F33.41 RECURRENT MAJOR DEPRESSIVE DISORDER, IN PARTIAL REMISSION: ICD-10-CM

## 2023-10-10 DIAGNOSIS — N18.31 HYPERTENSIVE HEART AND KIDNEY DISEASE WITHOUT HEART FAILURE AND WITH STAGE 3A CHRONIC KIDNEY DISEASE: ICD-10-CM

## 2023-10-10 DIAGNOSIS — M85.80 OSTEOPENIA, UNSPECIFIED LOCATION: ICD-10-CM

## 2023-10-10 DIAGNOSIS — E11.22 TYPE 2 DIABETES MELLITUS WITH STAGE 3A CHRONIC KIDNEY DISEASE, WITH LONG-TERM CURRENT USE OF INSULIN: Primary | ICD-10-CM

## 2023-10-10 DIAGNOSIS — Z79.4 TYPE 2 DIABETES MELLITUS WITH STAGE 3A CHRONIC KIDNEY DISEASE, WITH LONG-TERM CURRENT USE OF INSULIN: Primary | ICD-10-CM

## 2023-10-10 DIAGNOSIS — J47.9 BRONCHIECTASIS WITHOUT COMPLICATION: ICD-10-CM

## 2023-10-10 DIAGNOSIS — E78.5 HYPERLIPIDEMIA ASSOCIATED WITH TYPE 2 DIABETES MELLITUS: ICD-10-CM

## 2023-10-10 PROCEDURE — 99214 OFFICE O/P EST MOD 30 MIN: CPT | Mod: S$GLB,,, | Performed by: NURSE PRACTITIONER

## 2023-10-10 PROCEDURE — 99999 PR PBB SHADOW E&M-EST. PATIENT-LVL V: ICD-10-PCS | Mod: PBBFAC,,, | Performed by: NURSE PRACTITIONER

## 2023-10-10 PROCEDURE — 1101F PR PT FALLS ASSESS DOC 0-1 FALLS W/OUT INJ PAST YR: ICD-10-PCS | Mod: CPTII,S$GLB,, | Performed by: NURSE PRACTITIONER

## 2023-10-10 PROCEDURE — 3074F SYST BP LT 130 MM HG: CPT | Mod: CPTII,S$GLB,, | Performed by: NURSE PRACTITIONER

## 2023-10-10 PROCEDURE — 3074F PR MOST RECENT SYSTOLIC BLOOD PRESSURE < 130 MM HG: ICD-10-PCS | Mod: CPTII,S$GLB,, | Performed by: NURSE PRACTITIONER

## 2023-10-10 PROCEDURE — 1101F PT FALLS ASSESS-DOCD LE1/YR: CPT | Mod: CPTII,S$GLB,, | Performed by: NURSE PRACTITIONER

## 2023-10-10 PROCEDURE — 99999 PR PBB SHADOW E&M-EST. PATIENT-LVL V: CPT | Mod: PBBFAC,,, | Performed by: NURSE PRACTITIONER

## 2023-10-10 PROCEDURE — 3078F DIAST BP <80 MM HG: CPT | Mod: CPTII,S$GLB,, | Performed by: NURSE PRACTITIONER

## 2023-10-10 PROCEDURE — 3288F FALL RISK ASSESSMENT DOCD: CPT | Mod: CPTII,S$GLB,, | Performed by: NURSE PRACTITIONER

## 2023-10-10 PROCEDURE — 3288F PR FALLS RISK ASSESSMENT DOCUMENTED: ICD-10-PCS | Mod: CPTII,S$GLB,, | Performed by: NURSE PRACTITIONER

## 2023-10-10 PROCEDURE — 1126F PR PAIN SEVERITY QUANTIFIED, NO PAIN PRESENT: ICD-10-PCS | Mod: CPTII,S$GLB,, | Performed by: NURSE PRACTITIONER

## 2023-10-10 PROCEDURE — 1160F PR REVIEW ALL MEDS BY PRESCRIBER/CLIN PHARMACIST DOCUMENTED: ICD-10-PCS | Mod: CPTII,S$GLB,, | Performed by: NURSE PRACTITIONER

## 2023-10-10 PROCEDURE — 3078F PR MOST RECENT DIASTOLIC BLOOD PRESSURE < 80 MM HG: ICD-10-PCS | Mod: CPTII,S$GLB,, | Performed by: NURSE PRACTITIONER

## 2023-10-10 PROCEDURE — 1126F AMNT PAIN NOTED NONE PRSNT: CPT | Mod: CPTII,S$GLB,, | Performed by: NURSE PRACTITIONER

## 2023-10-10 PROCEDURE — 1159F MED LIST DOCD IN RCRD: CPT | Mod: CPTII,S$GLB,, | Performed by: NURSE PRACTITIONER

## 2023-10-10 PROCEDURE — 1160F RVW MEDS BY RX/DR IN RCRD: CPT | Mod: CPTII,S$GLB,, | Performed by: NURSE PRACTITIONER

## 2023-10-10 PROCEDURE — 1159F PR MEDICATION LIST DOCUMENTED IN MEDICAL RECORD: ICD-10-PCS | Mod: CPTII,S$GLB,, | Performed by: NURSE PRACTITIONER

## 2023-10-10 PROCEDURE — 99214 PR OFFICE/OUTPT VISIT, EST, LEVL IV, 30-39 MIN: ICD-10-PCS | Mod: S$GLB,,, | Performed by: NURSE PRACTITIONER

## 2023-10-10 NOTE — PROGRESS NOTES
"Subjective:       Patient ID: Xi Moise is a 83 y.o. female.    Chief Complaint: Follow-up (3 months F/U) and Wheezing    HPI    Patient is an 83 year old white female with Aortic Atherosclerosis, History of Stroke, Type 2 Diabetes, CKD Stage 3, diabetic neuropathy, Hypertension/Hypertensive heart disease, Hyperlipidemia, moderate COPD/Asthma with Bronchiectasis with history of Mycobacterium avium-intracellulare infection, Calcified granuloma of lung, Recurrent Depression, chronic GERD, and Osteopenia that is here today follow up with fasting lab results. She had her MEDICARE WELLNESS EXAM with [facility] last done around  6/5/2023 with [facility] representative..     Aortic Atherosclerosis and CAD  Seen on CT chest  4/28/2016 and more recent CT  ON Plavix 75 mg daily,  Atorvastatin 80 mg daily, ACE and BB.      History of Stroke  Around 2016  On Plavix 75 mg daily     Type 2 Diabetes with CKD 3  Taking NPH insulin 16 units twice daily before meals, metformin a 1000 mg twice daily  Fasting blood glucose 142 with hemoglobin A1c 6.7%.    Diabetic urine screening was negative   Continue current medications  NO SGLT2 due to recurrent UTIs  Recheck in 6 months.     CKD Stage 3  Creatinine 1.05 with eGFR 53 Feb. 2023  Creatinine  1.15 with DROP eGFR 47 in July 2023 - REFERRED Nephrology for monitoring  Seen Dr. Tillman on 7/24/2023  Current eGFR 53.3%          Diabetic Neuropathy  On Cymbalta 60 mg daily and gabapentin 300 mg twice daily  Complaint of right little toe pain - followed by podiatry     Hypertension/hypertensive heart disease  Taking lisinopril 5 mg daily and metoprolol  mg daily  /70 (BP Location: Right arm, Patient Position: Sitting, BP Method: Large (Manual))   Pulse 90   Temp 97.9 °F (36.6 °C) (Temporal)   Ht 5' 8" (1.727 m)   Wt 72.8 kg (160 lb 7.9 oz)   SpO2 95%   BMI 24.40 kg/m²        Hyperlipidemia   Taking atorvastatin 80 mg daily   LDL 77.4  Advised on lifestyle " modifications     Bronchiectasis, COPD/Asthma Overlap, History of Pulmonary MAC, SHANTI positive  Noted on last Pulmonary Progress note - Dr. Lee at [facility] - see under encounters  Patient reports now followed by Dr. Alannah Noe with [facility] - will request records  Reports only on Albuterol inhaler prn  Reports NO controller inhalers  Needs new pulmonologist  for COPD/asthma management.      Recurrent depression  Currently taking Cymbalta 60 mg and the Lexapro 10 mg daily        Osteopenia  Last DEXA scan 2/2/21  Advised to take calcium with vitamin-D supplementation     Chronic GERD  Taking Protonix 40 mg daily     Component      Latest Ref Rng 7/6/2023 7/19/2023 9/27/2023   Sodium      136 - 145 mmol/L 138  139  141    Sodium      136 - 145 mmol/L   141    Chloride      95 - 110 mmol/L 97 (L)  95  97    Chloride      95 - 110 mmol/L   97    CO2      23 - 29 mmol/L 30  33 (H)  30 (H)    CO2      23 - 29 mmol/L   31 (H)    Glucose      70 - 110 mg/dL 162 (H)  221 (H)  142 (H)    Glucose      70 - 110 mg/dL   143 (H)    Calcium      8.7 - 10.5 mg/dL 9.5  9.3  9.2    Calcium      8.7 - 10.5 mg/dL   9.2    Creatinine      0.50 - 1.40 mg/dL 1.15 (H)  1.10  1.04    Creatinine      0.50 - 1.40 mg/dL   1.06    PROTEIN TOTAL      6.0 - 8.4 g/dL 6.9  7.3  7.6    PROTEIN TOTAL      6.0 - 8.4 g/dL   7.9    Albumin      3.5 - 5.2 g/dL 4.3  4.3  4.4    Albumin      3.5 - 5.2 g/dL   4.5    AST      15 - 46 U/L 22  34  29    AST      15 - 46 U/L   29    ALT      10 - 44 U/L 12  27  23    ALT      10 - 44 U/L   24    BILIRUBIN TOTAL      0.1 - 1.0 mg/dL 0.7  0.9  0.6    BILIRUBIN TOTAL      0.1 - 1.0 mg/dL   0.7    Potassium      3.5 - 5.1 mmol/L 4.5  4.4  3.5    Potassium      3.5 - 5.1 mmol/L   3.5    BUN      7 - 17 mg/dL 22  31 (H)  29 (H)    BUN      7 - 17 mg/dL   29 (H)    ALP      38 - 126 U/L 83  78  101    ALP      38 - 126 U/L   102    Anion Gap      8 - 16 mmol/L  11  14    Anion Gap      8 - 16 mmol/L   13   "  eGFR      >60 mL/min/1.73 m^2  49.9 !  53.3 !    eGFR      >60 mL/min/1.73 m^2   52.1 !    Hemoglobin A1C External      4.0 - 5.6 % 7.0 (H)   6.7 (H)    Cholesterol Total      120 - 199 mg/dL   177    HDL      40 - 75 mg/dL   47    Triglycerides      30 - 150 mg/dL   263 (H)    LDL Cholesterol External      63.0 - 159.0 mg/dL   77.4    HDL/Cholesterol Ratio      20.0 - 50.0 %   26.6    Estimated Avg Glucose      68 - 131 mg/dL   146 (H)    Total Cholesterol/HDL Ratio      2.0 - 5.0    3.8    Non-HDL Cholesterol      mg/dL   130       Legend:  (L) Low  (H) High  ! Abnormal    Review of Systems   Respiratory:  Positive for wheezing.    All other systems reviewed and are negative.        Objective:     Vitals:    10/10/23 1118   BP: 124/70   BP Location: Right arm   Patient Position: Sitting   BP Method: Large (Manual)   Pulse: 90   Temp: 97.9 °F (36.6 °C)   TempSrc: Temporal   SpO2: 95%   Weight: 72.8 kg (160 lb 7.9 oz)   Height: 5' 8" (1.727 m)          Physical Exam  Constitutional:       General: She is not in acute distress.     Appearance: Normal appearance. She is not ill-appearing, toxic-appearing or diaphoretic.      Comments: Body mass index is 24.4 kg/m².     HENT:      Head: Normocephalic and atraumatic.   Eyes:      Extraocular Movements: Extraocular movements intact.      Conjunctiva/sclera: Conjunctivae normal.   Cardiovascular:      Rate and Rhythm: Normal rate and regular rhythm.   Pulmonary:      Effort: Pulmonary effort is normal. No respiratory distress.      Breath sounds: Normal breath sounds. No stridor. No wheezing, rhonchi or rales.   Abdominal:      General: There is no distension.   Musculoskeletal:         General: Normal range of motion.      Right lower leg: No edema.      Left lower leg: No edema.   Skin:     General: Skin is warm and dry.   Neurological:      Mental Status: She is alert and oriented to person, place, and time.   Psychiatric:         Mood and Affect: Mood normal.        "  Behavior: Behavior normal.         Thought Content: Thought content normal.         Judgment: Judgment normal.           Assessment:         ICD-10-CM ICD-9-CM   1. Type 2 diabetes mellitus with stage 3a chronic kidney disease, with long-term current use of insulin  E11.22 250.40    N18.31 585.3    Z79.4 V58.67   2. Long term (current) use of insulin  Z79.4 V58.67   3. CKD stage 3 due to type 2 diabetes mellitus  E11.22 250.40    N18.30 585.3   4. Hyperlipidemia associated with type 2 diabetes mellitus  E11.69 250.80    E78.5 272.4   5. 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 Diabetic polyneuropathy associated with type 2 diabetes mellitus  E11.42 250.60     357.2   7. Recurrent major depressive disorder, in partial remission  F33.41 296.35   8. Long term (current) use of antithrombotics/antiplatelets  Z79.02 V58.63   9. Aortic atherosclerosis  I70.0 440.0   10. COPD, moderate  J44.9 496   11. Calcified granuloma of lung  J84.10 515   12. Bronchiectasis without complication  J47.9 494.0   13. Mild intermittent asthma without complication  J45.20 493.90   14. History of MAC infection  Z86.19 V12.09   15. Atherosclerosis of native coronary artery of native heart with angina pectoris  I25.119 414.01     413.9   16. Chronic GERD  K21.9 530.81   17. Osteopenia, unspecified location  M85.80 733.90   18. Senile purpura  D69.2 287.2       Plan:       Type 2 diabetes mellitus with stage 3a chronic kidney disease, with long-term current use of insulin  Stay on insulin and metformin present doses  Recheck in 6 months.  -     Comprehensive Metabolic Panel; Future; Expected date: 10/10/2023  -     Hemoglobin A1C; Future; Expected date: 10/10/2023    Long term (current) use of insulin  stable    CKD stage 3 due to type 2 diabetes mellitus  Stable and improving  Followed by nehrology    Hyperlipidemia associated with type 2 diabetes  mellitus  Stable. 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    Diabetic polyneuropathy associated with type 2 diabetes mellitus  Stable.    Recurrent major depressive disorder, in partial remission  Stable.    Long term (current) use of antithrombotics/antiplatelets  stable    Aortic atherosclerosis  On statin therapy    COPD, moderate  Needs new pulmonology  Only on rescue inhaler at present time.  -     Ambulatory referral/consult to Pulmonology; Future; Expected date: 10/17/2023    Calcified granuloma of lung  Needs new pulmonology  Only on rescue inhaler at present time.    Bronchiectasis without complication  Needs new pulmonology  Only on rescue inhaler at present time.    Mild intermittent asthma without complication  Needs new pulmonology  Only on rescue inhaler at present time.    History of MAC infection  Needs new pulmonology  Only on rescue inhaler at present time.    Atherosclerosis of native coronary artery of native heart with angina pectoris  On ASA and statin therapy    Chronic GERD  Stable on PPI    Osteopenia, unspecified location  stable    Senile purpura  Stable.      Follow up in about 6 months (around 4/10/2024) for fasting labs and follow up.     Patient's Medications   New Prescriptions    No medications on file   Previous Medications    ALBUTEROL (PROVENTIL/VENTOLIN HFA) 90 MCG/ACTUATION INHALER    Inhale 2 puffs into the lungs.    ASCORBIC ACID, VITAMIN C, (VITAMIN C) 500 MG TABLET    Take 500 mg by mouth.    ATORVASTATIN (LIPITOR) 80 MG TABLET    Take 1 tablet (80 mg total) by mouth once daily.    CAMPHOR-METHYL SALICYL-MENTHOL 3.1-10-6 % PTMD    Apply topically 2 (two) times daily.    CHOLECALCIFEROL, VITAMIN D3, (VITAMIN D3) 50 MCG (2,000 UNIT) TAB    Take by mouth once daily.    CHROMIUM PICOLINATE 200 MCG TAB    Take by mouth.    CLOPIDOGREL  (PLAVIX) 75 MG TABLET    Take 1 tablet (75 mg total) by mouth once daily.    CYANOCOBALAMIN (VITAMIN B-12) 1000 MCG TABLET    Take 1,000 mcg by mouth.    D-MANNOSE ORAL    Take by mouth.    DULOXETINE (CYMBALTA) 60 MG CAPSULE    Take 1 capsule (60 mg total) by mouth once daily.    ESCITALOPRAM OXALATE (LEXAPRO) 10 MG TABLET    Take 1 tablet (10 mg total) by mouth once daily.    ESTRADIOL (ESTRACE) 0.01 % (0.1 MG/GRAM) VAGINAL CREAM    Place 1 g vaginally once daily for 14 days, THEN 1 g twice a week.    FLUTICASONE (FLONASE) 50 MCG/ACTUATION NASAL SPRAY    1 spray by Each Nare route once daily.    GABAPENTIN (NEURONTIN) 300 MG CAPSULE    Take 1 capsule (300 mg total) by mouth 2 (two) times daily.    GLUCOSAMINE-CHONDROITIN 500-400 MG TABLET    Take 1 tablet by mouth 3 (three) times daily.    INSULIN  UNIT/ML INJECTION    Inject 16 Units into the skin 2 (two) times daily before meals.    LISINOPRIL (PRINIVIL,ZESTRIL) 5 MG TABLET    Take 1 tablet (5 mg total) by mouth once daily.    MAGNESIUM ORAL    Take 500 mg by mouth once daily.    MECLIZINE (ANTIVERT) 25 MG TABLET    Take 25 mg by mouth 2 (two) times daily as needed.    METFORMIN (GLUCOPHAGE) 1000 MG TABLET    Take 1 tablet (1,000 mg total) by mouth 2 (two) times daily with meals.    METHEN-M.BLUE-S.PHOS-PHSAL-HYO (URIBEL) 118-10-40.8-36 MG CAP    Take 1 capsule by mouth once daily.    METOPROLOL SUCCINATE (TOPROL-XL) 100 MG 24 HR TABLET    Take 1 tablet (100 mg total) by mouth once daily.    MULTIVIT-MIN/FA/LYCOPEN/LUTEIN (SENTRY SENIOR ORAL)    Take by mouth.    MULTIVITAMIN CAPSULE    Take 1 capsule by mouth once daily.    ONDANSETRON (ZOFRAN) 8 MG TABLET    Take 8 mg by mouth every 8 (eight) hours as needed.    POTASSIUM GLUCONATE 595 MG (99 MG) TBSR    Take by mouth.    PYRIDOXINE, VITAMIN B6, (B-6) 100 MG TAB    Take 50 mg by mouth once daily.    SOLIFENACIN (VESICARE) 5 MG TABLET    Take 1 tablet (5 mg total) by mouth once daily.    UNABLE TO FIND     medication name: Pumpkin Seed Oil    VITAMIN A PALMITATE 3,000 MCG (10,000 UNIT) TAB    Take by mouth.   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    Lipidemia     Long term (current) use of insulin 2023    Meniere disease 2016    Multiple falls 2023    Pulmonary Mycobacterium avium-intracellulare infection 2016    followed by Pulmonologist at [facility]    Type 2 diabetes mellitus with stage 2 chronic kidney disease, without long-term current use of insulin 10/28/2015       Past Surgical History:   Procedure Laterality Date    ANKLE SURGERY Left     CATARACT EXTRACTION, BILATERAL       SECTION      x 3    eyelid surgery      HYSTERECTOMY         Family History   Problem Relation Age of Onset    Depression Mother     Bipolar disorder Mother     Cancer Father         skin cancer    Diabetes Father     Hypertension Father     Hyperlipidemia Father     Heart disease Father     Gout Father     Nephrolithiasis Father     Hypertension Sister     Gout Sister     Diabetes Sister     Cancer Sister         rectal cancer    Hypertension Sister     Hyperlipidemia Sister     Heart disease Sister         stents x 4    Cancer Sister         pituitary gland cancer    Hypertension Brother     Hyperlipidemia Brother     Heart disease Brother     Hypertension Daughter      Early death Maternal Aunt        Social History     Socioeconomic History    Marital status: Single   Tobacco Use    Smoking status: Never     Passive exposure: Past    Smokeless tobacco: Never   Substance and Sexual Activity    Alcohol use: No    Drug use: No

## 2023-11-24 ENCOUNTER — HOSPITAL ENCOUNTER (OUTPATIENT)
Facility: HOSPITAL | Age: 83
Discharge: HOME | End: 2023-11-25
Attending: EMERGENCY MEDICINE | Admitting: INTERNAL MEDICINE
Payer: MEDICARE

## 2023-11-24 ENCOUNTER — PATIENT MESSAGE (OUTPATIENT)
Dept: PULMONOLOGY | Facility: CLINIC | Age: 83
End: 2023-11-24
Payer: MEDICARE

## 2023-11-24 DIAGNOSIS — N17.9 ACUTE KIDNEY INJURY: ICD-10-CM

## 2023-11-24 DIAGNOSIS — E11.22 CKD STAGE 3 DUE TO TYPE 2 DIABETES MELLITUS: ICD-10-CM

## 2023-11-24 DIAGNOSIS — R05.9 COUGH: ICD-10-CM

## 2023-11-24 DIAGNOSIS — R06.02 SHORTNESS OF BREATH: ICD-10-CM

## 2023-11-24 DIAGNOSIS — R55 NEAR SYNCOPE: Primary | ICD-10-CM

## 2023-11-24 DIAGNOSIS — N18.31 HYPERTENSIVE HEART AND KIDNEY DISEASE WITHOUT HEART FAILURE AND WITH STAGE 3A CHRONIC KIDNEY DISEASE: ICD-10-CM

## 2023-11-24 DIAGNOSIS — I95.1 ORTHOSTATIC HYPOTENSION: ICD-10-CM

## 2023-11-24 DIAGNOSIS — I13.10 HYPERTENSIVE HEART AND KIDNEY DISEASE WITHOUT HEART FAILURE AND WITH STAGE 3A CHRONIC KIDNEY DISEASE: ICD-10-CM

## 2023-11-24 DIAGNOSIS — N18.30 CKD STAGE 3 DUE TO TYPE 2 DIABETES MELLITUS: ICD-10-CM

## 2023-11-24 LAB
ALBUMIN SERPL BCP-MCNC: 3.4 G/DL (ref 3.5–5.2)
ALP SERPL-CCNC: 100 U/L (ref 55–135)
ALT SERPL W/O P-5'-P-CCNC: 19 U/L (ref 10–44)
AMPHET+METHAMPHET UR QL: NEGATIVE
ANION GAP SERPL CALC-SCNC: 14 MMOL/L (ref 8–16)
ASCENDING AORTA: 2.73 CM
AST SERPL-CCNC: 12 U/L (ref 10–40)
AV INDEX (PROSTH): 1
AV MEAN GRADIENT: 4 MMHG
AV PEAK GRADIENT: 6 MMHG
AV VALVE AREA BY VELOCITY RATIO: 3.17 CM²
AV VALVE AREA: 3 CM²
AV VELOCITY RATIO: 1.05
BARBITURATES UR QL SCN>200 NG/ML: NEGATIVE
BASOPHILS # BLD AUTO: 0.04 K/UL (ref 0–0.2)
BASOPHILS NFR BLD: 0.2 % (ref 0–1.9)
BENZODIAZ UR QL SCN>200 NG/ML: NEGATIVE
BILIRUB SERPL-MCNC: 0.8 MG/DL (ref 0.1–1)
BILIRUB UR QL STRIP: NEGATIVE
BNP SERPL-MCNC: <10 PG/ML (ref 0–99)
BSA FOR ECHO PROCEDURE: 1.81 M2
BUN SERPL-MCNC: 59 MG/DL (ref 8–23)
BZE UR QL SCN: NEGATIVE
CALCIUM SERPL-MCNC: 10.2 MG/DL (ref 8.7–10.5)
CANNABINOIDS UR QL SCN: NEGATIVE
CHLORIDE SERPL-SCNC: 93 MMOL/L (ref 95–110)
CK SERPL-CCNC: 24 U/L (ref 20–180)
CLARITY UR: CLEAR
CO2 SERPL-SCNC: 28 MMOL/L (ref 23–29)
COLOR UR: YELLOW
CREAT SERPL-MCNC: 1.9 MG/DL (ref 0.5–1.4)
CREAT UR-MCNC: 50.8 MG/DL (ref 15–325)
CREAT UR-MCNC: 50.8 MG/DL (ref 15–325)
CV ECHO LV RWT: 0.47 CM
DIFFERENTIAL METHOD: ABNORMAL
DOP CALC AO PEAK VEL: 1.21 M/S
DOP CALC AO VTI: 28.3 CM
DOP CALC LVOT AREA: 3 CM2
DOP CALC LVOT DIAMETER: 1.96 CM
DOP CALC LVOT PEAK VEL: 1.27 M/S
DOP CALC LVOT STROKE VOLUME: 85.04 CM3
DOP CALCLVOT PEAK VEL VTI: 28.2 CM
E WAVE DECELERATION TIME: 222.92 MSEC
E/A RATIO: 0.59
E/E' RATIO: 8.77 M/S
ECHO LV POSTERIOR WALL: 0.9 CM (ref 0.6–1.1)
EOSINOPHIL # BLD AUTO: 0.1 K/UL (ref 0–0.5)
EOSINOPHIL NFR BLD: 0.7 % (ref 0–8)
ERYTHROCYTE [DISTWIDTH] IN BLOOD BY AUTOMATED COUNT: 13.4 % (ref 11.5–14.5)
EST. GFR  (NO RACE VARIABLE): 26 ML/MIN/1.73 M^2
FRACTIONAL SHORTENING: 27 % (ref 28–44)
GLUCOSE SERPL-MCNC: 267 MG/DL (ref 70–110)
GLUCOSE UR QL STRIP: NEGATIVE
HCT VFR BLD AUTO: 42.8 % (ref 37–48.5)
HGB BLD-MCNC: 14.4 G/DL (ref 12–16)
HGB UR QL STRIP: NEGATIVE
IMM GRANULOCYTES # BLD AUTO: 0.26 K/UL (ref 0–0.04)
IMM GRANULOCYTES NFR BLD AUTO: 1.6 % (ref 0–0.5)
INFLUENZA A, MOLECULAR: NEGATIVE
INFLUENZA B, MOLECULAR: NEGATIVE
INTERVENTRICULAR SEPTUM: 0.79 CM (ref 0.6–1.1)
IVRT: 139.87 MSEC
KETONES UR QL STRIP: NEGATIVE
LA MAJOR: 3.96 CM
LA MINOR: 3.81 CM
LA WIDTH: 3.6 CM
LACTATE SERPL-SCNC: 3 MMOL/L (ref 0.5–2.2)
LEFT ATRIUM SIZE: 3.07 CM
LEFT ATRIUM VOLUME INDEX MOD: 17.9 ML/M2
LEFT ATRIUM VOLUME INDEX: 20.2 ML/M2
LEFT ATRIUM VOLUME MOD: 32.46 CM3
LEFT ATRIUM VOLUME: 36.48 CM3
LEFT INTERNAL DIMENSION IN SYSTOLE: 2.8 CM (ref 2.1–4)
LEFT VENTRICLE DIASTOLIC VOLUME INDEX: 35.03 ML/M2
LEFT VENTRICLE DIASTOLIC VOLUME: 63.4 ML
LEFT VENTRICLE MASS INDEX: 52 G/M2
LEFT VENTRICLE SYSTOLIC VOLUME INDEX: 16.3 ML/M2
LEFT VENTRICLE SYSTOLIC VOLUME: 29.49 ML
LEFT VENTRICULAR INTERNAL DIMENSION IN DIASTOLE: 3.84 CM (ref 3.5–6)
LEFT VENTRICULAR MASS: 94.19 G
LEUKOCYTE ESTERASE UR QL STRIP: ABNORMAL
LV LATERAL E/E' RATIO: 9.5 M/S
LV SEPTAL E/E' RATIO: 8.14 M/S
LVOT MG: 4.25 MMHG
LVOT MV: 1 CM/S
LYMPHOCYTES # BLD AUTO: 3.4 K/UL (ref 1–4.8)
LYMPHOCYTES NFR BLD: 20.4 % (ref 18–48)
MCH RBC QN AUTO: 29.8 PG (ref 27–31)
MCHC RBC AUTO-ENTMCNC: 33.6 G/DL (ref 32–36)
MCV RBC AUTO: 88 FL (ref 82–98)
METHADONE UR QL SCN>300 NG/ML: NEGATIVE
MICROSCOPIC COMMENT: NORMAL
MONOCYTES # BLD AUTO: 1.1 K/UL (ref 0.3–1)
MONOCYTES NFR BLD: 6.3 % (ref 4–15)
MV PEAK A VEL: 0.96 M/S
MV PEAK E VEL: 0.57 M/S
MV STENOSIS PRESSURE HALF TIME: 64.65 MS
MV VALVE AREA P 1/2 METHOD: 3.4 CM2
NEUTROPHILS # BLD AUTO: 11.8 K/UL (ref 1.8–7.7)
NEUTROPHILS NFR BLD: 70.8 % (ref 38–73)
NITRITE UR QL STRIP: NEGATIVE
NRBC BLD-RTO: 0 /100 WBC
OHS LV EJECTION FRACTION SIMPSONS BIPLANE MOD: 76 %
OPIATES UR QL SCN: NEGATIVE
PCP UR QL SCN>25 NG/ML: NEGATIVE
PH UR STRIP: 6 [PH] (ref 5–8)
PISA TR MAX VEL: 2.32 M/S
PLATELET # BLD AUTO: 400 K/UL (ref 150–450)
PMV BLD AUTO: 8.6 FL (ref 9.2–12.9)
POCT GLUCOSE: 105 MG/DL (ref 70–110)
POCT GLUCOSE: 108 MG/DL (ref 70–110)
POTASSIUM SERPL-SCNC: 4.1 MMOL/L (ref 3.5–5.1)
PROCALCITONIN SERPL IA-MCNC: 0.08 NG/ML
PROT SERPL-MCNC: 6.4 G/DL (ref 6–8.4)
PROT UR QL STRIP: NEGATIVE
PULM VEIN S/D RATIO: 1.5
PV PEAK D VEL: 0.22 M/S
PV PEAK S VEL: 0.33 M/S
RA MAJOR: 4.1 CM
RA PRESSURE ESTIMATED: 3 MMHG
RA WIDTH: 3.28 CM
RBC # BLD AUTO: 4.84 M/UL (ref 4–5.4)
RBC #/AREA URNS HPF: 0 /HPF (ref 0–4)
RIGHT VENTRICULAR END-DIASTOLIC DIMENSION: 2.8 CM
RV TB RVSP: 5 MMHG
RV TISSUE DOPPLER FREE WALL SYSTOLIC VELOCITY 1 (APICAL 4 CHAMBER VIEW): 13.08 CM/S
SARS-COV-2 RDRP RESP QL NAA+PROBE: NEGATIVE
SINUS: 2.94 CM
SODIUM SERPL-SCNC: 135 MMOL/L (ref 136–145)
SODIUM UR-SCNC: 38 MMOL/L (ref 20–250)
SP GR UR STRIP: 1.02 (ref 1–1.03)
SPECIMEN SOURCE: NORMAL
STJ: 2.86 CM
TDI LATERAL: 0.06 M/S
TDI SEPTAL: 0.07 M/S
TDI: 0.07 M/S
TOXICOLOGY INFORMATION: NORMAL
TR MAX PG: 22 MMHG
TRICUSPID ANNULAR PLANE SYSTOLIC EXCURSION: 2.43 CM
TROPONIN I SERPL DL<=0.01 NG/ML-MCNC: 0.02 NG/ML (ref 0–0.03)
TSH SERPL DL<=0.005 MIU/L-ACNC: 0.62 UIU/ML (ref 0.4–4)
TV REST PULMONARY ARTERY PRESSURE: 25 MMHG
URN SPEC COLLECT METH UR: ABNORMAL
UROBILINOGEN UR STRIP-ACNC: NEGATIVE EU/DL
UUN UR-MCNC: 480 MG/DL (ref 140–1050)
WBC # BLD AUTO: 16.63 K/UL (ref 3.9–12.7)
WBC #/AREA URNS HPF: 3 /HPF (ref 0–5)
Z-SCORE OF LEFT VENTRICULAR DIMENSION IN END DIASTOLE: -2.65
Z-SCORE OF LEFT VENTRICULAR DIMENSION IN END SYSTOLE: -0.79

## 2023-11-24 PROCEDURE — 85025 COMPLETE CBC W/AUTO DIFF WBC: CPT | Performed by: NURSE PRACTITIONER

## 2023-11-24 PROCEDURE — G0378 HOSPITAL OBSERVATION PER HR: HCPCS

## 2023-11-24 PROCEDURE — 84540 ASSAY OF URINE/UREA-N: CPT

## 2023-11-24 PROCEDURE — 80053 COMPREHEN METABOLIC PANEL: CPT | Performed by: NURSE PRACTITIONER

## 2023-11-24 PROCEDURE — 93010 ELECTROCARDIOGRAM REPORT: CPT | Mod: 76,,, | Performed by: INTERNAL MEDICINE

## 2023-11-24 PROCEDURE — 25000003 PHARM REV CODE 250: Performed by: NURSE PRACTITIONER

## 2023-11-24 PROCEDURE — 93010 ELECTROCARDIOGRAM REPORT: CPT | Mod: ,,, | Performed by: INTERNAL MEDICINE

## 2023-11-24 PROCEDURE — 93005 ELECTROCARDIOGRAM TRACING: CPT

## 2023-11-24 PROCEDURE — U0002 COVID-19 LAB TEST NON-CDC: HCPCS | Performed by: NURSE PRACTITIONER

## 2023-11-24 PROCEDURE — 83605 ASSAY OF LACTIC ACID: CPT

## 2023-11-24 PROCEDURE — 63600175 PHARM REV CODE 636 W HCPCS

## 2023-11-24 PROCEDURE — 99285 EMERGENCY DEPT VISIT HI MDM: CPT | Mod: 25

## 2023-11-24 PROCEDURE — 96361 HYDRATE IV INFUSION ADD-ON: CPT

## 2023-11-24 PROCEDURE — 87502 INFLUENZA DNA AMP PROBE: CPT | Performed by: NURSE PRACTITIONER

## 2023-11-24 PROCEDURE — 84145 PROCALCITONIN (PCT): CPT

## 2023-11-24 PROCEDURE — 84484 ASSAY OF TROPONIN QUANT: CPT | Performed by: NURSE PRACTITIONER

## 2023-11-24 PROCEDURE — 83880 ASSAY OF NATRIURETIC PEPTIDE: CPT | Performed by: NURSE PRACTITIONER

## 2023-11-24 PROCEDURE — 25000003 PHARM REV CODE 250: Performed by: EMERGENCY MEDICINE

## 2023-11-24 PROCEDURE — 80307 DRUG TEST PRSMV CHEM ANLYZR: CPT

## 2023-11-24 PROCEDURE — 25000003 PHARM REV CODE 250

## 2023-11-24 PROCEDURE — 84300 ASSAY OF URINE SODIUM: CPT

## 2023-11-24 PROCEDURE — 81000 URINALYSIS NONAUTO W/SCOPE: CPT | Performed by: EMERGENCY MEDICINE

## 2023-11-24 PROCEDURE — 96360 HYDRATION IV INFUSION INIT: CPT

## 2023-11-24 PROCEDURE — 82550 ASSAY OF CK (CPK): CPT | Performed by: EMERGENCY MEDICINE

## 2023-11-24 PROCEDURE — 84443 ASSAY THYROID STIM HORMONE: CPT

## 2023-11-24 PROCEDURE — 96372 THER/PROPH/DIAG INJ SC/IM: CPT

## 2023-11-24 PROCEDURE — 36415 COLL VENOUS BLD VENIPUNCTURE: CPT

## 2023-11-24 PROCEDURE — 93010 EKG 12-LEAD: ICD-10-PCS | Mod: 76,,, | Performed by: INTERNAL MEDICINE

## 2023-11-24 RX ORDER — ALBUTEROL SULFATE 90 UG/1
2 AEROSOL, METERED RESPIRATORY (INHALATION) EVERY 4 HOURS PRN
Status: DISCONTINUED | OUTPATIENT
Start: 2023-11-24 | End: 2023-11-25 | Stop reason: HOSPADM

## 2023-11-24 RX ORDER — OSELTAMIVIR PHOSPHATE 75 MG/1
75 CAPSULE ORAL 2 TIMES DAILY
COMMUNITY
Start: 2023-11-08 | End: 2023-11-24

## 2023-11-24 RX ORDER — DOXYCYCLINE 100 MG/1
100 CAPSULE ORAL 2 TIMES DAILY
COMMUNITY
Start: 2023-11-14 | End: 2023-11-24

## 2023-11-24 RX ORDER — ACETAMINOPHEN 500 MG
500 TABLET ORAL EVERY 6 HOURS PRN
COMMUNITY
End: 2023-12-07

## 2023-11-24 RX ORDER — TALC
6 POWDER (GRAM) TOPICAL NIGHTLY PRN
Status: DISCONTINUED | OUTPATIENT
Start: 2023-11-24 | End: 2023-11-25 | Stop reason: HOSPADM

## 2023-11-24 RX ORDER — ENOXAPARIN SODIUM 100 MG/ML
30 INJECTION SUBCUTANEOUS EVERY 24 HOURS
Status: DISCONTINUED | OUTPATIENT
Start: 2023-11-24 | End: 2023-11-25 | Stop reason: HOSPADM

## 2023-11-24 RX ORDER — SODIUM CHLORIDE 9 MG/ML
500 INJECTION, SOLUTION INTRAVENOUS
Status: COMPLETED | OUTPATIENT
Start: 2023-11-24 | End: 2023-11-24

## 2023-11-24 RX ORDER — PREDNISONE 20 MG/1
40 TABLET ORAL
COMMUNITY
Start: 2023-11-14 | End: 2023-11-24

## 2023-11-24 RX ORDER — HYDROCHLOROTHIAZIDE 25 MG/1
TABLET ORAL
COMMUNITY
Start: 2023-09-25 | End: 2023-11-24

## 2023-11-24 RX ORDER — IBUPROFEN 200 MG
16 TABLET ORAL
Status: DISCONTINUED | OUTPATIENT
Start: 2023-11-24 | End: 2023-11-25 | Stop reason: HOSPADM

## 2023-11-24 RX ORDER — ESCITALOPRAM OXALATE 20 MG/1
20 TABLET ORAL DAILY
COMMUNITY
Start: 2023-09-25 | End: 2024-01-30 | Stop reason: SDUPTHER

## 2023-11-24 RX ORDER — BROMPHENIRAMINE MALEATE, PSEUDOEPHEDRINE HYDROCHLORIDE, AND DEXTROMETHORPHAN HYDROBROMIDE 2; 30; 10 MG/5ML; MG/5ML; MG/5ML
10 SYRUP ORAL EVERY 6 HOURS PRN
COMMUNITY
Start: 2023-11-14 | End: 2023-12-07

## 2023-11-24 RX ORDER — ESCITALOPRAM OXALATE 10 MG/1
20 TABLET ORAL DAILY
Status: DISCONTINUED | OUTPATIENT
Start: 2023-11-25 | End: 2023-11-25 | Stop reason: HOSPADM

## 2023-11-24 RX ORDER — ATORVASTATIN CALCIUM 40 MG/1
80 TABLET, FILM COATED ORAL NIGHTLY
Status: DISCONTINUED | OUTPATIENT
Start: 2023-11-24 | End: 2023-11-25 | Stop reason: HOSPADM

## 2023-11-24 RX ORDER — GABAPENTIN 300 MG/1
300 CAPSULE ORAL 2 TIMES DAILY
Status: DISCONTINUED | OUTPATIENT
Start: 2023-11-24 | End: 2023-11-25 | Stop reason: HOSPADM

## 2023-11-24 RX ORDER — INFLUENZA A VIRUS A/VICTORIA/4897/2022 IVR-238 (H1N1) ANTIGEN (FORMALDEHYDE INACTIVATED), INFLUENZA A VIRUS A/DARWIN/9/2021 SAN-010 (H3N2) ANTIGEN (FORMALDEHYDE INACTIVATED), INFLUENZA B VIRUS B/PHUKET/3073/2013 ANTIGEN (FORMALDEHYDE INACTIVATED), AND INFLUENZA B VIRUS B/MICHIGAN/01/2021 ANTIGEN (FORMALDEHYDE INACTIVATED) 60; 60; 60; 60 UG/.7ML; UG/.7ML; UG/.7ML; UG/.7ML
INJECTION, SUSPENSION INTRAMUSCULAR
COMMUNITY
Start: 2023-10-03 | End: 2023-12-08 | Stop reason: ALTCHOICE

## 2023-11-24 RX ORDER — GLUCAGON 1 MG
1 KIT INJECTION
Status: DISCONTINUED | OUTPATIENT
Start: 2023-11-24 | End: 2023-11-25 | Stop reason: HOSPADM

## 2023-11-24 RX ORDER — IBUPROFEN 200 MG
24 TABLET ORAL
Status: DISCONTINUED | OUTPATIENT
Start: 2023-11-24 | End: 2023-11-25 | Stop reason: HOSPADM

## 2023-11-24 RX ORDER — CLOPIDOGREL BISULFATE 75 MG/1
75 TABLET ORAL DAILY
Status: DISCONTINUED | OUTPATIENT
Start: 2023-11-25 | End: 2023-11-25 | Stop reason: HOSPADM

## 2023-11-24 RX ORDER — SODIUM CHLORIDE 0.9 % (FLUSH) 0.9 %
10 SYRINGE (ML) INJECTION
Status: DISCONTINUED | OUTPATIENT
Start: 2023-11-24 | End: 2023-11-25 | Stop reason: HOSPADM

## 2023-11-24 RX ORDER — INSULIN ASPART 100 [IU]/ML
0-5 INJECTION, SOLUTION INTRAVENOUS; SUBCUTANEOUS
Status: DISCONTINUED | OUTPATIENT
Start: 2023-11-24 | End: 2023-11-25 | Stop reason: HOSPADM

## 2023-11-24 RX ORDER — CODEINE PHOSPHATE AND GUAIFENESIN 10; 100 MG/5ML; MG/5ML
SOLUTION ORAL
COMMUNITY
Start: 2023-11-14 | End: 2023-12-07

## 2023-11-24 RX ORDER — METOPROLOL SUCCINATE 50 MG/1
100 TABLET, EXTENDED RELEASE ORAL DAILY
Status: DISCONTINUED | OUTPATIENT
Start: 2023-11-25 | End: 2023-11-25

## 2023-11-24 RX ORDER — LISINOPRIL 5 MG/1
5 TABLET ORAL DAILY
Status: DISCONTINUED | OUTPATIENT
Start: 2023-11-25 | End: 2023-11-24

## 2023-11-24 RX ADMIN — ATORVASTATIN CALCIUM 80 MG: 40 TABLET, FILM COATED ORAL at 08:11

## 2023-11-24 RX ADMIN — SODIUM CHLORIDE, POTASSIUM CHLORIDE, SODIUM LACTATE AND CALCIUM CHLORIDE 1000 ML: 600; 310; 30; 20 INJECTION, SOLUTION INTRAVENOUS at 05:11

## 2023-11-24 RX ADMIN — SODIUM CHLORIDE 500 ML: 9 INJECTION, SOLUTION INTRAVENOUS at 12:11

## 2023-11-24 RX ADMIN — SODIUM CHLORIDE 500 ML: 9 INJECTION, SOLUTION INTRAVENOUS at 02:11

## 2023-11-24 RX ADMIN — GABAPENTIN 300 MG: 300 CAPSULE ORAL at 08:11

## 2023-11-24 RX ADMIN — INSULIN DETEMIR 10 UNITS: 100 INJECTION, SOLUTION SUBCUTANEOUS at 08:11

## 2023-11-24 RX ADMIN — ENOXAPARIN SODIUM 30 MG: 30 INJECTION SUBCUTANEOUS at 05:11

## 2023-11-24 NOTE — PLAN OF CARE
11/24/23 1740   Admission   Initial VN Admission Questions Complete   Communication Issues? None   Shift   Virtual Nurse - Patient Verbalized Approval Of Camera Use;VN Rounding   Safety/Activity   Patient Rounds bed in low position;placement of personal items at bedside;call light in patient/parent reach;visualized patient;clutter free environment maintained;toileting offered   Safety Promotion/Fall Prevention Fall Risk reviewed with patient/family;side rails raised x 2;instructed to call staff for mobility   Positioning   Head of Bed (HOB) Positioning HOB at 30-45 degrees     VN cued into patient's room for introduction with patient's permission.  VN role explained and informed patient that VN would be working with bedside nurse and rest of care team.  Plan of care reviewed. Fall risk and bed alarm protocol education provided.  Instructed patient to call for assistance.  Patient aware and agreeable.  Patient's chart, labs and vital signs reviewed.  Allowed time for questions.  No acute distress noted.  Will continue to be available as needed.

## 2023-11-24 NOTE — ED NOTES
"Pt ambulatory to ED room with c/o peristent productive cough x3 weeks and episode of syncope with GLF last night. Per pt she was walking using her walker and "blacked out", falling to the ground. AAOx4, respirations unlabored, in NAD.  "

## 2023-11-24 NOTE — ED PROVIDER NOTES
Encounter Date: 11/24/2023       History     Chief Complaint   Patient presents with    Fall     Fell last night. Has been having an increased number of falls. Has been becoming light headed and almost losing consciousness. C/O head and neck pain since the last fall.     Cough     Persistent cough since having pneumonia.       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  Patient states that last evening while attempting to ambulate in her home she began to feel a clouded sensorium in which her peripheral vision became dark and obstructive causing her to nearly passed out (she does report recurrent episodes of near syncope, which have not been evaluated by her PCP, for several weeks).   She states that she subsequently fell landing on her right hip and her head.  She reports pain to the right hip and area of the cervical spine denies any headache.  She states that immediately after falls you have any feelings of nausea or vomiting, lightheadedness.  She states that she was able to bear weight immediately after the fall.  Additionally, the patient was recently diagnosed with influenza approximately 2-3 weeks ago.  She states that she is been treated for the influenza continues to have cough, night sweats, myalgias and elevated temperature.  She states that her PCP had prescribed her antibiotics in light of persistent fever and symptoms without any significant improvement.  She continues to have night sweats and does report unintentional weight loss of approximately 20 lb over the past several months.  She denies any overt chest pain, shortness of breath,  complaints, abdominal pain.  She denies any history of syncope or stenosis of the carotid arteries.      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    Lipidemia     Long term (current) use of insulin 2023    Meniere disease 2016    Multiple falls 2023    Pulmonary Mycobacterium avium-intracellulare infection 2016    followed by Pulmonologist at [facility]    Type 2 diabetes mellitus with stage 2 chronic kidney disease, without long-term current use of insulin 10/28/2015     Past Surgical History:   Procedure Laterality Date    ANKLE SURGERY Left     CATARACT EXTRACTION, BILATERAL       SECTION      x 3    eyelid surgery      HYSTERECTOMY       Family History   Problem Relation Age of Onset    Depression Mother     Bipolar disorder Mother     Cancer Father         skin cancer    Diabetes Father     Hypertension Father     Hyperlipidemia Father     Heart disease Father     Gout Father     Nephrolithiasis Father     Hypertension Sister     Gout Sister     Diabetes Sister     Cancer Sister         rectal cancer    Hypertension Sister     Hyperlipidemia Sister     Heart disease Sister         stents x 4    Cancer Sister         pituitary gland cancer    Hypertension Brother     Hyperlipidemia Brother     Heart disease Brother     Hypertension Daughter     Early death Maternal Aunt      Social History     Tobacco Use    Smoking status: Never     Passive exposure: Past    Smokeless tobacco:  Never   Substance Use Topics    Alcohol use: No    Drug use: No     Review of Systems   Constitutional:  Negative for chills and fever.   Respiratory:  Negative for chest tightness and shortness of breath.    Musculoskeletal:  Positive for arthralgias and back pain.   Neurological:  Positive for syncope (near syncope). Negative for dizziness.       Physical Exam     Initial Vitals [11/24/23 1103]   BP Pulse Resp Temp SpO2   (!) 98/55 108 18 98.1 °F (36.7 °C) (!) 94 %      MAP       --         Physical Exam    Nursing note and vitals reviewed.  Constitutional: She appears well-developed and well-nourished. She is not diaphoretic. No distress.   Well-appearing   No distress  Non toxic in appearance    HENT:   Head: Normocephalic and atraumatic.   Right Ear: External ear normal.   Left Ear: External ear normal.   Eyes: Conjunctivae and EOM are normal. Pupils are equal, round, and reactive to light.   Posterior oropharynx is clear    Neck: Neck supple.   Normal ROM  Midline tenderness    Normal range of motion.  Cardiovascular:  Regular rhythm, normal heart sounds and intact distal pulses.           Tachycardic    Abdominal: Abdomen is soft. Bowel sounds are normal.   Abdomen is soft/NT/ND; normal bowel sounds throughout    Musculoskeletal:         General: Tenderness present. Normal range of motion.      Cervical back: Normal range of motion and neck supple.      Comments: TTP C and T spine  No L spine tenderness  TTP R greater trochanter and anterior aspect of the R thigh     Neurological: She is alert and oriented to person, place, and time. She has normal strength.   No focal neurological deficit  Strength 5/5   Sensation grossly in tact   MAEW   GCS 15  AAOX3    Skin: Skin is warm. Capillary refill takes less than 2 seconds.         ED Course   Critical Care    Date/Time: 11/24/2023 4:14 PM    Performed by: Jose Wolf MD  Authorized by: Jose Wolf MD  Direct patient critical care time: 15  minutes  Additional history critical care time: 5 minutes  Ordering / reviewing critical care time: 5 minutes  Documentation critical care time: 5 minutes  Consulting other physicians critical care time: 5 minutes  Consult with family critical care time: 5 minutes  Total critical care time (exclusive of procedural time) : 40 minutes  Critical care was necessary to treat or prevent imminent or life-threatening deterioration of the following conditions: renal failure and circulatory failure.  Critical care was time spent personally by me on the following activities: ordering and review of laboratory studies, examination of patient, evaluation of patient's response to treatment, discussions with primary provider, pulse oximetry and ordering and review of radiographic studies.        Labs Reviewed   COMPREHENSIVE METABOLIC PANEL - Abnormal; Notable for the following components:       Result Value    Sodium 135 (*)     Chloride 93 (*)     Glucose 267 (*)     BUN 59 (*)     Creatinine 1.9 (*)     Albumin 3.4 (*)     eGFR 26 (*)     All other components within normal limits   CBC W/ AUTO DIFFERENTIAL - Abnormal; Notable for the following components:    WBC 16.63 (*)     MPV 8.6 (*)     Immature Granulocytes 1.6 (*)     Gran # (ANC) 11.8 (*)     Immature Grans (Abs) 0.26 (*)     Mono # 1.1 (*)     All other components within normal limits   URINALYSIS, REFLEX TO URINE CULTURE - Abnormal; Notable for the following components:    Leukocytes, UA Trace (*)     All other components within normal limits    Narrative:     Specimen Source->Urine   INFLUENZA A & B BY MOLECULAR   B-TYPE NATRIURETIC PEPTIDE   TROPONIN I   SARS-COV-2 RNA AMPLIFICATION, QUAL   CK   URINALYSIS MICROSCOPIC    Narrative:     Specimen Source->Urine   DRUG SCREEN PANEL, URINE EMERGENCY   PROCALCITONIN   LACTIC ACID, PLASMA   CREATININE, URINE, RANDOM   SODIUM, URINE, RANDOM   UREA NITROGEN, URINE, RANDOM   POCT GLUCOSE MONITORING CONTINUOUS   POCT GLUCOSE  MONITORING CONTINUOUS     EKG Readings: (Independently Interpreted)   Initial Reading: No STEMI. Rhythm: Normal Sinus Rhythm. Heart Rate: 104. Ectopy: No Ectopy. Conduction: Normal. ST Segments: Normal ST Segments. T Waves: Normal.   Sinus tachycardia        Imaging Results              CT Cervical Spine Without Contrast (Final result)  Result time 11/24/23 14:05:43      Final result by Amado Mcintyre MD (11/24/23 14:05:43)                   Impression:      No acute hemorrhage or major vascular distribution infarct.    No fracture.    Generalized cerebral volume loss with sequela of chronic microvascular ischemic change.    Cervical spondylosis without severe neural foraminal narrowing or spinal canal stenosis at any level.    Multiple ground-glass pulmonary nodules measuring up to 4 mm.  For a ground glass nodule <6 mm, Fleischner Society 2017 guidelines recommend no routine follow up. However, suspicious features could warrant follow up with non-contrast chest CT at 2 years and 4 years after discovery.    Electronically signed by resident: Alcon Davison  Date:    11/24/2023  Time:    13:11    Electronically signed by: Amado Mcintyre MD  Date:    11/24/2023  Time:    14:05               Narrative:    EXAMINATION:  CT HEAD WITHOUT CONTRAST; CT CERVICAL SPINE WITHOUT CONTRAST    CLINICAL HISTORY:  Head trauma, minor (Age >= 65y);; Neck trauma (Age >= 65y);    TECHNIQUE:  Low dose axial CT images obtained throughout the head without the use of intravenous contrast.  Axial, sagittal and coronal reconstructions were performed.    Low dose axial images, sagittal and coronal reformations were performed though the cervical spine.  Contrast was not administered.    COMPARISON:  CT 05/22/2023, 09/14/2022    FINDINGS:  INTRACRANIAL COMPARTMENT:    Prominence of the ventricles and sulci compatible with mild generalized cerebral volume loss.  No hydrocephalus.    Mild patchy hypoattenuation in the supratentorial white  matter, nonspecific but most likely reflecting chronic microvascular ischemic changes. No recent or remote major vascular distribution infarct. No acute hemorrhage.  No mass effect or midline shift.    No extra-axial blood or fluid collections.    SKULL/EXTRACRANIAL CONTENTS (limited evaluation):    No fracture. Mastoid air cells and paranasal sinuses are essentially clear.    Skull Base and Craniocervical Junction (partially imaged): Normal.    CERVICAL SPINE:    Spinal Alignment: Normal.    Vertebrae: Multilevel degenerative endplate change.  No fracture.    Discs: Multilevel disc height loss, most pronounced C6-7.    Degenerative findings: Multilevel facet arthropathy.  No severe neural foraminal narrowing or spinal canal stenosis at any level.    Paraspinal muscles & soft tissues: Multiple ground-glass nodules at the bilateral lung apices measuring up to 4 mm.                                       CT Head Without Contrast (Final result)  Result time 11/24/23 14:05:43      Final result by Amado Mcintyre MD (11/24/23 14:05:43)                   Impression:      No acute hemorrhage or major vascular distribution infarct.    No fracture.    Generalized cerebral volume loss with sequela of chronic microvascular ischemic change.    Cervical spondylosis without severe neural foraminal narrowing or spinal canal stenosis at any level.    Multiple ground-glass pulmonary nodules measuring up to 4 mm.  For a ground glass nodule <6 mm, Fleischner Society 2017 guidelines recommend no routine follow up. However, suspicious features could warrant follow up with non-contrast chest CT at 2 years and 4 years after discovery.    Electronically signed by resident: Alcon Davison  Date:    11/24/2023  Time:    13:11    Electronically signed by: Amado Mcintyre MD  Date:    11/24/2023  Time:    14:05               Narrative:    EXAMINATION:  CT HEAD WITHOUT CONTRAST; CT CERVICAL SPINE WITHOUT CONTRAST    CLINICAL HISTORY:  Head  trauma, minor (Age >= 65y);; Neck trauma (Age >= 65y);    TECHNIQUE:  Low dose axial CT images obtained throughout the head without the use of intravenous contrast.  Axial, sagittal and coronal reconstructions were performed.    Low dose axial images, sagittal and coronal reformations were performed though the cervical spine.  Contrast was not administered.    COMPARISON:  CT 05/22/2023, 09/14/2022    FINDINGS:  INTRACRANIAL COMPARTMENT:    Prominence of the ventricles and sulci compatible with mild generalized cerebral volume loss.  No hydrocephalus.    Mild patchy hypoattenuation in the supratentorial white matter, nonspecific but most likely reflecting chronic microvascular ischemic changes. No recent or remote major vascular distribution infarct. No acute hemorrhage.  No mass effect or midline shift.    No extra-axial blood or fluid collections.    SKULL/EXTRACRANIAL CONTENTS (limited evaluation):    No fracture. Mastoid air cells and paranasal sinuses are essentially clear.    Skull Base and Craniocervical Junction (partially imaged): Normal.    CERVICAL SPINE:    Spinal Alignment: Normal.    Vertebrae: Multilevel degenerative endplate change.  No fracture.    Discs: Multilevel disc height loss, most pronounced C6-7.    Degenerative findings: Multilevel facet arthropathy.  No severe neural foraminal narrowing or spinal canal stenosis at any level.    Paraspinal muscles & soft tissues: Multiple ground-glass nodules at the bilateral lung apices measuring up to 4 mm.                                       X-Ray Thoracic Spine AP Lateral (Final result)  Result time 11/24/23 12:22:04      Final result by Amado Mcintyre MD (11/24/23 12:22:04)                   Impression:      Degenerative changes, no acute fracture is identified.  This is a suboptimal study on the lateral view.      Electronically signed by: Amado Mcintyre MD  Date:    11/24/2023  Time:    12:22               Narrative:    EXAMINATION:  XR  THORACIC SPINE AP LATERAL    CLINICAL HISTORY:  back pain;    TECHNIQUE:  AP and lateral views of the thoracic spine were performed.    COMPARISON:  04/14/2023    FINDINGS:  X-ray a AP and lateral study demonstrates normal body heights bone destruction.  No obvious compression fractures seen on the frontal view.  The lateral x-ray is not aligned well to see the endplates, so it is difficult to determine whether there is a fracture in the lower thoracic spine.  Degenerative endplate changes are seen in the thoracic spine.                                       X-Ray Hip 2 or 3 views Right (with Pelvis when performed) (Final result)  Result time 11/24/23 12:44:33      Final result by Donavan Galan MD (11/24/23 12:44:33)                   Impression:      1. No acute displaced fracture or dislocation of the right hip.      Electronically signed by: Donavan Galan MD  Date:    11/24/2023  Time:    12:44               Narrative:    EXAMINATION:  XR HIP WITH PELVIS WHEN PERFORMED, 2 OR 3  VIEWS RIGHT    CLINICAL HISTORY:  right hip pain;    TECHNIQUE:  AP view of the pelvis and frog leg lateral view of the right hip were performed.    COMPARISON:  04/14/2023    FINDINGS:  Three views right hip.    There is osteopenia.  The bilateral sacroiliac joints are intact noting degenerative changes.  There are degenerative changes of the pubic symphysis.  The bilateral femoroacetabular joints are intact.  No acute displaced fracture or dislocation of the right hip.                                       X-Ray Chest PA And Lateral (Final result)  Result time 11/24/23 12:06:10      Final result by Donavan Galan MD (11/24/23 12:06:10)                   Impression:      1. Coarse interstitial attenuation, accentuated by shallow inspiratory effort and habitus.  Early edema or other pneumonitis can present in this fashion, no large focal consolidation.  Correlation is advised.  Correlation with any history of  COPD/emphysema.      Electronically signed by: Donavan Galan MD  Date:    11/24/2023  Time:    12:06               Narrative:    EXAMINATION:  XR CHEST PA AND LATERAL    CLINICAL HISTORY:  Cough, unspecified    TECHNIQUE:  PA and lateral views of the chest were performed.    COMPARISON:  04/14/2023    FINDINGS:  The cardiomediastinal silhouette is not enlarged noting calcification of the aorta..  There is no pleural effusion.  The trachea is midline.  The lungs are symmetrically expanded bilaterally with coarse interstitial attenuation.  There is bilateral basilar subsegmental atelectasis, left greater than right..  No large focal consolidation seen.  There is no pneumothorax.  The osseous structures are remarkable for degenerative changes noting levo scoliotic curvature of the spine..                                       Medications   albuterol inhaler 2 puff (has no administration in time range)   atorvastatin tablet 80 mg (has no administration in time range)   clopidogreL tablet 75 mg (has no administration in time range)   EScitalopram oxalate tablet 20 mg (has no administration in time range)   gabapentin capsule 300 mg (has no administration in time range)   metoprolol succinate (TOPROL-XL) 24 hr tablet 100 mg (has no administration in time range)   sodium chloride 0.9% flush 10 mL (has no administration in time range)   melatonin tablet 6 mg (has no administration in time range)   enoxaparin injection 30 mg (has no administration in time range)   glucose chewable tablet 16 g (has no administration in time range)   glucose chewable tablet 24 g (has no administration in time range)   glucagon (human recombinant) injection 1 mg (has no administration in time range)   insulin aspart U-100 pen 0-5 Units (has no administration in time range)   dextrose 10% bolus 125 mL 125 mL (has no administration in time range)   dextrose 10% bolus 250 mL 250 mL (has no administration in time range)   lactated ringers bolus  1,000 mL (has no administration in time range)   sodium chloride 0.9% bolus 500 mL 500 mL (0 mLs Intravenous Stopped 11/24/23 1406)   0.9%  NaCl infusion (500 mLs Intravenous New Bag 11/24/23 1405)     Medical Decision Making  Amount and/or Complexity of Data Reviewed  Labs:  Decision-making details documented in ED Course.  Radiology: ordered. Decision-making details documented in ED Course.    Risk  Prescription drug management.               ED Course as of 11/24/23 1614   Fri Nov 24, 2023   1321 WBC(!): 16.63  Reviewed by self - abnormal.  [LC]   1321 CPK: 24  Reviewed by self - WNL  [LC]   1322 Creatinine(!): 1.9  Reviewed by self - abnormal - pt baseline Cr is 1.0 [LC]   1324 X-Ray Chest PA And Lateral  coarse interstitial attenuation, COPD-like; no focal consolidation per my independent interpretation [LC]   1325 X-Ray Hip 2 or 3 views Right (with Pelvis when performed)  No acute osseous abnormality of the R hip per my independent interpretation.  [LC]   1432 Case discussed with [facility] who will admit patient to their service for treatment of orthostatic hypotension, dehydration.    Of note, pt did recently finish a course of PO prednisone which may reflect the reason for her leukocytosis.  [LC]      ED Course User Index  [LC] Jose Wolf MD                          Clinical Impression:  Final diagnoses:  [R06.02] Shortness of breath  [R05.9] Cough  [R55] Near syncope (Primary)  [I95.1] Orthostatic hypotension  [N17.9] Acute kidney injury          ED Disposition Condition    Observation Stable                Jose Wolf MD  11/24/23 1615

## 2023-11-24 NOTE — ED NOTES
Report called to CATRACHITO Reid. All questions answered. Pt to be transported to room with all belongings on telemetry.

## 2023-11-24 NOTE — PHARMACY MED REC
"  Admission Medication History     The home medication history was taken by Edie Carias CPhT.    Medication history obtained from, Patient Verified    You may go to "Admission" then "Reconcile Home Medications" tabs to review and/or act upon these items.     The home medication list has been updated by the Pharmacy department.   Please read ALL comments highlighted in yellow.   Please address this information as you see fit.    Feel free to contact us if you have any questions or require assistance.      The medications listed below were removed from the home medication list.  Please reorder if appropriate:  Patient reports no longer taking the following medication(s):  Doxycycline 100 mg  Duloxetine 60 mg  Estrace 0.01% vaginal cream  Glucosamine-Chondroitin 500-400 mg  HCTZ 25 mg  Prednisone 20 mg  Pumpkin Seed oil  Solifenacin 5 mg  Tamiflu 75 mg  Uribel 118-10-40.8-36 mg      Edie Carias CPhT.  Ext [phone]             .          "

## 2023-11-24 NOTE — PROGRESS NOTES
[facility]           Pharmacy        Current Drug Shortage     Due to national backorder and [facility] is critically low on inventory of Dextrose 50% (D50) Syringes and Vials, pharmacy has automatically switched from D50% to D10% IVPB at the equivalent dose until resolution of the shortage per P&T approved protocol.               More Choudhary, PharmD  [phone]

## 2023-11-24 NOTE — H&P
[facility] Medicine H&P Note     Admitting Team: [facility] Hospitalist Team B  Attending Physician: Jose Wolf MD  Resident: Dr. Lul Krishna  Intern: Dr. Donte Silva    Date of Admit: 11/24/2023    Chief Complaint     Syncope + Fall    Subjective:      History of Present Illness:  83 year old female with PMHx of Mycobacterium avium complex infection, Meniere disease, Hx CVA, CKD, T2DM, COPD (confirmed on PFT's), frequent UTI's presented to ED with complaint of syncope + fall last night. The patient reports she was sitting in her chair last night, when she got up and hung onto her walker for a short period of time, before she started to walk towards the door and started to feel her vision go black & subsequently fell. She reports several of these types of episodes happening in the past, similar to this one. She doesn't remember the rest of the episode, only remembers waking up to her family helping her. She was not confused after the episode, per the daughter that was in the room. She uses a rolling walker and reports she usually falls when she's not using her walker. She does report she felt more wheezy than usual last night and her home albuterol machine is broken.    3 weeks ago she was diagnosed with Influenza, and has a had a persistent cough since. Last week, she felt as if she was getting worse, and was given a course or Prednisone + Doxycycline. However, her cough has not worsened since her Influenza sickness. She reports she took her insulin AFTER the fall last night.     She does report a history of chronic for the last several years. She reports she will sometimes have small episodes of constipation, and then return to diarrhea. She also endorses night sweats (sweating through the sheets) + 20 lbs weight loss over the last 2 months. She also has had early satiety and decreased appetite as well.     Denies abdominal pain, chest pain, SOB,       The patient was in their usual state of health until 6  months ago.    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    Type 2 diabetes mellitus with stage 2 chronic kidney disease, without long-term current use of insulin 10/28/2015       Past Surgical History:  Past Surgical History:   Procedure Laterality Date    ANKLE SURGERY Left     CATARACT EXTRACTION, BILATERAL       SECTION      x 3    eyelid surgery      HYSTERECTOMY         Allergies:  Review of patient's allergies indicates:   Allergen Reactions    Bactrim [sulfamethoxazole-trimethoprim] Hives    Sulfa (sulfonamide antibiotics) Hives       Home Medications:  Prior to Admission medications    Medication Sig Start Date End Date Taking? Authorizing Provider   acetaminophen (TYLENOL) 500 MG tablet Take 500 mg by mouth every 6 (six) hours as needed for Pain.   Yes Provider, Historical   albuterol (PROVENTIL/VENTOLIN HFA) 90 mcg/actuation inhaler Inhale 2 puffs into the lungs every 4 (four) hours as needed.   Yes Provider, Historical   ascorbic acid, vitamin C, (VITAMIN C) 500 MG tablet Take 500 mg by mouth once daily.   Yes Provider, Historical   atorvastatin (LIPITOR) 80 MG tablet Take 1 tablet (80 mg total) by mouth once  daily.  Patient taking differently: Take 80 mg by mouth every evening. 3/6/23  Yes Dolores Corado, NP   brompheniramine-pseudoeph-DM (BROMFED DM) 2-30-10 mg/5 mL Syrp Take 10 mLs by mouth every 6 (six) hours as needed. 23  Yes Provider, Historical   camphor-methyl salicyl-menthoL 3.1-15-10 % Gel Apply topically 2 (two) times daily.   Yes Provider, Historical   cholecalciferol, vitamin D3, (VITAMIN D3) 50 mcg (2,000 unit) Tab Take 2,000 Units by mouth once daily.   Yes Provider, Historical   chromium picolinate 200 mcg Tab Take 200 mcg by mouth once daily.   Yes Provider, Historical   clopidogreL (PLAVIX) 75 mg tablet Take 1 tablet (75 mg total) by mouth once daily. 3/6/23  Yes Dolores Corado, NP   cyanocobalamin (VITAMIN B-12) 1000 MCG tablet Take 1,000 mcg by mouth once daily.   Yes Provider, Historical   d-mannose 500 mg Cap Take 500 mg by mouth 2 (two) times a day.   Yes Provider, Historical   EScitalopram oxalate (LEXAPRO) 20 MG tablet Take 20 mg by mouth once daily. 23  Yes Provider, Historical   fluticasone (FLONASE) 50 mcg/actuation nasal spray 1 spray by Each Nare route once daily.   Yes Provider, Historical   FLUZONE HIGHDOSE QUAD 23-24  mcg/0.7 mL Syrg  10/3/23  Yes Provider, Historical   gabapentin (NEURONTIN) 300 MG capsule Take 1 capsule (300 mg total) by mouth 2 (two) times daily. 3/6/23  Yes Dolores Corado, NP   guaiFENesin-codeine 100-10 mg/5 ml (TUSSI-ORGANIDIN NR)  mg/5 mL syrup SMARTSI-10 Milliliter(s) By Mouth Every 6 Hours PRN 23  Yes Provider, Historical   insulin  unit/mL injection Inject 16 Units into the skin 2 (two) times daily before meals. 3/6/23 1/31/31 Yes Dolores Corado, NP   lisinopriL (PRINIVIL,ZESTRIL) 5 MG tablet Take 1 tablet (5 mg total) by mouth once daily. 7/10/23  Yes Dolores Corado, MANAS   magnesium 250 mg Tab Take 500 mg by mouth once daily.   Yes Provider, Historical   metoprolol succinate (TOPROL-XL) 100 MG 24 hr tablet Take 1  tablet (100 mg total) by mouth once daily. 4/3/23  Yes Dolores Corado, MANAS   multivit-min/FA/lycopen/lutein (SENTRY SENIOR ORAL) Take 1 tablet by mouth once daily.   Yes Provider, Historical   ondansetron (ZOFRAN) 8 MG tablet Take 8 mg by mouth every 8 (eight) hours as needed.   Yes Provider, Historical   potassium gluconate 595 mg (99 mg) TbSR Take 595 mg by mouth once daily.   Yes Provider, Historical   pyridoxine, vitamin B6, (B-6) 100 MG Tab Take 100 mg by mouth once daily.   Yes Provider, Historical   vitamin A palmitate 3,000 mcg (10,000 unit) Tab Take 1 tablet by mouth once daily.   Yes Provider, Historical   doxycycline (MONODOX) 100 MG capsule Take 100 mg by mouth 2 (two) times daily. 11/14/23 11/24/23 Yes Provider, Historical   EScitalopram oxalate (LEXAPRO) 10 MG tablet Take 1 tablet (10 mg total) by mouth once daily. 7/10/23 11/24/23 Yes Dolores Corado, MANAS   hydroCHLOROthiazide (HYDRODIURIL) 25 MG tablet  9/25/23 11/24/23 Yes Provider, Historical   oseltamivir (TAMIFLU) 75 MG capsule Take 75 mg by mouth 2 (two) times daily. 11/8/23 11/24/23 Yes Provider, Historical   predniSONE (DELTASONE) 20 MG tablet Take 40 mg by mouth. 11/14/23 11/24/23 Yes Provider, Historical   meclizine (ANTIVERT) 25 mg tablet Take 25 mg by mouth 2 (two) times daily as needed. 11/30/22   Provider, Historical   metFORMIN (GLUCOPHAGE) 1000 MG tablet Take 1 tablet (1,000 mg total) by mouth 2 (two) times daily with meals. 7/10/23   Dolores Corado, NP   DULoxetine (CYMBALTA) 60 MG capsule Take 1 capsule (60 mg total) by mouth once daily. 3/6/23 11/24/23  Dolores Corado, NP   estradioL (ESTRACE) 0.01 % (0.1 mg/gram) vaginal cream Place 1 g vaginally once daily for 14 days, THEN 1 g twice a week. 9/25/23 11/24/23  Shahzad Solis NP   glucosamine-chondroitin 500-400 mg tablet Take 1 tablet by mouth 3 (three) times daily.  11/24/23  Provider, Historical   methen-m.blue-s.phos-phsal-hyo (URIBEL) 118-10-40.8-36 mg Cap Take 1  capsule by mouth once daily. 23  Ailyn Tillman MD   multivitamin capsule Take 1 capsule by mouth once daily.  23  Provider, Historical   solifenacin (VESICARE) 5 MG tablet Take 1 tablet (5 mg total) by mouth once daily. 23  Shahzad Solis NP   UNABLE TO FIND medication name: Pumpkin Seed Oil  23  Provider, Historical       Family History:  Family History   Problem Relation Age of Onset    Depression Mother     Bipolar disorder Mother     Cancer Father         skin cancer    Diabetes Father     Hypertension Father     Hyperlipidemia Father     Heart disease Father     Gout Father     Nephrolithiasis Father     Hypertension Sister     Gout Sister     Diabetes Sister     Cancer Sister         rectal cancer    Hypertension Sister     Hyperlipidemia Sister     Heart disease Sister         stents x 4    Cancer Sister         pituitary gland cancer    Hypertension Brother     Hyperlipidemia Brother     Heart disease Brother     Hypertension Daughter     Early death Maternal Aunt        Social History:  Social History     Tobacco Use    Smoking status: Never     Passive exposure: Past    Smokeless tobacco: Never   Substance Use Topics    Alcohol use: No    Drug use: No       Review of Systems:  All relevant systems in HPI. All other systems are reviewed and are negative.    Health Maintaince :   Primary Care Physician: Dolores Corado    Immunizations:   TDap postponed until 10/10/2024  Flu Up to date 04-Oct-2023  Pna Administered 19-Oct-2022    Cancer Screening:  Colonoscopy: Unclear if up to date. In 2016, there is a note that reports a colonoscopy is due in 2019, but when asked the patient cannot remember her last colonoscopy date, but reports it was in the last 10 years.     Objective:   Last 24 Hour Vital Signs:  BP  Min: 80/46  Max: 132/63  Temp  Av.1 °F (36.7 °C)  Min: 98.1 °F (36.7 °C)  Max: 98.1 °F (36.7 °C)  Pulse  Av.1  Min: 80  Max: 111  Resp  Av   "Min: 18  Max: 18  SpO2  Av.8 %  Min: 94 %  Max: 99 %  Height  Av' 8" (172.7 cm)  Min: 5' 8" (172.7 cm)  Max: 5' 8" (172.7 cm)  Weight  Av kg (150 lb)  Min: 68 kg (150 lb)  Max: 68 kg (150 lb)  Body mass index is 22.81 kg/m².  No intake/output data recorded.    Physical Examination:  Physical Exam  Vitals and nursing note reviewed.   Constitutional:       General: She is not in acute distress.     Appearance: Normal appearance. She is normal weight. She is not ill-appearing or toxic-appearing.   HENT:      Nose: Nose normal. No congestion or rhinorrhea.      Mouth/Throat:      Mouth: Mucous membranes are moist.      Pharynx: Oropharynx is clear.   Eyes:      Extraocular Movements: Extraocular movements intact.      Conjunctiva/sclera: Conjunctivae normal.      Pupils: Pupils are equal, round, and reactive to light.   Cardiovascular:      Rate and Rhythm: Normal rate and regular rhythm.      Pulses: Normal pulses.      Heart sounds: Normal heart sounds. No murmur heard.     No friction rub. No gallop.   Pulmonary:      Effort: Pulmonary effort is normal.      Breath sounds: Normal breath sounds. No wheezing, rhonchi or rales.   Abdominal:      General: Abdomen is flat. Bowel sounds are normal.      Palpations: Abdomen is soft.      Tenderness: There is no abdominal tenderness. There is no guarding or rebound.   Musculoskeletal:         General: Normal range of motion.      Cervical back: Normal range of motion and neck supple. No rigidity.      Right lower leg: No edema.      Left lower leg: No edema.   Skin:     General: Skin is warm and dry.      Capillary Refill: Capillary refill takes less than 2 seconds.   Neurological:      General: No focal deficit present.      Mental Status: She is alert and oriented to person, place, and time.   Psychiatric:         Mood and Affect: Mood normal.         Behavior: Behavior normal.         Thought Content: Thought content normal.         Judgment: Judgment " normal.           Laboratory:  Most Recent Data:  CBC:   Lab Results   Component Value Date    WBC 16.63 (H) 11/24/2023    HGB 14.4 11/24/2023    HCT 42.8 11/24/2023     11/24/2023    MCV 88 11/24/2023    RDW 13.4 11/24/2023     WBC Differential: 70.8 % N, 1.6 % Bands, 3.4 % L, 1.1 % M, 0.7 % Eo, 0.2 % Baso    BMP:   Lab Results   Component Value Date     (L) 11/24/2023    K 4.1 11/24/2023    CL 93 (L) 11/24/2023    CO2 28 11/24/2023    BUN 59 (H) 11/24/2023    CREATININE 1.9 (H) 11/24/2023     (H) 11/24/2023    CALCIUM 10.2 11/24/2023    MG 2.3 09/27/2023    PHOS 4.3 09/27/2023     LFTs:   Lab Results   Component Value Date    PROT 6.4 11/24/2023    ALBUMIN 3.4 (L) 11/24/2023    BILITOT 0.8 11/24/2023    AST 12 11/24/2023    ALKPHOS 100 11/24/2023    ALT 19 11/24/2023     FLP:   Lab Results   Component Value Date    CHOL 177 09/27/2023    HDL 47 09/27/2023    LDLCALC 77.4 09/27/2023    TRIG 263 (H) 09/27/2023    CHOLHDL 26.6 09/27/2023     DM:   Lab Results   Component Value Date    HGBA1C 6.7 (H) 09/27/2023    HGBA1C 7.0 (H) 07/06/2023    HGBA1C 7.4 (H) 02/20/2023    MICROALBUR 0.7 02/21/2023    LDLCALC 77.4 09/27/2023    CREATININE 1.9 (H) 11/24/2023     Thyroid:   Lab Results   Component Value Date    TSH 3.42 02/20/2023     Anemia:   Lab Results   Component Value Date    IRON 142 07/19/2023    TIBC 426 07/19/2023    FERRITIN 40 07/19/2023     Cardiac:   Lab Results   Component Value Date    TROPONINI 0.023 11/24/2023    BNP <10 11/24/2023     Urinalysis:   Lab Results   Component Value Date    LABURIN No growth 08/07/2023    COLORU Yellow 11/24/2023    SPECGRAV 1.020 11/24/2023    NITRITE Negative 11/24/2023    KETONESU Negative 11/24/2023    UROBILINOGEN Negative 11/24/2023    WBCUA 3 11/24/2023       Trended Lab Data:  Recent Labs   Lab 11/24/23  1219   WBC 16.63*   HGB 14.4   HCT 42.8      MCV 88   RDW 13.4   *   K 4.1   CL 93*   CO2 28   BUN 59*   CREATININE 1.9*   *    PROT 6.4   ALBUMIN 3.4*   BILITOT 0.8   AST 12   ALKPHOS 100   ALT 19       Trended Cardiac Data:  Recent Labs   Lab 11/24/23  1219   TROPONINI 0.023   BNP <10       Microbiology Data:  Negative Influenza Culture    Other Results:  EKG (my interpretation): Sinus Tachycardia, No STT wave changes    Radiology:  Imaging Results              CT Cervical Spine Without Contrast (Final result)  Result time 11/24/23 14:05:43      Final result by Amado Mcintyre MD (11/24/23 14:05:43)                   Impression:      No acute hemorrhage or major vascular distribution infarct.    No fracture.    Generalized cerebral volume loss with sequela of chronic microvascular ischemic change.    Cervical spondylosis without severe neural foraminal narrowing or spinal canal stenosis at any level.    Multiple ground-glass pulmonary nodules measuring up to 4 mm.  For a ground glass nodule <6 mm, Fleischner Society 2017 guidelines recommend no routine follow up. However, suspicious features could warrant follow up with non-contrast chest CT at 2 years and 4 years after discovery.    Electronically signed by resident: Alcon Davison  Date:    11/24/2023  Time:    13:11    Electronically signed by: Amado Mcintyre MD  Date:    11/24/2023  Time:    14:05               Narrative:    EXAMINATION:  CT HEAD WITHOUT CONTRAST; CT CERVICAL SPINE WITHOUT CONTRAST    CLINICAL HISTORY:  Head trauma, minor (Age >= 65y);; Neck trauma (Age >= 65y);    TECHNIQUE:  Low dose axial CT images obtained throughout the head without the use of intravenous contrast.  Axial, sagittal and coronal reconstructions were performed.    Low dose axial images, sagittal and coronal reformations were performed though the cervical spine.  Contrast was not administered.    COMPARISON:  CT 05/22/2023, 09/14/2022    FINDINGS:  INTRACRANIAL COMPARTMENT:    Prominence of the ventricles and sulci compatible with mild generalized cerebral volume loss.  No  hydrocephalus.    Mild patchy hypoattenuation in the supratentorial white matter, nonspecific but most likely reflecting chronic microvascular ischemic changes. No recent or remote major vascular distribution infarct. No acute hemorrhage.  No mass effect or midline shift.    No extra-axial blood or fluid collections.    SKULL/EXTRACRANIAL CONTENTS (limited evaluation):    No fracture. Mastoid air cells and paranasal sinuses are essentially clear.    Skull Base and Craniocervical Junction (partially imaged): Normal.    CERVICAL SPINE:    Spinal Alignment: Normal.    Vertebrae: Multilevel degenerative endplate change.  No fracture.    Discs: Multilevel disc height loss, most pronounced C6-7.    Degenerative findings: Multilevel facet arthropathy.  No severe neural foraminal narrowing or spinal canal stenosis at any level.    Paraspinal muscles & soft tissues: Multiple ground-glass nodules at the bilateral lung apices measuring up to 4 mm.                                       CT Head Without Contrast (Final result)  Result time 11/24/23 14:05:43      Final result by Amado Mcintyre MD (11/24/23 14:05:43)                   Impression:      No acute hemorrhage or major vascular distribution infarct.    No fracture.    Generalized cerebral volume loss with sequela of chronic microvascular ischemic change.    Cervical spondylosis without severe neural foraminal narrowing or spinal canal stenosis at any level.    Multiple ground-glass pulmonary nodules measuring up to 4 mm.  For a ground glass nodule <6 mm, Fleischner Society 2017 guidelines recommend no routine follow up. However, suspicious features could warrant follow up with non-contrast chest CT at 2 years and 4 years after discovery.    Electronically signed by resident: Alcon Davison  Date:    11/24/2023  Time:    13:11    Electronically signed by: Amado Mcintyre MD  Date:    11/24/2023  Time:    14:05               Narrative:    EXAMINATION:  CT HEAD WITHOUT  CONTRAST; CT CERVICAL SPINE WITHOUT CONTRAST    CLINICAL HISTORY:  Head trauma, minor (Age >= 65y);; Neck trauma (Age >= 65y);    TECHNIQUE:  Low dose axial CT images obtained throughout the head without the use of intravenous contrast.  Axial, sagittal and coronal reconstructions were performed.    Low dose axial images, sagittal and coronal reformations were performed though the cervical spine.  Contrast was not administered.    COMPARISON:  CT 05/22/2023, 09/14/2022    FINDINGS:  INTRACRANIAL COMPARTMENT:    Prominence of the ventricles and sulci compatible with mild generalized cerebral volume loss.  No hydrocephalus.    Mild patchy hypoattenuation in the supratentorial white matter, nonspecific but most likely reflecting chronic microvascular ischemic changes. No recent or remote major vascular distribution infarct. No acute hemorrhage.  No mass effect or midline shift.    No extra-axial blood or fluid collections.    SKULL/EXTRACRANIAL CONTENTS (limited evaluation):    No fracture. Mastoid air cells and paranasal sinuses are essentially clear.    Skull Base and Craniocervical Junction (partially imaged): Normal.    CERVICAL SPINE:    Spinal Alignment: Normal.    Vertebrae: Multilevel degenerative endplate change.  No fracture.    Discs: Multilevel disc height loss, most pronounced C6-7.    Degenerative findings: Multilevel facet arthropathy.  No severe neural foraminal narrowing or spinal canal stenosis at any level.    Paraspinal muscles & soft tissues: Multiple ground-glass nodules at the bilateral lung apices measuring up to 4 mm.                                       X-Ray Thoracic Spine AP Lateral (Final result)  Result time 11/24/23 12:22:04      Final result by Amado Mcintyre MD (11/24/23 12:22:04)                   Impression:      Degenerative changes, no acute fracture is identified.  This is a suboptimal study on the lateral view.      Electronically signed by: Amado Mcintyre  MD  Date:    11/24/2023  Time:    12:22               Narrative:    EXAMINATION:  XR THORACIC SPINE AP LATERAL    CLINICAL HISTORY:  back pain;    TECHNIQUE:  AP and lateral views of the thoracic spine were performed.    COMPARISON:  04/14/2023    FINDINGS:  X-ray a AP and lateral study demonstrates normal body heights bone destruction.  No obvious compression fractures seen on the frontal view.  The lateral x-ray is not aligned well to see the endplates, so it is difficult to determine whether there is a fracture in the lower thoracic spine.  Degenerative endplate changes are seen in the thoracic spine.                                       X-Ray Hip 2 or 3 views Right (with Pelvis when performed) (Final result)  Result time 11/24/23 12:44:33      Final result by Donavan Galan MD (11/24/23 12:44:33)                   Impression:      1. No acute displaced fracture or dislocation of the right hip.      Electronically signed by: Donavan Galan MD  Date:    11/24/2023  Time:    12:44               Narrative:    EXAMINATION:  XR HIP WITH PELVIS WHEN PERFORMED, 2 OR 3  VIEWS RIGHT    CLINICAL HISTORY:  right hip pain;    TECHNIQUE:  AP view of the pelvis and frog leg lateral view of the right hip were performed.    COMPARISON:  04/14/2023    FINDINGS:  Three views right hip.    There is osteopenia.  The bilateral sacroiliac joints are intact noting degenerative changes.  There are degenerative changes of the pubic symphysis.  The bilateral femoroacetabular joints are intact.  No acute displaced fracture or dislocation of the right hip.                                       X-Ray Chest PA And Lateral (Final result)  Result time 11/24/23 12:06:10      Final result by Donavan Galan MD (11/24/23 12:06:10)                   Impression:      1. Coarse interstitial attenuation, accentuated by shallow inspiratory effort and habitus.  Early edema or other pneumonitis can present in this fashion, no large focal  consolidation.  Correlation is advised.  Correlation with any history of COPD/emphysema.      Electronically signed by: Donavan Galan MD  Date:    11/24/2023  Time:    12:06               Narrative:    EXAMINATION:  XR CHEST PA AND LATERAL    CLINICAL HISTORY:  Cough, unspecified    TECHNIQUE:  PA and lateral views of the chest were performed.    COMPARISON:  04/14/2023    FINDINGS:  The cardiomediastinal silhouette is not enlarged noting calcification of the aorta..  There is no pleural effusion.  The trachea is midline.  The lungs are symmetrically expanded bilaterally with coarse interstitial attenuation.  There is bilateral basilar subsegmental atelectasis, left greater than right..  No large focal consolidation seen.  There is no pneumothorax.  The osseous structures are remarkable for degenerative changes noting levo scoliotic curvature of the spine..                                       Assessment:       83 year old female presented to ED with complaint of syncope + fall prior to arrival. Given the history and labs, is most likely secondary to orthostatic Hypotension, as her BP drops >20/10 on ambulation, as well she reports diarrhea and recent illness with hyperdynamic heart on echo. Low suspicion for PE at this time.    Plan:     Will hold off on abx for now, given the history and assessment of the patient/labs/imaging. Will order procal and if elevated reevaluate. Plan for a standard Syncope workup    Syncope  Falls  - IV fluids: 500mL to start  - BNP <10, CPK 24, Trop 0.023  - Orthostatics documented in chart, suggestive of orthostatic Hypotension  - ECG grossly normal  - Will old off on abx for now, given likely orthostatic  - Hold Lisinopril, Meclizine  - Echo to rule out structural cause of syncope    DAQUAN  - Cr 1.9, up from baseline ~1  - Urine Studies and FeNa  - Likely Prerenal, will reassess post fluid administration    Night Sweats, early satiety  Weight Loss  - Unable to assess when last  colonoscopy was  - Close follow up with PCP. Will consider outpatient follow up with Gastroenterology for EGD/Colonoscopy    Persistent Cough  Recent diagnosed influenza  - Pro Paul to decrease suspicion of pneumonia  -CXR  -Leukocytosis, but patient has had recent prednisone  -Guaifenesin for dry cough relief    Leukocytosis  - patient was recently on Prednisone, likely reactive.  -Afebrile in hospital, unlikely infectious    COPD  - continue home Albuterol  - O2 sats 100% in ED, will target lower 90's as in patient.    DM with peripheral Neuropathy  - Continue home gabapentin for neuropathic foot pain  - A1C in   - Home dosin units insulin NPH twice daily (32 units total per day): will start 10 units nightly + sliding scale while in hospital.  - Hold Metformin    Chronic Loose Stools + Constipation  - Unsure of significance at this time, will continue to monitor while in Hospital  - Outpatient referral to GI if interested upon discharge    Hx of Depression  - Continue Home Escitalopram  - Syncope likely not due to SIADH from escitalopram, given hypovolemic appearance, but will reevaluate if urine labs suggest as an etiology.    Hx of MAC infection  - Set up out patient follow up with Pulmonology    Code Status:     Code: Full    Donte Silva MD  [facility] Internal Medicine/Emergency Medicine, HO-1  [facility] Hospitalist Team B  2023      [facility] Medicine Hospitalist Pager numbers:   [facility] Hospitalist Medicine Team A (Conrad/Bruno): [phone]  [facility] Hospitalist Medicine Team B (Gill/Billy):  [phone]

## 2023-11-25 VITALS
WEIGHT: 150.13 LBS | OXYGEN SATURATION: 96 % | SYSTOLIC BLOOD PRESSURE: 154 MMHG | RESPIRATION RATE: 18 BRPM | BODY MASS INDEX: 22.75 KG/M2 | DIASTOLIC BLOOD PRESSURE: 76 MMHG | TEMPERATURE: 98 F | HEIGHT: 68 IN | HEART RATE: 97 BPM

## 2023-11-25 LAB
ALBUMIN SERPL BCP-MCNC: 2.7 G/DL (ref 3.5–5.2)
ALP SERPL-CCNC: 74 U/L (ref 55–135)
ALT SERPL W/O P-5'-P-CCNC: 16 U/L (ref 10–44)
ANION GAP SERPL CALC-SCNC: 9 MMOL/L (ref 8–16)
AST SERPL-CCNC: 13 U/L (ref 10–40)
BASOPHILS # BLD AUTO: 0.02 K/UL (ref 0–0.2)
BASOPHILS NFR BLD: 0.2 % (ref 0–1.9)
BILIRUB SERPL-MCNC: 0.9 MG/DL (ref 0.1–1)
BUN SERPL-MCNC: 39 MG/DL (ref 8–23)
CALCIUM SERPL-MCNC: 8.9 MG/DL (ref 8.7–10.5)
CHLORIDE SERPL-SCNC: 102 MMOL/L (ref 95–110)
CO2 SERPL-SCNC: 30 MMOL/L (ref 23–29)
CREAT SERPL-MCNC: 1.2 MG/DL (ref 0.5–1.4)
DIFFERENTIAL METHOD: ABNORMAL
EOSINOPHIL # BLD AUTO: 0.2 K/UL (ref 0–0.5)
EOSINOPHIL NFR BLD: 1.7 % (ref 0–8)
ERYTHROCYTE [DISTWIDTH] IN BLOOD BY AUTOMATED COUNT: 13.6 % (ref 11.5–14.5)
EST. GFR  (NO RACE VARIABLE): 45 ML/MIN/1.73 M^2
GLUCOSE SERPL-MCNC: 90 MG/DL (ref 70–110)
HCT VFR BLD AUTO: 38.3 % (ref 37–48.5)
HGB BLD-MCNC: 12.8 G/DL (ref 12–16)
IMM GRANULOCYTES # BLD AUTO: 0.12 K/UL (ref 0–0.04)
IMM GRANULOCYTES NFR BLD AUTO: 1 % (ref 0–0.5)
LYMPHOCYTES # BLD AUTO: 3.1 K/UL (ref 1–4.8)
LYMPHOCYTES NFR BLD: 26.4 % (ref 18–48)
MAGNESIUM SERPL-MCNC: 1.8 MG/DL (ref 1.6–2.6)
MCH RBC QN AUTO: 29.9 PG (ref 27–31)
MCHC RBC AUTO-ENTMCNC: 33.4 G/DL (ref 32–36)
MCV RBC AUTO: 90 FL (ref 82–98)
MONOCYTES # BLD AUTO: 0.9 K/UL (ref 0.3–1)
MONOCYTES NFR BLD: 7.3 % (ref 4–15)
NEUTROPHILS # BLD AUTO: 7.3 K/UL (ref 1.8–7.7)
NEUTROPHILS NFR BLD: 63.4 % (ref 38–73)
NRBC BLD-RTO: 0 /100 WBC
PHOSPHATE SERPL-MCNC: 3.9 MG/DL (ref 2.7–4.5)
PLATELET # BLD AUTO: 310 K/UL (ref 150–450)
PMV BLD AUTO: 8.3 FL (ref 9.2–12.9)
POCT GLUCOSE: 196 MG/DL (ref 70–110)
POCT GLUCOSE: 81 MG/DL (ref 70–110)
POTASSIUM SERPL-SCNC: 4.1 MMOL/L (ref 3.5–5.1)
PROT SERPL-MCNC: 5.3 G/DL (ref 6–8.4)
RBC # BLD AUTO: 4.28 M/UL (ref 4–5.4)
SODIUM SERPL-SCNC: 141 MMOL/L (ref 136–145)
WBC # BLD AUTO: 11.57 K/UL (ref 3.9–12.7)

## 2023-11-25 PROCEDURE — 84100 ASSAY OF PHOSPHORUS: CPT

## 2023-11-25 PROCEDURE — 83735 ASSAY OF MAGNESIUM: CPT

## 2023-11-25 PROCEDURE — 97535 SELF CARE MNGMENT TRAINING: CPT

## 2023-11-25 PROCEDURE — 99900035 HC TECH TIME PER 15 MIN (STAT)

## 2023-11-25 PROCEDURE — 94761 N-INVAS EAR/PLS OXIMETRY MLT: CPT

## 2023-11-25 PROCEDURE — 36415 COLL VENOUS BLD VENIPUNCTURE: CPT

## 2023-11-25 PROCEDURE — 25000003 PHARM REV CODE 250

## 2023-11-25 PROCEDURE — 85025 COMPLETE CBC W/AUTO DIFF WBC: CPT

## 2023-11-25 PROCEDURE — G0378 HOSPITAL OBSERVATION PER HR: HCPCS

## 2023-11-25 PROCEDURE — 97165 OT EVAL LOW COMPLEX 30 MIN: CPT

## 2023-11-25 PROCEDURE — 97161 PT EVAL LOW COMPLEX 20 MIN: CPT

## 2023-11-25 PROCEDURE — 97530 THERAPEUTIC ACTIVITIES: CPT

## 2023-11-25 PROCEDURE — 80053 COMPREHEN METABOLIC PANEL: CPT

## 2023-11-25 RX ADMIN — GABAPENTIN 300 MG: 300 CAPSULE ORAL at 08:11

## 2023-11-25 RX ADMIN — ESCITALOPRAM OXALATE 20 MG: 10 TABLET ORAL at 08:11

## 2023-11-25 RX ADMIN — CLOPIDOGREL BISULFATE 75 MG: 75 TABLET ORAL at 08:11

## 2023-11-25 NOTE — DISCHARGE SUMMARY
[facility] Hospital Medicine Discharge Summary    Primary Team: [facility] Hospitalist Team B  Attending Physician: Rufus Cervantes MD  Resident: Dr. Lul Krishna  Intern: Dr. Donte Silva    Date of Admit: 11/24/2023  Date of Discharge: 11/25/2023    Discharge to: Home  Condition: Improved    Discharge Diagnoses     Primary Hospital Diagnosis: Syncope secondary to Orthostatic Hypotension    Patient Active Problem List   Diagnosis    Type 2 diabetes mellitus with stage 3 chronic kidney disease, with long-term current use of insulin    HTN, goal below 140/90    Hyperlipidemia associated with type 2 diabetes mellitus    CKD stage 3 due to type 2 diabetes mellitus    Meniere disease    History of CVA (cerebrovascular accident) without residual deficits    History of MAC infection    Recurrent major depressive disorder, in partial remission    Abnormal chest x-ray with multiple lung nodules    COPD, moderate    SOB (shortness of breath)    Weakness of both lower extremities    Balance problems    Allergic rhinitis    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    Diabetic polyneuropathy associated with type 2 diabetes mellitus    Atherosclerosis of native coronary artery of native heart with angina pectoris    Aortic atherosclerosis    Long term (current) use of antithrombotics/antiplatelets    Bronchiectasis without complication    Calcified granuloma of lung    Chronic GERD    Osteopenia    Long term (current) use of insulin    Multiple falls    Mild intermittent asthma without complication    Senile purpura    Near syncope    Acute kidney injury       Consultants and Procedures     Consultants:  None    Procedures:   None    Imaging:    Xray PA and Lateral: Coarse interstitial attenuation, accentuated by shallow inspiratory effort and habitus.  Early edema or other pneumonitis can present in this fashion, no large focal consolidation.  Correlation is advised.  Correlation  with any history of COPD/emphysema.      Xray Hip: No acute displaced fracture or dislocation of the right hip.      Xray Thoracic Spine: Degenerative changes, no acute fracture is identified.  This is a suboptimal study on the lateral view.      CT head without contrast: No acute hemorrhage or major vascular distribution infarct.     No fracture.     Generalized cerebral volume loss with sequela of chronic microvascular ischemic change.     Cervical spondylosis without severe neural foraminal narrowing or spinal canal stenosis at any level.     Multiple ground-glass pulmonary nodules measuring up to 4 mm.  For a ground glass nodule <6 mm, Fleischner Society 2017 guidelines recommend no routine follow up. However, suspicious features could warrant follow up with non-contrast chest CT at 2 years and 4 years after discovery.     CT Cervical Spine: No acute hemorrhage or major vascular distribution infarct.     No fracture.     Generalized cerebral volume loss with sequela of chronic microvascular ischemic change.     Cervical spondylosis without severe neural foraminal narrowing or spinal canal stenosis at any level.     Multiple ground-glass pulmonary nodules measuring up to 4 mm.  For a ground glass nodule <6 mm, Fleischner Society 2017 guidelines recommend no routine follow up. However, suspicious features could warrant follow up with non-contrast chest CT at 2 years and 4 years after discovery.  Brief History of Present Illness      Xi Moise is an 83 year old female that presented to the ED with syncope. Given the history and physical exam findings + improved creatinine post volume expansion, the etiology is most likely orthostatic hypotension.    For the full HPI please refer to the History & Physical from this admission.    Hospital Course By Problem with Pertinent Findings     Plan to discharge today and educate on adequate fluid resuscitation. Discussed stopping beta blocker to decrease orthostatic  hypotension in the future.     Syncope  Falls  - IV fluids: 500mL to start  - BNP <10, CPK 24, Trop 0.023  - Orthostatics documented in chart, suggestive of orthostatic Hypotension  - ECG grossly normal  - Will old off on abx for now, given likely orthostatic  - Hold Lisinopril, Meclizine  - Echo with no concern for functional abnormality     DAQUAN  - Cr 1.9, up from baseline ~1  - Urine Studies and FeNa  - Cr 1.2 on hospital day 2 post fluid resuscitation, likely prerenal cause.     Night Sweats, early satiety  Weight Loss  - Unable to assess when last colonoscopy was  - Close follow up with PCP. Will consider outpatient follow up with Gastroenterology for EGD/Colonoscopy     Persistent Cough  Recent diagnosed influenza  - Pro Palu to decrease suspicion of pneumonia  -CXR  -Leukocytosis, but patient has had recent prednisone  -Guaifenesin for dry cough relief     Leukocytosis  - patient was recently on Prednisone, likely reactive.  -Afebrile in hospital, unlikely infectious     COPD  - continue home Albuterol  - O2 sats 100% in ED, will target lower 90's as in patient.     DM with peripheral Neuropathy  - Continue home gabapentin for neuropathic foot pain  - A1C in   - Home dosin units insulin NPH twice daily (32 units total per day): will start 10 units nightly + sliding scale while in hospital.  - Hold Metformin     Chronic Loose Stools + Constipation  - Unsure of significance at this time, will continue to monitor while in Hospital  - Outpatient referral to GI if interested upon discharge     Hx of Depression  - Continue Home Escitalopram  - Syncope likely not due to SIADH from escitalopram, given hypovolemic appearance, but will reevaluate if urine labs suggest as an etiology.     Hx of MAC infection  - Set up out patient follow up with Pulmonology    Discharge Medications        Medication List        CHANGE how you take these medications      atorvastatin 80 MG tablet  Commonly known as:  LIPITOR  Take 1 tablet (80 mg total) by mouth once daily.  What changed: when to take this            CONTINUE taking these medications      acetaminophen 500 MG tablet  Commonly known as: TYLENOL     albuterol 90 mcg/actuation inhaler  Commonly known as: PROVENTIL/VENTOLIN HFA     ascorbic acid (vitamin C) 500 MG tablet  Commonly known as: VITAMIN C     brompheniramine-pseudoeph-DM 2-30-10 mg/5 mL Syrp  Commonly known as: BROMFED DM     camphor-methyl salicyl-menthoL 3.1-15-10 % Gel     cholecalciferol (vitamin D3) 50 mcg (2,000 unit) Tab  Commonly known as: VITAMIN D3     chromium picolinate 200 mcg Tab     clopidogreL 75 mg tablet  Commonly known as: PLAVIX  Take 1 tablet (75 mg total) by mouth once daily.     cyanocobalamin 1000 MCG tablet  Commonly known as: VITAMIN B-12     d-mannose 500 mg Cap     EScitalopram oxalate 20 MG tablet  Commonly known as: LEXAPRO     fluticasone propionate 50 mcg/actuation nasal spray  Commonly known as: FLONASE     FLUZONE HIGHDOSE QUAD 23-24  mcg/0.7 mL Syrg  Generic drug: flu vacc [id number](65yr up)-PF     gabapentin 300 MG capsule  Commonly known as: NEURONTIN  Take 1 capsule (300 mg total) by mouth 2 (two) times daily.     guaiFENesin-codeine 100-10 mg/5 ml  mg/5 mL syrup  Commonly known as: TUSSI-ORGANIDIN NR     insulin  unit/mL injection  Inject 16 Units into the skin 2 (two) times daily before meals.     lisinopriL 5 MG tablet  Commonly known as: PRINIVIL,ZESTRIL  Take 1 tablet (5 mg total) by mouth once daily.     magnesium 250 mg Tab     meclizine 25 mg tablet  Commonly known as: ANTIVERT     metFORMIN 1000 MG tablet  Commonly known as: GLUCOPHAGE  Take 1 tablet (1,000 mg total) by mouth 2 (two) times daily with meals.     ondansetron 8 MG tablet  Commonly known as: ZOFRAN     potassium gluconate 595 mg (99 mg) Tbsr     pyridoxine (vitamin B6) 100 MG Tab  Commonly known as: B-6     [facility] ORAL     vitamin A palmitate 3,000 mcg (10,000 unit)  Tab            STOP taking these medications      metoprolol succinate 100 MG 24 hr tablet  Commonly known as: TOPROL-XL              Discharge Information:   Diet:  Discussed adequate fluid intake and oral food intake.   Diabetic diet     Physical Activity:  Discussed orthostatic hypotension precautions             Instructions:  1. Take all medications as prescribed  2. Keep all follow-up appointments  3. Return to the hospital or call your primary care physicians if any worsening symptoms such as fever, chest pain, shortness of breath, return of symptoms, or any other concerns.    Discussed with the patient fall precautions. Discussed follow up with PCP. The patient agrees to this plan. All questions have been answered at this time.    Follow-Up Appointments:  Nephology - [facility]  Pulmonology - [facility]    PCP Follow-Up Items:  - Recommend discontinuing beta blockers  - Please ensure continued follow up of chronic conditions.    Donte Silva MD  [facility] Internal Medicine/Emergency Medicine, HO-1  [facility] Hospitalist Team B  11/25/2023

## 2023-11-25 NOTE — PT/OT/SLP EVAL
Occupational Therapy   Evaluation and Discharge Note    Name: Xi Moise  MRN: [id number]  Admitting Diagnosis: Near syncope  Recent Surgery: * No surgery found *      Recommendations:     Discharge Recommendations: No Therapy Indicated  Discharge Equipment Recommendations: none  Barriers to discharge:  None    Assessment:     Xi Moise is a 83 y.o. female with a medical diagnosis of Near syncope. Pt was agreeable to and participated in OT/PT evaluation.  Pt reports that she was mod I with mobility and ADLS at [facility].  Pt completed evaluation and noted to perform all tasks at mod I level, except for ambulation in hallway (completed with supervision due to hx of orthostatic).   Pt completed OT evaluation and noted to perform func mobility and ADLs with mod I to I.  Pt will have assistance/supervision as needed once discharged home.  Due to these factors, pt is discharged from OT services.  No DME or post acute OT required at this time.       Plan:     During this hospitalization, patient does not require further acute OT services.  Please re-consult if situation changes.    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  Patient found supine with telemetry upon OT entry to room.    General Precautions: Standard, fall  Orthopedic Precautions: N/A  Braces: N/A  Respiratory Status: Room air     Occupational Performance:    Bed Mobility:    Patient completed Scooting/Bridging with independence  Patient completed Supine to Sit with independence  Patient completed Sit to Supine with independence    Functional Mobility/Transfers:  Patient completed Sit <> Stand Transfer with modified independence  with  no assistive device   Functional Mobility: Pt able to walk inside room and in hallway with PT - no AD needed - only provided with S due to orthostatic history    Activities of Daily Living:  Feeding:  independence    Grooming: modified independence    Lower Body Dressing: modified independence to jaci/doff socks    Cognitive/Visual Perceptual:  Cognitive/Psychosocial Skills:     -       Oriented to: Person, Place, Time, and Situation   -       Follows Commands/attention:Follows two-step commands  -       Communication: clear/fluent  -       Memory: No Deficits noted  -       Safety awareness/insight to disability: impaired   -       Mood/Affect/Coping skills/emotional control: Appropriate to situation    Physical Exam:  Balance: -       Sitting/Standing = good  Postural examination/scapula alignment:    -       No postural abnormalities identified  Skin integrity: Visible skin intact - redness in B feet (R foot worse)  Edema:  None noted  Sensation:  Moderate in B feet (R foot worse)  Upper Extremity Range of Motion:  B UEs = WFL   Upper Extremity Strength:  B UEs = WFL   Strength:  B UEs = WFL    AMPAC 6 Click ADL:  AMPAC Total Score: 24    Treatment & Education:  Pt completed ADLs and func mobility activities for tx session as noted above  BPS taken on all positions:  Supine:  137/65  Sitting EOB: 127/89  Standing (before walking): 113/62  Standing  (after walking): 127/62  Pt educated on role of OT and POC      Patient left supine with call button in reach    GOALS:   Multidisciplinary Problems       Occupational Therapy Goals       Not on file              Multidisciplinary Problems (Resolved)          Problem: Occupational Therapy    Goal Priority Disciplines Outcome Interventions   Occupational Therapy Goal   (Resolved)     OT, PT/OT Met                        History:     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    Lipidemia     Long term (current) use of insulin 2023    Meniere disease 2016    Multiple falls 2023    Pulmonary Mycobacterium avium-intracellulare infection 2016    followed by Pulmonologist at [facility]    Type 2 diabetes mellitus with stage 2 chronic kidney disease, without long-term current use of insulin 10/28/2015         Past Surgical History:   Procedure Laterality Date    ANKLE SURGERY Left     CATARACT EXTRACTION, BILATERAL       SECTION      x 3    eyelid surgery      HYSTERECTOMY         Time Tracking:     OT Date of Treatment: 23  OT Start Time: 1021  OT Stop Time: 1049  OT Total Time (min): 28 min (23 min billable)    Billable Minutes:Evaluation 10 (coeval with PT)  Self Care/Home Management 13    2023

## 2023-11-25 NOTE — PLAN OF CARE
1140  DC order noted. Patient awake & alert in bed when CM rounded via [employer]. No family present. Patient in agreement with plan to discharge home today, denied the need for assistance with transportation at time of discharge, & will schedule a PCP hospital follow up appointment following discharge.     Message sent to nurse Sabine & virtual nurse Lolis informing that the pt is cleared to discharge.       [facility] - Telemetry  Discharge Final Note    Primary Care Provider: Dolores Corado NP    Expected Discharge Date: 11/25/2023    Final Discharge Note (most recent)       Final Note - 11/25/23 1400          Final Note    Assessment Type Final Discharge Note (P)      Anticipated Discharge Disposition Home or Self Care (P)                      Contact Info       Dolores Corado NP   Specialty: Family Medicine   Relationship: PCP - General    [address] [place] [address]  [address]  [facility] [address]   Phone: [phone]       Next Steps: Schedule an appointment as soon as possible for a visit in 1 week(s)    Instructions: Follow up BP and HR after stopping Toprol

## 2023-11-25 NOTE — PLAN OF CARE
Problem: Physical Therapy  Goal: Physical Therapy Goal  11/25/2023 1237 by Fina Randall, PT  Outcome: Adequate for Care Transition     PT Judah completed, note to follow. Pt reports feeling better this date and that her functional mobility is at baseline. Pt ambulated in hallway with rollator and Supervision. Orthostatics taken and pt asymptomatic. BP increased post ambulation. D/c home with no therapy or DME needs. D/c PT.        11/25/23 1034 11/25/23 1036 11/25/23 1040   Vital Signs   /65 127/89 113/62   BP Location Left arm Left arm Left arm   Patient Position Lying Sitting Standing      11/25/23 1045   Vital Signs   /62   BP Location Left arm   Patient Position Standing post ambulation

## 2023-11-25 NOTE — PLAN OF CARE
Problem: Occupational Therapy  Goal: Occupational Therapy Goal  Outcome: Met     Pt was agreeable to and participated in OT/PT evaluation.  Pt reports that she was mod I with mobility and ADLS at [facility].  Pt completed evaluation and noted to perform all tasks at mod I level, except for ambulation in hallway (completed with supervision due to hx of orthostatic).   Pt completed OT evaluation and noted to perform func mobility and ADLs with mod I to I.  Pt will have assistance/supervision as needed once discharged home.  Due to these factors, pt is discharged from OT services.  No DME or post acute OT required at this time.     Vanesa Garcia, OT  11/25/2023

## 2023-11-25 NOTE — PROGRESS NOTES
"[facility] Medicine Progress Note    Primary Team: [facility] Hospitalist Team B  Attending Physician: Rufus Cervantes MD  Resident: Dr. Lul Krishna  Intern: Dr. Donte Silva    Subjective:      No acute events overnight. The patient reports she feels much improved from yesterday, and reports she's ready to go home. No new complaints at this time. Discussed stopping Beta blocker with the patient at this time.     Objective:     Last 24 Hour Vital Signs:  BP  Min: 80/46  Max: 156/68  Temp  Av.2 °F (36.2 °C)  Min: 96.1 °F (35.6 °C)  Max: 98.1 °F (36.7 °C)  Pulse  Av.3  Min: 72  Max: 111  Resp  Av.2  Min: 18  Max: 20  SpO2  Av.3 %  Min: 94 %  Max: 99 %  Height  Av' 8" (172.7 cm)  Min: 5' 8" (172.7 cm)  Max: 5' 8" (172.7 cm)  Weight  Av.8 kg (149 lb 9.1 oz)  Min: 67.2 kg (148 lb 2.4 oz)  Max: 68.1 kg (150 lb 2.1 oz)  I/O last 3 completed shifts:  In: 865 [P.O.:365; IV Piggyback:500]  Out: 1450 [Urine:1450]    Physical Examination:  Physical Exam  Vitals and nursing note reviewed.   Constitutional:       General: She is not in acute distress.     Appearance: Normal appearance. She is normal weight. She is not ill-appearing or toxic-appearing.   HENT:      Nose: Nose normal. No congestion or rhinorrhea.      Mouth/Throat:      Mouth: Mucous membranes are moist.      Pharynx: Oropharynx is clear.   Eyes:      Extraocular Movements: Extraocular movements intact.      Conjunctiva/sclera: Conjunctivae normal.      Pupils: Pupils are equal, round, and reactive to light.   Cardiovascular:      Rate and Rhythm: Normal rate and regular rhythm.      Pulses: Normal pulses.      Heart sounds: Normal heart sounds. No murmur heard.     No friction rub. No gallop.   Pulmonary:      Effort: Pulmonary effort is normal.      Breath sounds: Normal breath sounds. No wheezing, rhonchi or rales.   Abdominal:      General: Abdomen is flat. Bowel sounds are normal.      Palpations: Abdomen is soft.      Tenderness: " There is no abdominal tenderness. There is no guarding or rebound.   Musculoskeletal:         General: Normal range of motion.      Cervical back: Normal range of motion and neck supple. No rigidity.      Right lower leg: No edema.      Left lower leg: No edema.   Skin:     General: Skin is warm and dry.      Capillary Refill: Capillary refill takes less than 2 seconds.   Neurological:      General: No focal deficit present.      Mental Status: She is alert and oriented to person, place, and time.   Psychiatric:         Mood and Affect: Mood normal.         Behavior: Behavior normal.         Thought Content: Thought content normal.         Judgment: Judgment normal.     Laboratory:  Laboratory Data Reviewed: yes  Pertinent Findings:  WBC 11.57  Cr 1.2 down from 1.9  eGFR improved    Microbiology Data Reviewed: yes  Pertinent Findings:  Influenza A and B Negative    Other Results:  EKG (my interpretation): Sinus Rhythm with premature atrial complexes    Radiology Data Reviewed: yes  Pertinent Findings:    Xray PA and Lateral: Coarse interstitial attenuation, accentuated by shallow inspiratory effort and habitus.  Early edema or other pneumonitis can present in this fashion, no large focal consolidation.  Correlation is advised.  Correlation with any history of COPD/emphysema.     Xray Hip: No acute displaced fracture or dislocation of the right hip.     Xray Thoracic Spine: Degenerative changes, no acute fracture is identified.  This is a suboptimal study on the lateral view.     CT head without contrast: No acute hemorrhage or major vascular distribution infarct.     No fracture.     Generalized cerebral volume loss with sequela of chronic microvascular ischemic change.     Cervical spondylosis without severe neural foraminal narrowing or spinal canal stenosis at any level.     Multiple ground-glass pulmonary nodules measuring up to 4 mm.  For a ground glass nodule <6 mm, Fleischner Society 2017 guidelines recommend  no routine follow up. However, suspicious features could warrant follow up with non-contrast chest CT at 2 years and 4 years after discovery.     CT Cervical Spine: No acute hemorrhage or major vascular distribution infarct.     No fracture.     Generalized cerebral volume loss with sequela of chronic microvascular ischemic change.     Cervical spondylosis without severe neural foraminal narrowing or spinal canal stenosis at any level.     Multiple ground-glass pulmonary nodules measuring up to 4 mm.  For a ground glass nodule <6 mm, Fleischner Society 2017 guidelines recommend no routine follow up. However, suspicious features could warrant follow up with non-contrast chest CT at 2 years and 4 years after discovery.  Current Medications:     Infusions:       Scheduled:   atorvastatin  80 mg Oral QHS    clopidogreL  75 mg Oral Daily    enoxparin  30 mg Subcutaneous Daily    EScitalopram oxalate  20 mg Oral Daily    gabapentin  300 mg Oral BID    insulin detemir U-100  10 Units Subcutaneous QHS        PRN:  albuterol, dextrose 10%, dextrose 10%, glucagon (human recombinant), glucose, glucose, insulin aspart U-100, melatonin, sodium chloride 0.9%    Antibiotics and Day Number of Therapy:  None    Lines and Day Number of Therapy:  Peripheral Intravenous Line  Duration       Peripheral IV - Single Lumen 11/24/23 1145 20 G Anterior;Right Forearm <1 day       Assessment:     Xi Moise is an 83 year old female that presented to the ED with syncope. Given the history and physical exam findings + improved creatinine post volume expansion, the etiology is most likely orthostatic hypotension.    Plan:     Plan to discharge today and educate on adequate fluid resuscitation. Discussed stopping beta blocker to decrease orthostatic hypotension in the future.    Syncope  Falls  - IV fluids: 500mL to start  - BNP <10, CPK 24, Trop 0.023  - Orthostatics documented in chart, suggestive of orthostatic Hypotension  - ECG grossly  normal  - Will old off on abx for now, given likely orthostatic  - Hold Lisinopril, Meclizine  - Echo with no concern for functional abnormality     DAQUAN  - Cr 1.9, up from baseline ~1  - Urine Studies and FeNa  - Cr 1.2 on hospital day 2 post fluid resuscitation, likely prerenal cause.     Night Sweats, early satiety  Weight Loss  - Unable to assess when last colonoscopy was  - Close follow up with PCP. Will consider outpatient follow up with Gastroenterology for EGD/Colonoscopy     Persistent Cough  Recent diagnosed influenza  - Pro Paul to decrease suspicion of pneumonia  -CXR  -Leukocytosis, but patient has had recent prednisone  -Guaifenesin for dry cough relief     Leukocytosis  - patient was recently on Prednisone, likely reactive.  -Afebrile in hospital, unlikely infectious     COPD  - continue home Albuterol  - O2 sats 100% in ED, will target lower 90's as in patient.     DM with peripheral Neuropathy  - Continue home gabapentin for neuropathic foot pain  - A1C in   - Home dosin units insulin NPH twice daily (32 units total per day): will start 10 units nightly + sliding scale while in hospital.  - Hold Metformin     Chronic Loose Stools + Constipation  - Unsure of significance at this time, will continue to monitor while in Hospital  - Outpatient referral to GI if interested upon discharge     Hx of Depression  - Continue Home Escitalopram  - Syncope likely not due to SIADH from escitalopram, given hypovolemic appearance, but will reevaluate if urine labs suggest as an etiology.     Hx of MAC infection  - Set up out patient follow up with Pulmonology       Donte Silva MD  [facility] Internal Medicine/Emergency Medicine, HO-1  [facility] Hospitalist Team B  2023    [facility] Medicine Hospitalist Pager numbers:   [facility] Hospitalist Medicine Team A (Conrad/Bruno): [phone]  [facility] Hospitalist Medicine Team B (Gill/Billy):  [phone]

## 2023-11-25 NOTE — PROGRESS NOTES
Discharge orders noted. Additional clinical references attached. Patient's discharge instructions given by bedside RN and reviewed via this VN.  Education provided on new medication, diagnosis, and follow-up appointments.  Teach back method used. Patient verbalized understanding. All questions answered. Patient awaiting daughter for . Bedside nurse updated on patient status and to request transportation to [facility] when ready.   11/25/23 1205   AVS Confirmation   Discharge instructions and AVS given to and reviewed with patient and/or significant other. Yes

## 2023-11-25 NOTE — PT/OT/SLP EVAL
"Physical Therapy Evaluation and Discharge Note    Patient Name:  Xi Moise   MRN:  [id number]    Recommendations:     Discharge Recommendations: No Therapy Indicated  Discharge Equipment Recommendations: none   Barriers to discharge: None    Assessment:     Xi Moise is a 83 y.o. female admitted with a medical diagnosis of Near syncope. .  At this time, patient is functioning at their prior level of function and does not require further acute PT services.    Pt reports feeling better this date and that her functional mobility is at baseline. Pt ambulated in hallway with rollator and Supervision. Orthostatics taken and pt asymptomatic. BP increased post ambulation. D/c home with no therapy or DME needs. D/c PT.      Recent Surgery: * No surgery found *      Plan:     During this hospitalization, patient does not require further acute PT services.  Please re-consult if situation changes.      Subjective     Chief Complaint: none  Patient/Family Comments/goals: pt reports feeling better this date and back to her baseline   Pain/Comfort:  Pain Rating 1: 0/10  Pain Rating Post-Intervention 1: 0/10    Patients cultural, spiritual, [employer] conflicts given the current situation: no    Living Environment:  Pt lives alone in a 2 , [facility], with bedroom and T/S with GB downstairs   Prior to admission, patients level of function was Mod I with use of rollator or endorses "furniture walking" for mobility and ADL's; pt endorses multiple falls "25" due to "blacking out" and attributes it to her BP.  Equipment owned at home: wheelchair, cane, quad, cane, straight, rollator, bedside commode, bath bench, walker, rolling.  Upon discharge, patient will have assistance from daughter who lives next door and her granddaughter.    Objective:     Communicated with nsg prior to session.  Patient found supine with telemetry upon PT entry to room.    General Precautions: Standard, fall    Orthopedic Precautions:N/A   Braces: " N/A  Respiratory Status: Room air    Exams:  Cognitive Exam:  Patient is oriented to Person, Place, Time, and Situation  Postural Exam:  Patient presented with the following abnormalities:    -       Rounded shoulders  Sensation:    -       Impaired  light/touch B feet (R>L) and sharp/dull B feet (R>L)  Skin Integrity/Edema:      -       Skin integrity: redness B feet (R>L)  -       Edema: Mild B feet/toes  RLE ROM: WFL  RLE Strength: WFL  LLE ROM: WFL  LLE Strength: WFL    Functional Mobility:  Bed Mobility:     Scooting: modified independence  Supine to Sit: modified independence  Sit to Supine: modified independence  Transfers:     Sit to Stand:  modified independence with no AD  Gait: Pt ambulated ~200 ft with rollator and supervision due to hx of orthostatic hypotension; no LOB, steady gait    AM-PAC 6 CLICK MOBILITY  Total Score:22       Treatment and Education:  Pt educated on role of PT/POC and use of AD at all times instead of furniture walking for increased safety and decreased fall risk  BP decreased with orthostatics, however, pt asymptomatic and increased post ambulation  Pt educated on daily skin inspection due to impaired sensation and reports of neuropathy     11/25/23 1034 11/25/23 1036 11/25/23 1040   Vital Signs   /65 127/89 113/62   BP Location Left arm Left arm Left arm   Patient Position Lying Sitting Standing      11/25/23 1045   Vital Signs   /62   BP Location Left arm   Patient Position Standing     AM-PAC 6 CLICK MOBILITY  Total Score:22     Patient left supine with all lines intact, call button in reach, bed alarm on, and nsg notified.    GOALS:   Multidisciplinary Problems       Physical Therapy Goals          Problem: Physical Therapy    Goal Priority Disciplines Outcome Goal Variances Interventions   Physical Therapy Goal     PT, PT/OT Adequate for Care Transition                         History:     Past Medical History:   Diagnosis Date    Asthma     Chronic kidney disease  (CKD), stage III (moderate) 2016    COPD, moderate     Depression 2016    Diabetes mellitus     Diabetic polyneuropathy associated with type 2 diabetes mellitus 2023    History of CVA (cerebrovascular accident) without residual deficits 2016    History of MAC infection 2016    Hyperlipidemia associated with type 2 diabetes mellitus 2016    Hypertension     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    Lipidemia     Long term (current) use of insulin 2023    Meniere disease 2016    Multiple falls 2023    Pulmonary Mycobacterium avium-intracellulare infection 2016    followed by Pulmonologist at [facility]    Type 2 diabetes mellitus with stage 2 chronic kidney disease, without long-term current use of insulin 10/28/2015       Past Surgical History:   Procedure Laterality Date    ANKLE SURGERY Left     CATARACT EXTRACTION, BILATERAL       SECTION      x 3    eyelid surgery      HYSTERECTOMY         Time Tracking:     PT Received On: 23  PT Start Time: 1021     PT Stop Time: 1050  PT Total Time (min): 29 min with OT    Billable Minutes: Evaluation 10 and Therapeutic Activity 19      2023

## 2023-11-27 ENCOUNTER — TELEPHONE (OUTPATIENT)
Dept: FAMILY MEDICINE | Facility: CLINIC | Age: 83
End: 2023-11-27
Payer: MEDICARE

## 2023-11-27 ENCOUNTER — PATIENT OUTREACH (OUTPATIENT)
Dept: ADMINISTRATIVE | Facility: CLINIC | Age: 83
End: 2023-11-27
Payer: MEDICARE

## 2023-11-27 NOTE — PROGRESS NOTES
C3 nurse attempted to contact Xi Moise for a TCC post hospital discharge follow up call. No answer. Left voicemail with callback information. The patient does not have a scheduled HOSFU appointment. Message sent to PCP staff for assistance with scheduling visit with patient.

## 2023-11-27 NOTE — PROGRESS NOTES
C3 nurse spoke with Xi Moise's daughter Mana for a TCC post hospital discharge follow up call. The patient has a scheduled HOSFU appointment with Dolores Corado NP on 12/08/23 @ 1400.

## 2023-12-08 ENCOUNTER — OFFICE VISIT (OUTPATIENT)
Dept: FAMILY MEDICINE | Facility: CLINIC | Age: 83
End: 2023-12-08
Payer: MEDICARE

## 2023-12-08 VITALS
HEIGHT: 68 IN | OXYGEN SATURATION: 95 % | SYSTOLIC BLOOD PRESSURE: 112 MMHG | BODY MASS INDEX: 23.36 KG/M2 | TEMPERATURE: 98 F | WEIGHT: 154.13 LBS | HEART RATE: 108 BPM | DIASTOLIC BLOOD PRESSURE: 60 MMHG

## 2023-12-08 DIAGNOSIS — I13.10 HYPERTENSIVE HEART AND KIDNEY DISEASE WITHOUT HEART FAILURE AND WITH STAGE 3A CHRONIC KIDNEY DISEASE: ICD-10-CM

## 2023-12-08 DIAGNOSIS — E11.42 DIABETIC POLYNEUROPATHY ASSOCIATED WITH TYPE 2 DIABETES MELLITUS: ICD-10-CM

## 2023-12-08 DIAGNOSIS — E78.5 HYPERLIPIDEMIA ASSOCIATED WITH TYPE 2 DIABETES MELLITUS: ICD-10-CM

## 2023-12-08 DIAGNOSIS — N18.30 CKD STAGE 3 DUE TO TYPE 2 DIABETES MELLITUS: ICD-10-CM

## 2023-12-08 DIAGNOSIS — E11.22 CKD STAGE 3 DUE TO TYPE 2 DIABETES MELLITUS: ICD-10-CM

## 2023-12-08 DIAGNOSIS — K21.9 CHRONIC GERD: ICD-10-CM

## 2023-12-08 DIAGNOSIS — N17.9 ACUTE KIDNEY INJURY: ICD-10-CM

## 2023-12-08 DIAGNOSIS — N18.31 HYPERTENSIVE HEART AND KIDNEY DISEASE WITHOUT HEART FAILURE AND WITH STAGE 3A CHRONIC KIDNEY DISEASE: ICD-10-CM

## 2023-12-08 DIAGNOSIS — E11.69 HYPERLIPIDEMIA ASSOCIATED WITH TYPE 2 DIABETES MELLITUS: ICD-10-CM

## 2023-12-08 DIAGNOSIS — J84.10 CALCIFIED GRANULOMA OF LUNG: ICD-10-CM

## 2023-12-08 DIAGNOSIS — Z86.73 HISTORY OF CVA (CEREBROVASCULAR ACCIDENT) WITHOUT RESIDUAL DEFICITS: Chronic | ICD-10-CM

## 2023-12-08 DIAGNOSIS — F33.41 RECURRENT MAJOR DEPRESSIVE DISORDER, IN PARTIAL REMISSION: ICD-10-CM

## 2023-12-08 DIAGNOSIS — Z79.4 TYPE 2 DIABETES MELLITUS WITH STAGE 3A CHRONIC KIDNEY DISEASE, WITH LONG-TERM CURRENT USE OF INSULIN: ICD-10-CM

## 2023-12-08 DIAGNOSIS — Z09 HOSPITAL DISCHARGE FOLLOW-UP: Primary | ICD-10-CM

## 2023-12-08 DIAGNOSIS — N18.31 TYPE 2 DIABETES MELLITUS WITH STAGE 3A CHRONIC KIDNEY DISEASE, WITH LONG-TERM CURRENT USE OF INSULIN: ICD-10-CM

## 2023-12-08 DIAGNOSIS — M85.80 OSTEOPENIA, UNSPECIFIED LOCATION: ICD-10-CM

## 2023-12-08 DIAGNOSIS — E11.22 TYPE 2 DIABETES MELLITUS WITH STAGE 3A CHRONIC KIDNEY DISEASE, WITH LONG-TERM CURRENT USE OF INSULIN: ICD-10-CM

## 2023-12-08 DIAGNOSIS — Z86.19 HISTORY OF MAC INFECTION: Chronic | ICD-10-CM

## 2023-12-08 DIAGNOSIS — J44.9 COPD, MODERATE: ICD-10-CM

## 2023-12-08 DIAGNOSIS — R19.7 FREQUENT DIARRHEA: ICD-10-CM

## 2023-12-08 DIAGNOSIS — I70.0 AORTIC ATHEROSCLEROSIS: ICD-10-CM

## 2023-12-08 DIAGNOSIS — I95.1 SYNCOPE DUE TO ORTHOSTATIC HYPOTENSION: ICD-10-CM

## 2023-12-08 PROCEDURE — 3074F PR MOST RECENT SYSTOLIC BLOOD PRESSURE < 130 MM HG: ICD-10-PCS | Mod: CPTII,S$GLB,, | Performed by: NURSE PRACTITIONER

## 2023-12-08 PROCEDURE — 1101F PR PT FALLS ASSESS DOC 0-1 FALLS W/OUT INJ PAST YR: ICD-10-PCS | Mod: CPTII,S$GLB,, | Performed by: NURSE PRACTITIONER

## 2023-12-08 PROCEDURE — 99999 PR PBB SHADOW E&M-EST. PATIENT-LVL IV: CPT | Mod: PBBFAC,,, | Performed by: NURSE PRACTITIONER

## 2023-12-08 PROCEDURE — 99495 TCM SERVICES (MODERATE COMPLEXITY): ICD-10-PCS | Mod: S$GLB,,, | Performed by: NURSE PRACTITIONER

## 2023-12-08 PROCEDURE — 1101F PT FALLS ASSESS-DOCD LE1/YR: CPT | Mod: CPTII,S$GLB,, | Performed by: NURSE PRACTITIONER

## 2023-12-08 PROCEDURE — 1159F PR MEDICATION LIST DOCUMENTED IN MEDICAL RECORD: ICD-10-PCS | Mod: CPTII,S$GLB,, | Performed by: NURSE PRACTITIONER

## 2023-12-08 PROCEDURE — 1126F AMNT PAIN NOTED NONE PRSNT: CPT | Mod: CPTII,S$GLB,, | Performed by: NURSE PRACTITIONER

## 2023-12-08 PROCEDURE — 3078F DIAST BP <80 MM HG: CPT | Mod: CPTII,S$GLB,, | Performed by: NURSE PRACTITIONER

## 2023-12-08 PROCEDURE — 99999 PR PBB SHADOW E&M-EST. PATIENT-LVL IV: ICD-10-PCS | Mod: PBBFAC,,, | Performed by: NURSE PRACTITIONER

## 2023-12-08 PROCEDURE — 3074F SYST BP LT 130 MM HG: CPT | Mod: CPTII,S$GLB,, | Performed by: NURSE PRACTITIONER

## 2023-12-08 PROCEDURE — 99495 TRANSJ CARE MGMT MOD F2F 14D: CPT | Mod: S$GLB,,, | Performed by: NURSE PRACTITIONER

## 2023-12-08 PROCEDURE — 3078F PR MOST RECENT DIASTOLIC BLOOD PRESSURE < 80 MM HG: ICD-10-PCS | Mod: CPTII,S$GLB,, | Performed by: NURSE PRACTITIONER

## 2023-12-08 PROCEDURE — 3288F FALL RISK ASSESSMENT DOCD: CPT | Mod: CPTII,S$GLB,, | Performed by: NURSE PRACTITIONER

## 2023-12-08 PROCEDURE — 3288F PR FALLS RISK ASSESSMENT DOCUMENTED: ICD-10-PCS | Mod: CPTII,S$GLB,, | Performed by: NURSE PRACTITIONER

## 2023-12-08 PROCEDURE — 1159F MED LIST DOCD IN RCRD: CPT | Mod: CPTII,S$GLB,, | Performed by: NURSE PRACTITIONER

## 2023-12-08 PROCEDURE — 1126F PR PAIN SEVERITY QUANTIFIED, NO PAIN PRESENT: ICD-10-PCS | Mod: CPTII,S$GLB,, | Performed by: NURSE PRACTITIONER

## 2023-12-08 RX ORDER — METOPROLOL SUCCINATE 25 MG/1
25 TABLET, EXTENDED RELEASE ORAL DAILY
Qty: 30 TABLET | Refills: 0 | Status: SHIPPED | OUTPATIENT
Start: 2023-12-08 | End: 2024-01-07 | Stop reason: SDUPTHER

## 2023-12-08 RX ORDER — AMLODIPINE BESYLATE 2.5 MG/1
2.5 TABLET ORAL DAILY
COMMUNITY
End: 2023-12-08 | Stop reason: HOSPADM

## 2023-12-08 RX ORDER — METOPROLOL SUCCINATE 25 MG/1
25 TABLET, EXTENDED RELEASE ORAL DAILY
Qty: 30 TABLET | Refills: 0 | Status: SHIPPED | OUTPATIENT
Start: 2023-12-08 | End: 2023-12-08 | Stop reason: SDUPTHER

## 2023-12-08 NOTE — PROGRESS NOTES
Subjective:       Patient ID: Xi Moise is a 83 y.o. female.    Chief Complaint: Hospital Follow Up and Transitional Care    HPI    Transitional Care Note    Family and/or Caretaker present at visit?  No.  Diagnostic tests reviewed/disposition: No diagnosic tests pending after this hospitalization.  Disease/illness education: yes  Home health/community services discussion/referrals: Patient does not have home health established from hospital visit.  They do not need home health.  If needed, we will set up home health for the patient.   Establishment or re-establishment of referral orders for community resources: No other necessary community resources.   Discussion with other health care providers: No discussion with other health care providers necessary.       Patient is an 83 year old white female with Aortic Atherosclerosis, History of Stroke, Type 2 Diabetes, CKD Stage 3, diabetic neuropathy, Hypertension/Hypertensive heart disease, Hyperlipidemia, moderate COPD/Asthma with Bronchiectasis with history of Mycobacterium avium-intracellulare infection, Calcified granuloma of lung, Recurrent Depression, chronic GERD, and Osteopenia that is here today [facility]/HOSPITAL follow up. She had her MEDICARE WELLNESS EXAM with [employer] last done around  6/5/2023 with [employer] representative..     HOSPITAL FOLLOW UP:  Date of Admit: 11/24/2023  Date of Discharge: 11/25/2023  Primary Hospital Diagnosis: Syncope secondary to Orthostatic Hypotension     Syncope  Falls  - IV fluids: 500mL to start  - BNP <10, CPK 24, Trop 0.023  - Orthostatics documented in chart, suggestive of orthostatic Hypotension  - ECG grossly normal  - Will old off on abx for now, given likely orthostatic  - Hold Lisinopril, Meclizine  - Echo with no concern for functional abnormality  -  Patient was resumed on Lisinopril 5 mg daily, Amlodipine 2.5 mg daily and HCTZ 25 mg daily.  They stopped the Metoprolol  mg at discharge  TODAY  2023:  Blood pressure remains on low end of normal 112/60 and HR up to 108 off the Metoprolol.  Some mild dehydration/mucous membranes noted.  WILL STOP the Amlodipine 2.5 mg daily and the HCTZ 25 mg daily.  Continue the Lisinopril 5 mg daily and ADD ON Metoprolol XL 25 mg daily  Reassess in 3 to 4 weeks with BMP lab as well.     DAQUAN with CKD Stage 3  - Cr 1.9, up from baseline ~1  - Urine Studies and FeNa  - Cr 1.2 on hospital day 2 post fluid resuscitation, likely prerenal cause.  Today 2023:  Patient seen by her Nephrologist yesterday 2023  Creatinine down from 1.9 on admit to 1.1  eGFR up from 26 to 49.9  Will repeat BMP in 3 to 4 weeks with next blood pressure check        Night Sweats, early satiety  Weight Loss  - Unable to assess when last colonoscopy was  - Close follow up with PCP. Will consider outpatient follow up with Gastroenterology for EGD/Colonoscopy  TODAY 2023:  Reports frequent diarrhea that she tries to control with Immodium  Will STOP the METFORMIN to see if this resolved the diarrhea  Reassess in 3 to 4 weeks - if not improved - will refer to GI     Persistent Cough  Recent diagnosed influenza 2023 - [facility]  - Pro Paul to decrease suspicion of pneumonia  -CXR  -Leukocytosis, but patient has had recent prednisone  -Guaifenesin for dry cough relief  TODAY 2023:  Mostly all URI s/s haver results.     Leukocytosis  - patient was recently on Prednisone, likely reactive.  -Afebrile in hospital, unlikely infectious  TODAY 2023:  Resolved as of 2023          COPD  - continue home Albuterol  - O2 sats 100% in ED, will target lower 90's as in patient.  TODAY:  Stable, no shortness of breath. Respirations unlabored and CTA     DM with peripheral Neuropathy  - Continue home gabapentin for neuropathic foot pain  - A1C in   - Home dosin units insulin NPH twice daily (32 units total per day): will start 10 units nightly + sliding scale  while in hospital.  - Hold Metformin  Today 12/8/2023:  STOP THE METFORMIN due to frequent diarrhea  INCREASE the NPH insulin from 16 to 20 units twice daily  Check blood sugars and call me with report in 1 week  Back in office 3 to  weeks for re-assessment.     Chronic Loose Stools + Constipation  - Unsure of significance at this time, will continue to monitor while in Hospital  - Outpatient referral to GI if interested upon discharge  TODAY 12/8/2023:  Reports frequent diarrhea that she tries to control with Immodium  Will STOP the METFORMIN to see if this resolved the diarrhea  Reassess in 3 to 4 weeks - if not improved - will refer to GI       Hx of Depression  - Continue Home Escitalopram  - Syncope likely not due to SIADH from escitalopram, given hypovolemic appearance, but will reevaluate if urine labs suggest as an etiology.  Today 12/8/2023:  Stay on Lexapro 20 mg daily     Hx of MAC infection  - Set up out patient follow up with Pulmonology      UPDATED CHRONIC PROBLEMS AND ADJUSTMENTS TO MEDICATIONS:     Aortic Atherosclerosis and CAD  Seen on CT chest  4/28/2016 and more recent CT  ON Plavix 75 mg daily,  Atorvastatin 80 mg daily, ACE and BB.       History of Stroke  Around 2016  On Plavix 75 mg daily     Type 2 Diabetes with CKD 3  Taking NPH insulin 16 units twice daily before meals, metformin a 1000 mg twice daily  Fasting blood glucose 142 with hemoglobin A1c 6.7% in October 2023  Diabetic urine screening was negative   NO SGLT2 due to recurrent UTIs  frequent diarrhea that she tries to control with Immodium  Will STOP the METFORMIN to see if this resolves the diarrhea  INCREASE the NPH insulin to 20 units twice daily  Monitor blood sugars at home and call me with report in 1 week.  Reassess in 3 to 4 weeks - if not improved - will refer to GI       CKD Stage 3  Creatinine 1.05 with eGFR 53 Feb. 2023  Creatinine  1.15 with DROP eGFR 47 in July 2023 - REFERRED Nephrology for monitoring  Seen   Primo on 7/24/2023 and 12/7/2023  Current eGFR 49.9%  Recheck in 3 to  4 weeks.           Diabetic Neuropathy  On Gabapentin 300 mg twice daily  She has been on this medication and dose for years  Continue at present dose for now.     Hypertension/hypertensive heart disease  Patient was resumed on Lisinopril 5 mg daily, Amlodipine 2.5 mg daily and HCTZ 25 mg daily.  They stopped the Metoprolol  mg at discharge  TODAY 12/8/2023:  Blood pressure remains on low end of normal 112/60 and HR up to 108 off the Metoprolol.  Some mild dehydration/mucous membranes noted.  WILL STOP the Amlodipine 2.5 mg daily and the HCTZ 25 mg daily.  Continue the Lisinopril 5 mg daily and ADD ON Metoprolol XL 25 mg daily  Reassess in 3 to 4 weeks with BMP lab as well.       Hyperlipidemia   Taking atorvastatin 80 mg daily   LDL 77.4 in October 2023  Advised on lifestyle modifications     Bronchiectasis, COPD/Asthma Overlap, History of Pulmonary MAC, SHANTI positive  Noted on last Pulmonary Progress note - Dr. Lee at [facility] - see under encounters  Patient reports now followed by Dr. Alannah Noe with [facility] - will request records  Reports only on Albuterol inhaler prn  Reports NO controller inhalers  Needs new pulmonologist  for COPD/asthma management.        Calcified Granuloma of Lung  Seen on CT chest 12/17/2015      Recurrent depression  Currently taking Lexapro 20 mg daily  Controlled at present time.        Osteopenia  Last DEXA scan 2/2/21  Advised to take calcium with vitamin-D supplementation     Chronic GERD  Taking Protonix 40 mg daily in the past  NOT currently taking medication and has no complaints.      Review of Systems   HENT: Negative.     Respiratory:  Negative for chest tightness and shortness of breath.    Cardiovascular:  Negative for chest pain and leg swelling.   Gastrointestinal:  Positive for diarrhea. Negative for abdominal pain.         Objective:     Vitals:    12/08/23 1239   BP: 112/60   BP  "Location: Left arm   Patient Position: Sitting   BP Method: Large (Manual)   Pulse: 108   Temp: 98.1 °F (36.7 °C)   TempSrc: Temporal   SpO2: 95%   Weight: 69.9 kg (154 lb 1.6 oz)   Height: 5' 8" (1.727 m)          Physical Exam  Constitutional:       General: She is not in acute distress.     Appearance: Normal appearance. She is normal weight. She is not ill-appearing, toxic-appearing or diaphoretic.      Comments: Body mass index is 23.43 kg/m².       HENT:      Head: Normocephalic and atraumatic.      Nose: No congestion.      Mouth/Throat:      Pharynx: No oropharyngeal exudate.   Eyes:      Extraocular Movements: Extraocular movements intact.      Conjunctiva/sclera: Conjunctivae normal.   Cardiovascular:      Rate and Rhythm: Normal rate and regular rhythm.      Heart sounds: Normal heart sounds.   Pulmonary:      Effort: Pulmonary effort is normal. No respiratory distress.      Breath sounds: Normal breath sounds. No stridor. No wheezing, rhonchi or rales.   Abdominal:      General: There is no distension.   Musculoskeletal:         General: Normal range of motion.      Right lower leg: No edema.      Left lower leg: No edema.   Skin:     General: Skin is warm and dry.   Neurological:      Mental Status: She is alert and oriented to person, place, and time.   Psychiatric:         Mood and Affect: Mood normal.         Behavior: Behavior normal.         Thought Content: Thought content normal.         Judgment: Judgment normal.           Assessment:         ICD-10-CM ICD-9-CM   1. Hospital discharge follow-up  Z09 V67.59   2. Syncope due to orthostatic hypotension  I95.1 458.0   3. 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 Type 2 diabetes mellitus with stage 3a chronic kidney disease, with long-term current use of insulin  E11.22 250.40    N18.31 585.3    Z79.4 V58.67   5. CKD stage 3 due to type 2 diabetes mellitus  " E11.22 250.40    N18.30 585.3   6. Hyperlipidemia associated with type 2 diabetes mellitus  E11.69 250.80    E78.5 272.4   7. Diabetic polyneuropathy associated with type 2 diabetes mellitus  E11.42 250.60     357.2   8. Recurrent major depressive disorder, in partial remission  F33.41 296.35   9. Aortic atherosclerosis  I70.0 440.0   10. Frequent diarrhea  R19.7 787.91   11. History of CVA (cerebrovascular accident) without residual deficits  Z86.73 V12.54   12. Calcified granuloma of lung  J84.10 515   13. COPD, moderate  J44.9 496   14. Acute kidney injury  N17.9 584.9   15. History of MAC infection  Z86.19 V12.09   16. Chronic GERD  K21.9 530.81   17. Osteopenia, unspecified location  M85.80 733.90       Plan:       1. Hospital discharge follow-up    2. Syncope due to orthostatic hypotension  Overview:  Syncope on Hospital Admit 11/24/2023  Falls  - IV fluids: 500mL to start  - BNP <10, CPK 24, Trop 0.023  - Orthostatics documented in chart, suggestive of orthostatic Hypotension  - ECG grossly normal  - Will old off on abx for now, given likely orthostatic  - Hold Lisinopril, Meclizine  - Echo with no concern for functional abnormality  -  Patient was resumed on Lisinopril 5 mg daily, Amlodipine 2.5 mg daily and HCTZ 25 mg daily.  They stopped the Metoprolol  mg at discharge  TODAY 12/8/2023:  Blood pressure remains on low end of normal 112/60 and HR up to 108 off the Metoprolol.  Some mild dehydration/mucous membranes noted.  WILL STOP the Amlodipine 2.5 mg daily and the HCTZ 25 mg daily.  Continue the Lisinopril 5 mg daily and ADD ON Metoprolol XL 25 mg daily  Reassess in 3 to 4 weeks with BMP lab as well.      3. 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 and with stage 3a chronic kidney disease  Overview:  Patient was resumed on Lisinopril 5 mg daily, Amlodipine 2.5 mg daily and HCTZ 25 mg daily.  They stopped the Metoprolol  mg at discharge  TODAY 12/8/2023:  Blood pressure  remains on low end of normal 112/60 and HR up to 108 off the Metoprolol.  Some mild dehydration/mucous membranes noted.  WILL STOP the Amlodipine 2.5 mg daily and the HCTZ 25 mg daily.  Continue the Lisinopril 5 mg daily and ADD ON Metoprolol XL 25 mg daily  Reassess in 3 to 4 weeks with BMP lab as well.    Orders:  -     Discontinue: metoprolol succinate (TOPROL-XL) 25 MG 24 hr tablet; Take 1 tablet (25 mg total) by mouth once daily.  Dispense: 30 tablet; Refill: 0  -     metoprolol succinate (TOPROL-XL) 25 MG 24 hr tablet; Take 1 tablet (25 mg total) by mouth once daily.  Dispense: 30 tablet; Refill: 0    4. Type 2 diabetes mellitus with stage 3a chronic kidney disease, with long-term current use of insulin  Overview:  Taking NPH insulin 16 units twice daily before meals, metformin a 1000 mg twice daily  Fasting blood glucose 142 with hemoglobin A1c 6.7% in October 2023  Diabetic urine screening was negative   NO SGLT2 due to recurrent UTIs  frequent diarrhea that she tries to control with Immodium  Will STOP the METFORMIN to see if this resolves the diarrhea  INCREASE the NPH insulin to 20 units twice daily  Monitor blood sugars at home and call me with report in 1 week.  Reassess in 3 to 4 weeks - if not improved - will refer to GI      Orders:  -     insulin  unit/mL injection; Inject 20 Units into the skin 2 (two) times daily before meals.  Dispense: 30 mL; Refill: 3    5. CKD stage 3 due to type 2 diabetes mellitus  Overview:  Creatinine 1.05 with eGFR 53 Feb. 2023  Creatinine  1.15 with DROP eGFR 47 in July 2023 - REFERRED Nephrology for monitoring  Seen Dr. Tillman on 7/24/2023 and 12/7/2023  Current eGFR 49.9%  Recheck in 3 to  4 weeks.    Orders:  -     BASIC METABOLIC PANEL; Future; Expected date: 12/08/2023    6. Hyperlipidemia associated with type 2 diabetes mellitus  Overview:  Taking atorvastatin 80 mg daily   LDL 77.4 in October 2023  Advised on lifestyle modifications      7. Diabetic  polyneuropathy associated with type 2 diabetes mellitus  Overview:  On Gabapentin 300 mg twice daily  She has been on this medication and dose for years  Continue at present dose for now.      8. Recurrent major depressive disorder, in partial remission  Overview:  Currently taking Lexapro 20 mg daily  Controlled at present time.         9. Aortic atherosclerosis  Overview:  Seen on CT chest  4/28/2016 and more recent CT  ON Plavix 75 mg daily,  Atorvastatin 80 mg daily, ACE and BB.        10. Frequent diarrhea  Overview:  Reports frequent diarrhea that she tries to control with Immodium  Will STOP the METFORMIN to see if this resolved the diarrhea  Reassess in 3 to 4 weeks - if not improved - will refer to GI         11. History of CVA (cerebrovascular accident) without residual deficits  Overview:  Around 2016  On Plavix 75 mg daily      12. Calcified granuloma of lung  Overview:  Seen on CT chest 12/17/2015      13. COPD, moderate  Overview:  Bronchiectasis, COPD/Asthma Overlap, History of Pulmonary MAC, SHANTI positive  Noted on last Pulmonary Progress note - Dr. Lee at [facility] - see under encounters  Patient reports now followed by Dr. Alannah Noe with [facility] - will request records  Reports only on Albuterol inhaler prn  Reports NO controller inhalers  Needs new pulmonologist  for COPD/asthma management.      14. Acute kidney injury  Overview:  - Cr 1.9, up from baseline ~1 on hospital admit 11/24/2023  - Urine Studies and FeNa  - Cr 1.2 on hospital day 2 post fluid resuscitation, likely prerenal cause.  Today 12/8/2023:  Patient seen by her Nephrologist yesterday 12/7/2023  Creatinine down from 1.9 on admit to 1.1  eGFR up from 26 to 49.9  Will repeat BMP in 3 to 4 weeks with next blood pressure check        15. History of MAC infection  Overview:  Noted on last Pulmonary Progress note - Dr. Lee at [facility] - see under encounters  Patient reports now followed by Dr. Alannah Noe with  [facility] - will request records  Reports only on Albuterol inhaler prn  Reports NO controller inhalers  Needs new pulmonologist  for COPD/asthma management.         16. Chronic GERD  Overview:  Taking Protonix 40 mg daily in the past  NOT currently taking medication and has no complaints.      17. Osteopenia, unspecified location  Overview:  Last DEXA scan 2/2/21  Advised to take calcium with vitamin-D supplementation         Follow up in about 4 weeks (around 1/5/2024) for BMP and follow up.     Patient's Medications   New Prescriptions    METOPROLOL SUCCINATE (TOPROL-XL) 25 MG 24 HR TABLET    Take 1 tablet (25 mg total) by mouth once daily.   Previous Medications    ALBUTEROL (PROVENTIL/VENTOLIN HFA) 90 MCG/ACTUATION INHALER    Inhale 2 puffs into the lungs every 4 (four) hours as needed.    ASCORBIC ACID, VITAMIN C, (VITAMIN C) 500 MG TABLET    Take 500 mg by mouth once daily.    ATORVASTATIN (LIPITOR) 80 MG TABLET    Take 1 tablet (80 mg total) by mouth once daily.    CAMPHOR-METHYL SALICYL-MENTHOL 3.1-15-10 % GEL    Apply topically 2 (two) times daily.    CHOLECALCIFEROL, VITAMIN D3, (VITAMIN D3) 50 MCG (2,000 UNIT) TAB    Take 2,000 Units by mouth once daily.    CHROMIUM PICOLINATE 200 MCG TAB    Take 200 mcg by mouth once daily.    CLOPIDOGREL (PLAVIX) 75 MG TABLET    Take 1 tablet (75 mg total) by mouth once daily.    CYANOCOBALAMIN (VITAMIN B-12) 1000 MCG TABLET    Take 1,000 mcg by mouth once daily.    D-MANNOSE 500 MG CAP    Take 500 mg by mouth 2 (two) times a day.    ESCITALOPRAM OXALATE (LEXAPRO) 20 MG TABLET    Take 20 mg by mouth once daily.    FLUTICASONE (FLONASE) 50 MCG/ACTUATION NASAL SPRAY    1 spray by Each Nare route once daily.    GABAPENTIN (NEURONTIN) 300 MG CAPSULE    Take 1 capsule (300 mg total) by mouth 2 (two) times daily.    LISINOPRIL (PRINIVIL,ZESTRIL) 5 MG TABLET    Take 1 tablet (5 mg total) by mouth once daily.    MAGNESIUM 250 MG TAB    Take 500 mg by mouth once daily.     MECLIZINE (ANTIVERT) 25 MG TABLET    Take 25 mg by mouth 2 (two) times daily as needed.    MULTIVIT-MIN/FA/LYCOPEN/LUTEIN (SENTRY SENIOR ORAL)    Take 1 tablet by mouth once daily.    ONDANSETRON (ZOFRAN) 8 MG TABLET    Take 8 mg by mouth every 8 (eight) hours as needed.   Modified Medications    Modified Medication Previous Medication    INSULIN  UNIT/ML INJECTION insulin  unit/mL injection       Inject 20 Units into the skin 2 (two) times daily before meals.    Inject 16 Units into the skin 2 (two) times daily before meals.   Discontinued Medications    AMLODIPINE (NORVASC) 2.5 MG TABLET    Take 2.5 mg by mouth once daily.    FLUZONE HIGHDOSE QUAD 23-24  MCG/0.7 ML SYRG        HYDROCHLOROTHIAZIDE (HYDRODIURIL) 25 MG TABLET    Take 25 mg by mouth once daily.    METFORMIN (GLUCOPHAGE) 1000 MG TABLET    Take 1 tablet (1,000 mg total) by mouth 2 (two) times daily with meals.       Past Medical History:   Diagnosis Date    Asthma     Chronic kidney disease (CKD), stage III (moderate) 04/28/2016    COPD, moderate     Depression 04/29/2016    Diabetes mellitus     Diabetic polyneuropathy associated with type 2 diabetes mellitus 02/09/2023    History of CVA (cerebrovascular accident) without residual deficits 04/28/2016    History of MAC infection 04/28/2016    Hyperlipidemia associated with type 2 diabetes mellitus 04/28/2016    Hypertension     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 02/09/2023    Lipidemia     Long term (current) use of insulin 6/16/2023    Meniere disease 04/28/2016    Multiple falls 6/16/2023    Pulmonary Mycobacterium avium-intracellulare infection 08/01/2016    followed by Pulmonologist at [facility]    Type 2 diabetes mellitus with stage 2 chronic kidney disease, without long-term current use of insulin 10/28/2015       Past Surgical History:   Procedure Laterality Date    ANKLE SURGERY Left     CATARACT EXTRACTION,  BILATERAL       SECTION      x 3    eyelid surgery      HYSTERECTOMY         Family History   Problem Relation Age of Onset    Depression Mother     Bipolar disorder Mother     Cancer Father         skin cancer    Diabetes Father     Hypertension Father     Hyperlipidemia Father     Heart disease Father     Gout Father     Nephrolithiasis Father     Hypertension Sister     Gout Sister     Diabetes Sister     Cancer Sister         rectal cancer    Hypertension Sister     Hyperlipidemia Sister     Heart disease Sister         stents x 4    Cancer Sister         pituitary gland cancer    Hypertension Brother     Hyperlipidemia Brother     Heart disease Brother     Hypertension Daughter     Early death Maternal Aunt        Social History     Socioeconomic History    Marital status: Single   Tobacco Use    Smoking status: Never     Passive exposure: Past    Smokeless tobacco: Never   Substance and Sexual Activity    Alcohol use: No    Drug use: No

## 2023-12-10 PROBLEM — R19.7 FREQUENT DIARRHEA: Status: ACTIVE | Noted: 2023-12-10

## 2023-12-10 PROBLEM — I95.1 SYNCOPE DUE TO ORTHOSTATIC HYPOTENSION: Status: ACTIVE | Noted: 2023-12-10

## 2023-12-31 LAB
BACTERIA UR CULT: ABNORMAL
BACTERIA UR CULT: ABNORMAL
OTHER ANTIBIOTIC SUSC ISLT: ABNORMAL

## 2024-01-01 ENCOUNTER — TELEPHONE (OUTPATIENT)
Dept: URGENT CARE | Facility: CLINIC | Age: 84
End: 2024-01-01
Payer: MEDICARE

## 2024-01-01 NOTE — TELEPHONE ENCOUNTER
Results reviewed, patient treated correctly with cephalexin symptoms have improved as following with primary care provider

## 2024-01-05 ENCOUNTER — HOSPITAL ENCOUNTER (EMERGENCY)
Facility: HOSPITAL | Age: 84
Discharge: HOME | End: 2024-01-05
Attending: EMERGENCY MEDICINE
Payer: MEDICARE

## 2024-01-05 ENCOUNTER — TELEPHONE (OUTPATIENT)
Dept: OPHTHALMOLOGY | Facility: CLINIC | Age: 84
End: 2024-01-05
Payer: MEDICARE

## 2024-01-05 ENCOUNTER — HOSPITAL ENCOUNTER (EMERGENCY)
Facility: HOSPITAL | Age: 84
End: 2024-01-05
Attending: EMERGENCY MEDICINE
Payer: MEDICARE

## 2024-01-05 VITALS
HEART RATE: 79 BPM | OXYGEN SATURATION: 98 % | TEMPERATURE: 98 F | SYSTOLIC BLOOD PRESSURE: 177 MMHG | RESPIRATION RATE: 16 BRPM | DIASTOLIC BLOOD PRESSURE: 80 MMHG

## 2024-01-05 VITALS
OXYGEN SATURATION: 94 % | RESPIRATION RATE: 19 BRPM | HEART RATE: 78 BPM | TEMPERATURE: 99 F | SYSTOLIC BLOOD PRESSURE: 166 MMHG | WEIGHT: 154 LBS | BODY MASS INDEX: 23.42 KG/M2 | DIASTOLIC BLOOD PRESSURE: 80 MMHG

## 2024-01-05 DIAGNOSIS — S02.85XA CLOSED FRACTURE OF ORBIT, INITIAL ENCOUNTER: Primary | ICD-10-CM

## 2024-01-05 DIAGNOSIS — S05.12XA TRAUMATIC HEMATOMA OF LEFT ORBIT, INITIAL ENCOUNTER: ICD-10-CM

## 2024-01-05 DIAGNOSIS — S02.32XA CLOSED FRACTURE OF LEFT ORBITAL FLOOR, INITIAL ENCOUNTER: Primary | ICD-10-CM
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
## 2024-01-05 DIAGNOSIS — S61.412A LACERATION OF LEFT HAND WITHOUT FOREIGN BODY, INITIAL ENCOUNTER: ICD-10-CM

## 2024-01-05 DIAGNOSIS — S00.83XA TRAUMATIC HEMATOMA OF FOREHEAD, INITIAL ENCOUNTER: ICD-10-CM

## 2024-01-05 DIAGNOSIS — M79.673 FOOT PAIN: ICD-10-CM

## 2024-01-05 LAB
ABO + RH BLD: NORMAL
ALBUMIN SERPL BCP-MCNC: 4 G/DL (ref 3.5–5.2)
ALP SERPL-CCNC: 89 U/L (ref 55–135)
ALT SERPL W/O P-5'-P-CCNC: 24 U/L (ref 10–44)
ANION GAP SERPL CALC-SCNC: 11 MMOL/L (ref 8–16)
AST SERPL-CCNC: 23 U/L (ref 10–40)
BASOPHILS # BLD AUTO: 0.04 K/UL (ref 0–0.2)
BASOPHILS NFR BLD: 0.2 % (ref 0–1.9)
BILIRUB SERPL-MCNC: 0.7 MG/DL (ref 0.1–1)
BLD GP AB SCN CELLS X3 SERPL QL: NORMAL
BUN SERPL-MCNC: 25 MG/DL (ref 8–23)
CALCIUM SERPL-MCNC: 9.7 MG/DL (ref 8.7–10.5)
CHLORIDE SERPL-SCNC: 101 MMOL/L (ref 95–110)
CO2 SERPL-SCNC: 27 MMOL/L (ref 23–29)
CREAT SERPL-MCNC: 1.1 MG/DL (ref 0.5–1.4)
DIFFERENTIAL METHOD BLD: ABNORMAL
EOSINOPHIL # BLD AUTO: 0 K/UL (ref 0–0.5)
EOSINOPHIL NFR BLD: 0.2 % (ref 0–8)
ERYTHROCYTE [DISTWIDTH] IN BLOOD BY AUTOMATED COUNT: 14.1 % (ref 11.5–14.5)
EST. GFR  (NO RACE VARIABLE): 50 ML/MIN/1.73 M^2
GLUCOSE SERPL-MCNC: 227 MG/DL (ref 70–110)
HCT VFR BLD AUTO: 41.1 % (ref 37–48.5)
HGB BLD-MCNC: 14 G/DL (ref 12–16)
IMM GRANULOCYTES # BLD AUTO: 0.05 K/UL (ref 0–0.04)
IMM GRANULOCYTES NFR BLD AUTO: 0.3 % (ref 0–0.5)
LYMPHOCYTES # BLD AUTO: 1.5 K/UL (ref 1–4.8)
LYMPHOCYTES NFR BLD: 9.5 % (ref 18–48)
MCH RBC QN AUTO: 30 PG (ref 27–31)
MCHC RBC AUTO-ENTMCNC: 34.1 G/DL (ref 32–36)
MCV RBC AUTO: 88 FL (ref 82–98)
MONOCYTES # BLD AUTO: 0.4 K/UL (ref 0.3–1)
MONOCYTES NFR BLD: 2.2 % (ref 4–15)
NEUTROPHILS # BLD AUTO: 14.1 K/UL (ref 1.8–7.7)
NEUTROPHILS NFR BLD: 87.6 % (ref 38–73)
NRBC BLD-RTO: 0 /100 WBC
PLATELET # BLD AUTO: 270 K/UL (ref 150–450)
PMV BLD AUTO: 8.3 FL (ref 9.2–12.9)
POTASSIUM SERPL-SCNC: 3.8 MMOL/L (ref 3.5–5.1)
PROT SERPL-MCNC: 7.2 G/DL (ref 6–8.4)
RBC # BLD AUTO: 4.67 M/UL (ref 4–5.4)
SODIUM SERPL-SCNC: 139 MMOL/L (ref 136–145)
SPECIMEN OUTDATE: NORMAL
WBC # BLD AUTO: 16.05 K/UL (ref 3.9–12.7)

## 2024-01-05 PROCEDURE — 99284 EMERGENCY DEPT VISIT MOD MDM: CPT | Mod: 25,27

## 2024-01-05 PROCEDURE — 96365 THER/PROPH/DIAG IV INF INIT: CPT

## 2024-01-05 PROCEDURE — 85025 COMPLETE CBC W/AUTO DIFF WBC: CPT | Performed by: EMERGENCY MEDICINE

## 2024-01-05 PROCEDURE — 80053 COMPREHEN METABOLIC PANEL: CPT | Performed by: EMERGENCY MEDICINE

## 2024-01-05 PROCEDURE — 99285 EMERGENCY DEPT VISIT HI MDM: CPT | Mod: 25

## 2024-01-05 PROCEDURE — 12001 RPR S/N/AX/GEN/TRNK 2.5CM/<: CPT

## 2024-01-05 PROCEDURE — 96374 THER/PROPH/DIAG INJ IV PUSH: CPT | Mod: 59

## 2024-01-05 PROCEDURE — 96375 TX/PRO/DX INJ NEW DRUG ADDON: CPT

## 2024-01-05 PROCEDURE — 86901 BLOOD TYPING SEROLOGIC RH(D): CPT | Performed by: EMERGENCY MEDICINE

## 2024-01-05 PROCEDURE — 63600175 PHARM REV CODE 636 W HCPCS: Performed by: EMERGENCY MEDICINE

## 2024-01-05 PROCEDURE — 25000003 PHARM REV CODE 250: Performed by: EMERGENCY MEDICINE

## 2024-01-05 RX ORDER — MORPHINE SULFATE 4 MG/ML
4 INJECTION, SOLUTION INTRAMUSCULAR; INTRAVENOUS
Status: COMPLETED | OUTPATIENT
Start: 2024-01-05 | End: 2024-01-05

## 2024-01-05 RX ORDER — NICARDIPINE HYDROCHLORIDE 0.2 MG/ML
INJECTION INTRAVENOUS
Status: DISCONTINUED
Start: 2024-01-05 | End: 2024-01-05 | Stop reason: WASHOUT

## 2024-01-05 RX ORDER — LIDOCAINE HYDROCHLORIDE 10 MG/ML
5 INJECTION, SOLUTION EPIDURAL; INFILTRATION; INTRACAUDAL; PERINEURAL
Status: DISCONTINUED | OUTPATIENT
Start: 2024-01-05 | End: 2024-01-05 | Stop reason: HOSPADM

## 2024-01-05 RX ORDER — PROPARACAINE HYDROCHLORIDE 5 MG/ML
1 SOLUTION/ DROPS OPHTHALMIC
Status: COMPLETED | OUTPATIENT
Start: 2024-01-05 | End: 2024-01-05

## 2024-01-05 RX ORDER — NICARDIPINE HYDROCHLORIDE 0.2 MG/ML
0-15 INJECTION INTRAVENOUS CONTINUOUS
Status: DISCONTINUED | OUTPATIENT
Start: 2024-01-05 | End: 2024-01-05 | Stop reason: HOSPADM

## 2024-01-05 RX ORDER — AMOXICILLIN AND CLAVULANATE POTASSIUM 875; 125 MG/1; MG/1
1 TABLET, FILM COATED ORAL 2 TIMES DAILY
Qty: 14 TABLET | Refills: 0 | Status: SHIPPED | OUTPATIENT
Start: 2024-01-05 | End: 2024-01-30 | Stop reason: ALTCHOICE

## 2024-01-05 RX ORDER — AMOXICILLIN AND CLAVULANATE POTASSIUM 875; 125 MG/1; MG/1
1 TABLET, FILM COATED ORAL
Status: COMPLETED | OUTPATIENT
Start: 2024-01-05 | End: 2024-01-05

## 2024-01-05 RX ORDER — ONDANSETRON HYDROCHLORIDE 2 MG/ML
4 INJECTION, SOLUTION INTRAVENOUS
Status: COMPLETED | OUTPATIENT
Start: 2024-01-05 | End: 2024-01-05

## 2024-01-05 RX ORDER — MORPHINE SULFATE 2 MG/ML
2 INJECTION, SOLUTION INTRAMUSCULAR; INTRAVENOUS
Status: COMPLETED | OUTPATIENT
Start: 2024-01-05 | End: 2024-01-05

## 2024-01-05 RX ADMIN — MORPHINE SULFATE 2 MG: 2 INJECTION, SOLUTION INTRAMUSCULAR; INTRAVENOUS at 04:01

## 2024-01-05 RX ADMIN — AMOXICILLIN AND CLAVULANATE POTASSIUM 1 TABLET: 875; 125 TABLET, FILM COATED ORAL at 08:01

## 2024-01-05 RX ADMIN — MORPHINE SULFATE 4 MG: 4 INJECTION INTRAVENOUS at 02:01

## 2024-01-05 RX ADMIN — PROPARACAINE HYDROCHLORIDE 1 DROP: 5 SOLUTION/ DROPS OPHTHALMIC at 02:01

## 2024-01-05 RX ADMIN — PROPARACAINE HYDROCHLORIDE 1 DROP: 5 SOLUTION/ DROPS OPHTHALMIC at 05:01

## 2024-01-05 RX ADMIN — ONDANSETRON 4 MG: 2 INJECTION INTRAMUSCULAR; INTRAVENOUS at 02:01

## 2024-01-05 RX ADMIN — NICARDIPINE HYDROCHLORIDE 2.5 MG/HR: 0.2 INJECTION, SOLUTION INTRAVENOUS at 03:01

## 2024-01-05 NOTE — ED TRIAGE NOTES
Pt Arrives via EMS from [facility] for optho. Fell on cinder block and has left orbital fx. States was having double vision but that subsided. BP was in 230s systolic, arriving on cardene gtt at 7.5mg/hr

## 2024-01-05 NOTE — TELEPHONE ENCOUNTER
----- Message from Jayjay Winters sent at 1/5/2024  1:57 PM CST -----  Regarding: URGENT  Consult/Advisory    Name Of Caller:Call the transfer center       Contact Preference:[phone] ALYSSA     Nature of call: [facility] transfer [place] called regarding ptn Orbital wall fracture inferior rectus muscle in her left eye she tripped in her backyard  tumor to left upper eyelid with abrasion

## 2024-01-05 NOTE — ED PROVIDER NOTES
Encounter Date: 1/5/2024       History     Chief Complaint   Patient presents with    Fall     Pt states she tripped in backyard, + large hematoma noted to left upper eye lid, abrasion noted to left cheek side of hand and abrasion noted to right foot, b/p in triage- 228/110, + blood thinner use      Patient is an 83-year-old female who presents to the ED with complaint of fall.  Patient states that she was walking in her backyard when she sustained a mechanical fall.  She states that she tripped over her feet versus possibly tripping over a cinder block causing her fall forward and strike the left side of her face.  She states that this occurred approximately 11:00 a.m. today.  She states that she did not lose consciousness but now reports pain and swelling to the left orbit and left eye.  She denies any vision loss. She reports intermittent blurry vision to the L.  She is on Plavix for previous stroke. She does report mild pain to the mid shaft region of her L humerus but she denies any overt L shoulder, numbness, tingling, neck or back pain.  Pt states she up to date on her tetanus.       Review of patient's allergies indicates:   Allergen Reactions    Bactrim [sulfamethoxazole-trimethoprim] Hives    Sulfa (sulfonamide antibiotics) Hives     Past Medical History:   Diagnosis Date    Asthma     Chronic kidney disease (CKD), stage III (moderate) 04/28/2016    COPD, moderate     Depression 04/29/2016    Diabetes mellitus     Diabetic polyneuropathy associated with type 2 diabetes mellitus 02/09/2023    History of CVA (cerebrovascular accident) without residual deficits 04/28/2016    History of MAC infection 04/28/2016    Hyperlipidemia associated with type 2 diabetes mellitus 04/28/2016    Hypertension     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 02/09/2023    Lipidemia     Long term (current) use of insulin 6/16/2023    Meniere disease 04/28/2016     Multiple falls 2023    Pulmonary Mycobacterium avium-intracellulare infection 2016    followed by Pulmonologist at [facility]    Type 2 diabetes mellitus with stage 2 chronic kidney disease, without long-term current use of insulin 10/28/2015     Past Surgical History:   Procedure Laterality Date    ANKLE SURGERY Left     CATARACT EXTRACTION, BILATERAL       SECTION      x 3    eyelid surgery      HYSTERECTOMY       Family History   Problem Relation Age of Onset    Depression Mother     Bipolar disorder Mother     Cancer Father         skin cancer    Diabetes Father     Hypertension Father     Hyperlipidemia Father     Heart disease Father     Gout Father     Nephrolithiasis Father     Hypertension Sister     Gout Sister     Diabetes Sister     Cancer Sister         rectal cancer    Hypertension Sister     Hyperlipidemia Sister     Heart disease Sister         stents x 4    Cancer Sister         pituitary gland cancer    Hypertension Brother     Hyperlipidemia Brother     Heart disease Brother     Hypertension Daughter     Early death Maternal Aunt      Social History     Tobacco Use    Smoking status: Never     Passive exposure: Past    Smokeless tobacco: Never   Substance Use Topics    Alcohol use: No    Drug use: No     Review of Systems   Eyes:  Positive for pain, redness and visual disturbance. Negative for photophobia and discharge.   Respiratory:  Negative for chest tightness and shortness of breath.    Gastrointestinal:  Positive for nausea. Negative for abdominal pain and vomiting.   Neurological:  Positive for headaches. Negative for dizziness.       Physical Exam     Initial Vitals [24 1200]   BP Pulse Resp Temp SpO2   (!) 228/110 70 20 97.7 °F (36.5 °C) 95 %      MAP       --         Physical Exam    Nursing note and vitals reviewed.  Constitutional: She appears well-developed and well-nourished. She appears distressed.   HENT:   Head: Normocephalic.   Right Ear: External ear normal.  "  Left Ear: External ear normal.   Eyes: Pupils are equal, round, and reactive to light. Right conjunctiva is not injected. Right conjunctiva has no hemorrhage. Left conjunctiva is injected. Left conjunctiva has a hemorrhage. Right eye exhibits normal extraocular motion and no nystagmus. Left eye exhibits normal extraocular motion and no nystagmus.   Normal EOM  Pupils equal and reactive to light bilaterally  No so-called "tear drop" pupil  No overt proptosis on gross exam  L eye IOP 28 mmHg x 2   No hyphema \  No pupillary defect     Neck: Neck supple.   Normal range of motion.  Cardiovascular:  Normal rate, regular rhythm, normal heart sounds and intact distal pulses.           Pulmonary/Chest: Breath sounds normal.   Abdominal: Abdomen is soft. Bowel sounds are normal.   Musculoskeletal:         General: Normal range of motion.      Cervical back: Normal range of motion and neck supple.     Neurological: She is alert and oriented to person, place, and time. She has normal strength.   No focal neurological deficit  Strength 5/5   Sensation grossly in tact  MAEW  GCS 15   AAOx3    Skin: Skin is warm.   Psychiatric: She has a normal mood and affect.               ED Course   Procedures  Labs Reviewed   COMPREHENSIVE METABOLIC PANEL - Abnormal; Notable for the following components:       Result Value    Glucose 227 (*)     BUN 25 (*)     eGFR 50 (*)     All other components within normal limits   CBC W/ AUTO DIFFERENTIAL - Abnormal; Notable for the following components:    WBC 16.05 (*)     MPV 8.3 (*)     Gran # (ANC) 14.1 (*)     Immature Grans (Abs) 0.05 (*)     Gran % 87.6 (*)     Lymph % 9.5 (*)     Mono % 2.2 (*)     All other components within normal limits   TYPE & SCREEN          Imaging Results              X-Ray Humerus 2 View Left (Final result)  Result time 01/05/24 15:57:13      Final result by Donavan Galan MD (01/05/24 15:57:13)                   Impression:      1. Lucency at the level of the " proximal left humeral metaphysis, not confirmed on the orthogonal view.  Correlation with dedicated shoulder radiography recommended, fracture is not excluded.      Electronically signed by: Donavan Galan MD  Date:    01/05/2024  Time:    15:57               Narrative:    EXAMINATION:  XR HUMERUS 2 VIEW LEFT    CLINICAL HISTORY:  Unspecified fall, initial encounter    COMPARISON:  None    FINDINGS:  Two views left humerus.    There is osteopenia.  The left humeral head maintains appropriate relationship with the glenoid.  The elbow is intact.  No acute displaced rib fracture.  On the lateral view, there is apparent cortical irregularity involving the medial aspect of the proximal humerus at the metaphysis.  This may reflect superimposition of skin fold however dedicated shoulder radiography is recommended for definitive exclusion of fracture.                                       X-Ray Foot Complete Right (Final result)  Result time 01/05/24 14:47:18      Final result by Donavan Galan MD (01/05/24 14:47:18)                   Impression:      1. No convincing acute displaced fracture or dislocation of the right foot.      Electronically signed by: Donavan Galan MD  Date:    01/05/2024  Time:    14:47               Narrative:    EXAMINATION:  XR FOOT COMPLETE 3 VIEW RIGHT    CLINICAL HISTORY:  . Pain in unspecified foot    TECHNIQUE:  AP, lateral, and oblique views of the right foot were performed.    COMPARISON:  07/06/2023    FINDINGS:  Three views right foot.    There is osteopenia.  There is remote injury involving the proximal aspect of the proximal phalanx of the 5th toe.  No convincing acute displaced fracture or dislocation of the foot.  There is remote injury involving the proximal aspect of the 5th metatarsal.  There are degenerative changes of the calcaneus.                                        CT Cervical Spine Without Contrast (Final result)  Result time 01/05/24 13:02:10      Final result by  Nicholas Nicolas DO (01/05/24 13:02:10)                   Impression:      CT head: No acute intracranial findings specifically without evidence for acute intracranial hemorrhage or sulcal effacement to suggest large territory recent infarction.  Large subcutaneous hematoma overlies the left inferior frontal calvarium without gross underlying calvarial fracture.    CT maxillofacial bones: Extension of subcutaneous hematoma into the left periorbital preseptal soft tissues with underlying fracture deformity of the left inferior orbital wall.  Please note there is severe near complete opacification left maxillary antra concerning for hemorrhage with material extending along the fracture cleft along the floor of the left orbit concerning for extra conal hematoma with slight elevation of the inferior rectus muscle.  Probable component of left globe proptosis.  Clinical correlation and correlation with emergent ophthalmological evaluation recommended.    No definite additional fracture remaining maxillofacial bones    CT cervical spine: Continued degenerative change cervical spine without evidence for acute fracture or traumatic subluxation.      Electronically signed by: Nicholas Nicolas DO  Date:    01/05/2024  Time:    13:02               Narrative:    EXAMINATION:  CT HEAD WITHOUT CONTRAST; CT MAXILLOFACIAL WITHOUT CONTRAST; CT CERVICAL SPINE WITHOUT CONTRAST    CLINICAL HISTORY:  Facial trauma, blunt;; Neck trauma (Age >= 65y);    TECHNIQUE:  CT head: Multiple sequential 5 mm axial images of the head without contrast.  Coronal and sagittal reformatted imaging from the axial acquisition    CT maxillofacial bones: 1.25 mm axial images of the maxillofacial bones without contrast.  Coronal sagittal reformatted imaging from the axial acquisition    CT cervical spine: 1.25 mm sequential axial images of the cervical spine without contrast coronal sagittal reformatted imaging from the axial acquisition.    COMPARISON:  CT head  and cervical spine 11/24/2023 and CT maxillofacial bones 05/22/2023    FINDINGS:  CT head: Large subcutaneous hematoma overlies the left inferior frontal calvarium extending to the right periorbital preseptal soft tissues without underlying calvarial fracture.  There is no evidence for acute intracranial hemorrhage.  Thin hypodense extra-axial collections overlie the frontal lobes bilaterally similar to prior suggestive for subdural hygromas unchanged.  Ventricles stable without hydrocephalus.  No midline shift or significant mass effect.  Moderate opacification in the left maxillary antra with underlying inferior depressed fracture left inferior orbital wall.  Please see below for further details.    CT maxillofacial bones: Prominent left preseptal periorbital subcutaneous hematoma extending from the frontal soft tissues with scattered preseptal and orbital emphysema.    There is superimposed inferior depressed fracture of the left inferior orbital wall with the medial component of the fracture extending approximately 9 mm below the orbital floor into the maxillary antrum.  There is near complete mixed density material filling the left maxillary antra suggestive for hemorrhage.    There is intermediate density material along the fracture gap along the floor of the left orbit suggestive for additional hemorrhage with slight elevation of the inferior rectus muscle.  Clinical correlation and ophthalmological evaluation recommended.    There is probable left globe proptosis    Superimposed remote operative change from prior lateral lens replacement.  No evidence for right orbital fracture.  Remaining maxillofacial bones are intact.  No significant opacification remaining paranasal sinuses    Mastoid air cells are clear    Degenerative change temporomandibular joints.  There is slight distortion by artifact from dental metal    CT cervical spine: Cervical sagittal alignment similar to prior.  Cervical vertebral body  heights and contours are relatively stable allowing for scattered degenerative changes without evidence for acute fracture or traumatic subluxation.  There is stable mild superior height loss of the T3 superior endplate    Evaluation of the central canal neural foramen distorted by CT technique and similar to prior allowing for this degenerative change most pronounced at C4/C5 level with posterior disc osteophyte without significant central canal or definite bony neural foraminal stenosis    There are few linear opacities within the lung apices suggestive for scarring with stable subcentimeter probable micronodule within the right upper lobe posteriorly measuring approximately 0.3 cm image 241.    Heterogeneous thyroid gland with multiple nodules unchanged    This report was flagged in Epic as abnormal.                                        CT Maxillofacial Without Contrast (Final result)  Result time 01/05/24 13:02:10      Final result by Nicholas Nicolas DO (01/05/24 13:02:10)                   Impression:      CT head: No acute intracranial findings specifically without evidence for acute intracranial hemorrhage or sulcal effacement to suggest large territory recent infarction.  Large subcutaneous hematoma overlies the left inferior frontal calvarium without gross underlying calvarial fracture.    CT maxillofacial bones: Extension of subcutaneous hematoma into the left periorbital preseptal soft tissues with underlying fracture deformity of the left inferior orbital wall.  Please note there is severe near complete opacification left maxillary antra concerning for hemorrhage with material extending along the fracture cleft along the floor of the left orbit concerning for extra conal hematoma with slight elevation of the inferior rectus muscle.  Probable component of left globe proptosis.  Clinical correlation and correlation with emergent ophthalmological evaluation recommended.    No definite additional  fracture remaining maxillofacial bones    CT cervical spine: Continued degenerative change cervical spine without evidence for acute fracture or traumatic subluxation.      Electronically signed by: Nicholas Nicolas DO  Date:    01/05/2024  Time:    13:02               Narrative:    EXAMINATION:  CT HEAD WITHOUT CONTRAST; CT MAXILLOFACIAL WITHOUT CONTRAST; CT CERVICAL SPINE WITHOUT CONTRAST    CLINICAL HISTORY:  Facial trauma, blunt;; Neck trauma (Age >= 65y);    TECHNIQUE:  CT head: Multiple sequential 5 mm axial images of the head without contrast.  Coronal and sagittal reformatted imaging from the axial acquisition    CT maxillofacial bones: 1.25 mm axial images of the maxillofacial bones without contrast.  Coronal sagittal reformatted imaging from the axial acquisition    CT cervical spine: 1.25 mm sequential axial images of the cervical spine without contrast coronal sagittal reformatted imaging from the axial acquisition.    COMPARISON:  CT head and cervical spine 11/24/2023 and CT maxillofacial bones 05/22/2023    FINDINGS:  CT head: Large subcutaneous hematoma overlies the left inferior frontal calvarium extending to the right periorbital preseptal soft tissues without underlying calvarial fracture.  There is no evidence for acute intracranial hemorrhage.  Thin hypodense extra-axial collections overlie the frontal lobes bilaterally similar to prior suggestive for subdural hygromas unchanged.  Ventricles stable without hydrocephalus.  No midline shift or significant mass effect.  Moderate opacification in the left maxillary antra with underlying inferior depressed fracture left inferior orbital wall.  Please see below for further details.    CT maxillofacial bones: Prominent left preseptal periorbital subcutaneous hematoma extending from the frontal soft tissues with scattered preseptal and orbital emphysema.    There is superimposed inferior depressed fracture of the left inferior orbital wall with the medial  component of the fracture extending approximately 9 mm below the orbital floor into the maxillary antrum.  There is near complete mixed density material filling the left maxillary antra suggestive for hemorrhage.    There is intermediate density material along the fracture gap along the floor of the left orbit suggestive for additional hemorrhage with slight elevation of the inferior rectus muscle.  Clinical correlation and ophthalmological evaluation recommended.    There is probable left globe proptosis    Superimposed remote operative change from prior lateral lens replacement.  No evidence for right orbital fracture.  Remaining maxillofacial bones are intact.  No significant opacification remaining paranasal sinuses    Mastoid air cells are clear    Degenerative change temporomandibular joints.  There is slight distortion by artifact from dental metal    CT cervical spine: Cervical sagittal alignment similar to prior.  Cervical vertebral body heights and contours are relatively stable allowing for scattered degenerative changes without evidence for acute fracture or traumatic subluxation.  There is stable mild superior height loss of the T3 superior endplate    Evaluation of the central canal neural foramen distorted by CT technique and similar to prior allowing for this degenerative change most pronounced at C4/C5 level with posterior disc osteophyte without significant central canal or definite bony neural foraminal stenosis    There are few linear opacities within the lung apices suggestive for scarring with stable subcentimeter probable micronodule within the right upper lobe posteriorly measuring approximately 0.3 cm image 241.    Heterogeneous thyroid gland with multiple nodules unchanged    This report was flagged in Epic as abnormal.                                        CT Head Without Contrast (Final result)  Result time 01/05/24 13:02:10      Final result by Nicholas Nicolas DO (01/05/24 13:02:10)                    Impression:      CT head: No acute intracranial findings specifically without evidence for acute intracranial hemorrhage or sulcal effacement to suggest large territory recent infarction.  Large subcutaneous hematoma overlies the left inferior frontal calvarium without gross underlying calvarial fracture.    CT maxillofacial bones: Extension of subcutaneous hematoma into the left periorbital preseptal soft tissues with underlying fracture deformity of the left inferior orbital wall.  Please note there is severe near complete opacification left maxillary antra concerning for hemorrhage with material extending along the fracture cleft along the floor of the left orbit concerning for extra conal hematoma with slight elevation of the inferior rectus muscle.  Probable component of left globe proptosis.  Clinical correlation and correlation with emergent ophthalmological evaluation recommended.    No definite additional fracture remaining maxillofacial bones    CT cervical spine: Continued degenerative change cervical spine without evidence for acute fracture or traumatic subluxation.      Electronically signed by: Nicholas Nicolas DO  Date:    01/05/2024  Time:    13:02               Narrative:    EXAMINATION:  CT HEAD WITHOUT CONTRAST; CT MAXILLOFACIAL WITHOUT CONTRAST; CT CERVICAL SPINE WITHOUT CONTRAST    CLINICAL HISTORY:  Facial trauma, blunt;; Neck trauma (Age >= 65y);    TECHNIQUE:  CT head: Multiple sequential 5 mm axial images of the head without contrast.  Coronal and sagittal reformatted imaging from the axial acquisition    CT maxillofacial bones: 1.25 mm axial images of the maxillofacial bones without contrast.  Coronal sagittal reformatted imaging from the axial acquisition    CT cervical spine: 1.25 mm sequential axial images of the cervical spine without contrast coronal sagittal reformatted imaging from the axial acquisition.    COMPARISON:  CT head and cervical spine 11/24/2023 and CT  maxillofacial bones 05/22/2023    FINDINGS:  CT head: Large subcutaneous hematoma overlies the left inferior frontal calvarium extending to the right periorbital preseptal soft tissues without underlying calvarial fracture.  There is no evidence for acute intracranial hemorrhage.  Thin hypodense extra-axial collections overlie the frontal lobes bilaterally similar to prior suggestive for subdural hygromas unchanged.  Ventricles stable without hydrocephalus.  No midline shift or significant mass effect.  Moderate opacification in the left maxillary antra with underlying inferior depressed fracture left inferior orbital wall.  Please see below for further details.    CT maxillofacial bones: Prominent left preseptal periorbital subcutaneous hematoma extending from the frontal soft tissues with scattered preseptal and orbital emphysema.    There is superimposed inferior depressed fracture of the left inferior orbital wall with the medial component of the fracture extending approximately 9 mm below the orbital floor into the maxillary antrum.  There is near complete mixed density material filling the left maxillary antra suggestive for hemorrhage.    There is intermediate density material along the fracture gap along the floor of the left orbit suggestive for additional hemorrhage with slight elevation of the inferior rectus muscle.  Clinical correlation and ophthalmological evaluation recommended.    There is probable left globe proptosis    Superimposed remote operative change from prior lateral lens replacement.  No evidence for right orbital fracture.  Remaining maxillofacial bones are intact.  No significant opacification remaining paranasal sinuses    Mastoid air cells are clear    Degenerative change temporomandibular joints.  There is slight distortion by artifact from dental metal    CT cervical spine: Cervical sagittal alignment similar to prior.  Cervical vertebral body heights and contours are relatively  stable allowing for scattered degenerative changes without evidence for acute fracture or traumatic subluxation.  There is stable mild superior height loss of the T3 superior endplate    Evaluation of the central canal neural foramen distorted by CT technique and similar to prior allowing for this degenerative change most pronounced at C4/C5 level with posterior disc osteophyte without significant central canal or definite bony neural foraminal stenosis    There are few linear opacities within the lung apices suggestive for scarring with stable subcentimeter probable micronodule within the right upper lobe posteriorly measuring approximately 0.3 cm image 241.    Heterogeneous thyroid gland with multiple nodules unchanged    This report was flagged in Epic as abnormal.                                       Medications   morphine injection 4 mg (4 mg Intravenous Given 1/5/24 1435)   proparacaine 0.5 % ophthalmic solution 1 drop (1 drop Left Eye Given by Provider 1/5/24 1452)   ondansetron injection 4 mg (4 mg Intravenous Given 1/5/24 1432)   morphine injection 2 mg (2 mg Intravenous Given 1/5/24 1613)     Medical Decision Making  Amount and/or Complexity of Data Reviewed  Labs: ordered. Decision-making details documented in ED Course.  Radiology: ordered. Decision-making details documented in ED Course.    Risk  Prescription drug management.               ED Course as of 01/05/24 1951 Fri Jan 05, 2024   1200 Called CT for stat imaging as the pt is on Plavix [DT]   1333 Impression:     CT head: No acute intracranial findings specifically without evidence for acute intracranial hemorrhage or sulcal effacement to suggest large territory recent infarction.  Large subcutaneous hematoma overlies the left inferior frontal calvarium without gross underlying calvarial fracture.     CT maxillofacial bones: Extension of subcutaneous hematoma into the left periorbital preseptal soft tissues with underlying fracture deformity of  the left inferior orbital wall.  Please note there is severe near complete opacification left maxillary antra concerning for hemorrhage with material extending along the fracture cleft along the floor of the left orbit concerning for extra conal hematoma with slight elevation of the inferior rectus muscle.  Probable component of left globe proptosis.  Clinical correlation and correlation with emergent ophthalmological evaluation recommended.     No definite additional fracture remaining maxillofacial bones     CT cervical spine: Continued degenerative change cervical spine without evidence for acute fracture or traumatic subluxation.         [DT]   1336 CT Head Without Contrast(!) [LC]   1340 Pt moved to room 14 as she is actively vomiting. Dr Wolf at the bedside.  [DT]   1427 Pt states fall occurred at approximately 1115 am today.    [LC]   1514 Case discussed with ophthalmology on call (Dr. Mensah) who has accepted pt as an ED-to-ED transfer.  [LC]   1617 WBC(!): 16.05 [LC]   1617 X-Ray Humerus 2 View Left  Lucency at the level of the proximal left humeral metaphysis, not confirmed on the orthogonal view.  Correlation with dedicated shoulder radiography recommended, fracture is not excluded.         [LC]      ED Course User Index  [DT] Leonarda Guzman, NP  [LC] Jose Wolf MD               Medical Decision Making:   Initial Assessment:   See HPI   Will obtain CT head, C spine and max/face in light of trauma; pt on Plavix   Differential Diagnosis:   Retrobulbar hematoma, globe rupture, traumatic hyphema, blowout fracture, intracranial hemorrhage skull fracture, scalp laceration  Clinical Tests:   Lab Tests: Reviewed and Ordered  Radiological Study: Reviewed  ED Management:  - CT of the head demonstrates no acute intra cranial abnormality per the final radiology read; CT of the cervical spine demonstrates no acute osseous abnormality per the final radiology read; CT of the max face demonstrates  extension of subcutaneous hematoma into the left periorbital preseptal soft tissues with underlying fracture deformity of the left inferior orbital wall.  Please note there is severe near complete opacification left maxillary antra concerning for hemorrhage with material extending along the fracture cleft along the floor of the left orbit concerning for extra conal hematoma with slight elevation of the inferior rectus muscle.  Probable component of left globe proptosis.  Clinical correlation and correlation with emergent ophthalmological evaluation recommended.  - in light of the injuries and urgent request for transfer an ophthalmology consultation was placed; the on-call ophthalmologist, Dr. Davian Mensah, reviewed the case, the associated attached images in the patient's chart, as well as the CT scan images and final read and not feel the patient was suffering from me orbital compartment syndrome requiring lateral canthotomy at this time; he did not feel the patient needed adjuvant medications such as acetazolamide; he did agree with my concern for possible developing/expanding hematoma in the light of the CT scan findings and the fact the patient is on Plavix; as such, he graciously agreed to accept the patient as an ED transfer for ED ophthalmology evaluation of this patient's; the patient's blood pressure was markedly elevated she was started on a Cardene drip prior to transfer; additionally, her pain was well controlled as was nausea following administration of antiemetic, pain medications; and no point did the patient complain of any loss of vision or double vision; she did complain of blurry vision from left thigh likely secondary to the significant ecchymosis and swelling surrounding the left orbit; as such a true visual acuity test was somewhat lacking when attempted; all these results findings were discussed with the on-call ophthalmologist prior to patient being transferred.  The patient is comfortable  plan for transfer to [facility] for further evaluation in the emergency department.  I explained the significant nature of the injuries sustained by the patient the patient's family at bedside; they are in agreement for transfer at this time.       Other:   I have discussed this case with another health care provider.       <> Summary of the Discussion: Cased discussed with on-call ophthalmology, Dr. Davian Mensah. See my notes above regarding my discussion with him regarding this patient.              Clinical Impression:  Final diagnoses:  [M79.673] Foot pain  [W19.XXXA] Fall  [S02.85XA] Closed fracture of orbit, initial encounter (Primary)  [S05.12XA] Traumatic hematoma of left orbit, initial encounter  [S00.83XA] Traumatic hematoma of forehead, initial encounter          ED Disposition Condition    Transfer to Another Facility Stable                Jose Wolf MD  01/05/24 1951

## 2024-01-05 NOTE — ED PROVIDER NOTES
Encounter Date: 1/5/2024       History     Chief Complaint   Patient presents with    Transfer     Arrives via EMS from [facility] for optho. Fell on cinder block and has left orbital fx. States was having double vision but that subsided. BP was in 230s systolic, arriving on cardene gtt at 7.5mg/hr     83-year-old female with history of CVA with anticoagulation on clopidogrel, COPD, CKD, hypertension, hyperlipidemia, diabetes, multiple falls, recent hospitalization for influenza and pneumonia.      Patient is transferred from an outside facility for ophthalmologic evaluation.  Patient had a fall while tripping over some uneven stepping stones at 11:15 a.m..  She struck the left side of her face as well as her left hand and the dorsum of her bilateral feet.  Her chief complaint is pain and swelling to left periorbital area.  CT at the outside facility showed a significant orbital fracture.  Patient had intra-ocular pressure of 28 to the left eye.  She was noted to be anticoagulated.  She was referred here for further evaluation.  However, she did not have significant proptosis or limitation of extraocular movements and lateral canthotomy was not clinically indicated.  She was placed on a Cardene drip but presents now with the Cardene at 7.5 milligrams/hour in his systolic blood pressure in the 130s    Review of patient's allergies indicates:   Allergen Reactions    Bactrim [sulfamethoxazole-trimethoprim] Hives    Sulfa (sulfonamide antibiotics) Hives     Past Medical History:   Diagnosis Date    Asthma     Chronic kidney disease (CKD), stage III (moderate) 04/28/2016    COPD, moderate     Depression 04/29/2016    Diabetes mellitus     Diabetic polyneuropathy associated with type 2 diabetes mellitus 02/09/2023    History of CVA (cerebrovascular accident) without residual deficits 04/28/2016    History of MAC infection 04/28/2016    Hyperlipidemia associated with type 2 diabetes mellitus 04/28/2016     Hypertension     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    Lipidemia     Long term (current) use of insulin 2023    Meniere disease 2016    Multiple falls 2023    Pulmonary Mycobacterium avium-intracellulare infection 2016    [facility]    Type 2 diabetes mellitus with stage 2 chronic kidney disease, without long-term current use of insulin 10/28/2015     Past Surgical History:   Procedure Laterality Date    ANKLE SURGERY Left     CATARACT EXTRACTION, BILATERAL       SECTION      x 3    eyelid surgery      HYSTERECTOMY       Family History   Problem Relation Age of Onset    Depression Mother     Bipolar disorder Mother     Cancer Father         skin cancer    Diabetes Father     Hypertension Father     Hyperlipidemia Father     Heart disease Father     Gout Father     Nephrolithiasis Father     Hypertension Sister     Gout Sister     Diabetes Sister     Cancer Sister         rectal cancer    Hypertension Sister     Hyperlipidemia Sister     Heart disease Sister         stents x 4    Cancer Sister         pituitary gland cancer    Hypertension Brother     Hyperlipidemia Brother     Heart disease Brother     Hypertension Daughter     Early death Maternal Aunt      Social History     Tobacco Use    Smoking status: Never     Passive exposure: Past    Smokeless tobacco: Never   Substance Use Topics    Alcohol use: No    Drug use: No     Review of Systems    Physical Exam     Initial Vitals   BP Pulse Resp Temp SpO2   24 1714 24 1714 24 1714 24 1717 24 1714   (!) 148/63 81 18 98 °F (36.7 °C) 97 %      MAP       --                Physical Exam    Nursing note and vitals reviewed.  Constitutional: She appears well-developed and well-nourished. She is not diaphoretic. No distress.   HENT:   Large left-sided.  Will hematoma involving  brow superiorly, and malar area inferiorly   Significant chemosis to nasal conjunctiva of left eye  Intact extraocular movements with pain on superior gaze of the left eye   No direct or consensual photophobia   No anisocoria   No pupillary irregularity   No afferent or efferent pupillary response defect   Neck: Neck supple.   Normal range of motion.  Pulmonary/Chest: No respiratory distress. She has no wheezes. She exhibits no tenderness.   Musculoskeletal:         General: Tenderness (Bilateral talus) present.      Cervical back: Normal range of motion and neck supple.      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 She has normal strength. She displays normal reflexes. No cranial nerve deficit or sensory deficit. GCS score is 15. GCS eye subscore is 4. GCS verbal subscore is 5. GCS motor subscore is 6.   Skin: Skin is warm and dry. Capillary refill takes less than 2 seconds.   2 x 1 cm deep subcutaneous laceration over left thenar eminence  Abrasions over dorsal ankles bilaterally       ED Course   Lac Repair    Date/Time: 1/5/2024 8:05 PM    Performed by: Davian Plummer MD  Authorized by: Davian Plummer MD    Consent:     Consent obtained:  Verbal    Consent given by:  Patient    Risks, benefits, and alternatives were discussed: yes      Risks discussed:  Infection, pain and need for additional repair    Alternatives discussed:  No treatment, delayed treatment, observation and referral  Universal protocol:     Patient identity confirmed:  Hospital-assigned identification number, arm band and provided demographic data  Anesthesia:     Anesthesia method:  Local infiltration    Local anesthetic:  Lidocaine 1% w/o epi  Laceration details:     Location:  Hand    Hand location:  L palm    Length (cm):  2    Depth (mm):  2  Pre-procedure details:     Preparation:  Imaging obtained to  evaluate for foreign bodies and patient was prepped and draped in usual sterile fashion  Exploration:     Hemostasis achieved with:  Direct pressure    Imaging obtained: x-ray      Imaging outcome: foreign body not noted      Wound exploration: wound explored through full range of motion and entire depth of wound visualized      Wound extent: no areolar tissue violation noted, no fascia violation noted, no foreign bodies/material noted, no muscle damage noted, no nerve damage noted, no tendon damage noted, no underlying fracture noted and no vascular damage noted      Contaminated: yes    Treatment:     Area cleansed with:  Saline    Amount of cleaning:  Extensive    Irrigation solution:  Sterile saline    Irrigation volume:  1000    Irrigation method:  Pressure wash    Visualized foreign bodies/material removed: no      Debridement:  Minimal    Undermining:  Minimal    Scar revision: no    Skin repair:     Repair method:  Sutures    Suture size:  5-0    Suture material:  Prolene    Suture technique:  Simple interrupted and horizontal mattress    Number of sutures:  2  Approximation:     Approximation:  Close  Repair type:     Repair type:  Intermediate  Post-procedure details:     Dressing:  Adhesive bandage    Procedure completion:  Tolerated well, no immediate complications  Labs Reviewed - No data to display         Imaging Results              X-Ray Hand 3 view Left (Final result)  Result time 01/05/24 18:49:20      Final result by Donavan Galan MD (01/05/24 18:49:20)                   Impression:      1. No convincing acute displaced fracture or dislocation of the hand.      Electronically signed by: Donavan Galan MD  Date:    01/05/2024  Time:    18:49               Narrative:    EXAMINATION:  XR HAND COMPLETE 3 VIEW LEFT    CLINICAL HISTORY:  Significant open wound thenar eminence concern for open fracture of left 1st metacarpal;.    TECHNIQUE:  PA, lateral, and oblique views of the left hand were  performed.    COMPARISON:  04/14/2023    FINDINGS:  Three views left hand.    There is osteopenia.  There are degenerative changes of the PIP and D IP joints, most significantly involving the D IP joints of the 4th and 5th digits.  There is chronic degenerative change of the 1st carpal metacarpal joint.  No convincing acute displaced fracture or dislocation of the hand.  No radiopaque foreign body.  No significant edema.                                       X-Ray Ankle Complete Bilateral (Final result)  Result time 01/05/24 19:05:29      Final result by Donavan Galan MD (01/05/24 19:05:29)                   Impression:      As above      Electronically signed by: Donavan Galan MD  Date:    01/05/2024  Time:    19:05               Narrative:    EXAMINATION:  XR ANKLE COMPLETE 3 VIEW BILATERAL    CLINICAL HISTORY:  Unspecified fall, initial encounter    TECHNIQUE:  AP, lateral and oblique views of both ankles were performed.    COMPARISON:  Right foot radiograph 07/06/2023, left ankle 04/15/2019    FINDINGS:  Three views left ankle, three views right ankle.    Left ankle:    There is osteopenia.  The ankle mortise is intact.  No acute displaced fracture or dislocation of the ankle.  There is a stable radiopaque structure projected over the distal tibial metaphysis.    Right ankle:    The ankle mortise is intact.  No acute displaced fracture or dislocation of the ankle.  No radiopaque foreign body.  There is remote injury of the proximal aspect of the 5th metatarsal.  There are degenerative changes of the calcaneus.                                       X-Ray Foot 2 View Bilateral (Final result)  Result time 01/05/24 19:03:43      Final result by Donavan Galan MD (01/05/24 19:03:43)                   Impression:      As above      Electronically signed by: Donavan Galan MD  Date:    01/05/2024  Time:    19:03               Narrative:    EXAMINATION:  XR FOOT 2 VIEW BILATERAL    CLINICAL HISTORY:  fall  "w/ inability to ambulate;    COMPARISON:  Right foot 01/05/2024    FINDINGS:  Four views bilateral foot.    Left foot:    There is osteopenia.  No acute displaced fracture or dislocation of the foot.  No radiopaque foreign body.  There are suspected remote surgical changes of the distal tibia/fibula.  No significant edema.    Right foot:    No interval acute displaced fracture or dislocation since the previous exam.                                       Medications   proparacaine 0.5 % ophthalmic solution 1 drop (1 drop Both Eyes Given by Provider 1/5/24 1733)   amoxicillin-clavulanate 875-125mg per tablet 1 tablet (1 tablet Oral Given 1/5/24 2029)     Medical Decision Making  Swelling appears to have worsened in the interim.  However, I am still easily able to perform a full evaluation of the patient's eye.  There is no firm swelling.  Patient has full extraocular movements but does have significant pain when she looked superiorly consistent with her displaced inferior rectus muscle.  She has no crepitus.  Her intra-ocular pressure in the left eye is increased slightly from 28-32.  She has a significant open wound to the left thenar eminence.  X-ray ordered.  She has pain to the bilateral feet and ankles with a significant abrasion to the dorsum of her bilateral feet.  Will obtain x-ray of bilateral feet and ankles.  Her tetanus is up-to-date.  Will consult ophthalmology due to significant orbital fracture.    Amount and/or Complexity of Data Reviewed  External Data Reviewed: labs, radiology, ECG and notes.     Details: History of CVA "the Easter before [place]er[place]" with clopidogrel use  CT at outside facility shows no acute intracranial or cervical spine injury.  However, CT maxillofacial shows significant inferior orbital wall fracture with hematoma displacing inferior rectus muscle  Labs: ordered. Decision-making details documented in ED Course.  Radiology: ordered and independent interpretation performed. " Decision-making details documented in ED Course.  Discussion of management or test interpretation with external provider(s): Patient seen by ophthalmology, will f/u in clinic. No emergent intervention  D/w ENT to obtain f/u. Sinus precautions ordered.    Risk  OTC drugs.  Prescription drug management.  Decision regarding hospitalization.               ED Course as of 01/06/24 2103 Fri Jan 05, 2024   1754 Discussed with Ophthalmology who will evaluate patient at bedside [DS]   1826 OPHTHALMOLOGY AT BEDSIDE [DS]   1826 X-Ray Ankle Complete Bilateral [DS]   1836 Previously seen osteoarthritic changes of 1st digit are again demonstrated on independent review of x-ray left hand today. [DS]   2001 Discussed with Ophthalmology on-call completed their evaluation.  There is no acute intervention from their standpoint.  I referred the patient to ENT due to concern for maxillary sinus fracture and opacification of sinus with hemorrhage.  Will provide sinus precautions in the ENT clinic will arrange follow-up for next week.  Patient follow up for repeat blood pressure check on Monday of the coming week.  Will prescribe antibiotics [DS]   2006 Left hand laceration irrigated copiously and explored to base in bloodless field.  No apparent bone, joint, tendon involvement.  Moderate amount of debridement performed.  Flap laceration repaired with 1 5 0 Prolene horizontal mattress suture and 1 simple interrupted 5 0 Prolene suture.  Wounds to the right ankle and dorsum of left foot irrigated copiously and scrubbed.  There are no lacerations but there are abrasions that continue to ooze after eschar as described away.  This bleeding resolves with light pressure.  Patient is safe to discharge this time assuming she can ambulate with a walker at baseline.  Patient reports that her daughter lives 2 oz down in his able to help take care of her.  She reports that she has a follow-up on Monday of this coming week and can have her blood  pressure rechecked.  Ophthalmology will follow her up in a month and ENT will follow her up this week [DS]   2026 Patient ambulating briskly with steady gait to restroom and back.  Safe to discharge home at this time.  I sent her chart to her primary care doctor to assist with home fall medication [DS]      ED Course User Index  [DS] Sessions, Davian BUCHANAN MD                             Clinical Impression:  Final diagnoses:  [W19.XXXA] Fall  [S02.32XA] Closed fracture of left orbital floor, initial encounter (Primary)  [S61.412A] Laceration of left hand without foreign body, initial encounter          ED Disposition Condition    Discharge           ED Prescriptions       Medication Sig Dispense Start Date End Date Auth. Provider    amoxicillin-clavulanate 875-125mg (AUGMENTIN) 875-125 mg per tablet Take 1 tablet by mouth 2 (two) times daily. 14 tablet 1/5/2024 -- Sessions, Davian BUCHANAN MD          Follow-up Information    None          Sessions, Davian BUCHANAN MD  01/06/24 0123

## 2024-01-05 NOTE — ED TRIAGE NOTES
Trip and fall over concrete, striking face on deck. Left periorbital bruising/swelling with bloody sclera noted. Patient denies LOC. On plavix for hx of stroke. Denies cervical or spinal pain.     Two patient identifiers have been checked and are correct.      Appearance: Pt awake, alert & oriented to person, place & time. Pt in no acute distress at present time. Pt is clean and well groomed with clothes appropriately fastened.   Skin: Skin warm, dry & intact. Color consistent with ethnicity. Mucous membranes moist. Swelling and bruising to left eye.   Musculoskeletal: Patient moving all extremities well, no obvious swelling or deformities noted.   Respiratory: Respirations spontaneous, even, and non-labored. Visible chest rise noted. Airway is open and patent. No accessory muscle use noted.   Neurologic: Sensation is intact. Speech is clear and appropriate. Eyes open spontaneously, behavior appropriate to situation, follows commands, facial expression symmetrical, bilateral hand grasp equal and even, purposeful motor response noted.  Cardiac: All peripheral pulses present. No Bilateral lower extremity edema. Cap refill is <3 seconds.  Abdomen: Abdomen soft, non-tender to palpation.   : Pt reports no dysuria or hematuria.

## 2024-01-06 NOTE — CONSULTS
Consultation Report  Ophthalmology Service    Date: 01/05/2024    Chief complaint/Reason for Consult: Transfer for Orbital floor fracture     History of Present Illness: Xi Moise is a 83 y.o. female with POcularHx of CEIOL OU, DM w/o retinopathy, dry eye syndrome, dermatochalasis s/p blepharoplasty and PMHx of CAD on plavix who presents as a transfer for concern for orbital floor fracture and muscle entrapment. Patient states today she was bending down to  some trash in her yard when she feel forward and hit her head on a concrete block. Does not remember losing consciousness. States forehead and eyelid started to swell up. She states she had some episodes of diplopia while at home and at other hospital but that has since resolved. Patient denies any nausea/vomiting, tinnitus, visual changes, visual disturbances, such as flashes, floaters, or curtain-veil in visual field, and ocular discomfort OU.    POcularHx: CEIOL OU 2018, bilateral blepharoplasty for dermatochalasis operated on about 5-6 months ago - has noticed worsening dry symptoms since blepharoplasty especially in right eye, dry eye syndrome on Restasis. Follows annually with outside eye doctor ([facility])    Current eye gtts: PFATs, Restasis     Family Hx: Denies family history of glaucoma, macular degeneration, or blindness. family history includes Bipolar disorder in her mother; Cancer in her father, sister, and sister; Depression in her mother; Diabetes in her father and sister; Early death in her maternal aunt; Gout in her father and sister; Heart disease in her brother, father, and sister; Hyperlipidemia in her brother, father, and sister; Hypertension in her brother, daughter, father, sister, and sister; Nephrolithiasis in her father.     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     PSurgHx:  has a past surgical history that includes Hysterectomy;  section; Ankle surgery (Left); Cataract extraction, bilateral; and eyelid surgery.     Home Medications:   Prior to Admission medications    Medication Sig Start Date End Date Taking? Authorizing Provider   albuterol (PROVENTIL/VENTOLIN HFA) 90 mcg/actuation inhaler Inhale 2 puffs into the lungs every 4 (four) hours as needed.    Provider, Historical   ascorbic acid, vitamin C, (VITAMIN C) 500 MG tablet Take 500 mg by mouth once daily.    Provider, Historical   atorvastatin (LIPITOR) 80 MG tablet Take 1 tablet (80 mg total) by mouth once daily.  Patient taking differently: Take 80 mg by mouth every evening. 3/6/23   Dolores Corado, NP   camphor-methyl salicyl-menthoL 3.1-15-10 % Gel Apply topically 2 (two) times daily.    Provider, Historical   cholecalciferol, vitamin D3, (VITAMIN D3) 50 mcg (2,000 unit) Tab Take 2,000 Units by mouth once daily.    Provider, Historical   chromium picolinate 200 mcg Tab Take 200 mcg by mouth once daily.    Provider, Historical   clopidogreL (PLAVIX) 75 mg tablet Take 1 tablet (75 mg total) by mouth once daily. 3/6/23   Dolores Corado NP   cyanocobalamin (VITAMIN B-12) 1000 MCG tablet Take 1,000 mcg by mouth once daily.    Provider, Historical    d-mannose 500 mg Cap Take 500 mg by mouth 2 (two) times a day.    Provider, Historical   EScitalopram oxalate (LEXAPRO) 20 MG tablet Take 20 mg by mouth once daily. 9/25/23   Provider, Historical   fluticasone (FLONASE) 50 mcg/actuation nasal spray 1 spray by Each Nare route once daily.    Provider, Historical   gabapentin (NEURONTIN) 300 MG capsule Take 1 capsule (300 mg total) by mouth 2 (two) times daily. 3/6/23   Dolores Corado, NP   insulin  unit/mL injection Inject 20 Units into the skin 2 (two) times daily before meals. 12/8/23   Dolores Corado, NP   lisinopriL (PRINIVIL,ZESTRIL) 5 MG tablet Take 1 tablet (5 mg total) by mouth once daily. 7/10/23   Dolores Corado NP   magnesium 250 mg Tab Take 500 mg by mouth once daily.    Provider, Historical   meclizine (ANTIVERT) 25 mg tablet Take 25 mg by mouth 2 (two) times daily as needed. 11/30/22   Provider, Historical   metoprolol succinate (TOPROL-XL) 25 MG 24 hr tablet Take 1 tablet (25 mg total) by mouth once daily. 12/8/23 12/7/24  Dolores Corado, NP   multivit-min/FA/lycopen/lutein (SENTRY SENIOR ORAL) Take 1 tablet by mouth once daily.    Provider, Historical   ondansetron (ZOFRAN) 8 MG tablet Take 8 mg by mouth every 8 (eight) hours as needed.    Provider, Historical        Medications this encounter:    LIDOcaine (PF) 10 mg/ml (1%)  5 mL Infiltration ED 1 Time       Allergies: is allergic to bactrim [sulfamethoxazole-trimethoprim] and sulfa (sulfonamide antibiotics).     Social:  reports that she has never smoked. She has been exposed to tobacco smoke. She has never used smokeless tobacco. She reports that she does not drink alcohol and does not use drugs.     ROS: As per HPI    Ocular examination/Dilated fundus examination:  Base Eye Exam       Visual Acuity (Snellen - Linear)         Right Left    Dist sc 20/25 -1 20/100    Dist ph sc 20/20 20/40 -2              Tonometry (Applanation, 7:29 PM)         Right Left    Pressure 10 10               Pupils         Dark Light Shape React APD    Right 4 3 Round Brisk None    Left 4 3 Round Brisk None              Visual Fields         Right Left     Full Full              Extraocular Movement         Right Left     0 0 0   0  0   0 0 0    0 0 0   0  0   0 -1 -2                 Neuro/Psych       Oriented x3: Yes              Dilation       Both eyes: 2.5% Phenylephrine, 1% Mydriacyl @ 7:30 PM                  Slit Lamp and Fundus Exam       External Exam         Right Left    External Normal Ecchymosis, Periorbital edema              Slit Lamp Exam         Right Left    Lids/Lashes 2.5mm lagophthalmos Superior lid hematoma    Conjunctiva/Sclera White and quiet Subconj heme NST areas inferiorly of whtie sclera seen, chemosis 2/2 heme mostly nasally    Cornea Scattered PEEs, poor tear film Dense centeral PEEs, poor tear film    Anterior Chamber Deep and quiet Deep and quiet    Iris Round and reactive Round and reactive    Lens Clear Clear    Anterior Vitreous PVD PVD              Fundus Exam         Right Left    Disc Pink, sharp Pink, sharp    C/D Ratio 0.35 0.35    Macula Flat, no holes Flat, no holes    Vessels Normal caliber Normal caliber    Periphery No holes, tears, detachments No holes, tears, detachments                  CT Max/Face (1/5/23)  Impression:  CT maxillofacial bones: Extension of subcutaneous hematoma into the left periorbital preseptal soft tissues with underlying fracture deformity of the left inferior orbital wall.  Please note there is severe near complete opacification left maxillary antra concerning for hemorrhage with material extending along the fracture cleft along the floor of the left orbit concerning for extra conal hematoma with slight elevation of the inferior rectus muscle.  Probable component of left globe proptosis. Clinical correlation and correlation with emergent ophthalmological evaluation recommended.     No definite additional fracture remaining maxillofacial  bones    Assessment/Plan:     1. Orbital fracture, left eye  - IOP excellent, no signs of orbital compartment syndrome    - No evidence of entrapment on imaging, EOM intact  - Globe intact - Aida negative, IOP normal, DFE negative for retinal damage  - Start Augmentin 875/125mg PO BID for 7 days   - Instructed patient to not blow nose  - Afrin spray BID for 3 days  - Apply ice packs to eyelids for 20 minutes every 1-2 hours for the first 24-48 hours  - 30 degree incline when at rest   - Follow up with Triage within 3-4 weeks our clinic will call patient with an appointment, message sent to staff. Patient states she will try to make appointment with her own eye doctor, instructed she can cancel appt if she get that appointment set up.     2. Lagophthalmos, OD  - Possibly iatrogenic 2/2 recent eyelid surgery  - Patient already reaching out to outside Ophtho who did the surgery.   - Reviewed frequent PFATs, lubricating ointment at night time    Patient's Best Contact Number: [phone]    Cipriano Fink MD PGY-2  [employer] Ophthalmology Resident  01/05/2024  7:31 PM

## 2024-01-06 NOTE — DISCHARGE INSTRUCTIONS
See the attached instructions for how to take care of your sinus fracture     Take the antibiotics as prescribed     Return to the emergency department for any new or concerning symptoms occur    Take over the counter acetaminophen 1000mg or  every 6 hours as needed for pain and inflammation

## 2024-01-07 DIAGNOSIS — I13.10 HYPERTENSIVE HEART AND KIDNEY DISEASE WITHOUT HEART FAILURE AND WITH STAGE 3A CHRONIC KIDNEY DISEASE: ICD-10-CM

## 2024-01-07 DIAGNOSIS — N18.31 HYPERTENSIVE HEART AND KIDNEY DISEASE WITHOUT HEART FAILURE AND WITH STAGE 3A CHRONIC KIDNEY DISEASE: ICD-10-CM

## 2024-01-08 ENCOUNTER — TELEPHONE (OUTPATIENT)
Dept: FAMILY MEDICINE | Facility: CLINIC | Age: 84
End: 2024-01-08
Payer: MEDICARE

## 2024-01-08 ENCOUNTER — PATIENT OUTREACH (OUTPATIENT)
Dept: EMERGENCY MEDICINE | Facility: HOSPITAL | Age: 84
End: 2024-01-08
Payer: MEDICARE

## 2024-01-08 RX ORDER — METOPROLOL SUCCINATE 25 MG/1
25 TABLET, EXTENDED RELEASE ORAL DAILY
Qty: 30 TABLET | Refills: 0 | Status: SHIPPED | OUTPATIENT
Start: 2024-01-08 | End: 2024-01-29

## 2024-01-08 NOTE — PROGRESS NOTES
Patient was seen in the ED on 1/5/24 for left closed orbital floor fx. They have an appointment scheduled with Dolores Corado NP on 1/10/24 at 5:00. ED navigator will remind patient of appointment.

## 2024-01-09 ENCOUNTER — PATIENT OUTREACH (OUTPATIENT)
Dept: EMERGENCY MEDICINE | Facility: HOSPITAL | Age: 84
End: 2024-01-09
Payer: MEDICARE

## 2024-01-09 NOTE — TELEPHONE ENCOUNTER
CALL PATIENT - she had scheduled herself for a virtual visit on 1/10/2024 at 5 PM for hospital follow up.    Tell patient I need to see her in person so I changed her appt to 1/10/2024 at 9 AM - please arrive for 8:45 AM to check in.

## 2024-01-09 NOTE — TELEPHONE ENCOUNTER
Patient advised of message, said she can not come in to the office, she is unable to drive, can not see, that s why she had schedule as a virtual, she no way coming in soon. Please advised.

## 2024-01-09 NOTE — PROGRESS NOTES
ED Navigator reminded the patient of scheduled appointment with Dolores Corado NP on 1/10/24 at 9:00. Patient instructed to bring a photo I.D., health insurance card, and a list of current medications. Patient agreed to appointment date and time. Patient declined additional services. Follow up encounter closed.

## 2024-01-09 NOTE — TELEPHONE ENCOUNTER
Megan,    Patient is unable to drive, unable to see due to orbital fracture.    She needs HOME Hospital follow up NP visit as well as home follow up for frequent falls.    I did do some BLOOD PRESSURE medication changes last month due to low blood pressure/orthostatic hypotension so please check Blood pressure and make medication adjustments if necessary as well.    I notified patient you will be reaching out to her to set up visit.    When she is able to get out again - probably need to plan for carotid ultrasound for syncopal episodes/fall in past as well just to complete the workup.    Please place whatever referral order needed for your services for me to sign

## 2024-01-10 ENCOUNTER — PES CALL (OUTPATIENT)
Dept: HOME HEALTH SERVICES | Facility: CLINIC | Age: 84
End: 2024-01-10
Payer: MEDICARE

## 2024-01-10 ENCOUNTER — TELEPHONE (OUTPATIENT)
Dept: HOME HEALTH SERVICES | Facility: CLINIC | Age: 84
End: 2024-01-10
Payer: MEDICARE

## 2024-01-10 DIAGNOSIS — J44.9 COPD, MODERATE: ICD-10-CM

## 2024-01-10 DIAGNOSIS — Z86.73 HISTORY OF CVA (CEREBROVASCULAR ACCIDENT) WITHOUT RESIDUAL DEFICITS: Primary | Chronic | ICD-10-CM

## 2024-01-11 ENCOUNTER — TELEPHONE (OUTPATIENT)
Dept: OPHTHALMOLOGY | Facility: CLINIC | Age: 84
End: 2024-01-11
Payer: MEDICARE

## 2024-01-11 NOTE — TELEPHONE ENCOUNTER
Called pt as per Triage Note:   Pt states that she will follow up with her own outside ophthalmologist- Dr. Rashid.   Advised pt to call back if we could help her with anything else.   Pt verbalized understanding.

## 2024-01-11 NOTE — TELEPHONE ENCOUNTER
"----- Message from Cipriano Fink MD sent at 1/5/2024  7:48 PM CST -----  Regarding: ED Follow Up  Hello,    Can we please schedule this patient in Triage Clinic in 3-4 weeks. For follow up of Orbital floor fracture.    Thanks,    Cipriano Fink MD (Trey)      "

## 2024-01-12 ENCOUNTER — OFFICE VISIT (OUTPATIENT)
Dept: HOME HEALTH SERVICES | Facility: CLINIC | Age: 84
End: 2024-01-12
Payer: MEDICARE

## 2024-01-12 DIAGNOSIS — Z86.73 HISTORY OF CVA (CEREBROVASCULAR ACCIDENT) WITHOUT RESIDUAL DEFICITS: Chronic | ICD-10-CM

## 2024-01-12 DIAGNOSIS — S02.32XD CLOSED FRACTURE OF LEFT ORBITAL FLOOR WITH ROUTINE HEALING: ICD-10-CM

## 2024-01-12 DIAGNOSIS — I13.10 HYPERTENSIVE HEART AND KIDNEY DISEASE WITHOUT HEART FAILURE AND WITH STAGE 3A CHRONIC KIDNEY DISEASE: Primary | ICD-10-CM

## 2024-01-12 DIAGNOSIS — R29.6 MULTIPLE FALLS: ICD-10-CM

## 2024-01-12 DIAGNOSIS — J44.9 COPD, MODERATE: ICD-10-CM

## 2024-01-12 DIAGNOSIS — N18.31 HYPERTENSIVE HEART AND KIDNEY DISEASE WITHOUT HEART FAILURE AND WITH STAGE 3A CHRONIC KIDNEY DISEASE: Primary | ICD-10-CM
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
## 2024-01-12 PROCEDURE — 99349 HOME/RES VST EST MOD MDM 40: CPT | Mod: S$GLB,,, | Performed by: NURSE PRACTITIONER

## 2024-01-12 NOTE — PROGRESS NOTES
[facility]  Transitional Care Management (TCM) Home Visit    Encounter Provider: Megan Ramon   PCP: Dolores Corado, NP  Consult Requested By: Dolores Corado  Admit Date: 1/5/24   IP Discharge Date: 1/5/24  Hospital Length of Stay: 1 day  Days since discharge (from IP or SNF): 7  [facility] Contact Note: 1/10/24  Hospital Diagnosis: History of CVA (cerebrovascular accident) without residual deficits [Z86.73];COPD, moderate [J44.9]     HISTORY OF PRESENT ILLNESS      Patient ID: Xi Moise is a 83 y.o. female was recently admitted to the hospital, this is their TCM encounter.    Hospital Course Synopsis:        DECISION MAKING TODAY       Assessment & Plan:  1. 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    Assessment & Plan:  Bp at goal today. 128/70  Medication recently adjusted by PCP  Continue Metoprolol 25mg and Lisinopril 5 mg  Monitor    BMP  Lab Results   Component Value Date     01/05/2024    K 3.8 01/05/2024     01/05/2024    CO2 27 01/05/2024    BUN 25 (H) 01/05/2024    CREATININE 1.1 01/05/2024    CALCIUM 9.7 01/05/2024    ANIONGAP 11 01/05/2024    EGFRNORACEVR 50 (A) 01/05/2024         Orders:  -     Ambulatory referral/consult to Home Health; Future; Expected date: 01/13/2024    2. History of CVA (cerebrovascular accident) without residual deficits  Overview:  Around 2016  On Plavix 75 mg daily    Assessment & Plan:  Chronic and stable  Plavix and statin in place  Continue current plan  Monitor    Orders:  -     Ambulatory referral/consult to [facility]    3. COPD, moderate  Overview:  Bronchiectasis, COPD/Asthma Overlap, History of Pulmonary MAC, SHANTI positive  Noted on last Pulmonary Progress note - Dr. Lee at [facility] - see under  encounters  Patient reports now followed by Dr. Alannah Noe with [facility] - will request records  Reports only on Albuterol inhaler prn  Reports NO controller inhalers  Needs new pulmonologist  for COPD/asthma management.    Assessment & Plan:  Stable at baseline  Continue current plan    Orders:  -     Ambulatory referral/consult to [facility]  -     Ambulatory referral/consult to Home Health; Future; Expected date: 01/13/2024    4. Multiple falls  Assessment & Plan:  3 falls in past 3 months  Rolling walker in use  PT eval and treat      5. Closed fracture of left orbital floor with routine healing  Assessment & Plan:  Result of fall last week  Will complete Augmentin tomorrow  Follow up with ENT next week           Medication List on Discharge:     Medication List            Accurate as of January 12, 2024  2:17 PM. If you have any questions, ask your nurse or doctor.                CHANGE how you take these medications      atorvastatin 80 MG tablet  Commonly known as: LIPITOR  Take 1 tablet (80 mg total) by mouth once daily.  What changed: when to take this            CONTINUE taking these medications      albuterol 90 mcg/actuation inhaler  Commonly known as: PROVENTIL/VENTOLIN HFA  Inhale 2 puffs into the lungs every 4 (four) hours as needed.     amoxicillin-clavulanate 875-125mg 875-125 mg per tablet  Commonly known as: AUGMENTIN  Take 1 tablet by mouth 2 (two) times daily.     ascorbic acid (vitamin C) 500 MG tablet  Commonly known as: VITAMIN C  Take 500 mg by mouth once daily.     camphor-methyl salicyl-menthoL 3.1-15-10 % Gel  Apply topically 2 (two) times daily.     cholecalciferol (vitamin D3) 50 mcg (2,000 unit) Tab  Commonly known as: VITAMIN D3  Take 2,000 Units by mouth once daily.     chromium picolinate 200 mcg Tab  Take 200 mcg by mouth once daily.     clopidogreL 75 mg tablet  Commonly known as: PLAVIX  Take 1 tablet (75 mg total) by mouth once daily.      cyanocobalamin 1000 MCG tablet  Commonly known as: VITAMIN B-12  Take 1,000 mcg by mouth once daily.     d-mannose 500 mg Cap  Take 500 mg by mouth 2 (two) times a day.     EScitalopram oxalate 20 MG tablet  Commonly known as: LEXAPRO  Take 20 mg by mouth once daily.     fluticasone propionate 50 mcg/actuation nasal spray  Commonly known as: FLONASE  1 spray by Each Nare route once daily.     gabapentin 300 MG capsule  Commonly known as: NEURONTIN  Take 1 capsule (300 mg total) by mouth 2 (two) times daily.     insulin  unit/mL injection  Inject 20 Units into the skin 2 (two) times daily before meals.     lisinopriL 5 MG tablet  Commonly known as: PRINIVIL,ZESTRIL  Take 1 tablet (5 mg total) by mouth once daily.     magnesium 250 mg Tab  Take 500 mg by mouth once daily.     meclizine 25 mg tablet  Commonly known as: ANTIVERT  Take 25 mg by mouth 2 (two) times daily as needed.     metoprolol succinate 25 MG 24 hr tablet  Commonly known as: TOPROL-XL  Take 1 tablet (25 mg total) by mouth once daily.     ondansetron 8 MG tablet  Commonly known as: ZOFRAN  Take 8 mg by mouth every 8 (eight) hours as needed.     SENTRY SENIOR ORAL  Take 1 tablet by mouth once daily.              Medication Reconciliation:  Were medications changed on discharge? Yes  Were medications in the home? Yes  Is the patient taking the medications as directed? Yes  Does the patient understand the medications and changes? Yes  Does updated med list accurately reflects meds patient is currently taking? Yes    ENVIRONMENT OF CARE      Family and/or Caregiver present at visit?  No  Name of Caregiver:   History provided by: patient    Advance Care Planning   Advanced Care Planning Status:  Patient has had an ACP conversation  Living Will: No  Power of : No  [place]: No    Does Caregiver have HCPoA: No  Changes today: None  Is patient hospice appropriate: No  (If needed, use PPS <30 or FAST score >7)  Was referral to hospice placed: No        Impression upon entering the home:  Physical Dwelling: single family home   Appearance of home environment: cleaniness: clean  Functional Status: independent  Mobility: ambulatory with device  Nutritional access: adequate intake and access  Home Health: No, and will be referred today   DME/Supplies: rolling walker     Diagnostic tests reviewed/disposition: No diagnosic tests pending after this hospitalization.  Disease/illness education:  Fall  Establishment or re-establishment of referral orders for community resources: No other necessary community resources.   Discussion with other health care providers: No discussion with other health care providers necessary.   Does patient have a PCP at [place]? Yes   Repatriation plan with PCP? follow-up with PCP within 90d   Does patient have an ostomy (ileostomy, colostomy, suprapubic catheter, nephrostomy tube, tracheostomy, PEG tube, pleurex catheter, cholecystostomy, etc)? No  Were BPAs reviewed? Yes    Social History     Socioeconomic History    Marital status: Single   Tobacco Use    Smoking status: Never     Passive exposure: Past    Smokeless tobacco: Never   Substance and Sexual Activity    Alcohol use: No    Drug use: No     Social Determinants of Health     Financial Resource Strain: Medium Risk (1/7/2024)    Overall Financial Resource Strain (CARDIA)     Difficulty of Paying Living Expenses: Somewhat hard   Food Insecurity: Food Insecurity Present (1/7/2024)    Hunger Vital Sign     Worried About Running Out of Food in the Last Year: Sometimes true     Ran Out of Food in the Last Year: Never true   Transportation Needs: Unmet Transportation Needs (1/7/2024)    PRAPARE - Transportation     Lack of Transportation (Medical): Yes     Lack of Transportation (Non-Medical): Yes   Physical Activity: Inactive (1/7/2024)    Exercise Vital Sign     Days of Exercise per Week: 0 days     Minutes of Exercise per Session: 0 min   Stress: Stress Concern Present (1/7/2024)     [facility] of Occupational Health - Occupational Stress Questionnaire     Feeling of Stress : Rather much   Social Connections: Unknown (1/7/2024)    Social Connection and Isolation Panel [NHANES]     Frequency of Communication with Friends and Family: More than three times a week     Frequency of Social Gatherings with Friends and Family: Once a week     Active Member of Clubs or Organizations: Yes     Attends Club or Organization Meetings: More than 4 times per year     Marital Status:    Housing Stability: Unknown (1/7/2024)    Housing Stability Vital Sign     Unable to Pay for Housing in the Last Year: Patient declined     Number of Places Lived in the Last Year: 1     Unstable Housing in the Last Year: No       OBJECTIVE:     Vital Signs:  There were no vitals filed for this visit.    Review of Systems   Constitutional:  Negative for activity change, fatigue and fever.   HENT: Negative.     Eyes: Negative.    Respiratory:  Negative for chest tightness.    Cardiovascular: Negative.  Negative for leg swelling.   Gastrointestinal: Negative.    Endocrine: Negative.    Genitourinary: Negative.    Musculoskeletal:  Positive for gait problem.   Skin: Negative.    Allergic/Immunologic: Negative.    Hematological:  Bruises/bleeds easily.   Psychiatric/Behavioral: Negative.  Negative for agitation.    All other systems reviewed and are negative.      Physical Exam:  Physical Exam  Vitals reviewed.   Constitutional:       General: She is not in acute distress.     Appearance: She is well-developed.   HENT:      Head: Normocephalic.      Comments: Raccoon Bruising to both eyes     Nose: Nose normal.      Mouth/Throat:      Mouth: Mucous membranes are dry.   Eyes:      Pupils: Pupils are equal, round, and reactive to light.   Cardiovascular:      Rate and Rhythm: Normal rate and regular rhythm.      Heart sounds: Normal heart sounds.   Pulmonary:      Effort: Pulmonary effort is normal.      Breath sounds:  Normal breath sounds.   Abdominal:      General: Bowel sounds are normal.      Palpations: Abdomen is soft.   Musculoskeletal:         General: Normal range of motion.      Cervical back: Normal range of motion and neck supple.   Skin:     General: Skin is warm and dry.   Neurological:      Mental Status: She is alert and oriented to person, place, and time.      Cranial Nerves: No cranial nerve deficit.   Psychiatric:         Behavior: Behavior normal.         Thought Content: Thought content normal.         Judgment: Judgment normal.         INSTRUCTIONS FOR PATIENT:     Scheduled Follow-up, Appts Reviewed with Modifications if Needed: Yes  Future Appointments   Date Time Provider Department Center   1/17/2024  8:15 AM Tony Turner MD [facility] [address]   3/1/2024 10:30 AM Dolores Corado, MANAS [facility]C [facility] [place]   3/11/2024 10:30 AM Naomi Real FNP-C [facility]   3/28/2024  1:20 PM Nara Mullins MD [facility]   4/3/2024  9:00 AM LAB, DAVE DES LAB [place]   4/11/2024 11:30 AM Dolores Corado, MANAS DESC [facility] [place]   6/17/2024 10:45 AM Ailyn Tillman MD [facility]       Signature: Megan Ramon NP    Transition of Care Visit:  I have reviewed and updated the history and problem list.  I have reconciled the medication list.  I have discussed the hospitalization and current medical issues, prognosis and plans with the patient/family.

## 2024-01-12 NOTE — ASSESSMENT & PLAN NOTE
Bp at goal today. 128/70  Medication recently adjusted by PCP  Continue Metoprolol 25mg and Lisinopril 5 mg  Monitor    BMP  Lab Results   Component Value Date     01/05/2024    K 3.8 01/05/2024     01/05/2024    CO2 27 01/05/2024    BUN 25 (H) 01/05/2024    CREATININE 1.1 01/05/2024    CALCIUM 9.7 01/05/2024    ANIONGAP 11 01/05/2024    EGFRNORACEVR 50 (A) 01/05/2024

## 2024-01-17 ENCOUNTER — OFFICE VISIT (OUTPATIENT)
Dept: OTOLARYNGOLOGY | Facility: CLINIC | Age: 84
End: 2024-01-17
Payer: MEDICARE

## 2024-01-17 DIAGNOSIS — S02.32XD CLOSED FRACTURE OF LEFT ORBITAL FLOOR WITH ROUTINE HEALING: Primary | ICD-10-CM

## 2024-01-17 PROCEDURE — 99211 OFF/OP EST MAY X REQ PHY/QHP: CPT | Mod: 95,,, | Performed by: OTOLARYNGOLOGY

## 2024-01-17 NOTE — PROGRESS NOTES
History of Present Illness:   Xi Moise is a 83 y.o. year old female evaluated in the Otolaryngology-Head and Neck Surgery Clinic at [facility]. The patient was referred by Dr. Shetty for evaluation of orbital fracture.  Patient sustained a fall on 2024 with no loss of consciousness.  She had significant bruising and still has what she refers to as a egg shaped swelling on her forehead.  She reports the bruising is getting better.  She denies any true diplopia she reports that with her hand very close to her face she can see double but other than that no diplopia in any gaze direction.  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     Past Family/Social History  Her family history includes Bipolar disorder in her mother; Cancer in her father, sister, and sister; Depression in her mother;  Diabetes in her father and sister; Early death in her maternal aunt; Gout in her father and sister; Heart disease in her brother, father, and sister; Hyperlipidemia in her brother, father, and sister; Hypertension in her brother, daughter, father, sister, and sister; Nephrolithiasis in her father.  She  reports that she has never smoked. She has been exposed to tobacco smoke. She has never used smokeless tobacco. She reports that she does not drink alcohol and does not use drugs.     Medications/Allergies/Immunizations  Her current medication(s) include:   Current Outpatient Medications   Medication Sig Dispense Refill    albuterol (PROVENTIL/VENTOLIN HFA) 90 mcg/actuation inhaler Inhale 2 puffs into the lungs every 4 (four) hours as needed.      amoxicillin-clavulanate 875-125mg (AUGMENTIN) 875-125 mg per tablet Take 1 tablet by mouth 2 (two) times daily. 14 tablet 0    ascorbic acid, vitamin C, (VITAMIN C) 500 MG tablet Take 500 mg by mouth once daily.      atorvastatin (LIPITOR) 80 MG tablet Take 1 tablet (80 mg total) by mouth once daily. (Patient taking differently: Take 80 mg by mouth every evening.) 90 tablet 3    camphor-methyl salicyl-menthoL 3.1-15-10 % Gel Apply topically 2 (two) times daily.      cholecalciferol, vitamin D3, (VITAMIN D3) 50 mcg (2,000 unit) Tab Take 2,000 Units by mouth once daily.      chromium picolinate 200 mcg Tab Take 200 mcg by mouth once daily.      clopidogreL (PLAVIX) 75 mg tablet Take 1 tablet (75 mg total) by mouth once daily. 90 tablet 3    cyanocobalamin (VITAMIN B-12) 1000 MCG tablet Take 1,000 mcg by mouth once daily.      d-mannose 500 mg Cap Take 500 mg by mouth 2 (two) times a day.      EScitalopram oxalate (LEXAPRO) 20 MG tablet Take 20 mg by mouth once daily.      fluticasone (FLONASE) 50 mcg/actuation nasal spray 1 spray by Each Nare route once daily.      gabapentin (NEURONTIN) 300 MG capsule Take 1 capsule (300 mg total) by mouth 2 (two) times daily. 180 capsule 3     insulin  unit/mL injection Inject 20 Units into the skin 2 (two) times daily before meals. 30 mL 3    lisinopriL (PRINIVIL,ZESTRIL) 5 MG tablet Take 1 tablet (5 mg total) by mouth once daily. 90 tablet 3    magnesium 250 mg Tab Take 500 mg by mouth once daily.      meclizine (ANTIVERT) 25 mg tablet Take 25 mg by mouth 2 (two) times daily as needed.      metoprolol succinate (TOPROL-XL) 25 MG 24 hr tablet Take 1 tablet (25 mg total) by mouth once daily. 30 tablet 0    multivit-min/FA/lycopen/lutein (SENTRY SENIOR ORAL) Take 1 tablet by mouth once daily.      ondansetron (ZOFRAN) 8 MG tablet Take 8 mg by mouth every 8 (eight) hours as needed.       No current facility-administered medications for this visit.        Allergies: Bactrim [sulfamethoxazole-trimethoprim] and Sulfa (sulfonamide antibiotics)     Immunizations:   Immunization History   Administered Date(s) Administered    COVID-19, MRNA, LN-S, PF (Pfizer) (Purple Cap) 01/15/2021, 02/05/2021, 09/29/2021    COVID-19, mRNA, LNP-S, PF (Moderna 2023)Ages 12+ 12/26/2023    COVID-19, mRNA, LNP-S, bivalent booster, PF (PFIZER OMICRON) 10/03/2022    Influenza 10/11/2013    Influenza (FLUAD) - Quadrivalent - Adjuvanted - PF *Preferred* (65+) 08/23/2020, 09/29/2021    Influenza - High Dose - PF (65 years and older) 10/28/2015, 10/01/2016, 09/01/2017, 09/28/2017, 08/19/2018, 09/29/2019    Influenza - Quadrivalent - High Dose - PF (65 years and older) 10/03/2022    Pneumococcal Conjugate - 20 Valent 10/19/2022    Zoster Recombinant 09/29/2019, 01/12/2020, 10/19/2022, 12/28/2022         Review of Systems   Constitutional: Negative for fever, weight loss and weight gain.  Skin: Negative for rash, itchiness, dryness  HENT:  As per HPI  Cardiovascular: Negative for chest pain and dyspnea on exertion .   Respiratory: Is not experiencing shortness of breath.   Gastrointestinal: Negative for nausea and vomiting.   Neurological: Negative for headaches.   Lymph/Heme:  Negative for lymphadenopathy or easy bruising  Musculoskeletal: Negative for joint or muscle pain  Psychiatric: The patient is not nervous/anxious.        All other systems are negative except for that listed in the HPI.      PHYSICAL EXAM:   Vital Signs:  There were no vitals taken for this visit.     Visit was done virtually with a phone call    Patient could not log onto the video aspect that I could not assess visually.     RADIOLOGIC REVIEW:    I personally reviewed the imaging and went over the results with the patient.  Patient has an orbital floor fracture on the left involving less than 50% of the floor without entrapment  CT max face 01/05/2024   Impression:     CT head: No acute intracranial findings specifically without evidence for acute intracranial hemorrhage or sulcal effacement to suggest large territory recent infarction.  Large subcutaneous hematoma overlies the left inferior frontal calvarium without gross underlying calvarial fracture.     CT maxillofacial bones: Extension of subcutaneous hematoma into the left periorbital preseptal soft tissues with underlying fracture deformity of the left inferior orbital wall.  Please note there is severe near complete opacification left maxillary antra concerning for hemorrhage with material extending along the fracture cleft along the floor of the left orbit concerning for extra conal hematoma with slight elevation of the inferior rectus muscle.  Probable component of left globe proptosis.  Clinical correlation and correlation with emergent ophthalmological evaluation recommended.     No definite additional fracture remaining maxillofacial bones      ASSESSMENT:   1. Closed fracture of left orbital floor with routine healing            PLAN:   Non op management of left orbital floor fracture.  I reassured the patient that hematoma will eventually re absorb.  Follow up as needed      I believe that Ms. Moise has a good understanding of the issues involved  and I answered all of her questions.     DISCLAIMER: This note was prepared with [employer] voice recognition transcription software. Garbled syntax, mangled pronouns, and other bizarre constructions may be attributed to that software system. While efforts were made to correct any mistakes made by this voice recognition program, some errors and/or omissions may remain in the note that were missed when the note was originally created.

## 2024-01-22 ENCOUNTER — TELEPHONE (OUTPATIENT)
Dept: FAMILY MEDICINE | Facility: CLINIC | Age: 84
End: 2024-01-22
Payer: MEDICARE

## 2024-01-22 ENCOUNTER — PATIENT MESSAGE (OUTPATIENT)
Dept: FAMILY MEDICINE | Facility: CLINIC | Age: 84
End: 2024-01-22
Payer: MEDICARE

## 2024-01-22 NOTE — TELEPHONE ENCOUNTER
Call Loki in regards to message, states she concern about the swelling she has above her eye not healing, patient report to her that she was in last week, I advised Loki patient was seen by Dr Turner on 01/17 in regards to her eye. Loki states she will give that  office a call.

## 2024-01-22 NOTE — TELEPHONE ENCOUNTER
----- Message from Megan May sent at 1/22/2024  1:32 PM CST -----  Regarding: Ravon Calling from [facility] [phone]  Contact: Ravon Calling from [facility] [phone]  Who Called: [facility] [phone]    Type:  Needs Medical Advice    Who Called: Ravon Calling from [facility] [phone]  Symptoms (please be specific): swelling above her left eye and is getting worst   How long has patient had these symptoms:  2 days   Would the patient rather a call back or a response via [facility]? Call back   Best Call Back Number: [phone]  Additional Information:

## 2024-01-23 NOTE — TELEPHONE ENCOUNTER
Dr Tony Turner,    You seen this patient  on 1/17/2024 for orbital fracture and hematoma.  Patient's daughter messaging that the hematoma is worsening and painful.  Please review patient messages and pictures sent and contact daughter to advise what needs to be done.    Thanks,  MANAS Bernal

## 2024-01-29 DIAGNOSIS — N18.31 HYPERTENSIVE HEART AND KIDNEY DISEASE WITHOUT HEART FAILURE AND WITH STAGE 3A CHRONIC KIDNEY DISEASE: ICD-10-CM

## 2024-01-29 DIAGNOSIS — I13.10 HYPERTENSIVE HEART AND KIDNEY DISEASE WITHOUT HEART FAILURE AND WITH STAGE 3A CHRONIC KIDNEY DISEASE: ICD-10-CM

## 2024-01-29 RX ORDER — METOPROLOL SUCCINATE 25 MG/1
25 TABLET, EXTENDED RELEASE ORAL
Qty: 90 TABLET | Refills: 0 | Status: SHIPPED | OUTPATIENT
Start: 2024-01-29 | End: 2024-03-20 | Stop reason: SDUPTHER

## 2024-01-30 DIAGNOSIS — E78.5 HYPERLIPIDEMIA ASSOCIATED WITH TYPE 2 DIABETES MELLITUS: ICD-10-CM

## 2024-01-30 DIAGNOSIS — Z86.73 HISTORY OF CVA (CEREBROVASCULAR ACCIDENT) WITHOUT RESIDUAL DEFICITS: Chronic | ICD-10-CM

## 2024-01-30 DIAGNOSIS — E11.42 DIABETIC POLYNEUROPATHY ASSOCIATED WITH TYPE 2 DIABETES MELLITUS: ICD-10-CM

## 2024-01-30 DIAGNOSIS — E11.69 HYPERLIPIDEMIA ASSOCIATED WITH TYPE 2 DIABETES MELLITUS: ICD-10-CM

## 2024-01-30 RX ORDER — ESCITALOPRAM OXALATE 20 MG/1
20 TABLET ORAL DAILY
Qty: 90 TABLET | Refills: 1 | Status: SHIPPED | OUTPATIENT
Start: 2024-01-30 | End: 2024-07-28

## 2024-01-30 RX ORDER — CLOPIDOGREL BISULFATE 75 MG/1
75 TABLET ORAL DAILY
Qty: 90 TABLET | Refills: 1 | Status: SHIPPED | OUTPATIENT
Start: 2024-01-30 | End: 2024-07-28

## 2024-01-30 RX ORDER — GABAPENTIN 300 MG/1
300 CAPSULE ORAL 2 TIMES DAILY
Qty: 180 CAPSULE | Refills: 1 | Status: SHIPPED | OUTPATIENT
Start: 2024-01-30 | End: 2024-07-28

## 2024-01-30 RX ORDER — ATORVASTATIN CALCIUM 80 MG/1
80 TABLET, FILM COATED ORAL NIGHTLY
Qty: 90 TABLET | Refills: 1 | Status: SHIPPED | OUTPATIENT
Start: 2024-01-30 | End: 2024-07-28

## 2024-01-30 NOTE — PROGRESS NOTES
Home health nurse reports pt requires refills to new pharmacy as her pharmacy closed.  Medications to [facility] as requested

## 2024-02-19 ENCOUNTER — EXTERNAL HOME HEALTH (OUTPATIENT)
Dept: HOME HEALTH SERVICES | Facility: HOSPITAL | Age: 84
End: 2024-02-19
Payer: MEDICARE

## 2024-02-26 ENCOUNTER — TELEPHONE (OUTPATIENT)
Dept: FAMILY MEDICINE | Facility: CLINIC | Age: 84
End: 2024-02-26
Payer: MEDICARE

## 2024-02-26 NOTE — TELEPHONE ENCOUNTER
Let move the lab appt up before 3/1/2024 visit and get FASTING LABS first and then visit - have visit just say follow up/lab results and can cancel the April visits for now.

## 2024-02-26 NOTE — TELEPHONE ENCOUNTER
Pt inform that she does have appt on 03/01 and wants to know if she has to do bw before this ppt she is also schedule in April with labs prior to the visit as well please advise.

## 2024-02-26 NOTE — TELEPHONE ENCOUNTER
----- Message from Adrienne Kincaid sent at 2/26/2024 11:48 AM CST -----  Contact: pt  Type:  Appointment Information     Who Called: pt  Would the patient rather a call back or a response via [facility]? call  Best Call Back Number: [phone]   Additional Information:   Pt requesting a call regarding her appointment on 03/01/2024.

## 2024-02-28 LAB
ALBUMIN SERPL-MCNC: 4.1 G/DL (ref 3.6–5.1)
ALBUMIN/GLOB SERPL: 1.5 (CALC) (ref 1–2.5)
ALP SERPL-CCNC: 99 U/L (ref 37–153)
ALT SERPL-CCNC: 22 U/L (ref 6–29)
AST SERPL-CCNC: 27 U/L (ref 10–35)
BILIRUB SERPL-MCNC: 0.8 MG/DL (ref 0.2–1.2)
BUN SERPL-MCNC: 27 MG/DL (ref 7–25)
BUN/CREAT SERPL: 29 (CALC) (ref 6–22)
CALCIUM SERPL-MCNC: 9.7 MG/DL (ref 8.6–10.4)
CHLORIDE SERPL-SCNC: 100 MMOL/L (ref 98–110)
CHOLEST SERPL-MCNC: 185 MG/DL
CHOLEST/HDLC SERPL: 3.3 (CALC)
CO2 SERPL-SCNC: 29 MMOL/L (ref 20–32)
CREAT SERPL-MCNC: 0.93 MG/DL (ref 0.6–0.95)
EGFR: 61 ML/MIN/1.73M2
GLOBULIN SER CALC-MCNC: 2.8 G/DL (CALC) (ref 1.9–3.7)
GLUCOSE SERPL-MCNC: 131 MG/DL (ref 65–99)
HBA1C MFR BLD: 8.2 % OF TOTAL HGB
HDLC SERPL-MCNC: 56 MG/DL
LDLC SERPL CALC-MCNC: 101 MG/DL (CALC)
NONHDLC SERPL-MCNC: 129 MG/DL (CALC)
POTASSIUM SERPL-SCNC: 5.1 MMOL/L (ref 3.5–5.3)
PROT SERPL-MCNC: 6.9 G/DL (ref 6.1–8.1)
SODIUM SERPL-SCNC: 140 MMOL/L (ref 135–146)
TRIGL SERPL-MCNC: 185 MG/DL

## 2024-03-01 ENCOUNTER — OFFICE VISIT (OUTPATIENT)
Dept: FAMILY MEDICINE | Facility: CLINIC | Age: 84
End: 2024-03-01
Payer: MEDICARE

## 2024-03-01 VITALS
HEART RATE: 81 BPM | WEIGHT: 165.69 LBS | DIASTOLIC BLOOD PRESSURE: 76 MMHG | BODY MASS INDEX: 25.11 KG/M2 | HEIGHT: 68 IN | TEMPERATURE: 99 F | OXYGEN SATURATION: 95 % | SYSTOLIC BLOOD PRESSURE: 154 MMHG

## 2024-03-01 DIAGNOSIS — E11.69 HYPERLIPIDEMIA ASSOCIATED WITH TYPE 2 DIABETES MELLITUS: ICD-10-CM

## 2024-03-01 DIAGNOSIS — R29.6 MULTIPLE FALLS: ICD-10-CM

## 2024-03-01 DIAGNOSIS — E78.5 HYPERLIPIDEMIA ASSOCIATED WITH TYPE 2 DIABETES MELLITUS: ICD-10-CM

## 2024-03-01 DIAGNOSIS — I25.119 ATHEROSCLEROSIS OF NATIVE CORONARY ARTERY OF NATIVE HEART WITH ANGINA PECTORIS: ICD-10-CM

## 2024-03-01 DIAGNOSIS — F33.41 RECURRENT MAJOR DEPRESSIVE DISORDER, IN PARTIAL REMISSION: ICD-10-CM

## 2024-03-01 DIAGNOSIS — Z87.898 HISTORY OF SYNCOPE: ICD-10-CM

## 2024-03-01 DIAGNOSIS — N18.31 HYPERTENSIVE HEART AND KIDNEY DISEASE WITHOUT HEART FAILURE AND WITH STAGE 3A CHRONIC KIDNEY DISEASE: ICD-10-CM

## 2024-03-01 DIAGNOSIS — E11.22 CKD STAGE 3 DUE TO TYPE 2 DIABETES MELLITUS: ICD-10-CM

## 2024-03-01 DIAGNOSIS — E11.42 DIABETIC POLYNEUROPATHY ASSOCIATED WITH TYPE 2 DIABETES MELLITUS: ICD-10-CM

## 2024-03-01 DIAGNOSIS — K44.9 HIATAL HERNIA: ICD-10-CM

## 2024-03-01 DIAGNOSIS — S02.32XD CLOSED FRACTURE OF LEFT ORBITAL FLOOR WITH ROUTINE HEALING: ICD-10-CM

## 2024-03-01 DIAGNOSIS — R29.6 FREQUENT FALLS: ICD-10-CM

## 2024-03-01 DIAGNOSIS — J45.20 MILD INTERMITTENT ASTHMA WITHOUT COMPLICATION: ICD-10-CM

## 2024-03-01 DIAGNOSIS — Z86.19 HISTORY OF MAC INFECTION: Chronic | ICD-10-CM

## 2024-03-01 DIAGNOSIS — J84.10 CALCIFIED GRANULOMA OF LUNG: ICD-10-CM

## 2024-03-01 DIAGNOSIS — J44.9 COPD, MODERATE: ICD-10-CM

## 2024-03-01 DIAGNOSIS — I13.10 HYPERTENSIVE HEART AND KIDNEY DISEASE WITHOUT HEART FAILURE AND WITH STAGE 3A CHRONIC KIDNEY DISEASE: ICD-10-CM

## 2024-03-01 DIAGNOSIS — N18.30 CKD STAGE 3 DUE TO TYPE 2 DIABETES MELLITUS: ICD-10-CM

## 2024-03-01 DIAGNOSIS — Z79.4 LONG TERM (CURRENT) USE OF INSULIN: ICD-10-CM

## 2024-03-01 DIAGNOSIS — Z86.73 HISTORY OF CVA (CEREBROVASCULAR ACCIDENT) WITHOUT RESIDUAL DEFICITS: Chronic | ICD-10-CM

## 2024-03-01 DIAGNOSIS — Z79.4 TYPE 2 DIABETES MELLITUS WITH STAGE 3A CHRONIC KIDNEY DISEASE, WITH LONG-TERM CURRENT USE OF INSULIN: ICD-10-CM

## 2024-03-01 DIAGNOSIS — K21.9 CHRONIC GERD: ICD-10-CM

## 2024-03-01 DIAGNOSIS — Z78.0 ASYMPTOMATIC MENOPAUSE: ICD-10-CM

## 2024-03-01 DIAGNOSIS — N18.31 TYPE 2 DIABETES MELLITUS WITH STAGE 3A CHRONIC KIDNEY DISEASE, WITH LONG-TERM CURRENT USE OF INSULIN: ICD-10-CM

## 2024-03-01 DIAGNOSIS — M85.80 OSTEOPENIA, UNSPECIFIED LOCATION: ICD-10-CM

## 2024-03-01 DIAGNOSIS — E11.22 TYPE 2 DIABETES MELLITUS WITH STAGE 3A CHRONIC KIDNEY DISEASE, WITH LONG-TERM CURRENT USE OF INSULIN: ICD-10-CM

## 2024-03-01 DIAGNOSIS — I70.0 AORTIC ATHEROSCLEROSIS: Primary | ICD-10-CM

## 2024-03-01 LAB
ALBUMIN/CREAT UR: 31.8 UG/MG (ref 0–30)
CREAT UR-MCNC: 85 MG/DL (ref 15–325)
MICROALBUMIN UR DL<=1MG/L-MCNC: 27 UG/ML

## 2024-03-01 PROCEDURE — 99999 PR PBB SHADOW E&M-EST. PATIENT-LVL V: CPT | Mod: PBBFAC,,, | Performed by: NURSE PRACTITIONER

## 2024-03-01 PROCEDURE — 99215 OFFICE O/P EST HI 40 MIN: CPT | Mod: S$GLB,,, | Performed by: NURSE PRACTITIONER

## 2024-03-01 PROCEDURE — 82043 UR ALBUMIN QUANTITATIVE: CPT | Performed by: NURSE PRACTITIONER

## 2024-03-01 RX ORDER — AMLODIPINE BESYLATE 5 MG/1
5 TABLET ORAL DAILY
Qty: 30 TABLET | Refills: 0 | Status: SHIPPED | OUTPATIENT
Start: 2024-03-01 | End: 2024-03-20 | Stop reason: SDUPTHER

## 2024-03-01 NOTE — PROGRESS NOTES
Subjective:       Patient ID: Xi Moise is a 84 y.o. female.    Chief Complaint: Follow-up (On Labs)    HPI    Patient is an 84 year old white female with Aortic Atherosclerosis, History of Stroke, Type 2 Diabetes, CKD Stage 3, diabetic neuropathy, Hypertension/Hypertensive heart disease, Hyperlipidemia, moderate COPD/Asthma with Bronchiectasis with history of Mycobacterium avium-intracellulare infection, Calcified granuloma of lung, Recurrent Depression, chronic GERD, and Osteopenia that is here today for follow up with fasting lab results. She had her MEDICARE WELLNESS EXAM with [employer] last done around  6/5/2023 with [employer] representative..        Aortic Atherosclerosis and CAD  Seen on CT chest  4/28/2016 and more recent CT  ON Plavix 75 mg daily,  Atorvastatin 80 mg daily, ACE and BB.    Stable.     History of Stroke  Around 2016  On Plavix 75 mg daily     Type 2 Diabetes with CKD 3  Taking NPH insulin 20 units twice daily before meals  Metformin stopped due to frequent diarrhea and diarrhea did resolve when stopping medication  Fasting blood glucose 131 with hemoglobin A1c 8.2% but patient was off of medication for awhile in January when she had a bad fall (tripped over a cement block) and had closed fracture of orbit.  NO SGLT2 due to recurrent UTIs  Patient will monitor FBG and evening glucose at home and monitor  Will bring me log when she returns in 3 to 4 weeks.  Recheck A1C in 3 months.         CKD Stage 3  Creatinine 1.05 with eGFR 53 Feb. 2023  Creatinine  1.15 with DROP eGFR 47 in July 2023 - REFERRED Nephrology for monitoring  Seen Dr. Tillman on 7/24/2023 and 12/7/2023  Current eGFR 61%               Diabetic Neuropathy  On Gabapentin 300 mg twice daily  She has been on this medication and dose for years  Continue at present dose for now.       Hypertension/hypertensive heart disease  Currently taking  Lisinopril 5 mg daily and Metoprolol XL 25 mg daily  Blood pressure remains  "elevated  BP (!) 154/76   Pulse 81   Temp 98.6 °F (37 °C) (Temporal)   Ht 5' 8" (1.727 m)   Wt 75.1 kg (165 lb 10.8 oz)   SpO2 95%   BMI 25.19 kg/m²   Will add on Amlodipine 5 mg daily  Recheck in 4 weeks.        Hyperlipidemia   Taking atorvastatin 80 mg daily    but was not on medication during hospitalization in January 2024 with the fall.  Will get back to taking medication every day  Recheck in 3 months.            Bronchiectasis, COPD/Asthma Overlap, History of Pulmonary MAC, SHANTI positive  Noted on last Pulmonary Progress note - Dr. Lee at [facility] - see under encounters  Patient was followed by Dr. Alannah Noe with [facility]  but has appt with [facility] 3/28/2024  Reports only on Albuterol inhaler prn  Reports NO controller inhalers  Needs new pulmonologist  for COPD/asthma management - appt with [facility] 3/24/2024        Calcified Granuloma of Lung  Seen on CT chest 12/17/2015  Stable.          Recurrent depression  Currently taking Lexapro 20 mg daily  Controlled at present time.  Recheck in 6 months.        Osteopenia  Last DEXA scan 2/2/21  Taking calcium with vitamin-D supplementation  Due to recheck DEXA scan now - scheduled for 3/5/2024     Chronic GERD with small hiatal hernia  Previously taking Protonix 40 mg daily  NOT currently taking medication and has no complaints.  Small sliding-type hiatal hernia seen on CT abd/pelvis 7/26/2023  Stable.        Frequent Falls  In November 2023 - hospitalized for syncope and orthostatic hypotension  In January 2024 - fall with orbital fracture - states she tripped over a cement stone  Patient does have a walker to use at home but does not always use it like today - she has no walker  Advised patient I want her to have a walker at all times for support.  Will get a carotid ultrasound to complete the syncopal workup that was not done during hospitalization  Will monitor.    Closed Fracture of left orbital floor with routine " "healing  S/p fall on 1/5/2024  Treated at hospital followed by Ophthalmology in hospital and then followed by ENT outpatient 1/17/2024    Component      Latest Ref Rng 2/28/2024   Sodium      135 - 146 mmol/L 140    Chloride      98 - 110 mmol/L 100    CO2      20 - 32 mmol/L 29    Glucose      65 - 99 mg/dL 131 (H)    Calcium      8.6 - 10.4 mg/dL 9.7    Creatinine      0.60 - 0.95 mg/dL 0.93    PROTEIN TOTAL      6.1 - 8.1 g/dL 6.9    Albumin      3.6 - 5.1 g/dL 4.1    AST      10 - 35 U/L 27    ALT      6 - 29 U/L 22    BILIRUBIN TOTAL      0.2 - 1.2 mg/dL 0.8    eGFR      > OR = 60 mL/min/1.73m2 61    Potassium      3.5 - 5.3 mmol/L 5.1    BUN      7 - 25 mg/dL 27 (H)    ALP      37 - 153 U/L 99    BUN/CREAT RATIO      6 - 22 (calc) 29 (H)    Globulin, Total      1.9 - 3.7 g/dL (calc) 2.8    Albumin/Globulin Ratio      1.0 - 2.5 (calc) 1.5    Hemoglobin A1C External      <5.7 % of total Hgb 8.2 (H)    Cholesterol Total      <200 mg/dL 185    HDL      > OR = 50 mg/dL 56    Triglycerides      <150 mg/dL 185 (H)    LDL Cholesterol      mg/dL (calc) 101 (H)    HDL/Cholesterol Ratio      <5.0 (calc) 3.3    Non HDL Chol. (LDL+VLDL)      <130 mg/dL (calc) 129            Review of Systems   HENT: Negative.     Respiratory: Negative.     Cardiovascular: Negative.    Gastrointestinal: Negative.          Objective:     Vitals:    03/01/24 1016 03/01/24 1037   BP: (!) 146/72 (!) 154/76   BP Location: Left arm    Patient Position: Sitting    BP Method: Large (Manual)    Pulse: 81    Temp: 98.6 °F (37 °C)    TempSrc: Temporal    SpO2: 95%    Weight: 75.1 kg (165 lb 10.8 oz)    Height: 5' 8" (1.727 m)           Physical Exam  Constitutional:       General: She is not in acute distress.     Appearance: Normal appearance. She is not toxic-appearing or diaphoretic.      Comments: Body mass index is 25.19 kg/m².     Cardiovascular:      Rate and Rhythm: Normal rate and regular rhythm.      Heart sounds: Normal heart sounds. "   Pulmonary:      Effort: Pulmonary effort is normal. No respiratory distress.   Neurological:      Mental Status: She is alert and oriented to person, place, and time.   Psychiatric:         Mood and Affect: Mood normal.         Behavior: Behavior normal.         Assessment:         ICD-10-CM ICD-9-CM   1. Aortic atherosclerosis  I70.0 440.0   2. Atherosclerosis of native coronary artery of native heart with angina pectoris  I25.119 414.01     413.9   3. Recurrent major depressive disorder, in partial remission  F33.41 296.35   4. 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 Type 2 diabetes mellitus with stage 3a chronic kidney disease, with long-term current use of insulin  E11.22 250.40    N18.31 585.3    Z79.4 V58.67   6. CKD stage 3 due to type 2 diabetes mellitus  E11.22 250.40    N18.30 585.3   7. Diabetic polyneuropathy associated with type 2 diabetes mellitus  E11.42 250.60     357.2   8. Long term (current) use of insulin  Z79.4 V58.67   9. Hyperlipidemia associated with type 2 diabetes mellitus  E11.69 250.80    E78.5 272.4   10. History of CVA (cerebrovascular accident) without residual deficits  Z86.73 V12.54   11. Calcified granuloma of lung  J84.10 515   12. COPD, moderate  J44.9 496   13. Mild intermittent asthma without complication  J45.20 493.90   14. History of MAC infection  Z86.19 V12.09   15. Frequent falls  R29.6 V15.88   16. History of syncope  Z87.898 V15.89   17. Asymptomatic menopause  Z78.0 V49.81   18. Osteopenia, unspecified location  M85.80 733.90   19. Chronic GERD  K21.9 530.81   20. Hiatal hernia  K44.9 553.3   21. Multiple falls  R29.6 V15.88   22. Closed fracture of left orbital floor with routine healing  S02.32XD V54.19       Plan:       1. Aortic atherosclerosis  Overview:  Seen on CT chest  4/28/2016 and more recent CT  ON Plavix 75 mg daily,  Atorvastatin 80 mg daily, ACE and BB.   "  Stable.      2. Atherosclerosis of native coronary artery of native heart with angina pectoris  Overview:  Noted on algrano AWV form on 5/5/2022  Seen on CT chest 3/29/2022  ON Plavix 75 mg daily,  Atorvastatin 80 mg daily, ACE and BB.    Stable.      3. Recurrent major depressive disorder, in partial remission  Overview:   Currently taking Lexapro 20 mg daily  Controlled at present time.  Recheck in 6 months.      4. 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    Orders:  -     amLODIPine (NORVASC) 5 MG tablet; Take 1 tablet (5 mg total) by mouth once daily.  Dispense: 30 tablet; Refill: 0    5. Type 2 diabetes mellitus with stage 3a chronic kidney disease, with long-term current use of insulin  Overview:  Taking NPH insulin 20 units twice daily before meals  Metformin stopped due to frequent diarrhea and diarrhea did resolve when stopping medication  Fasting blood glucose 131 with hemoglobin A1c 8.2% but patient was off of medication for awhile in January when she had a bad fall (tripped over a cement block) and had closed fracture of orbit.  NO SGLT2 due to recurrent UTIs  Patient will monitor FBG and evening glucose at home and monitor  Will bring me log when she returns in 3 to 4 weeks.  Recheck A1C in 3 months.      Orders:  -     Microalbumin/Creatinine Ratio, Urine    6. CKD stage 3 due to type 2 diabetes mellitus  Overview:  Creatinine 1.05 with eGFR 53 Feb. 2023  Creatinine  1.15 with DROP eGFR 47 in July 2023 - REFERRED Nephrology for monitoring  Seen Dr. Tillman on 7/24/2023 and 12/7/2023  Current eGFR 61%        7. Diabetic " polyneuropathy associated with type 2 diabetes mellitus  Overview:  On Gabapentin 300 mg twice daily  She has been on this medication and dose for years  Continue at present dose for now.        8. Long term (current) use of insulin  Overview:  Taking NPH insulin 20 units twice daily before meals  Metformin stopped due to frequent diarrhea and diarrhea did resolve when stopping medication  Fasting blood glucose 131 with hemoglobin A1c 8.2% but patient was off of medication for awhile in January when she had a bad fall (tripped over a cement block) and had closed fracture of orbit.  NO SGLT2 due to recurrent UTIs  Patient will monitor FBG and evening glucose at home and monitor  Will bring me log when she returns in 3 to 4 weeks.  Recheck A1C in 3 months.        9. Hyperlipidemia associated with type 2 diabetes mellitus  Overview:  Taking atorvastatin 80 mg daily    but was not on medication during hospitalization in January 2024 with the fall.  Will get back to taking medication every day  Recheck in 3 months.          10. History of CVA (cerebrovascular accident) without residual deficits  Overview:  Around 2016  On Plavix 75 mg daily  Stable.      11. Calcified granuloma of lung  Overview:  Seen on CT chest 12/17/2015  Stable.      12. COPD, moderate  Overview:  Noted on last Pulmonary Progress note - Dr. Lee at [facility] - see under encounters  Patient was followed by Dr. Alannah Noe with [facility]  but has appt with [facility] 3/28/2024  Reports only on Albuterol inhaler prn  Reports NO controller inhalers  Needs new pulmonologist  for COPD/asthma management - appt with [facility] 3/24/2024      13. Mild intermittent asthma without complication  Overview:  Noted on last Pulmonary Progress note - Dr. Lee at [facility] - see under encounters  Patient was followed by Dr. Alannah Noe with [facility]  but has appt with [facility] 3/28/2024  Reports only on Albuterol inhaler  prn  Reports NO controller inhalers  Needs new pulmonologist  for COPD/asthma management - appt with [facility] 3/24/2024      14. History of MAC infection  Overview:   Noted on last Pulmonary Progress note - Dr. Lee at [facility] - see under encounters  Patient was followed by Dr. Alannah Noe with [facility]  but has appt with [facility] 3/28/2024  Reports only on Albuterol inhaler prn  Reports NO controller inhalers  Needs new pulmonologist  for COPD/asthma management - appt with [facility] 3/24/2024      15. Frequent falls  -     US Carotid Bilateral; Future; Expected date: 03/01/2024    16. History of syncope  -     US Carotid Bilateral; Future; Expected date: 03/01/2024    17. Asymptomatic menopause  -     DXA Bone Density Axial Skeleton 1 or more sites; Future; Expected date: 03/01/2024    18. Osteopenia, unspecified location  Overview:  Last DEXA scan 2/2/21  Taking calcium with vitamin-D supplementation  Due to recheck DEXA scan now - scheduled for 3/5/2024         19. Chronic GERD  Overview:  Previously taking Protonix 40 mg daily  NOT currently taking medication and has no complaints.  Small sliding-type hiatal hernia seen on CT abd/pelvis 7/26/2023  Stable.        20. Hiatal hernia  Overview:  Small sliding-type hiatal hernia seen on CT abd/pelvis 7/26/2023  Stable.      21. Multiple falls  Overview:  n November 2023 - hospitalized for syncope and orthostatic hypotension  In January 2024 - fall with orbital fracture - states she tripped over a cement stone  Patient does have a walker to use at home but does not always use it like today - she has no walker  Advised patient I want her to have a walker at all times for support.  Will get a carotid ultrasound to complete the syncopal workup that was not done during hospitalization  Will monitor.      22. Closed fracture of left orbital floor with routine healing  Overview:  Closed Fracture of left orbital floor with routine healing  S/p fall on  1/5/2024  Treated at hospital followed by Ophthalmology in hospital and then followed by ENT outpatient 1/17/2024         I spent a total of 60+ minutes on the day of the visit.This includes face to face time and non-face to face time preparing to see the patient (eg, review of tests), obtaining and/or reviewing separately obtained history, documenting clinical information in the electronic or other health record, independently interpreting results and communicating results to the patient/family/caregiver, or care coordinator.       Follow up in about 4 weeks (around 3/29/2024) for follow up.     Patient's Medications   New Prescriptions    AMLODIPINE (NORVASC) 5 MG TABLET    Take 1 tablet (5 mg total) by mouth once daily.   Previous Medications    ALBUTEROL (PROVENTIL/VENTOLIN HFA) 90 MCG/ACTUATION INHALER    Inhale 2 puffs into the lungs every 4 (four) hours as needed.    ASCORBIC ACID, VITAMIN C, (VITAMIN C) 500 MG TABLET    Take 500 mg by mouth once daily.    ATORVASTATIN (LIPITOR) 80 MG TABLET    Take 1 tablet (80 mg total) by mouth every evening.    CHOLECALCIFEROL, VITAMIN D3, (VITAMIN D3) 50 MCG (2,000 UNIT) TAB    Take 2,000 Units by mouth once daily.    CLOPIDOGREL (PLAVIX) 75 MG TABLET    Take 1 tablet (75 mg total) by mouth once daily.    CYANOCOBALAMIN (VITAMIN B-12) 1000 MCG TABLET    Take 1,000 mcg by mouth once daily.    D-MANNOSE 500 MG CAP    Take 500 mg by mouth 2 (two) times a day.    ESCITALOPRAM OXALATE (LEXAPRO) 20 MG TABLET    Take 1 tablet (20 mg total) by mouth once daily.    FLUTICASONE (FLONASE) 50 MCG/ACTUATION NASAL SPRAY    1 spray by Each Nare route once daily.    GABAPENTIN (NEURONTIN) 300 MG CAPSULE    Take 1 capsule (300 mg total) by mouth 2 (two) times daily.    INSULIN  UNIT/ML INJECTION    Inject 20 Units into the skin 2 (two) times daily before meals.    LISINOPRIL (PRINIVIL,ZESTRIL) 5 MG TABLET    Take 1 tablet (5 mg total) by mouth once daily.    MAGNESIUM 250 MG TAB     Take 500 mg by mouth once daily.    MECLIZINE (ANTIVERT) 25 MG TABLET    Take 25 mg by mouth 2 (two) times daily as needed.    METOPROLOL SUCCINATE (TOPROL-XL) 25 MG 24 HR TABLET    TAKE 1 TABLET BY MOUTH EVERY DAY    MULTIVIT-MIN/FA/LYCOPEN/LUTEIN (SENTRY SENIOR ORAL)    Take 1 tablet by mouth once daily.    ONDANSETRON (ZOFRAN) 8 MG TABLET    Take 8 mg by mouth every 8 (eight) hours as needed.   Modified Medications    No medications on file   Discontinued Medications    CAMPHOR-METHYL SALICYL-MENTHOL 3.1-15-10 % GEL    Apply topically 2 (two) times daily.    CHROMIUM PICOLINATE 200 MCG TAB    Take 200 mcg by mouth once daily.       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    Lipidemia     Long term (current) use of insulin 2023    Meniere disease 2016    Multiple falls 2023    Pulmonary Mycobacterium avium-intracellulare infection 2016    followed by Pulmonologist at [facility]    Type 2 diabetes mellitus with stage 2 chronic kidney disease, without long-term current use of insulin 10/28/2015       Past Surgical History:   Procedure Laterality Date    ANKLE SURGERY Left     CATARACT EXTRACTION, BILATERAL       SECTION      x 3    eyelid surgery      HYSTERECTOMY         Family History   Problem Relation Age of Onset    Depression Mother     Bipolar disorder Mother     Cancer Father         skin cancer    Diabetes Father     Hypertension Father      Hyperlipidemia Father     Heart disease Father     Gout Father     Nephrolithiasis Father     Hypertension Sister     Gout Sister     Diabetes Sister     Cancer Sister         rectal cancer    Hypertension Sister     Hyperlipidemia Sister     Heart disease Sister         stents x 4    Cancer Sister         pituitary gland cancer    Hypertension Brother     Hyperlipidemia Brother     Heart disease Brother     Hypertension Daughter     Early death Maternal Aunt        Social History     Socioeconomic History    Marital status: Single   Tobacco Use    Smoking status: Never     Passive exposure: Past    Smokeless tobacco: Never   Substance and Sexual Activity    Alcohol use: No    Drug use: No     Social Determinants of Health     Financial Resource Strain: Medium Risk (1/7/2024)    Overall Financial Resource Strain (CARDIA)     Difficulty of Paying Living Expenses: Somewhat hard   Food Insecurity: Food Insecurity Present (1/7/2024)    Hunger Vital Sign     Worried About Running Out of Food in the Last Year: Sometimes true     Ran Out of Food in the Last Year: Never true   Transportation Needs: Unmet Transportation Needs (1/7/2024)    PRAPARE - Transportation     Lack of Transportation (Medical): Yes     Lack of Transportation (Non-Medical): Yes   Physical Activity: Inactive (1/7/2024)    Exercise Vital Sign     Days of Exercise per Week: 0 days     Minutes of Exercise per Session: 0 min   Stress: Stress Concern Present (1/7/2024)    [employer] [place] [employer] - Occupational Stress Questionnaire     Feeling of Stress : Rather much   Social Connections: Unknown (1/7/2024)    Social Connection and Isolation Panel [NHANES]     Frequency of Communication with Friends and Family: More than three times a week     Frequency of Social Gatherings with Friends and Family: Once a week     Active Member of Clubs or Organizations: Yes     Attends Club or Organization Meetings: More than 4 times per year     Marital  Status:    Housing Stability: Unknown (1/7/2024)    Housing Stability Vital Sign     Unable to Pay for Housing in the Last Year: Patient declined     Number of Places Lived in the Last Year: 1     Unstable Housing in the Last Year: No

## 2024-03-04 PROBLEM — K44.9 HIATAL HERNIA: Status: ACTIVE | Noted: 2024-03-04

## 2024-03-04 PROBLEM — R19.7 FREQUENT DIARRHEA: Status: RESOLVED | Noted: 2023-12-10 | Resolved: 2024-03-04

## 2024-03-04 PROBLEM — I95.1 SYNCOPE DUE TO ORTHOSTATIC HYPOTENSION: Status: RESOLVED | Noted: 2023-12-10 | Resolved: 2024-03-04

## 2024-03-04 PROBLEM — D69.2 SENILE PURPURA: Status: RESOLVED | Noted: 2023-10-10 | Resolved: 2024-03-04

## 2024-03-05 ENCOUNTER — HOSPITAL ENCOUNTER (OUTPATIENT)
Dept: RADIOLOGY | Facility: HOSPITAL | Age: 84
Discharge: HOME | End: 2024-03-05
Attending: NURSE PRACTITIONER
Payer: MEDICARE

## 2024-03-05 DIAGNOSIS — R29.6 FREQUENT FALLS: ICD-10-CM

## 2024-03-05 DIAGNOSIS — Z87.898 HISTORY OF SYNCOPE: ICD-10-CM

## 2024-03-05 DIAGNOSIS — Z78.0 ASYMPTOMATIC MENOPAUSE: ICD-10-CM

## 2024-03-05 PROCEDURE — 93880 EXTRACRANIAL BILAT STUDY: CPT | Mod: TC

## 2024-03-05 PROCEDURE — 77080 DXA BONE DENSITY AXIAL: CPT | Mod: TC

## 2024-03-05 PROCEDURE — 77080 DXA BONE DENSITY AXIAL: CPT | Mod: 26,,, | Performed by: STUDENT IN AN ORGANIZED HEALTH CARE EDUCATION/TRAINING PROGRAM

## 2024-03-05 PROCEDURE — 93880 EXTRACRANIAL BILAT STUDY: CPT | Mod: 26,,, | Performed by: RADIOLOGY

## 2024-03-06 ENCOUNTER — TELEPHONE (OUTPATIENT)
Dept: FAMILY MEDICINE | Facility: CLINIC | Age: 84
End: 2024-03-06
Payer: MEDICARE

## 2024-03-06 NOTE — TELEPHONE ENCOUNTER
----- Message from Dolores Corado NP sent at 3/6/2024  9:38 AM CST -----  Call and advise patient that bone density shows mild osteopenia - continue calcium with vitamin D supplement daily.  Recheck in 3 years.

## 2024-03-06 NOTE — PROGRESS NOTES
Call and advise patient that bone density shows mild osteopenia - continue calcium with vitamin D supplement daily.  Recheck in 3 years.

## 2024-03-08 ENCOUNTER — DOCUMENT SCAN (OUTPATIENT)
Dept: HOME HEALTH SERVICES | Facility: HOSPITAL | Age: 84
End: 2024-03-08
Payer: MEDICARE

## 2024-03-11 PROBLEM — N17.9 ACUTE KIDNEY INJURY: Status: RESOLVED | Noted: 2023-11-24 | Resolved: 2024-03-11

## 2024-03-20 ENCOUNTER — OFFICE VISIT (OUTPATIENT)
Dept: FAMILY MEDICINE | Facility: CLINIC | Age: 84
End: 2024-03-20
Payer: MEDICARE

## 2024-03-20 VITALS
HEIGHT: 68 IN | OXYGEN SATURATION: 95 % | SYSTOLIC BLOOD PRESSURE: 136 MMHG | BODY MASS INDEX: 24.71 KG/M2 | TEMPERATURE: 98 F | DIASTOLIC BLOOD PRESSURE: 70 MMHG | HEART RATE: 71 BPM | WEIGHT: 163 LBS

## 2024-03-20 DIAGNOSIS — I25.119 ATHEROSCLEROSIS OF NATIVE CORONARY ARTERY OF NATIVE HEART WITH ANGINA PECTORIS: ICD-10-CM

## 2024-03-20 DIAGNOSIS — N18.31 TYPE 2 DIABETES MELLITUS WITH STAGE 3A CHRONIC KIDNEY DISEASE, WITH LONG-TERM CURRENT USE OF INSULIN: Primary | ICD-10-CM

## 2024-03-20 DIAGNOSIS — J45.20 MILD INTERMITTENT ASTHMA WITHOUT COMPLICATION: ICD-10-CM

## 2024-03-20 DIAGNOSIS — J44.9 COPD, MODERATE: ICD-10-CM

## 2024-03-20 DIAGNOSIS — R29.6 FREQUENT FALLS: ICD-10-CM

## 2024-03-20 DIAGNOSIS — I13.10 HYPERTENSIVE HEART AND KIDNEY DISEASE WITHOUT HEART FAILURE AND WITH STAGE 3A CHRONIC KIDNEY DISEASE: ICD-10-CM

## 2024-03-20 DIAGNOSIS — E11.42 DIABETIC POLYNEUROPATHY ASSOCIATED WITH TYPE 2 DIABETES MELLITUS: ICD-10-CM

## 2024-03-20 DIAGNOSIS — R29.6 MULTIPLE FALLS: ICD-10-CM

## 2024-03-20 DIAGNOSIS — Z86.73 HISTORY OF CVA (CEREBROVASCULAR ACCIDENT) WITHOUT RESIDUAL DEFICITS: Chronic | ICD-10-CM

## 2024-03-20 DIAGNOSIS — S02.32XD CLOSED FRACTURE OF LEFT ORBITAL FLOOR WITH ROUTINE HEALING: ICD-10-CM

## 2024-03-20 DIAGNOSIS — E11.22 TYPE 2 DIABETES MELLITUS WITH STAGE 3A CHRONIC KIDNEY DISEASE, WITH LONG-TERM CURRENT USE OF INSULIN: Primary | ICD-10-CM

## 2024-03-20 DIAGNOSIS — N18.31 HYPERTENSIVE HEART AND KIDNEY DISEASE WITHOUT HEART FAILURE AND WITH STAGE 3A CHRONIC KIDNEY DISEASE: ICD-10-CM

## 2024-03-20 DIAGNOSIS — K21.9 CHRONIC GERD: ICD-10-CM

## 2024-03-20 DIAGNOSIS — M85.80 OSTEOPENIA, UNSPECIFIED LOCATION: ICD-10-CM

## 2024-03-20 DIAGNOSIS — Z79.4 TYPE 2 DIABETES MELLITUS WITH STAGE 3A CHRONIC KIDNEY DISEASE, WITH LONG-TERM CURRENT USE OF INSULIN: Primary | ICD-10-CM

## 2024-03-20 DIAGNOSIS — J47.9 BRONCHIECTASIS WITHOUT COMPLICATION: ICD-10-CM

## 2024-03-20 DIAGNOSIS — J84.10 CALCIFIED GRANULOMA OF LUNG: ICD-10-CM

## 2024-03-20 DIAGNOSIS — M54.31 SCIATICA OF RIGHT SIDE: ICD-10-CM

## 2024-03-20 DIAGNOSIS — I70.0 AORTIC ATHEROSCLEROSIS: ICD-10-CM

## 2024-03-20 DIAGNOSIS — E78.5 HYPERLIPIDEMIA ASSOCIATED WITH TYPE 2 DIABETES MELLITUS: ICD-10-CM

## 2024-03-20 DIAGNOSIS — E11.69 HYPERLIPIDEMIA ASSOCIATED WITH TYPE 2 DIABETES MELLITUS: ICD-10-CM

## 2024-03-20 DIAGNOSIS — R26.89 NEED FOR ASSISTANCE DUE TO UNSTEADY GAIT: ICD-10-CM

## 2024-03-20 PROCEDURE — 99999 PR PBB SHADOW E&M-EST. PATIENT-LVL V: CPT | Mod: PBBFAC,,, | Performed by: NURSE PRACTITIONER

## 2024-03-20 PROCEDURE — 99215 OFFICE O/P EST HI 40 MIN: CPT | Mod: S$GLB,,, | Performed by: NURSE PRACTITIONER

## 2024-03-20 RX ORDER — METOPROLOL SUCCINATE 25 MG/1
25 TABLET, EXTENDED RELEASE ORAL DAILY
Qty: 90 TABLET | Refills: 1 | Status: SHIPPED | OUTPATIENT
Start: 2024-03-20

## 2024-03-20 RX ORDER — AMLODIPINE BESYLATE 5 MG/1
5 TABLET ORAL DAILY
Qty: 90 TABLET | Refills: 1 | Status: SHIPPED | OUTPATIENT
Start: 2024-03-20 | End: 2025-03-20

## 2024-03-20 NOTE — PROGRESS NOTES
Subjective:       Patient ID: Xi Moise is a 84 y.o. female.    Chief Complaint: Follow-up (4 weeks F/U) and Sciatica (On right side)    HPI  Patient is an 84 year old white female with Aortic Atherosclerosis, History of Stroke, Type 2 Diabetes, CKD Stage 3, diabetic neuropathy, Hypertension/Hypertensive heart disease, Hyperlipidemia, moderate COPD/Asthma with Bronchiectasis with history of Mycobacterium avium-intracellulare infection, Calcified granuloma of lung, Recurrent Depression, chronic GERD, frequent falls, with recent orbital fracture in January 2024 treated by [facility] and followed by [facility], and Osteopenia that is here today for 4 week follow up.      Aortic Atherosclerosis and CAD  Seen on CT chest 4/28/2016.  Still present on recent CT 3/29/22  ON Plavix 75 mg daily,  Atorvastatin 80 mg daily, Lisinopril 5 mg daily, Toprol XL 25 mg daily   Stable.     History of Stroke  Stroke around 2016  On Plavix 75 mg daily  Has unsteady gait with multiple falls in the past year - most recent call causing orbital fracture - she is unsteady and needs a rolling lightweight walker for better stability.  The mobility limitation cannot be sufficiently resolved by the use of a cane. Patient's functional mobility deficit can be sufficiently resolved with the use of a Rolling Walker, Rollator or Walker. Patient's mobility limitation significantly impairs their ability to participate in one of more activities of daily living. The use of a Rolling Walker, Rollator or Walker will significantly improve the patient's ability to participate in MRADLS and the patient will use it on regular basis in the home.  ###The patient has a mobility limitation that significantly impairs her ability to participate in one or more mobility-related activities of daily living (MRADL) in the home.  The beneficiary is able to safely use the walker; and the functional mobility deficit can be sufficiently resolved with use of a  walker. ####  Order for walker will be sent to [facility].     Frequent Falls  In November 2023 - hospitalized for syncope and orthostatic hypotension  In January 2024 - fall with orbital fracture - states she tripped over a cement stone  Bilateral carotid ultrasound 3/5/24: Impression: NO evidence of a hemodynamically significant carotid bifurcation stenosis.  March 2024:  tripped in attic and hurt back but did not fall  Patient has a rollator walker with seat but very heavy and difficulty loading and unloading.  Patient requesting a lightweight everyday walker- DME request sent for lightweight walker.  Patient has a history of stroke with unsteady gait with multiple falls in the past year - most recent call causing orbital fracture - she is unsteady and needs a rolling lightweight walker for better stability.  The mobility limitation cannot be sufficiently resolved by the use of a cane. Patient's functional mobility deficit can be sufficiently resolved with the use of a Rolling Walker, Rollator or Walker. Patient's mobility limitation significantly impairs their ability to participate in one of more activities of daily living. The use of a Rolling Walker, Rollator or Walker will significantly improve the patient's ability to participate in MRADLS and the patient will use it on regular basis in the home.  ###The patient has a mobility limitation that significantly impairs her ability to participate in one or more mobility-related activities of daily living (MRADL) in the home.  The beneficiary is able to safely use the walker; and the functional mobility deficit can be sufficiently resolved with use of a walker. ####  Order for walker will be sent to [facility].  Will monitor.      Type 2 Diabetes with CKD 3  Taking NPH insulin 20 units twice daily before meals  Metformin stopped due to frequent diarrhea and diarrhea that resolved when med stopped  Fasting blood glucose 131 with hemoglobin A1c 8.2% in Feb. 2024  "but patient was off of medication for awhile in January when she had a bad fall (tripped over a cement block) and had closed fracture of orbit.  NO SGLT2 due to recurrent UTIs  Will recheck HgbA1C in June 2024 but had patient bring in BID blood sugar readings to further evaluation after 2 weeks.. See picture below.  Trend is stable - mostly < 150s - will continue to monitor.  Remain on current regimen.  Recheck in 3 months.              CKD Stage 3  Creatinine 1.05 with eGFR 53 Feb. 2023  Creatinine  1.15 with DROP eGFR 47 in July 2023 - REFERRED Nephrology for monitoring  Seen Dr. Tillman on 7/24/2023 and 12/7/2023  Current eGFR 61%              Diabetic Neuropathy  On Gabapentin 300 mg twice daily  She has been on this medication and dose for years  Continue at present dose for now.  Stable.        Hypertension/hypertensive heart disease  Taking Lisinopril 5 mg daily, Metoprolol XL 25 mg daily, Amlodipine 5 mg daily  /70   Pulse 71   Temp 97.9 °F (36.6 °C) (Temporal)   Ht 5' 8" (1.727 m)   Wt 73.9 kg (163 lb 0.5 oz)   SpO2 95%   BMI 24.79 kg/m²   Improved   Stay on current medications, take as directed.  Recheck 3 months.  BP Readings from Last 3 Encounters:   03/20/24 136/70   03/11/24 128/80   03/01/24 (!) 154/76          Hyperlipidemia   Taking atorvastatin 80 mg daily    but was not on medication during hospitalization in January 2024 with the fall.  Will get back to taking medication every day  Recheck in 3 months.                Bronchiectasis, COPD/Asthma Overlap, History of Pulmonary MAC, SHANTI positive  Noted on last Pulmonary Progress note - Dr. Lee at [facility] - see under encounters  Patient was followed by Dr. Alannah Noe with [facility]  but has appt with [facility] 3/28/2024  Reports only on Albuterol inhaler prn  Reports NO controller inhalers  Keep upcoming pulmonology appt.         Calcified Granuloma of Lung  Seen on CT chest 12/17/2015  Stable.         "   Recurrent depression  Currently taking Lexapro 20 mg daily  Controlled at present time.  Recheck in 6 months.        Osteopenia  Last DEXA scan 3/5/24: Osteopenia  Continue taking calcium with vitamin-D supplementation  Recheck in 3 years.     Chronic GERD with small hiatal hernia  Previously taking Protonix 40 mg daily  NOT currently taking medication and has no complaints.  Small sliding-type hiatal hernia seen on CT abd/pelvis 7/26/2023  Stable.         Frequent Falls  In November 2023 - hospitalized for syncope and orthostatic hypotension  In January 2024 - fall with orbital fracture - states she tripped over a cement stone  Bilateral carotid ultrasound 3/5/24: Impression: NO evidence of a hemodynamically significant carotid bifurcation stenosis.  March 2024:  tripped in attic and hurt back but did not fall  States current walker heavy and hard to maneuver  Patient requesting a lightweight everyday walker- DME request sent for lightweight walker.  Will monitor.    Pain to right lower back/sciatica pain  Around March 12th, 2024: tripped while in her walk-in attic from tripping over storage bags but no complete fall. States she did NOT hit her head. But she did start with pain to right lower back/buttock radiating into leg.  Negative SLRs   + DTRs.  Already on Gabapentin 300 mg BID - advised to increase to three times daily  Topical rubs and Tylenol  Stay away from NSAIDs - due to CKD     Closed Fracture of left orbital floor with routine healing  S/p fall on 1/5/2024  Treated at hospital followed by Ophthalmology in hospital and then followed by ENT outpatient 1/17/2024  No complaints today.    Lab Visit on 03/07/2024   Component Date Value Ref Range Status    WBC 03/07/2024 8.43  3.90 - 12.70 K/uL Final    RBC 03/07/2024 4.70  4.00 - 5.40 M/uL Final    Hemoglobin 03/07/2024 14.1  12.0 - 16.0 g/dL Final    Hematocrit 03/07/2024 43.3  37.0 - 48.5 % Final    MCV 03/07/2024 92  82 - 98 fL Final    MCH 03/07/2024  30.0  27.0 - 31.0 pg Final    MCHC 03/07/2024 32.6  32.0 - 36.0 g/dL Final    RDW 03/07/2024 13.1  11.5 - 14.5 % Final    Platelets 03/07/2024 224  150 - 450 K/uL Final    MPV 03/07/2024 8.8 (L)  9.2 - 12.9 fL Final    Immature Granulocytes 03/07/2024 0.2  0.0 - 0.5 % Final    Gran # (ANC) 03/07/2024 4.4  1.8 - 7.7 K/uL Final    Immature Grans (Abs) 03/07/2024 0.02  0.00 - 0.04 K/uL Final    Comment: Mild elevation in immature granulocytes is non specific and   can be seen in a variety of conditions including stress response,   acute inflammation, trauma and pregnancy. Correlation with other   laboratory and clinical findings is essential.      Lymph # 03/07/2024 2.8  1.0 - 4.8 K/uL Final    Mono # 03/07/2024 0.4  0.3 - 1.0 K/uL Final    Eos # 03/07/2024 0.8 (H)  0.0 - 0.5 K/uL Final    Baso # 03/07/2024 0.07  0.00 - 0.20 K/uL Final    nRBC 03/07/2024 0  0 /100 WBC Final    Gran % 03/07/2024 52.3  38.0 - 73.0 % Final    Lymph % 03/07/2024 32.6  18.0 - 48.0 % Final    Mono % 03/07/2024 5.2  4.0 - 15.0 % Final    Eosinophil % 03/07/2024 8.9 (H)  0.0 - 8.0 % Final    Basophil % 03/07/2024 0.8  0.0 - 1.9 % Final    Differential Method 03/07/2024 Automated   Final    Sodium 03/07/2024 143  136 - 145 mmol/L Final    Potassium 03/07/2024 4.3  3.5 - 5.1 mmol/L Final    Chloride 03/07/2024 105  95 - 110 mmol/L Final    CO2 03/07/2024 28  23 - 29 mmol/L Final    Glucose 03/07/2024 220 (H)  70 - 110 mg/dL Final    BUN 03/07/2024 30 (H)  7 - 17 mg/dL Final    Creatinine 03/07/2024 1.09  0.50 - 1.40 mg/dL Final    Calcium 03/07/2024 8.9  8.7 - 10.5 mg/dL Final    Total Protein 03/07/2024 6.9  6.0 - 8.4 g/dL Final    Albumin 03/07/2024 4.2  3.5 - 5.2 g/dL Final    Total Bilirubin 03/07/2024 0.6  0.1 - 1.0 mg/dL Final    Comment: For infants and newborns, interpretation of results should be based  on gestational age, weight and in agreement with clinical  observations.    Premature Infant recommended reference ranges:  Up to 24  hours.............<8.0 mg/dL  Up to 48 hours............<12.0 mg/dL  3-5 days..................<15.0 mg/dL  6-29 days.................<15.0 mg/dL      Alkaline Phosphatase 03/07/2024 96  38 - 126 U/L Final    AST 03/07/2024 33  15 - 46 U/L Final    ALT 03/07/2024 32  10 - 44 U/L Final    Anion Gap 03/07/2024 10  8 - 16 mmol/L Final    eGFR 03/07/2024 50.1 (A)  >60 mL/min/1.73 m^2 Final    Magnesium 03/07/2024 2.0  1.6 - 2.6 mg/dL Final    Phosphorus 03/07/2024 4.6 (H)  2.7 - 4.5 mg/dL Final    Protein, Urine Random 03/07/2024 11  0 - 15 mg/dL Final    Creatinine, Urine 03/07/2024 107.0  15.0 - 325.0 mg/dL Final    Prot/Creat Ratio, Urine 03/07/2024 0.10  0.00 - 0.20 Final    Specimen UA 03/07/2024 Urine, Clean Catch   Final    Color, UA 03/07/2024 Yellow  Yellow, Straw, Danette Final    Appearance, UA 03/07/2024 Clear  Clear Final    pH, UA 03/07/2024 7.0  5.0 - 8.0 Final    Specific Gravity, UA 03/07/2024 1.025  1.005 - 1.030 Final    Protein, UA 03/07/2024 Negative  Negative Final    Comment: Recommend a 24 hour urine protein or a urine   protein/creatinine ratio if globulin induced proteinuria is  clinically suspected.      Glucose, UA 03/07/2024 2+ (A)  Negative Final    Ketones, UA 03/07/2024 Negative  Negative Final    Bilirubin (UA) 03/07/2024 Negative  Negative Final    Occult Blood UA 03/07/2024 Negative  Negative Final    Nitrite, UA 03/07/2024 Negative  Negative Final    Urobilinogen, UA 03/07/2024 Negative  <2.0 EU/dL Final    Leukocytes, UA 03/07/2024 Negative  Negative Final    RBC, UA 03/07/2024 4  0 - 4 /hpf Final    WBC, UA 03/07/2024 3  0 - 5 /hpf Final    Bacteria 03/07/2024 None  None-Occ /hpf Final    Squam Epithel, UA 03/07/2024 10  /hpf Final    Non-Squam Epith 03/07/2024 2 (A)  <1/hpf /hpf Final    Microscopic Comment 03/07/2024 SEE COMMENT   Final    Comment: Other formed elements not mentioned in the report are not   present in the microscopic examination.      Office Visit on 03/01/2024  "  Component Date Value Ref Range Status    Microalbumin, Urine 03/01/2024 27.0  ug/mL Final    Creatinine, Urine 03/01/2024 85.0  15.0 - 325.0 mg/dL Final    Microalb/Creat Ratio 03/01/2024 31.8 (H)  0.0 - 30.0 ug/mg Final       Review of Systems   Constitutional: Negative.    HENT: Negative.     Eyes: Negative.    Respiratory: Negative.     Cardiovascular: Negative.    Gastrointestinal: Negative.    Genitourinary: Negative.    Musculoskeletal:  Positive for gait problem.        Recent fall one week ago.    Psychiatric/Behavioral: Negative.         Objective:     Vitals:    03/20/24 1058 03/20/24 1133   BP: (!) 150/70 136/70   BP Location: Left arm    Patient Position: Sitting    BP Method: Large (Manual)    Pulse: 71    Temp: 97.9 °F (36.6 °C)    TempSrc: Temporal    SpO2: 95%    Weight: 73.9 kg (163 lb 0.5 oz)    Height: 5' 8" (1.727 m)           Physical Exam  Constitutional:       General: She is not in acute distress.     Appearance: Normal appearance. She is not ill-appearing.   HENT:      Head: Normocephalic.      Right Ear: Tympanic membrane, ear canal and external ear normal.      Left Ear: Tympanic membrane, ear canal and external ear normal.      Nose: Nose normal.      Mouth/Throat:      Mouth: Mucous membranes are moist.   Eyes:      Pupils: Pupils are equal, round, and reactive to light.   Cardiovascular:      Rate and Rhythm: Normal rate and regular rhythm.      Pulses: Normal pulses.      Heart sounds: Normal heart sounds. No murmur heard.  Pulmonary:      Effort: Pulmonary effort is normal. No respiratory distress.      Breath sounds: Normal breath sounds. No wheezing.   Abdominal:      General: Abdomen is flat. Bowel sounds are normal.      Palpations: Abdomen is soft.      Tenderness: There is no abdominal tenderness.      Hernia: No hernia is present.   Musculoskeletal:         General: Normal range of motion.      Cervical back: Normal range of motion.   Skin:     General: Skin is warm and dry. "      Capillary Refill: Capillary refill takes less than 2 seconds.   Neurological:      Mental Status: She is alert and oriented to person, place, and time.   Psychiatric:         Mood and Affect: Mood normal.         Behavior: Behavior normal.         Thought Content: Thought content normal.         Judgment: Judgment normal.         Assessment:         ICD-10-CM ICD-9-CM   1. Type 2 diabetes mellitus with stage 3a chronic kidney disease, with long-term current use of insulin  E11.22 250.40    N18.31 585.3    Z79.4 V58.67   2. 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 and with stage 3a chronic kidney disease  I13.10 404.90    N18.31 585.3   3. Hyperlipidemia associated with type 2 diabetes mellitus  E11.69 250.80    E78.5 272.4   4. Frequent falls  R29.6 V15.88   5. Aortic atherosclerosis  I70.0 440.0   6. Atherosclerosis of native coronary artery of native heart with angina pectoris  I25.119 414.01     413.9   7. History of CVA (cerebrovascular accident) without residual deficits  Z86.73 V12.54   8. Diabetic polyneuropathy associated with type 2 diabetes mellitus  E11.42 250.60     357.2   9. Bronchiectasis without complication  J47.9 494.0   10. COPD, moderate  J44.9 496   11. Mild intermittent asthma without complication  J45.20 493.90   12. Calcified granuloma of lung  J84.10 515   13. Osteopenia, unspecified location  M85.80 733.90   14. Chronic GERD  K21.9 530.81   15. Multiple falls  R29.6 V15.88   16. Closed fracture of left orbital floor with routine healing  S02.32XD V54.19   17. Sciatica of right side  M54.31 724.3       Plan:       1. Type 2 diabetes mellitus with stage 3a chronic kidney disease, with long-term current use of insulin  Overview:  Taking NPH insulin 20 units twice daily before meals  Metformin stopped due to frequent diarrhea and diarrhea that resolved when med stopped  Fasting blood glucose 131 with hemoglobin A1c 8.2% in Feb. 2024 but patient was off of medication for  "awhile in January when she had a bad fall (tripped over a cement block) and had closed fracture of orbit.  NO SGLT2 due to recurrent UTIs  Will recheck HgbA1C in June 2024 but had patient bring in BID blood sugar readings to further evaluation after 2 weeks.. See picture below.  Trend is stable - mostly < 150s - will continue to monitor.  Remain on current regimen.  Recheck in 3 months.      Orders:  -     Cancel: Comprehensive Metabolic Panel; Future; Expected date: 03/20/2024  -     Hemoglobin A1C; Future; Expected date: 03/20/2024    2. 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  Recheck 3 months.  BP Readings from Last 3 Encounters:   03/20/24 136/70   03/11/24 128/80   03/01/24 (!) 154/76       Orders:  -     amLODIPine (NORVASC) 5 MG tablet; Take 1 tablet (5 mg total) by mouth once daily.  Dispense: 90 tablet; Refill: 1  -     metoprolol succinate (TOPROL-XL) 25 MG 24 hr tablet; Take 1 tablet (25 mg total) by mouth once daily.  Dispense: 90 tablet; Refill: 1    3. Hyperlipidemia associated with type 2 diabetes mellitus  Overview:  aking atorvastatin 80 mg daily    but was not on medication during hospitalization in January 2024 with the fall.  Will get back to taking medication every day  Recheck in 3 months.         Orders:  -     Lipid Panel; Future; Expected date: 03/20/2024    4. Frequent falls  -     Cancel: WALKER FOR HOME USE    5. Aortic atherosclerosis  Overview:  Seen on CT chest 4/28/2016.  Still present on recent CT 3/29/22  ON Plavix 75 mg daily,  Atorvastatin 80 mg daily, Lisinopril 5 mg daily, Toprol XL 25 mg daily   Stable.      6. " Atherosclerosis of native coronary artery of native heart with angina pectoris  Overview:  Seen on CT chest 4/28/2016.  Still present on recent CT 3/29/22  ON Plavix 75 mg daily,  Atorvastatin 80 mg daily, Lisinopril 5 mg daily, Toprol XL 25 mg daily   Stable.      7. History of CVA (cerebrovascular accident) without residual deficits  Overview:  Stroke around 2016  On Plavix 75 mg daily  Has unsteady gait with multiple falls in the past year - most recent call causing orbital fracture - she is unsteady and needs a rolling lightweight walker for better stability.  The mobility limitation cannot be sufficiently resolved by the use of a cane. Patient's functional mobility deficit can be sufficiently resolved with the use of a Rolling Walker, Rollator or Walker. Patient's mobility limitation significantly impairs their ability to participate in one of more activities of daily living. The use of a Rolling Walker, Rollator or Walker will significantly improve the patient's ability to participate in MRADLS and the patient will use it on regular basis in the home.  ###The patient has a mobility limitation that significantly impairs her ability to participate in one or more mobility-related activities of daily living (MRADL) in the home.  The beneficiary is able to safely use the walker; and the functional mobility deficit can be sufficiently resolved with use of a walker. ####  Order for walker will be sent to [facility].    Orders:  -     Cancel: WALKER FOR HOME USE  -     WALKER FOR HOME USE    8. Diabetic polyneuropathy associated with type 2 diabetes mellitus  Overview:  On Gabapentin 300 mg twice daily  She has been on this medication and dose for years  Continue at present dose for now.        9. Bronchiectasis without complication  Overview:  Bronchiectasis, COPD/Asthma Overlap, History of Pulmonary MAC, SHANTI positive  Noted on last Pulmonary Progress note - Dr. Lee at [facility] - see under  encounters  Patient was followed by Dr. Alannah Noe with [facility]  but has appt with [facility] 3/28/2024  Reports only on Albuterol inhaler prn  Reports NO controller inhalers  Keep upcoming pulmonology appt.          10. COPD, moderate  Overview:  Bronchiectasis, COPD/Asthma Overlap, History of Pulmonary MAC, SHANTI positive  Noted on last Pulmonary Progress note - Dr. Lee at [facility] - see under encounters  Patient was followed by Dr. Alannah Noe with [facility]  but has appt with [facility] 3/28/2024  Reports only on Albuterol inhaler prn  Reports NO controller inhalers  Keep upcoming pulmonology appt.          11. Mild intermittent asthma without complication  Overview:  Bronchiectasis, COPD/Asthma Overlap, History of Pulmonary MAC, SHANTI positive  Noted on last Pulmonary Progress note - Dr. Lee at [facility] - see under encounters  Patient was followed by Dr. Alannah Noe with [facility]  but has appt with [facility] 3/28/2024  Reports only on Albuterol inhaler prn  Reports NO controller inhalers  Keep upcoming pulmonology appt.          12. Calcified granuloma of lung  Overview:  Seen on CT chest 12/17/2015  Stable.      13. Osteopenia, unspecified location  Overview:  Last DEXA scan 3/5/24: Osteopenia  Continue taking calcium with vitamin-D supplementation  Recheck in 3 years.      14. Chronic GERD  Overview:  Previously taking Protonix 40 mg daily  NOT currently taking medication and has no complaints.  Small sliding-type hiatal hernia seen on CT abd/pelvis 7/26/2023  Stable.        15. Multiple falls  Overview:  Frequent Falls  In November 2023 - hospitalized for syncope and orthostatic hypotension  In January 2024 - fall with orbital fracture - states she tripped over a cement stone  Bilateral carotid ultrasound 3/5/24: Impression: NO evidence of a hemodynamically significant carotid bifurcation stenosis.  March 2024:  tripped in attic and hurt back but did not  fall  Patient has a rollator walker with seat but very heavy and difficulty loading and unloading.  Patient requesting a lightweight everyday walker- DME request sent for lightweight walker.  Patient has a history of stroke with unsteady gait with multiple falls in the past year - most recent call causing orbital fracture - she is unsteady and needs a rolling lightweight walker for better stability.  The mobility limitation cannot be sufficiently resolved by the use of a cane. Patient's functional mobility deficit can be sufficiently resolved with the use of a Rolling Walker, Rollator or Walker. Patient's mobility limitation significantly impairs their ability to participate in one of more activities of daily living. The use of a Rolling Walker, Rollator or Walker will significantly improve the patient's ability to participate in MRADLS and the patient will use it on regular basis in the home.  ###The patient has a mobility limitation that significantly impairs her ability to participate in one or more mobility-related activities of daily living (MRADL) in the home.  The beneficiary is able to safely use the walker; and the functional mobility deficit can be sufficiently resolved with use of a walker. ####  Order for walker will be sent to [facility].  Will monitor.    Orders:  -     Cancel: WALKER FOR HOME USE  -     WALKER FOR HOME USE    16. Closed fracture of left orbital floor with routine healing  Overview:  S/p fall on 1/5/2024  Treated at hospital followed by Ophthalmology in hospital and then followed by ENT outpatient 1/17/2024  No complaints today.      17. Sciatica of right side  Overview:  Pain to right lower back/sciatica pain  Around March 12th, 2024: tripped while in her walk-in attic from tripping over storage bags but no complete fall. States she did NOT hit her head. But she did start with pain to right lower back/buttock radiating into leg.  Negative SLRs   + DTRs.  Already on Gabapentin 300 mg  BID - advised to increase to three times daily  Topical rubs and Tylenol  Stay away from NSAIDs - due to CKD      18. Need for assistance due to unsteady gait  -     Cancel: WALKER FOR HOME USE  -     WALKER FOR HOME USE       I spent a total of 53 minutes on the day of the visit.  This includes face to face time and non-face to face time preparing to see the patient (eg, review of tests), obtaining and/or reviewing separately obtained history, documenting clinical information in the electronic or other health record, independently interpreting results and communicating results to the patient/family/caregiver, or care coordinator.       Follow up in about 3 months (around 6/20/2024) for fasting labs and follow up.     Patient's Medications   New Prescriptions    No medications on file   Previous Medications    ALBUTEROL (PROVENTIL/VENTOLIN HFA) 90 MCG/ACTUATION INHALER    Inhale 2 puffs into the lungs every 4 (four) hours as needed.    ASCORBIC ACID, VITAMIN C, (VITAMIN C) 500 MG TABLET    Take 500 mg by mouth once daily.    ATORVASTATIN (LIPITOR) 80 MG TABLET    Take 1 tablet (80 mg total) by mouth every evening.    CHOLECALCIFEROL, VITAMIN D3, (VITAMIN D3) 50 MCG (2,000 UNIT) TAB    Take 2,000 Units by mouth once daily.    CLOPIDOGREL (PLAVIX) 75 MG TABLET    Take 1 tablet (75 mg total) by mouth once daily.    COLCHICINE (COLCRYS) 0.6 MG TABLET    Take 1 tablet (0.6 mg total) by mouth daily as needed (take while in acute gout flare).    CYANOCOBALAMIN (VITAMIN B-12) 1000 MCG TABLET    Take 1,000 mcg by mouth once daily.    D-MANNOSE 500 MG CAP    Take 500 mg by mouth 2 (two) times a day.    ESCITALOPRAM OXALATE (LEXAPRO) 20 MG TABLET    Take 1 tablet (20 mg total) by mouth once daily.    FLUTICASONE (FLONASE) 50 MCG/ACTUATION NASAL SPRAY    1 spray by Each Nare route once daily.    GABAPENTIN (NEURONTIN) 300 MG CAPSULE    Take 1 capsule (300 mg total) by mouth 2 (two) times daily.    INSULIN  UNIT/ML  INJECTION    Inject 20 Units into the skin 2 (two) times daily before meals.    LISINOPRIL (PRINIVIL,ZESTRIL) 5 MG TABLET    Take 1 tablet (5 mg total) by mouth once daily.    MAGNESIUM 250 MG TAB    Take 500 mg by mouth once daily.    MECLIZINE (ANTIVERT) 25 MG TABLET    Take 25 mg by mouth 2 (two) times daily as needed.    MULTIVIT-MIN/FA/LYCOPEN/LUTEIN (SENTRY SENIOR ORAL)    Take 1 tablet by mouth once daily.    ONDANSETRON (ZOFRAN) 8 MG TABLET    Take 8 mg by mouth every 8 (eight) hours as needed.   Modified Medications    Modified Medication Previous Medication    AMLODIPINE (NORVASC) 5 MG TABLET amLODIPine (NORVASC) 5 MG tablet       Take 1 tablet (5 mg total) by mouth once daily.    Take 1 tablet (5 mg total) by mouth once daily.    METOPROLOL SUCCINATE (TOPROL-XL) 25 MG 24 HR TABLET metoprolol succinate (TOPROL-XL) 25 MG 24 hr tablet       Take 1 tablet (25 mg total) by mouth once daily.    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 02/09/2023    Lipidemia     Long term (current) use of insulin 6/16/2023    Meniere disease 04/28/2016    Multiple falls 6/16/2023    Pulmonary Mycobacterium avium-intracellulare infection 08/01/2016    followed by Pulmonologist at [facility]    Type 2 diabetes mellitus with stage 2 chronic kidney disease, without long-term current use of insulin 10/28/2015       Past  Surgical History:   Procedure Laterality Date    ANKLE SURGERY Left     CATARACT EXTRACTION, BILATERAL       SECTION      x 3    eyelid surgery      HYSTERECTOMY         Family History   Problem Relation Age of Onset    Depression Mother     Bipolar disorder Mother     Cancer Father         skin cancer    Diabetes Father     Hypertension Father     Hyperlipidemia Father     Heart disease Father     Gout Father     Nephrolithiasis Father     Hypertension Sister     Gout Sister     Diabetes Sister     Cancer Sister         rectal cancer    Hypertension Sister     Hyperlipidemia Sister     Heart disease Sister         stents x 4    Cancer Sister         pituitary gland cancer    Hypertension Brother     Hyperlipidemia Brother     Heart disease Brother     Hypertension Daughter     Early death Maternal Aunt        Social History     Socioeconomic History    Marital status: Single   Tobacco Use    Smoking status: Never     Passive exposure: Past    Smokeless tobacco: Never   Substance and Sexual Activity    Alcohol use: No    Drug use: No     Social Determinants of Health     Financial Resource Strain: Medium Risk (2024)    Overall Financial Resource Strain (CARDIA)     Difficulty of Paying Living Expenses: Somewhat hard   Food Insecurity: Food Insecurity Present (2024)    Hunger Vital Sign     Worried About Running Out of Food in the Last Year: Sometimes true     Ran Out of Food in the Last Year: Never true   Transportation Needs: Unmet Transportation Needs (2024)    PRAPARE - Transportation     Lack of Transportation (Medical): Yes     Lack of Transportation (Non-Medical): Yes   Physical Activity: Inactive (2024)    Exercise Vital Sign     Days of Exercise per Week: 0 days     Minutes of Exercise per Session: 0 min   Stress: Stress Concern Present (2024)    [employer] - Occupational Stress Questionnaire     Feeling of Stress : Rather much   Social Connections:  Unknown (1/7/2024)    Social Connection and Isolation Panel [NHANES]     Frequency of Communication with Friends and Family: More than three times a week     Frequency of Social Gatherings with Friends and Family: Once a week     Active Member of Clubs or Organizations: Yes     Attends Club or Organization Meetings: More than 4 times per year     Marital Status:    Housing Stability: Unknown (1/7/2024)    Housing Stability Vital Sign     Unable to Pay for Housing in the Last Year: Patient declined     Number of Places Lived in the Last Year: 1     Unstable Housing in the Last Year: No

## 2024-03-25 ENCOUNTER — TELEPHONE (OUTPATIENT)
Dept: FAMILY MEDICINE | Facility: CLINIC | Age: 84
End: 2024-03-25
Payer: MEDICARE

## 2024-03-25 NOTE — TELEPHONE ENCOUNTER
Sarah Mckeon from [facility] sent message:    [facility]ME wants to ensure that the insurance will pay the claims on WC, BSC and Bed orders. All WC orders should be ordered has Standard WC. If the patient needs a Lightweight WC, it must be documented thoroughly that the patient cannot use a Standard WC. HME only stocks Lightweight WC's, so the patient will receive a lightweight if the order shows Standard WC. The following Insurance Justifications will need to be documented in the MD progress notes, before approval can be given to deliver. Insurance is denying paying the claims if the justification is not listed in the MD progress notes. If this can be done when the orders are placed, it will streamline the process time to have equipment delivered. If you have any questions, please give me a call and I will be happy to discuss this with you. Example: Rolling Walker/Walker/ Rollator The mobility limitation cannot be sufficiently resolved by the use of a cane. Patient's functional mobility deficit can be sufficiently resolved with the use of a (Rolling Walker, Rollator or Walker). Patient's mobility limitation significantly impairs their ability to participate in one of more activities of daily living. The use of a (Rolling Walker, Rollator or Walker) will significantly improve the patient's ability to participate in MRADLS and the patient will use it on regular basis in the home. Must be in progress notes.

## 2024-03-28 ENCOUNTER — OFFICE VISIT (OUTPATIENT)
Dept: PULMONOLOGY | Facility: CLINIC | Age: 84
End: 2024-03-28
Payer: MEDICARE

## 2024-03-28 VITALS
WEIGHT: 160 LBS | OXYGEN SATURATION: 95 % | HEIGHT: 68 IN | DIASTOLIC BLOOD PRESSURE: 75 MMHG | BODY MASS INDEX: 24.25 KG/M2 | SYSTOLIC BLOOD PRESSURE: 189 MMHG | HEART RATE: 82 BPM

## 2024-03-28 DIAGNOSIS — J47.9 BRONCHIECTASIS WITHOUT COMPLICATION: Primary | ICD-10-CM

## 2024-03-28 DIAGNOSIS — J45.20 MILD INTERMITTENT ASTHMA WITHOUT COMPLICATION: ICD-10-CM

## 2024-03-28 DIAGNOSIS — J44.9 COPD, MODERATE: Primary | ICD-10-CM

## 2024-03-28 DIAGNOSIS — J44.9 COPD, MODERATE: ICD-10-CM

## 2024-03-28 PROCEDURE — 99999 PR PBB SHADOW E&M-EST. PATIENT-LVL IV: CPT | Mod: PBBFAC,,, | Performed by: INTERNAL MEDICINE

## 2024-03-28 PROCEDURE — 99214 OFFICE O/P EST MOD 30 MIN: CPT | Mod: S$GLB,,, | Performed by: INTERNAL MEDICINE

## 2024-03-28 NOTE — PROGRESS NOTES
"Subjective:      Patient ID: Xi Moise is a 84 y.o. female.    Chief Complaint: No chief complaint on file.    84 year old female with MAC and bronchiectasis who presents for pulmonary follow up.   Never smoker. Last seen in 2021.   Doing well.   Recently fell and hospitalized for orbital fracture.   Albuterol and Symbicort.         Review of Systems   Respiratory:  Positive for cough, shortness of breath and dyspnea on extertion.      Objective:     Physical Exam   Constitutional: She is oriented to person, place, and time. She appears well-developed and well-nourished. No distress.   Cardiovascular: Normal rate and regular rhythm.   Pulmonary/Chest: Normal expansion and symmetric chest wall expansion. She has no rhonchi. She has no rales.   Neurological: She is alert and oriented to person, place, and time.   Skin: Skin is warm and dry. She is not diaphoretic.   Nursing note and vitals reviewed.    Personal Diagnostic Review  none pertinent      3/28/2024     1:33 PM 3/20/2024    10:58 AM 3/11/2024    10:29 AM 3/1/2024    10:16 AM 1/5/2024     8:17 PM 1/5/2024     6:30 PM 1/5/2024     6:15 PM   Pulmonary Function Tests   SpO2 95 % 95 %  95 % 94 % 96 % 95 %   Height 5' 8" (1.727 m) 5' 8" (1.727 m) 5' 8" (1.727 m) 5' 8" (1.727 m)      Weight 72.6 kg (160 lb) 73.9 kg (163 lb 0.5 oz) 74.4 kg (164 lb) 75.1 kg (165 lb 10.8 oz)      BMI (Calculated) 24.3 24.8 24.9 25.2           Assessment:     1. COPD, moderate         Outpatient Encounter Medications as of 3/28/2024   Medication Sig Dispense Refill    albuterol (PROVENTIL/VENTOLIN HFA) 90 mcg/actuation inhaler Inhale 2 puffs into the lungs every 4 (four) hours as needed.      amLODIPine (NORVASC) 5 MG tablet Take 1 tablet (5 mg total) by mouth once daily. 90 tablet 1    ascorbic acid, vitamin C, (VITAMIN C) 500 MG tablet Take 500 mg by mouth once daily.      atorvastatin (LIPITOR) 80 MG tablet Take 1 tablet (80 mg total) by mouth every evening. 90 tablet 1    " cholecalciferol, vitamin D3, (VITAMIN D3) 50 mcg (2,000 unit) Tab Take 2,000 Units by mouth once daily.      clopidogreL (PLAVIX) 75 mg tablet Take 1 tablet (75 mg total) by mouth once daily. 90 tablet 1    colchicine (COLCRYS) 0.6 mg tablet Take 1 tablet (0.6 mg total) by mouth daily as needed (take while in acute gout flare). 30 tablet 1    cyanocobalamin (VITAMIN B-12) 1000 MCG tablet Take 1,000 mcg by mouth once daily.      d-mannose 500 mg Cap Take 500 mg by mouth 2 (two) times a day.      EScitalopram oxalate (LEXAPRO) 20 MG tablet Take 1 tablet (20 mg total) by mouth once daily. 90 tablet 1    fluticasone (FLONASE) 50 mcg/actuation nasal spray 1 spray by Each Nare route once daily.      gabapentin (NEURONTIN) 300 MG capsule Take 1 capsule (300 mg total) by mouth 2 (two) times daily. 180 capsule 1    insulin  unit/mL injection Inject 20 Units into the skin 2 (two) times daily before meals. 30 mL 3    lisinopriL (PRINIVIL,ZESTRIL) 5 MG tablet Take 1 tablet (5 mg total) by mouth once daily. 90 tablet 3    magnesium 250 mg Tab Take 500 mg by mouth once daily.      meclizine (ANTIVERT) 25 mg tablet Take 25 mg by mouth 2 (two) times daily as needed.      metoprolol succinate (TOPROL-XL) 25 MG 24 hr tablet Take 1 tablet (25 mg total) by mouth once daily. 90 tablet 1    multivit-min/FA/lycopen/lutein (SENTRY SENIOR ORAL) Take 1 tablet by mouth once daily.      ondansetron (ZOFRAN) 8 MG tablet Take 8 mg by mouth every 8 (eight) hours as needed.      [DISCONTINUED] amLODIPine (NORVASC) 5 MG tablet Take 1 tablet (5 mg total) by mouth once daily. 30 tablet 0    [DISCONTINUED] camphor-methyl salicyl-menthoL 3.1-15-10 % Gel Apply topically 2 (two) times daily.      [DISCONTINUED] chromium picolinate 200 mcg Tab Take 200 mcg by mouth once daily.      [DISCONTINUED] metoprolol succinate (TOPROL-XL) 25 MG 24 hr tablet TAKE 1 TABLET BY MOUTH EVERY DAY 90 tablet 0     No facility-administered encounter medications on  file as of 3/28/2024.     No orders of the defined types were placed in this encounter.      Plan:     1. Bronchiectasis without complication  Overview:  Bronchiectasis, COPD/Asthma Overlap, History of Pulmonary MAC, SHANTI positive  Noted on last Pulmonary Progress note - Dr. Lee at [facility] - see under encounters  Patient was followed by Dr. Alannah Noe with [facility]  but has appt with [facility] 3/28/2024  Reports only on Albuterol inhaler prn  Reports NO controller inhalers  Keep upcoming pulmonology appt.        Assessment & Plan:  Patient on Symbicort and Albuterol.   Will order PFTs.   We can continue surveillance for now.       2. COPD, moderate  Overview:  Bronchiectasis, COPD/Asthma Overlap, History of Pulmonary MAC, SHANTI positive  Noted on last Pulmonary Progress note - Dr. Lee at [facility] - see under encounters  Patient was followed by Dr. Alannah Noe with [facility]  but has appt with [facility] 3/28/2024  Reports only on Albuterol inhaler prn  Reports NO controller inhalers  Keep upcoming pulmonology appt.        Orders:  -     Ambulatory referral/consult to Pulmonology    3. Mild intermittent asthma without complication  Overview:  Bronchiectasis, COPD/Asthma Overlap, History of Pulmonary MAC, SHANTI positive  Noted on last Pulmonary Progress note - Dr. Lee at [facility] - see under encounters  Patient was followed by Dr. Alannah Noe with [facility]  but has appt with [facility] 3/28/2024  Reports only on Albuterol inhaler prn  Reports NO controller inhalers  Keep upcoming pulmonology appt.        Assessment & Plan:  Stable        Follow up in 6 months.     Nara Mullins MD

## 2024-04-02 ENCOUNTER — TELEPHONE (OUTPATIENT)
Dept: FAMILY MEDICINE | Facility: CLINIC | Age: 84
End: 2024-04-02
Payer: MEDICARE

## 2024-04-22 ENCOUNTER — HOSPITAL ENCOUNTER (OUTPATIENT)
Dept: PULMONOLOGY | Facility: HOSPITAL | Age: 84
Discharge: HOME | End: 2024-04-22
Attending: INTERNAL MEDICINE
Payer: MEDICARE

## 2024-04-22 DIAGNOSIS — J44.9 COPD, MODERATE: ICD-10-CM

## 2024-04-22 PROCEDURE — 94010 BREATHING CAPACITY TEST: CPT

## 2024-04-22 PROCEDURE — 94640 AIRWAY INHALATION TREATMENT: CPT

## 2024-04-22 PROCEDURE — 94729 DIFFUSING CAPACITY: CPT

## 2024-04-24 LAB
BRPFT: ABNORMAL
DLCO ADJ PRE: 19.84 ML/(MIN*MMHG) (ref 15.98–27.45)
DLCO SINGLE BREATH LLN: 15.98
DLCO SINGLE BREATH PRE REF: 93.3 %
DLCO SINGLE BREATH REF: 21.72
DLCOC SBVA LLN: 2.64
DLCOC SBVA PRE REF: 67.2 %
DLCOC SBVA REF: 3.87
DLCOC SINGLE BREATH LLN: 15.98
DLCOC SINGLE BREATH PRE REF: 91.4 %
DLCOC SINGLE BREATH REF: 21.72
DLCOVA LLN: 2.64
DLCOVA PRE REF: 68.6 %
DLCOVA PRE: 2.65 ML/(MIN*MMHG*L) (ref 2.64–5.1)
DLCOVA REF: 3.87
DLVAADJ PRE: 2.6 ML/(MIN*MMHG*L) (ref 2.64–5.1)
FEF 25 75 CHG: 14 %
FEF 25 75 LLN: 0.63
FEF 25 75 POST REF: 24.4 %
FEF 25 75 PRE REF: 21.4 %
FEF 25 75 REF: 1.57
FET100 CHG: -22.4 %
FEV1 CHG: 6.5 %
FEV1 FVC CHG: 7.3 %
FEV1 FVC LLN: 61
FEV1 FVC POST REF: 65 %
FEV1 FVC PRE REF: 60.5 %
FEV1 FVC REF: 76
FEV1 LLN: 1.43
FEV1 POST REF: 64.8 %
FEV1 PRE REF: 60.8 %
FEV1 REF: 2.08
FVC CHG: -0.7 %
FVC LLN: 1.92
FVC POST REF: 98 %
FVC PRE REF: 98.7 %
FVC REF: 2.79
IVC PRE: 2.55 L (ref 1.92–3.71)
IVC SINGLE BREATH LLN: 1.92
IVC SINGLE BREATH PRE REF: 91.4 %
IVC SINGLE BREATH REF: 2.79
PEF CHG: 6.3 %
PEF LLN: 3.1
PEF POST REF: 74.5 %
PEF PRE REF: 70.1 %
PEF REF: 5.03
POST FEF 25 75: 0.38 L/S (ref 0.63–3.04)
POST FET 100: 12.05 SEC
POST FEV1 FVC: 49.43 % (ref 60.97–89.27)
POST FEV1: 1.35 L (ref 1.43–2.7)
POST FVC: 2.73 L (ref 1.92–3.71)
POST PEF: 3.75 L/S (ref 3.1–6.96)
PRE DLCO: 20.25 ML/(MIN*MMHG) (ref 15.98–27.45)
PRE FEF 25 75: 0.34 L/S (ref 0.63–3.04)
PRE FET 100: 15.54 SEC
PRE FEV1 FVC: 46.06 % (ref 60.97–89.27)
PRE FEV1: 1.27 L (ref 1.43–2.7)
PRE FVC: 2.75 L (ref 1.92–3.71)
PRE PEF: 3.52 L/S (ref 3.1–6.96)
VA PRE: 7.63 L (ref 5.46–5.46)
VA SINGLE BREATH LLN: 5.46
VA SINGLE BREATH PRE REF: 139.8 %
VA SINGLE BREATH REF: 5.46

## 2024-04-24 PROCEDURE — 94729 DIFFUSING CAPACITY: CPT | Mod: 26,,, | Performed by: INTERNAL MEDICINE

## 2024-04-24 PROCEDURE — 94060 EVALUATION OF WHEEZING: CPT | Mod: 26,,, | Performed by: INTERNAL MEDICINE

## 2024-06-20 ENCOUNTER — OFFICE VISIT (OUTPATIENT)
Dept: FAMILY MEDICINE | Facility: CLINIC | Age: 84
End: 2024-06-20
Payer: MEDICARE

## 2024-06-20 VITALS
DIASTOLIC BLOOD PRESSURE: 67 MMHG | HEIGHT: 68 IN | TEMPERATURE: 98 F | SYSTOLIC BLOOD PRESSURE: 144 MMHG | HEART RATE: 85 BPM | BODY MASS INDEX: 25.39 KG/M2 | WEIGHT: 167.56 LBS | OXYGEN SATURATION: 94 %

## 2024-06-20 DIAGNOSIS — N18.30 CKD STAGE 3 DUE TO TYPE 2 DIABETES MELLITUS: ICD-10-CM

## 2024-06-20 DIAGNOSIS — M85.80 OSTEOPENIA, UNSPECIFIED LOCATION: ICD-10-CM

## 2024-06-20 DIAGNOSIS — E11.69 HYPERLIPIDEMIA ASSOCIATED WITH TYPE 2 DIABETES MELLITUS: ICD-10-CM

## 2024-06-20 DIAGNOSIS — K21.9 CHRONIC GERD: ICD-10-CM

## 2024-06-20 DIAGNOSIS — N39.0 RECURRENT UTI: ICD-10-CM

## 2024-06-20 DIAGNOSIS — F33.41 RECURRENT MAJOR DEPRESSIVE DISORDER, IN PARTIAL REMISSION: ICD-10-CM

## 2024-06-20 DIAGNOSIS — E78.5 HYPERLIPIDEMIA ASSOCIATED WITH TYPE 2 DIABETES MELLITUS: ICD-10-CM

## 2024-06-20 DIAGNOSIS — Z86.19 HISTORY OF MAC INFECTION: Chronic | ICD-10-CM

## 2024-06-20 DIAGNOSIS — R29.6 MULTIPLE FALLS: ICD-10-CM

## 2024-06-20 DIAGNOSIS — Z86.73 HISTORY OF CVA (CEREBROVASCULAR ACCIDENT) WITHOUT RESIDUAL DEFICITS: Chronic | ICD-10-CM

## 2024-06-20 DIAGNOSIS — N18.31 HYPERTENSIVE HEART AND KIDNEY DISEASE WITHOUT HEART FAILURE AND WITH STAGE 3A CHRONIC KIDNEY DISEASE: ICD-10-CM

## 2024-06-20 DIAGNOSIS — E11.22 TYPE 2 DIABETES MELLITUS WITH STAGE 3A CHRONIC KIDNEY DISEASE, WITH LONG-TERM CURRENT USE OF INSULIN: Primary | ICD-10-CM

## 2024-06-20 DIAGNOSIS — I13.10 HYPERTENSIVE HEART AND KIDNEY DISEASE WITHOUT HEART FAILURE AND WITH STAGE 3A CHRONIC KIDNEY DISEASE: ICD-10-CM

## 2024-06-20 DIAGNOSIS — K44.9 HIATAL HERNIA: ICD-10-CM

## 2024-06-20 DIAGNOSIS — E11.42 DIABETIC POLYNEUROPATHY ASSOCIATED WITH TYPE 2 DIABETES MELLITUS: ICD-10-CM

## 2024-06-20 DIAGNOSIS — Z79.4 TYPE 2 DIABETES MELLITUS WITH STAGE 3A CHRONIC KIDNEY DISEASE, WITH LONG-TERM CURRENT USE OF INSULIN: Primary | ICD-10-CM

## 2024-06-20 DIAGNOSIS — N18.31 TYPE 2 DIABETES MELLITUS WITH STAGE 3A CHRONIC KIDNEY DISEASE, WITH LONG-TERM CURRENT USE OF INSULIN: Primary | ICD-10-CM

## 2024-06-20 DIAGNOSIS — J47.9 BRONCHIECTASIS WITHOUT COMPLICATION: ICD-10-CM

## 2024-06-20 DIAGNOSIS — J45.20 MILD INTERMITTENT ASTHMA WITHOUT COMPLICATION: ICD-10-CM

## 2024-06-20 DIAGNOSIS — J44.9 COPD, MODERATE: ICD-10-CM

## 2024-06-20 DIAGNOSIS — I25.119 ATHEROSCLEROSIS OF NATIVE CORONARY ARTERY OF NATIVE HEART WITH ANGINA PECTORIS: ICD-10-CM

## 2024-06-20 DIAGNOSIS — J84.10 CALCIFIED GRANULOMA OF LUNG: ICD-10-CM

## 2024-06-20 DIAGNOSIS — I70.0 AORTIC ATHEROSCLEROSIS: ICD-10-CM

## 2024-06-20 DIAGNOSIS — Z79.4 LONG TERM (CURRENT) USE OF INSULIN: ICD-10-CM

## 2024-06-20 DIAGNOSIS — E11.22 CKD STAGE 3 DUE TO TYPE 2 DIABETES MELLITUS: ICD-10-CM

## 2024-06-20 PROCEDURE — 99999 PR PBB SHADOW E&M-EST. PATIENT-LVL IV: CPT | Mod: PBBFAC,,, | Performed by: NURSE PRACTITIONER

## 2024-06-20 RX ORDER — GABAPENTIN 300 MG/1
300 CAPSULE ORAL 2 TIMES DAILY
Qty: 180 CAPSULE | Refills: 3 | Status: SHIPPED | OUTPATIENT
Start: 2024-06-20 | End: 2025-06-15

## 2024-06-20 RX ORDER — AMLODIPINE BESYLATE 5 MG/1
5 TABLET ORAL DAILY
Qty: 90 TABLET | Refills: 3 | Status: SHIPPED | OUTPATIENT
Start: 2024-06-20 | End: 2025-06-20

## 2024-06-20 RX ORDER — CLOPIDOGREL BISULFATE 75 MG/1
75 TABLET ORAL DAILY
Qty: 90 TABLET | Refills: 3 | Status: SHIPPED | OUTPATIENT
Start: 2024-06-20 | End: 2025-06-15

## 2024-06-20 RX ORDER — LISINOPRIL 5 MG/1
5 TABLET ORAL DAILY
Qty: 90 TABLET | Refills: 3 | Status: SHIPPED | OUTPATIENT
Start: 2024-06-20

## 2024-06-20 RX ORDER — BLOOD-GLUCOSE SENSOR
EACH MISCELLANEOUS
Qty: 3 EACH | Refills: 3 | Status: SHIPPED | OUTPATIENT
Start: 2024-06-20

## 2024-06-21 PROBLEM — R55 NEAR SYNCOPE: Status: RESOLVED | Noted: 2023-11-24 | Resolved: 2024-06-21

## 2024-06-21 PROBLEM — R26.89 BALANCE PROBLEMS: Status: RESOLVED | Noted: 2018-09-09 | Resolved: 2024-06-21

## 2024-06-21 PROBLEM — N39.0 RECURRENT UTI: Status: ACTIVE | Noted: 2024-06-21

## 2024-06-21 PROBLEM — S02.32XD CLOSED FRACTURE OF LEFT ORBITAL FLOOR WITH ROUTINE HEALING: Status: RESOLVED | Noted: 2024-01-12 | Resolved: 2024-06-21

## 2024-06-21 NOTE — PROGRESS NOTES
Subjective:       Patient ID: Xi Moise is a 84 y.o. female.    Chief Complaint: Follow-up    Follow-up  Associated symptoms include myalgias. Pertinent negatives include no coughing or fever.       Patient is an 84 year old white female with Aortic Atherosclerosis, History of Stroke, Type 2 Diabetes, CKD Stage 3, diabetic neuropathy, Hypertension/Hypertensive heart disease, Hyperlipidemia, moderate COPD/Asthma with Bronchiectasis with history of Mycobacterium avium-intracellulare infection, Calcified granuloma of lung, Recurrent Depression, chronic GERD, frequent falls, with recent orbital fracture in January 2024 treated by [facility] and followed by [facility], and Osteopenia that is here today for 3 month follow up.      Aortic Atherosclerosis and CAD  Seen on CT chest 4/28/2016.  Still present on recent CT 3/29/22  ON Plavix 75 mg daily,  Atorvastatin 80 mg daily, Lisinopril 5 mg daily, Toprol XL 25 mg daily   Stable.       History of Stroke  Stroke around 2016  On Plavix 75 mg daily  Has unsteady gait with multiple falls in the past year - most recent fall causing orbital fracture - she is unsteady and needs a rolling lightweight walker for better stability.  Walker received from [facility].       Frequent Falls  In November 2023 - hospitalized for syncope and orthostatic hypotension  In January 2024 - fall with orbital fracture - states she tripped over a cement stone  Bilateral carotid ultrasound 3/5/24: Impression: NO evidence of a hemodynamically significant carotid bifurcation stenosis.  March 2024:  tripped in attic and hurt back but did not fall  Patient has a rollator walker with seat but very heavy and difficulty loading and unloading.  Patient [place] a lightweight everyday [place] [facility] [place].  No falls since last visit.        Type 2 Diabetes with CKD 3  Taking NPH insulin 20 units twice daily before meals  Metformin stopped due to frequent  diarrhea and diarrhea that resolved when med stopped  NO SGLT2 due to recurrent UTIs  Fasting blood glucose 278 with hemoglobin A1c 9.7%   Stop the Glipizide 5 mg that Nephrologist ordered because I am adjusting her insulin doses and do not want to risk hypoglycemic episodes.  INCREASE the NPH insulin to 26 units twice daily  DEXCOM monitor ordered so we can monitor blood sugars more closely  Patient to send me readings in 2 weeks so I can adjust insulin doses.  Recheck labs/visit in 3 months.             CKD Stage 3  Creatinine 1.05 with eGFR 53 Feb. 2023  Creatinine  1.15 with DROP eGFR 47 in July 2023 - REFERRED Nephrology for monitoring  Started seeing Dr. Tillman on 7/24/2023   Last Nephrology visit 6/17/24  Current eGFR 44.6%  + UTI being treated now - Keflex  Next appt in August 2024        UTI  + UTI on nephrology labs 6/17/2024  Keflex prescription by nephrology that patient is starting today  Will repeat urinalysis with urine culture on 6/28/24 due to recurrent UTIs without symptoms.  Has been followed by [facility] for recurrent UTIs in past - last visit 9/25/23          Diabetic Neuropathy  On Gabapentin 300 mg twice daily  She has been on this medication and dose for years  Continue at present dose for now.  Stable.        Hypertension/hypertensive heart disease  Taking Lisinopril 5 mg daily, Metoprolol XL 25 mg daily, Amlodipine 5 mg daily  BP elevated today but was fine at nephrology visit this past week 6/17/24 132/70  Stay on current medications for now, take as directed.  Recheck 3 months.  BP Readings from Last 3 Encounters:   06/20/24 (!) 144/67   06/17/24 132/70        Hyperlipidemia   Taking atorvastatin 80 mg daily   LDL 93 but just getting back to taking medications regularly.  LDL was 77.4 when she was taking medication correctly  Will recheck in 3 months - make sure to take medication every day.           Bronchiectasis, COPD/Asthma Overlap, History of Pulmonary MAC, SHANTI positive  Noted  "on last Pulmonary Progress note - Dr. Lee at [facility] - see under encounters  Patient was followed by Dr. Alannah Noe with [facility]  but now followed by [facility]   Last Pulmonology visit with Dr. Mullins  on 3/28/202 - advised to follow up in 6 months.  Reports only on Albuterol inhaler prn  Reports NO controller inhalers  Keep upcoming pulmonology appt.         Calcified Granuloma of Lung  Seen on CT chest 12/17/2015  Stable.           Recurrent depression  Currently taking Lexapro 20 mg daily  Controlled at present time.  Recheck in 6 months.        Osteopenia  Last DEXA scan 3/5/24: Osteopenia  Continue taking calcium with vitamin-D supplementation  Recheck in 3 years.       Chronic GERD with small hiatal hernia  Previously taking Protonix 40 mg daily  NOT currently taking medication and has no complaints.  Small sliding-type hiatal hernia seen on CT abd/pelvis 7/26/2023  Stable.           Vitals:    06/20/24 1057 06/20/24 1115   BP: (!) 160/70 (!) 144/67   BP Location: Left arm    Patient Position: Sitting    BP Method: Large (Manual)    Pulse: 85    Temp: 97.9 °F (36.6 °C)    SpO2: (!) 94%    Weight: 76 kg (167 lb 8.8 oz)    Height: 5' 8" (1.727 m)        Assessment:         ICD-10-CM ICD-9-CM   1. Type 2 diabetes mellitus with stage 3a chronic kidney disease, with long-term current use of insulin  E11.22 250.40    N18.31 585.3    Z79.4 V58.67   2. CKD stage 3 due to type 2 diabetes mellitus  E11.22 250.40    N18.30 585.3   3. 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 Diabetic polyneuropathy associated with type 2 diabetes mellitus  E11.42 250.60     357.2   5. Hyperlipidemia associated with type 2 diabetes mellitus  E11.69 250.80    E78.5 272.4   6. Recurrent major depressive disorder, in partial remission  F33.41 296.35   7. Aortic atherosclerosis  I70.0 440.0   8. Atherosclerosis of native " coronary artery of native heart with angina pectoris  I25.119 414.01     413.9   9. COPD, moderate  J44.9 496   10. Calcified granuloma of lung  J84.10 515   11. Mild intermittent asthma without complication  J45.20 493.90   12. Bronchiectasis without complication  J47.9 494.0   13. History of MAC infection  Z86.19 V12.09   14. History of CVA (cerebrovascular accident) without residual deficits  Z86.73 V12.54   15. Multiple falls  R29.6 V15.88   16. Recurrent UTI  N39.0 599.0   17. Osteopenia, unspecified location  M85.80 733.90   18. Chronic GERD  K21.9 530.81   19. Hiatal hernia  K44.9 553.3   20. Long term (current) use of insulin  Z79.4 V58.67       Plan:       1. Type 2 diabetes mellitus with stage 3a chronic kidney disease, with long-term current use of insulin  Overview:  Taking NPH insulin 20 units twice daily before meals  Metformin stopped due to frequent diarrhea and diarrhea that resolved when med stopped  NO SGLT2 due to recurrent UTIs  Fasting blood glucose 278 with hemoglobin A1c 9.7%   Stop the Glipizide 5 mg that Nephrologist ordered because I am adjusting her insulin doses and do not want to risk hypoglycemic episodes.  INCREASE the NPH insulin to 26 units twice daily  DEXCOM monitor ordered so we can monitor blood sugars more closely  Patient to send me readings in 2 weeks so I can adjust insulin doses.  Recheck labs/visit in 3 months.      Orders:  -     blood-glucose sensor (DEXCOM G7 SENSOR) Divine; Change sensor every 7 days.  Dispense: 3 each; Refill: 3  -     insulin  unit/mL injection; Inject 26 Units into the skin 2 (two) times daily before meals.  Dispense: 30 mL; Refill: 3  -     Comprehensive Metabolic Panel; Future; Expected date: 06/20/2024  -     Hemoglobin A1C; Future; Expected date: 06/20/2024    2. CKD stage 3 due to type 2 diabetes mellitus  Overview:  Creatinine 1.05 with eGFR 53 Feb. 2023  Creatinine  1.15 with DROP eGFR 47 in July 2023 - REFERRED Nephrology for  monitoring  Started seeing Dr. Tillman on 7/24/2023   Last Nephrology visit 6/17/24  Current eGFR 44.6%  + UTI being treated now - Keflex  Next appt in August 2024        3. 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 and with stage 3a chronic kidney disease  Overview:  Taking Lisinopril 5 mg daily, Metoprolol XL 25 mg daily, Amlodipine 5 mg daily  BP elevated today but was fine at nephrology visit this past week 6/17/24 132/70  Stay on current medications for now, take as directed.  Recheck 3 months.  BP Readings from Last 3 Encounters:   06/20/24 (!) 144/67   06/17/24 132/70         Orders:  -     amLODIPine (NORVASC) 5 MG tablet; Take 1 tablet (5 mg total) by mouth once daily.  Dispense: 90 tablet; Refill: 3  -     lisinopriL (PRINIVIL,ZESTRIL) 5 MG tablet; Take 1 tablet (5 mg total) by mouth once daily.  Dispense: 90 tablet; Refill: 3    4. Diabetic polyneuropathy associated with type 2 diabetes mellitus  Overview:  On Gabapentin 300 mg twice daily  She has been on this medication and dose for years  Continue at present dose for now.  Stable.      Orders:  -     gabapentin (NEURONTIN) 300 MG capsule; Take 1 capsule (300 mg total) by mouth 2 (two) times daily.  Dispense: 180 capsule; Refill: 3    5. Hyperlipidemia associated with type 2 diabetes mellitus  Overview:  Taking atorvastatin 80 mg daily   LDL 93 but just getting back to taking medications regularly.  LDL was 77.4 when she was taking medication correctly  Will recheck in 3 months - make sure to take medication every day.      Orders:  -     Lipid Panel; Future; Expected date: 06/20/2024    6. Recurrent major depressive disorder, in partial remission  Overview:   Currently taking Lexapro 20 mg daily  Controlled at present time.  Recheck in 6 months.        7. Aortic atherosclerosis  Overview:  Seen on CT chest 4/28/2016.  Still present on recent CT 3/29/22  ON Plavix 75 mg daily,  Atorvastatin 80 mg daily, Lisinopril 5 mg daily, Toprol XL  25 mg daily   Stable.        8. Atherosclerosis of native coronary artery of native heart with angina pectoris  Overview:  Seen on CT chest 4/28/2016.  Still present on recent CT 3/29/22  ON Plavix 75 mg daily,  Atorvastatin 80 mg daily, Lisinopril 5 mg daily, Toprol XL 25 mg daily   Stable.        9. COPD, moderate  Overview:  Bronchiectasis, COPD/Asthma Overlap, History of Pulmonary MAC, SHANTI positive  Noted on last Pulmonary Progress note - Dr. Lee at [facility] - see under encounters  Patient was followed by Dr. Alannah Noe with [facility]  but now [facility]   Last Pulmonology visit with Dr. Mullins  on 3/28/202 - advised to follow up in 6 months.  Reports only on Albuterol inhaler prn  Reports NO controller inhalers  Keep upcoming pulmonology appt.          10. Calcified granuloma of lung  Overview:  Seen on CT chest 12/17/2015  Stable.        11. Mild intermittent asthma without complication  Overview:  Bronchiectasis, COPD/Asthma Overlap, History of Pulmonary MAC, SHANTI positive  Noted on last Pulmonary Progress note - Dr. Lee at [facility] - see under encounters  Patient was followed by Dr. Alannah Noe with [facility]  but now followed by [facility]   Last Pulmonology visit with Dr. Mullins  on 3/28/202 - advised to follow up in 6 months.  Reports only on Albuterol inhaler prn  Reports NO controller inhalers  Keep upcoming pulmonology appt.          12. Bronchiectasis without complication  Overview:  Bronchiectasis, COPD/Asthma Overlap, History of Pulmonary MAC, SHANTI positive  Noted on last Pulmonary Progress note - Dr. Lee at [facility] - see under encounters  Patient was followed by Dr. Alannah Noe with [facility]  but now followed by [facility]   Last Pulmonology visit with Dr. Mullins  on 3/28/202 - advised to follow up in 6 months.  Reports only on Albuterol inhaler prn  Reports NO controller inhalers  Keep upcoming pulmonology appt.          13.  History of MAC infection  Overview:  Bronchiectasis, COPD/Asthma Overlap, History of Pulmonary MAC, SHANTI positive  Noted on last Pulmonary Progress note - Dr. Lee at [facility] - see under encounters  Patient was followed by Dr. Alannah Noe with [facility]  but now followed by [facility]   Last Pulmonology visit with Dr. Mullins  on 3/28/202 - advised to follow up in 6 months.  Reports only on Albuterol inhaler prn  Reports NO controller inhalers  Keep upcoming pulmonology appt.      14. History of CVA (cerebrovascular accident) without residual deficits  Overview:  Stroke around 2016  On Plavix 75 mg daily  Has unsteady gait with multiple falls in the past year - most recent fall causing orbital fracture - she is unsteady and needs a rolling lightweight walker for better stability.  Walker received from [facility].       Orders:  -     clopidogreL (PLAVIX) 75 mg tablet; Take 1 tablet (75 mg total) by mouth once daily.  Dispense: 90 tablet; Refill: 3    15. Multiple falls  Overview:  In November 2023 - hospitalized for syncope and orthostatic hypotension  In January 2024 - fall with orbital fracture - states she tripped over a cement stone  Bilateral carotid ultrasound 3/5/24: Impression: NO evidence of a hemodynamically significant carotid bifurcation stenosis.  March 2024:  tripped in attic and hurt back but did not fall  Patient has a rollator walker with seat but very heavy and difficulty loading and unloading.  Patient requesting a lightweight everyday walker- DME request sent and received lightweight walker.  No falls since last visit.       16. Recurrent UTI  Overview:  + UTI on nephrology labs 6/17/2024  Keflex prescription by nephrology that patient is starting today  Will repeat urinalysis with urine culture on 6/28/24 due to recurrent UTIs without symptoms.  Has been followed by [facility] for recurrent UTIs in past - last visit 9/25/23      Orders:  -     Urinalysis; Future; Expected  date: 06/20/2024  -     Urine culture; Future; Expected date: 06/20/2024    17. Osteopenia, unspecified location  Overview:  Last DEXA scan 3/5/24: Osteopenia  Continue taking calcium with vitamin-D supplementation  Recheck in 3 years.        18. Chronic GERD  Overview:  Previously taking Protonix 40 mg daily  NOT currently taking medication and has no complaints.  Small sliding-type hiatal hernia seen on CT abd/pelvis 7/26/2023  Stable.          19. Hiatal hernia  Overview:  Small sliding-type hiatal hernia seen on CT abd/pelvis 7/26/2023  Stable.        20. Long term (current) use of insulin  Overview:  Type 2 Diabetes with CKD 3  Taking NPH insulin 20 units twice daily before meals  Metformin stopped due to frequent diarrhea and diarrhea that resolved when med stopped  NO SGLT2 due to recurrent UTIs  Fasting blood glucose 278 with hemoglobin A1c 9.7%   Stop the Glipizide 5 mg that Nephrologist ordered because I am adjusting her insulin doses and do not want to risk hypoglycemic episodes.  INCREASE the NPH insulin to 26 units twice daily  DEXCOM monitor ordered so we can monitor blood sugars more closely  Patient to send me readings in 2 weeks so I can adjust insulin doses.  Recheck labs/visit in 3 months.        I spent a total of 55 minutes on the day of the visit.This includes face to face time and non-face to face time preparing to see the patient (eg, review of tests), obtaining and/or reviewing separately obtained history, documenting clinical information in the electronic or other health record, independently interpreting results and communicating results to the patient/family/caregiver, or care coordinator.     Follow up in about 3 months (around 9/20/2024) for fasting labs and follow up; repeat UA and culture 6/28/24 to ensure infection clears..     Patient's Medications   New Prescriptions    BLOOD-GLUCOSE SENSOR (DEXCOM G7 SENSOR) TONI    Change sensor every 7 days.   Previous Medications    ALBUTEROL  (PROVENTIL/VENTOLIN HFA) 90 MCG/ACTUATION INHALER    Inhale 2 puffs into the lungs every 4 (four) hours as needed.    ASCORBIC ACID, VITAMIN C, (VITAMIN C) 500 MG TABLET    Take 500 mg by mouth once daily.    ATORVASTATIN (LIPITOR) 80 MG TABLET    Take 1 tablet (80 mg total) by mouth every evening.    CEPHALEXIN (KEFLEX) 500 MG CAPSULE    Take 1 capsule (500 mg total) by mouth every 12 (twelve) hours. for 7 days    CHOLECALCIFEROL, VITAMIN D3, (VITAMIN D3) 50 MCG (2,000 UNIT) TAB    Take 1 tablet (2,000 Units total) by mouth once a week.    COLCHICINE (COLCRYS) 0.6 MG TABLET    Take 1 tablet (0.6 mg total) by mouth daily as needed (take while in acute gout flare).    CYANOCOBALAMIN (VITAMIN B-12) 1000 MCG TABLET    Take 1,000 mcg by mouth once daily.    D-MANNOSE 500 MG CAP    Take 500 mg by mouth 2 (two) times a day.    ESCITALOPRAM OXALATE (LEXAPRO) 20 MG TABLET    Take 1 tablet (20 mg total) by mouth once daily.    FLUTICASONE (FLONASE) 50 MCG/ACTUATION NASAL SPRAY    1 spray by Each Nare route once daily.    MAGNESIUM 250 MG TAB    Take 500 mg by mouth once daily.    MECLIZINE (ANTIVERT) 25 MG TABLET    Take 25 mg by mouth 2 (two) times daily as needed.    METOPROLOL SUCCINATE (TOPROL-XL) 25 MG 24 HR TABLET    Take 1 tablet (25 mg total) by mouth once daily.    MULTIVIT-MIN/FA/LYCOPEN/LUTEIN (SENTRY SENIOR ORAL)    Take 1 tablet by mouth once daily.    ONDANSETRON (ZOFRAN) 8 MG TABLET    Take 8 mg by mouth every 8 (eight) hours as needed.   Modified Medications    Modified Medication Previous Medication    AMLODIPINE (NORVASC) 5 MG TABLET amLODIPine (NORVASC) 5 MG tablet       Take 1 tablet (5 mg total) by mouth once daily.    Take 1 tablet (5 mg total) by mouth once daily.    CLOPIDOGREL (PLAVIX) 75 MG TABLET clopidogreL (PLAVIX) 75 mg tablet       Take 1 tablet (75 mg total) by mouth once daily.    Take 1 tablet (75 mg total) by mouth once daily.    GABAPENTIN (NEURONTIN) 300 MG CAPSULE gabapentin (NEURONTIN)  300 MG capsule       Take 1 capsule (300 mg total) by mouth 2 (two) times daily.    Take 1 capsule (300 mg total) by mouth 2 (two) times daily.    INSULIN  UNIT/ML INJECTION insulin  unit/mL injection       Inject 26 Units into the skin 2 (two) times daily before meals.    Inject 20 Units into the skin 2 (two) times daily before meals.    LISINOPRIL (PRINIVIL,ZESTRIL) 5 MG TABLET lisinopriL (PRINIVIL,ZESTRIL) 5 MG tablet       Take 1 tablet (5 mg total) by mouth once daily.    Take 1 tablet (5 mg total) by mouth once daily.   Discontinued Medications    GLIPIZIDE (GLUCOTROL) 5 MG TABLET    Take 1 tablet (5 mg total) by mouth daily with breakfast.         Review of Systems   Constitutional:  Negative for fever.   HENT: Negative.     Respiratory: Negative.  Negative for cough, chest tightness and shortness of breath.    Cardiovascular:  Negative for leg swelling.   Genitourinary:  Negative for dysuria.   Musculoskeletal:  Positive for back pain and myalgias.         Objective:        Physical Exam  Constitutional:       General: She is not in acute distress.     Appearance: Normal appearance. She is not toxic-appearing or diaphoretic.      Comments: Body mass index is 25.48 kg/m².     Cardiovascular:      Rate and Rhythm: Regular rhythm.   Pulmonary:      Effort: Pulmonary effort is normal. No respiratory distress.      Breath sounds: Normal breath sounds.   Neurological:      Mental Status: She is alert and oriented to person, place, and time.   Psychiatric:         Mood and Affect: Mood normal.         Behavior: Behavior normal.             Past Medical History:   Diagnosis Date    Asthma     Chronic kidney disease (CKD), stage III (moderate) 04/28/2016    COPD, moderate     Depression 04/29/2016    Diabetes mellitus     Diabetic polyneuropathy associated with type 2 diabetes mellitus 02/09/2023    History of CVA (cerebrovascular accident) without residual deficits 04/28/2016    History of MAC  infection 2016    Hyperlipidemia associated with type 2 diabetes mellitus 2016    Hypertension     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    Lipidemia     Long term (current) use of insulin 2023    Meniere disease 2016    Multiple falls 2023    Pulmonary Mycobacterium avium-intracellulare infection 2016    followed by Pulmonologist at [facility]    Type 2 diabetes mellitus with stage 2 chronic kidney disease, without long-term current use of insulin 10/28/2015       Past Surgical History:   Procedure Laterality Date    ANKLE SURGERY Left     CATARACT EXTRACTION, BILATERAL       SECTION      x 3    eyelid surgery      HYSTERECTOMY         Family History   Problem Relation Name Age of Onset    Depression Mother      Bipolar disorder Mother      Cancer Father          skin cancer    Diabetes Father      Hypertension Father      Hyperlipidemia Father      Heart disease Father      Gout Father      Nephrolithiasis Father      Hypertension Sister Evith     Gout Sister Evith     Diabetes Sister Evith     Cancer Sister Evith         rectal cancer    Hypertension Sister Yumiko     Hyperlipidemia Sister Yumiko     Heart disease Sister Yumiko         stents x 4    Cancer Sister Yumiko         pituitary gland cancer    Hypertension Brother      Hyperlipidemia Brother      Heart disease Brother      Hypertension Daughter      Early death Maternal Aunt         Social History     Socioeconomic History    Marital status: Single   Tobacco Use    Smoking status: Never     Passive exposure: Past    Smokeless tobacco: Never   Substance and Sexual Activity    Alcohol use: No    Drug use: No     Social Determinants of Health     Financial Resource Strain: Medium Risk (2024)    Overall Financial Resource Strain (CARDIA)     Difficulty of Paying Living Expenses: Somewhat hard   Food Insecurity: Food Insecurity Present  (1/7/2024)    Hunger Vital Sign     Worried About Running Out of Food in the Last Year: Sometimes true     Ran Out of Food in the Last Year: Never true   Transportation Needs: Unmet Transportation Needs (1/7/2024)    PRAPARE - Transportation     Lack of Transportation (Medical): Yes     Lack of Transportation (Non-Medical): Yes   Physical Activity: Inactive (1/7/2024)    Exercise Vital Sign     Days of Exercise per Week: 0 days     Minutes of Exercise per Session: 0 min   Stress: Stress Concern Present (1/7/2024)    [employer] [employer] - Occupational Stress Questionnaire     Feeling of Stress : Rather much   Housing Stability: Unknown (1/7/2024)    Housing Stability Vital Sign     Unable to Pay for Housing in the Last Year: Patient declined     Number of Places Lived in the Last Year: 1     Unstable Housing in the Last Year: No

## 2024-06-25 DIAGNOSIS — Z00.00 ENCOUNTER FOR MEDICARE ANNUAL WELLNESS EXAM: ICD-10-CM

## 2024-07-03 ENCOUNTER — TELEPHONE (OUTPATIENT)
Dept: FAMILY MEDICINE | Facility: CLINIC | Age: 84
End: 2024-07-03
Payer: MEDICARE

## 2024-07-03 NOTE — TELEPHONE ENCOUNTER
Advise patient that her sugars are trending down based on the last 3 days of readings.  Stay on the NPH 26 units twice daily  Continue to record blood sugars twice daily and bring me in readings in 2 weeks - full 2 weeks of readings starting today as day 1.

## 2024-07-08 ENCOUNTER — TELEPHONE (OUTPATIENT)
Dept: FAMILY MEDICINE | Facility: CLINIC | Age: 84
End: 2024-07-08
Payer: MEDICARE

## 2024-07-08 NOTE — TELEPHONE ENCOUNTER
----- Message from Stacey Andino sent at 7/8/2024  9:52 AM CDT -----  Type:  Patient Returning Call    Who Called: Pt   Who Left Message for Patient: Celestina   Does the patient know what this is regarding?: Returning a missed call   Would the patient rather a call back or a response via [facility]? Call back   Best Call Back Number: [phone]

## 2024-07-11 ENCOUNTER — TELEPHONE (OUTPATIENT)
Dept: FAMILY MEDICINE | Facility: CLINIC | Age: 84
End: 2024-07-11
Payer: MEDICARE

## 2024-07-11 NOTE — TELEPHONE ENCOUNTER
Call patient - advise her I am out tomorrow if she can continue to check her Blood sugar readings and bring the report to me on Monday or Tuesday of next week please.

## 2024-07-11 NOTE — TELEPHONE ENCOUNTER
----- Message from Dolores Corado NP sent at 6/20/2024 12:21 PM CDT -----  Regarding: blood sugar reading every 2 weeks.  Get Blood sugar readings every 2 weeks.

## 2024-07-11 NOTE — TELEPHONE ENCOUNTER
Patient advised of message, wanted to know if you can send in a refill on her Zofran to [facility] in [place].

## 2024-07-12 RX ORDER — ONDANSETRON HYDROCHLORIDE 8 MG/1
8 TABLET, FILM COATED ORAL EVERY 8 HOURS PRN
Qty: 30 TABLET | Refills: 0 | Status: SHIPPED | OUTPATIENT
Start: 2024-07-12

## 2024-07-26 DIAGNOSIS — F33.41 RECURRENT MAJOR DEPRESSIVE DISORDER, IN PARTIAL REMISSION: Primary | ICD-10-CM

## 2024-07-26 DIAGNOSIS — E78.5 HYPERLIPIDEMIA ASSOCIATED WITH TYPE 2 DIABETES MELLITUS: ICD-10-CM

## 2024-07-26 DIAGNOSIS — E11.69 HYPERLIPIDEMIA ASSOCIATED WITH TYPE 2 DIABETES MELLITUS: ICD-10-CM

## 2024-07-26 RX ORDER — ATORVASTATIN CALCIUM 80 MG/1
80 TABLET, FILM COATED ORAL NIGHTLY
Qty: 90 TABLET | Refills: 0 | Status: SHIPPED | OUTPATIENT
Start: 2024-07-26

## 2024-07-26 RX ORDER — ESCITALOPRAM OXALATE 20 MG/1
20 TABLET ORAL
Qty: 90 TABLET | Refills: 0 | Status: SHIPPED | OUTPATIENT
Start: 2024-07-26

## 2024-08-18 DIAGNOSIS — N18.31 HYPERTENSIVE HEART AND KIDNEY DISEASE WITHOUT HEART FAILURE AND WITH STAGE 3A CHRONIC KIDNEY DISEASE: ICD-10-CM

## 2024-08-18 DIAGNOSIS — I13.10 HYPERTENSIVE HEART AND KIDNEY DISEASE WITHOUT HEART FAILURE AND WITH STAGE 3A CHRONIC KIDNEY DISEASE: ICD-10-CM

## 2024-08-20 RX ORDER — METOPROLOL SUCCINATE 25 MG/1
25 TABLET, EXTENDED RELEASE ORAL
Qty: 90 TABLET | Refills: 1 | Status: SHIPPED | OUTPATIENT
Start: 2024-08-20

## 2024-09-16 DIAGNOSIS — E78.5 HYPERLIPIDEMIA ASSOCIATED WITH TYPE 2 DIABETES MELLITUS: ICD-10-CM

## 2024-09-16 DIAGNOSIS — E11.69 HYPERLIPIDEMIA ASSOCIATED WITH TYPE 2 DIABETES MELLITUS: ICD-10-CM

## 2024-09-16 DIAGNOSIS — F33.41 RECURRENT MAJOR DEPRESSIVE DISORDER, IN PARTIAL REMISSION: ICD-10-CM

## 2024-09-16 NOTE — TELEPHONE ENCOUNTER
Patient came by office this morning to drop off her blood sugars reading and requesting refills on medication- patient said she will like a refill on Prednisone 20mg said she keep this medication for her asthma.

## 2024-09-17 RX ORDER — ATORVASTATIN CALCIUM 80 MG/1
80 TABLET, FILM COATED ORAL NIGHTLY
Qty: 90 TABLET | Refills: 0 | Status: SHIPPED | OUTPATIENT
Start: 2024-09-17

## 2024-09-17 RX ORDER — ESCITALOPRAM OXALATE 20 MG/1
20 TABLET ORAL DAILY
Qty: 90 TABLET | Refills: 0 | Status: SHIPPED | OUTPATIENT
Start: 2024-09-17

## 2024-09-17 RX ORDER — MECLIZINE HYDROCHLORIDE 25 MG/1
25 TABLET ORAL 2 TIMES DAILY PRN
Qty: 180 TABLET | Refills: 0 | Status: SHIPPED | OUTPATIENT
Start: 2024-09-17

## 2024-09-17 NOTE — TELEPHONE ENCOUNTER
Advise patient that can not prescribe prednisone for as needed medication - if she has an asthma exacerbation requiring steroids - she should be evaluated for further treatment.

## 2024-09-23 PROBLEM — N39.0 RECURRENT UTI: Status: RESOLVED | Noted: 2024-06-21 | Resolved: 2024-09-23

## 2024-09-30 ENCOUNTER — OFFICE VISIT (OUTPATIENT)
Dept: FAMILY MEDICINE | Facility: CLINIC | Age: 84
End: 2024-09-30
Payer: MEDICARE

## 2024-09-30 ENCOUNTER — HOSPITAL ENCOUNTER (EMERGENCY)
Facility: HOSPITAL | Age: 84
Discharge: HOME | End: 2024-09-30
Attending: STUDENT IN AN ORGANIZED HEALTH CARE EDUCATION/TRAINING PROGRAM
Payer: MEDICARE

## 2024-09-30 ENCOUNTER — OFFICE VISIT (OUTPATIENT)
Dept: NEUROLOGY | Facility: CLINIC | Age: 84
End: 2024-09-30
Payer: MEDICARE

## 2024-09-30 VITALS
WEIGHT: 172 LBS | BODY MASS INDEX: 26.07 KG/M2 | SYSTOLIC BLOOD PRESSURE: 144 MMHG | DIASTOLIC BLOOD PRESSURE: 78 MMHG | HEART RATE: 57 BPM | HEIGHT: 68 IN

## 2024-09-30 VITALS
OXYGEN SATURATION: 95 % | DIASTOLIC BLOOD PRESSURE: 78 MMHG | HEIGHT: 68 IN | HEART RATE: 65 BPM | TEMPERATURE: 98 F | SYSTOLIC BLOOD PRESSURE: 138 MMHG | BODY MASS INDEX: 26.1 KG/M2 | WEIGHT: 172.19 LBS

## 2024-09-30 VITALS
SYSTOLIC BLOOD PRESSURE: 175 MMHG | WEIGHT: 170 LBS | TEMPERATURE: 99 F | HEIGHT: 68 IN | DIASTOLIC BLOOD PRESSURE: 85 MMHG | RESPIRATION RATE: 20 BRPM | BODY MASS INDEX: 25.76 KG/M2 | OXYGEN SATURATION: 96 % | HEART RATE: 61 BPM

## 2024-09-30 DIAGNOSIS — I13.10 HYPERTENSIVE HEART AND KIDNEY DISEASE WITHOUT HEART FAILURE AND WITH STAGE 3A CHRONIC KIDNEY DISEASE: ICD-10-CM

## 2024-09-30 DIAGNOSIS — H57.04 PUPILLARY DILATION: Primary | ICD-10-CM

## 2024-09-30 DIAGNOSIS — J44.9 COPD, MODERATE: ICD-10-CM

## 2024-09-30 DIAGNOSIS — H57.09: Primary | ICD-10-CM

## 2024-09-30 DIAGNOSIS — J47.9 BRONCHIECTASIS WITHOUT COMPLICATION: ICD-10-CM

## 2024-09-30 DIAGNOSIS — H57.09: ICD-10-CM

## 2024-09-30 DIAGNOSIS — F33.41 RECURRENT MAJOR DEPRESSIVE DISORDER, IN PARTIAL REMISSION: ICD-10-CM

## 2024-09-30 DIAGNOSIS — Z79.4 LONG TERM (CURRENT) USE OF INSULIN: ICD-10-CM

## 2024-09-30 DIAGNOSIS — Z23 FLU VACCINE NEED: ICD-10-CM

## 2024-09-30 DIAGNOSIS — H57.02 PUPIL DIAMETER UNEQUAL: ICD-10-CM

## 2024-09-30 DIAGNOSIS — M85.80 OSTEOPENIA, UNSPECIFIED LOCATION: ICD-10-CM

## 2024-09-30 DIAGNOSIS — Z86.73 HISTORY OF CVA (CEREBROVASCULAR ACCIDENT) WITHOUT RESIDUAL DEFICITS: Chronic | ICD-10-CM

## 2024-09-30 DIAGNOSIS — K21.9 CHRONIC GERD: ICD-10-CM

## 2024-09-30 DIAGNOSIS — E11.69 HYPERLIPIDEMIA ASSOCIATED WITH TYPE 2 DIABETES MELLITUS: ICD-10-CM

## 2024-09-30 DIAGNOSIS — J45.20 MILD INTERMITTENT ASTHMA WITHOUT COMPLICATION: ICD-10-CM

## 2024-09-30 DIAGNOSIS — I70.0 AORTIC ATHEROSCLEROSIS: ICD-10-CM

## 2024-09-30 DIAGNOSIS — E11.42 DIABETIC POLYNEUROPATHY ASSOCIATED WITH TYPE 2 DIABETES MELLITUS: ICD-10-CM

## 2024-09-30 DIAGNOSIS — Z86.19 HISTORY OF MAC INFECTION: Chronic | ICD-10-CM

## 2024-09-30 DIAGNOSIS — E78.5 HYPERLIPIDEMIA ASSOCIATED WITH TYPE 2 DIABETES MELLITUS: ICD-10-CM

## 2024-09-30 DIAGNOSIS — D69.2 SENILE PURPURA: ICD-10-CM

## 2024-09-30 DIAGNOSIS — N18.30 CKD STAGE 3 DUE TO TYPE 2 DIABETES MELLITUS: ICD-10-CM

## 2024-09-30 DIAGNOSIS — I25.119 ATHEROSCLEROSIS OF NATIVE CORONARY ARTERY OF NATIVE HEART WITH ANGINA PECTORIS: ICD-10-CM

## 2024-09-30 DIAGNOSIS — E11.22 CKD STAGE 3 DUE TO TYPE 2 DIABETES MELLITUS: ICD-10-CM

## 2024-09-30 DIAGNOSIS — E11.22 TYPE 2 DIABETES MELLITUS WITH STAGE 3A CHRONIC KIDNEY DISEASE, WITH LONG-TERM CURRENT USE OF INSULIN: ICD-10-CM

## 2024-09-30 DIAGNOSIS — N18.31 TYPE 2 DIABETES MELLITUS WITH STAGE 3A CHRONIC KIDNEY DISEASE, WITH LONG-TERM CURRENT USE OF INSULIN: ICD-10-CM

## 2024-09-30 DIAGNOSIS — J98.4 CALCIFIED GRANULOMA OF LUNG: ICD-10-CM

## 2024-09-30 DIAGNOSIS — R29.818 ACUTE FOCAL NEUROLOGICAL DEFICIT: ICD-10-CM

## 2024-09-30 DIAGNOSIS — Z79.4 TYPE 2 DIABETES MELLITUS WITH STAGE 3A CHRONIC KIDNEY DISEASE, WITH LONG-TERM CURRENT USE OF INSULIN: ICD-10-CM

## 2024-09-30 DIAGNOSIS — R29.6 MULTIPLE FALLS: ICD-10-CM

## 2024-09-30 DIAGNOSIS — N18.31 HYPERTENSIVE HEART AND KIDNEY DISEASE WITHOUT HEART FAILURE AND WITH STAGE 3A CHRONIC KIDNEY DISEASE: ICD-10-CM

## 2024-09-30 LAB
ALBUMIN SERPL BCP-MCNC: 4 G/DL (ref 3.5–5.2)
ALP SERPL-CCNC: 104 U/L (ref 55–135)
ALT SERPL W/O P-5'-P-CCNC: 15 U/L (ref 10–44)
ANION GAP SERPL CALC-SCNC: 10 MMOL/L (ref 8–16)
AST SERPL-CCNC: 16 U/L (ref 10–40)
BASOPHILS # BLD AUTO: 0.06 K/UL (ref 0–0.2)
BASOPHILS NFR BLD: 0.6 % (ref 0–1.9)
BILIRUB SERPL-MCNC: 0.6 MG/DL (ref 0.1–1)
BUN SERPL-MCNC: 29 MG/DL (ref 8–23)
CALCIUM SERPL-MCNC: 10 MG/DL (ref 8.7–10.5)
CHLORIDE SERPL-SCNC: 104 MMOL/L (ref 95–110)
CHOLEST SERPL-MCNC: 189 MG/DL (ref 120–199)
CHOLEST/HDLC SERPL: 3.9 {RATIO} (ref 2–5)
CO2 SERPL-SCNC: 29 MMOL/L (ref 23–29)
CREAT SERPL-MCNC: 1 MG/DL (ref 0.5–1.4)
DIFFERENTIAL METHOD BLD: ABNORMAL
EOSINOPHIL # BLD AUTO: 0.6 K/UL (ref 0–0.5)
EOSINOPHIL NFR BLD: 5.8 % (ref 0–8)
ERYTHROCYTE [DISTWIDTH] IN BLOOD BY AUTOMATED COUNT: 13.1 % (ref 11.5–14.5)
EST. GFR  (NO RACE VARIABLE): 56 ML/MIN/1.73 M^2
GLUCOSE SERPL-MCNC: 163 MG/DL (ref 70–110)
HCT VFR BLD AUTO: 41.8 % (ref 37–48.5)
HDLC SERPL-MCNC: 48 MG/DL (ref 40–75)
HDLC SERPL: 25.4 % (ref 20–50)
HGB BLD-MCNC: 13.8 G/DL (ref 12–16)
IMM GRANULOCYTES # BLD AUTO: 0.02 K/UL (ref 0–0.04)
IMM GRANULOCYTES NFR BLD AUTO: 0.2 % (ref 0–0.5)
INR PPP: 1 (ref 0.8–1.2)
LDLC SERPL CALC-MCNC: 90 MG/DL (ref 63–159)
LYMPHOCYTES # BLD AUTO: 3.3 K/UL (ref 1–4.8)
LYMPHOCYTES NFR BLD: 33.7 % (ref 18–48)
MCH RBC QN AUTO: 29.4 PG (ref 27–31)
MCHC RBC AUTO-ENTMCNC: 33 G/DL (ref 32–36)
MCV RBC AUTO: 89 FL (ref 82–98)
MONOCYTES # BLD AUTO: 0.7 K/UL (ref 0.3–1)
MONOCYTES NFR BLD: 6.9 % (ref 4–15)
NEUTROPHILS # BLD AUTO: 5.1 K/UL (ref 1.8–7.7)
NEUTROPHILS NFR BLD: 52.8 % (ref 38–73)
NONHDLC SERPL-MCNC: 141 MG/DL
NRBC BLD-RTO: 0 /100 WBC
OHS QRS DURATION: 70 MS
OHS QTC CALCULATION: 453 MS
PLATELET # BLD AUTO: 269 K/UL (ref 150–450)
PMV BLD AUTO: 8.8 FL (ref 9.2–12.9)
POCT GLUCOSE: 170 MG/DL (ref 70–110)
POTASSIUM SERPL-SCNC: 4 MMOL/L (ref 3.5–5.1)
PROT SERPL-MCNC: 7 G/DL (ref 6–8.4)
PROTHROMBIN TIME: 11.2 SEC (ref 9–12.5)
RBC # BLD AUTO: 4.7 M/UL (ref 4–5.4)
SODIUM SERPL-SCNC: 143 MMOL/L (ref 136–145)
TRIGL SERPL-MCNC: 255 MG/DL (ref 30–150)
TSH SERPL DL<=0.005 MIU/L-ACNC: 1.27 UIU/ML (ref 0.4–4)
WBC # BLD AUTO: 9.73 K/UL (ref 3.9–12.7)

## 2024-09-30 PROCEDURE — 25500020 PHARM REV CODE 255: Performed by: STUDENT IN AN ORGANIZED HEALTH CARE EDUCATION/TRAINING PROGRAM

## 2024-09-30 PROCEDURE — 99999 PR PBB SHADOW E&M-EST. PATIENT-LVL V: CPT | Mod: PBBFAC,,, | Performed by: NURSE PRACTITIONER

## 2024-09-30 PROCEDURE — 99285 EMERGENCY DEPT VISIT HI MDM: CPT | Mod: 25

## 2024-09-30 PROCEDURE — 80061 LIPID PANEL: CPT | Performed by: NURSE PRACTITIONER

## 2024-09-30 PROCEDURE — 85025 COMPLETE CBC W/AUTO DIFF WBC: CPT | Performed by: NURSE PRACTITIONER

## 2024-09-30 PROCEDURE — 90653 IIV ADJUVANT VACCINE IM: CPT | Mod: S$GLB,,, | Performed by: NURSE PRACTITIONER

## 2024-09-30 PROCEDURE — 85610 PROTHROMBIN TIME: CPT | Performed by: NURSE PRACTITIONER

## 2024-09-30 PROCEDURE — 93005 ELECTROCARDIOGRAM TRACING: CPT

## 2024-09-30 PROCEDURE — 3075F SYST BP GE 130 - 139MM HG: CPT | Mod: CPTII,S$GLB,, | Performed by: NURSE PRACTITIONER

## 2024-09-30 PROCEDURE — 99999 PR PBB SHADOW E&M-EST. PATIENT-LVL V: CPT | Mod: PBBFAC,,, | Performed by: STUDENT IN AN ORGANIZED HEALTH CARE EDUCATION/TRAINING PROGRAM

## 2024-09-30 PROCEDURE — 80053 COMPREHEN METABOLIC PANEL: CPT | Performed by: NURSE PRACTITIONER

## 2024-09-30 PROCEDURE — 3078F DIAST BP <80 MM HG: CPT | Mod: CPTII,S$GLB,, | Performed by: NURSE PRACTITIONER

## 2024-09-30 PROCEDURE — 1126F AMNT PAIN NOTED NONE PRSNT: CPT | Mod: CPTII,S$GLB,, | Performed by: NURSE PRACTITIONER

## 2024-09-30 PROCEDURE — 93010 ELECTROCARDIOGRAM REPORT: CPT | Mod: ,,, | Performed by: INTERNAL MEDICINE

## 2024-09-30 PROCEDURE — 1101F PT FALLS ASSESS-DOCD LE1/YR: CPT | Mod: CPTII,S$GLB,, | Performed by: NURSE PRACTITIONER

## 2024-09-30 PROCEDURE — 99215 OFFICE O/P EST HI 40 MIN: CPT | Mod: S$GLB,,, | Performed by: NURSE PRACTITIONER

## 2024-09-30 PROCEDURE — 84443 ASSAY THYROID STIM HORMONE: CPT | Performed by: NURSE PRACTITIONER

## 2024-09-30 PROCEDURE — G0008 ADMIN INFLUENZA VIRUS VAC: HCPCS | Mod: S$GLB,,, | Performed by: NURSE PRACTITIONER

## 2024-09-30 PROCEDURE — 3288F FALL RISK ASSESSMENT DOCD: CPT | Mod: CPTII,S$GLB,, | Performed by: NURSE PRACTITIONER

## 2024-09-30 PROCEDURE — 82962 GLUCOSE BLOOD TEST: CPT

## 2024-09-30 RX ORDER — CYCLOBENZAPRINE HCL 10 MG
TABLET ORAL
COMMUNITY
Start: 2024-05-13 | End: 2024-09-30 | Stop reason: ALTCHOICE

## 2024-09-30 RX ORDER — CHOLECALCIFEROL (VITAMIN D3) 25 MCG
1000 TABLET ORAL DAILY
COMMUNITY

## 2024-09-30 RX ORDER — UBIDECARENONE 75 MG
500 CAPSULE ORAL DAILY
COMMUNITY

## 2024-09-30 RX ADMIN — IOHEXOL 100 ML: 350 INJECTION, SOLUTION INTRAVENOUS at 02:09

## 2024-09-30 NOTE — ED PROVIDER NOTES
NAME:  Xi Moise  CSN:     [id number]  MRN:    [id number]  ADMIT DATE: 9/30/2024        eMERGENCY dEPARTMENT eNCOUnter    CHIEF COMPLAINT    Chief Complaint   Patient presents with    Referal     Pt referred to ED by PCP for unresponsive pupils. Pt denies any complaints in triage.        HPI    Xi Moise is a 84 y.o. 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     she presents to the ED due to referral from Neurology office.  Pupils are dilated and sluggish.  She denies any vision changes but states it looks a little bit foggy lunch she was outside.  States that she did not notice her pupils or any obvious vision changes but was at her primary care physician office today for routine appointment.  Notes the day prior she had had a very brief few minute episode of double vision which has since resolved.  She states she did not think much of it as she had this for many months earlier this year.  She denies any symptoms at this time.  Did use eyedrops of unknown name this morning which she states she has been using since her cataract surgery, likely something for dry  eyes.  Of note, she did use a large amount of topical anti-itch agent on her dog and states she had it all over her and her hands yesterday.  Unsure of active ingredient.    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    Lipidemia     Long term (current) use of insulin 2023    Meniere disease 2016    Multiple falls 2023    Pulmonary Mycobacterium avium-intracellulare infection 2016    followed by Pulmonologist at [facility]    Type 2 diabetes mellitus with stage 2 chronic kidney disease, without long-term current use of insulin 10/28/2015       SURGICAL HISTORY    Past Surgical History:   Procedure Laterality Date    ANKLE SURGERY Left     CATARACT EXTRACTION, BILATERAL       SECTION      x 3    eyelid surgery      HYSTERECTOMY         FAMILY HISTORY    Family History   Problem Relation Name Age of Onset    Depression Mother      Bipolar disorder Mother      Cancer Father          skin cancer    Diabetes Father      Hypertension Father      Hyperlipidemia Father      Heart disease Father      Gout Father      Nephrolithiasis Father      Hypertension Sister Evith     Gout Sister Evith     Diabetes Sister Evith     Cancer Sister Evith         rectal cancer    Hypertension Sister Yumiko     Hyperlipidemia Sister Yumiko     Heart disease Sister Yumiko         stents x 4    Cancer Sister Yumiko         pituitary gland cancer     Hypertension Brother      Hyperlipidemia Brother      Heart disease Brother      Hypertension Daughter      Early death Maternal Aunt         SOCIAL HISTORY    Social History     Socioeconomic History    Marital status: Single   Tobacco Use    Smoking status: Never     Passive exposure: Past    Smokeless tobacco: Never   Substance and Sexual Activity    Alcohol use: No    Drug use: No     Social Drivers of Health     Financial Resource Strain: Medium Risk (1/7/2024)    Overall Financial Resource Strain (CARDIA)     Difficulty of Paying Living Expenses: Somewhat hard   Food Insecurity: Food Insecurity Present (1/7/2024)    Hunger Vital Sign     Worried About Running Out of Food in the Last Year: Sometimes true     Ran Out of Food in the Last Year: Never true   Transportation Needs: Unmet Transportation Needs (1/7/2024)    PRAPARE - Transportation     Lack of Transportation (Medical): Yes     Lack of Transportation (Non-Medical): Yes   Physical Activity: Inactive (1/7/2024)    Exercise Vital Sign     Days of Exercise per Week: 0 days     Minutes of Exercise per Session: 0 min   Stress: Stress Concern Present (1/7/2024)    [employer] - Occupational Stress Questionnaire     Feeling of Stress : Rather much   Housing Stability: Unknown (1/7/2024)    Housing Stability Vital Sign     Unable to Pay for Housing in the Last Year: Patient declined     Number of Places Lived in the Last Year: 1     Unstable Housing in the Last Year: No       MEDICATIONS  Current Outpatient Medications   Medication Instructions    albuterol (PROVENTIL/VENTOLIN HFA) 90 mcg/actuation inhaler 2 puffs, Every 4 hours PRN    amLODIPine (NORVASC) 5 mg, Oral, Daily    ascorbic acid (vitamin C) (VITAMIN C) 500 mg, Daily    atorvastatin (LIPITOR) 80 mg, Oral, Nightly    blood-glucose sensor (DEXCOM G7 SENSOR) Divine Change sensor every 7 days.    clopidogreL (PLAVIX) 75 mg, Oral, Daily    colchicine (COLCRYS) 0.6 mg, Oral, Daily  "PRN    cyanocobalamin (VITAMIN B-12) 500 mcg, Daily    d-mannose 500 mg, 2 times daily    EScitalopram oxalate (LEXAPRO) 20 mg, Oral, Daily    fluticasone (FLONASE) 50 mcg/actuation nasal spray 1 spray, Daily    gabapentin (NEURONTIN) 300 mg, Oral, 2 times daily    insulin NPH 26 Units, Subcutaneous, 2 times daily before meals    lisinopriL (PRINIVIL,ZESTRIL) 5 mg, Oral, Daily    magnesium 500 mg, Daily    meclizine (ANTIVERT) 25 mg, Oral, 2 times daily PRN    metoprolol succinate (TOPROL-XL) 25 mg, Oral    multivit-min/FA/lycopen/lutein (SENTRY SENIOR ORAL) 1 tablet, Daily    ondansetron (ZOFRAN) 8 mg, Oral, Every 8 hours PRN    vitamin D (VITAMIN D3) 1,000 Units, Daily       ALLERGIES    Review of patient's allergies indicates:   Allergen Reactions    Bactrim [sulfamethoxazole-trimethoprim] Hives    Sulfa (sulfonamide antibiotics) Hives         REVIEW OF SYSTEMS   Review of Systems       PHYSICAL EXAM    Reviewed Triage Note    VITAL SIGNS:   ED Triage Vitals [09/30/24 1153]   Encounter Vitals Group      BP (!) 141/70      Systolic BP Percentile       Diastolic BP Percentile       Pulse 60      Resp 20      Temp 98.7 °F (37.1 °C)      Temp Source Oral      SpO2 96 %      Weight 170 lb      Height 5' 8"      Head Circumference       Peak Flow       Pain Score       Pain Loc       Pain Education       Exclude from Growth Chart        Patient Vitals for the past 24 hrs:   BP Temp Temp src Pulse Resp SpO2 Height Weight   09/30/24 1153 (!) 141/70 98.7 °F (37.1 °C) Oral 60 20 96 % 5' 8" (1.727 m) 77.1 kg (170 lb)       Physical Exam    Nursing note and vitals reviewed.  Constitutional: She appears well-developed and well-nourished.   HENT:   Head: Normocephalic and atraumatic.   Eyes: Conjunctivae and EOM are normal. Right eye exhibits no discharge. Left eye exhibits no discharge.   Pupils are 4 mm, fixed.   Neck: Neck supple.   Normal range of motion.  Cardiovascular:  Normal rate and regular rhythm.         "   Pulmonary/Chest: Breath sounds normal. No respiratory distress.   Abdominal: Abdomen is soft. There is no abdominal tenderness.   Musculoskeletal:         General: Normal range of motion.      Cervical back: Normal range of motion and neck supple.     Neurological: She is alert and oriented to person, place, and time.   Skin: Skin is warm and dry.   Psychiatric: She has a normal mood and affect.          EKG     Interpreted by EM physician if performed:         ECG Results              ECG 12 lead (Final result)        Collection Time Result Time QRS Duration OHS QTC Calculation    09/30/24 12:57:15 09/30/24 15:54:26 70 453                     Final result by Interface, Lab In [facility] (09/30/24 15:54:33)                   Narrative:    Test Reason : R29.818,    Vent. Rate : 056 BPM     Atrial Rate : 056 BPM     P-R Int : 194 ms          QRS Dur : 070 ms      QT Int : 470 ms       P-R-T Axes : 083 043 061 degrees     QTc Int : 453 ms    Sinus bradycardia  Otherwise normal ECG  When compared with ECG of 24-NOV-2023 16:18,  Premature atrial complexes are no longer Present  Nonspecific T wave abnormality no longer evident in Lateral leads  Confirmed by Mario Short MD ([id number]) on 9/30/2024 3:54:25 PM    Referred By: [email or web address]   SELF           Confirmed By:Mario Short MD                                      LABS  Pertinent labs reviewed. (See chart for details)   Labs Reviewed   CBC W/ AUTO DIFFERENTIAL - Abnormal       Result Value    WBC 9.73      RBC 4.70      Hemoglobin 13.8      Hematocrit 41.8      MCV 89      MCH 29.4      MCHC 33.0      RDW 13.1      Platelets 269      MPV 8.8 (*)     Immature Granulocytes 0.2      Gran # (ANC) 5.1      Immature Grans (Abs) 0.02      Lymph # 3.3      Mono # 0.7      Eos # 0.6 (*)     Baso # 0.06      nRBC 0      Gran % 52.8      Lymph % 33.7      Mono % 6.9      Eosinophil % 5.8      Basophil % 0.6      Differential Method Automated     COMPREHENSIVE METABOLIC PANEL -  Abnormal    Sodium 143      Potassium 4.0      Chloride 104      CO2 29      Glucose 163 (*)     BUN 29 (*)     Creatinine 1.0      Calcium 10.0      Total Protein 7.0      Albumin 4.0      Total Bilirubin 0.6      Alkaline Phosphatase 104      AST 16      ALT 15      eGFR 56 (*)     Anion Gap 10     LIPID PANEL - Abnormal    Cholesterol 189      Triglycerides 255 (*)     HDL 48      LDL Cholesterol 90.0      HDL/Cholesterol Ratio 25.4      Total Cholesterol/HDL Ratio 3.9      Non-HDL Cholesterol 141     POCT GLUCOSE - Abnormal    POCT Glucose 170 (*)    PROTIME-INR    Prothrombin Time 11.2      INR 1.0     TSH    TSH 1.270     POCT GLUCOSE, HAND-HELD DEVICE         RADIOLOGY          Imaging Results              CTA Head and Neck (xpd) (Final result)  Result time 09/30/24 15:17:47      Final result by Xander Duran MD (09/30/24 15:17:47)                   Impression:      CT head:    Stable CT scan of the head.  Further evaluation with MRI of the brain, as clinically warranted.    CTA head:    No large vessel occlusion in the intracranial circulation.    CTA neck:    No hemodynamically significant stenosis of the neck vessels.    Additional findings:    Bilateral thyroid nodules.  Outpatient thyroid ultrasound may be obtained for evaluation.    Nonspecific mediastinal lymph node enlargement.      Electronically signed by: Xander Duran MD  Date:    09/30/2024  Time:    15:17               Narrative:    EXAMINATION:  CTA HEAD AND NECK (XPD)    CLINICAL HISTORY:  Ophthalmoplegia    TECHNIQUE:  Non contrast low dose axial images were obtained through the head.  CT angiogram was performed from the level of the donald to the top of the head following the IV administration of 100mL of Omnipaque 350.   Sagittal and coronal reconstructions and maximum intensity projection reconstructions were performed. Arterial stenosis percentages are based on NASCET measurement criteria.    COMPARISON:  CT head dated 09/30/2024.    CT  head dated 01/05/2024.    FINDINGS:  Noncontrast CT scan of the head:    The subcutaneous tissues are unremarkable.  The bony calvarium is intact.  The paranasal sinuses unremarkable.  The mastoid air cells are clear.  There are postoperative changes in the globes.    The craniocervical junction is intact.  There is empty appearance of the sella.  There is no evidence of intracranial hemorrhage.  There is unchanged appearance of bifrontal volume loss versus small CSF fluid collections.    The ventricles and sulci are prominent, consistent cerebral loss.  There are hypodensities within the periventricular and subcortical white matter.  The gray-white differentiation is maintained.  There is no dense vessel sign.  There is no evidence of mass effect.    CTA neck:    The visualized portions of the pulmonary tree is within normal limits.  There is a normal 3 vessel aortic arch configuration.  The subclavian arteries are within normal limits.    There are minimal calcifications at the bilateral carotid bulbs.    The right internal and external carotid arteries are within normal limits.    The left internal and external carotid arteries are within normal limits.    The origins of the vertebral arteries are unremarkable.  The vertebral arteries are normal in caliber.    There is no evidence of hemodynamically significant stenosis in the neck vessels.    CTA head:    The intracranial segments of the ICA are within normal limits.  The anterior cerebral arteries and anterior communicating complex are within normal limits.  The middle cerebral arteries are unremarkable.    The intracranial segments of the vertebral is unremarkable.  The basilar is within normal limits.  The posterior cerebral arteries are within normal limits.    The dural venous sinuses are within normal limits.    There is no evidence of aneurysm or stenosis of the intracranial vessels.    Additional findings:    There are multiple bilateral thyroid nodules.   The remainder of the soft tissue the neck are within normal limits.  The trachea is unremarkable.  There are interstitial opacities in the lung apices.  There is mediastinal lymph node enlargement.    The esophagus is unremarkable.    There are multilevel degenerative changes in the cervical spine.  There is no evidence of a fracture.                                       CT Head Without Contrast (Final result)  Result time 09/30/24 13:00:14      Final result by Donavan Galan MD (09/30/24 13:00:14)                   Impression:      1. No convincing acute intracranial abnormalities noting sequela of chronic microvascular ischemic change and senescent change.      Electronically signed by: Donavan Galan MD  Date:    09/30/2024  Time:    13:00               Narrative:    EXAMINATION:  CT HEAD WITHOUT CONTRAST    CLINICAL HISTORY:  Headache, sudden, severe;    TECHNIQUE:  Low dose axial images were obtained through the head.  Coronal and sagittal reformations were also performed. Contrast was not administered.    COMPARISON:  01/05/2024    FINDINGS:  There is motion artifact.    There is generalized cerebral volume loss.  There is hypoattenuation in a periventricular fashion, likely sequela of chronic microvascular ischemic change.  There is no evidence of acute major vascular territory infarct, hemorrhage, or mass.  There is no hydrocephalus.  There are no abnormal extra-axial fluid collections.  The paranasal sinuses and mastoid air cells are clear, and there is no evidence of calvarial fracture.  The visualized soft tissues are unremarkable.                                        PROCEDURES    Procedures      ED COURSE & MEDICAL DECISION MAKING    Pertinent Labs & Imaging studies reviewed. (See chart for details and specific orders.)          Summary of review of records:   Patient seen today by nurse practitioner who noted absent pupillary response.  She does have a history of stroke in his on  "Plavix.    "Right pupil 5 mm and non-reactive  Left pupil 4 mm and reactive to 3 mm  She does reports some double vision on yesterday  She has been seeing eye specialist Dr. Rashid at [facility] - last visit March 2024 but I do NOT/did not note the dilated, non-reactive pupil at previous visits - this is new as far as I am aware.  Rest of neurological exam okay - equal strength, ROM.  Clear speech.  Had NEGATIVE RPR testing September 2023.  Urgent Referral to Neurology for further evaluation    Neurology appt this morning at 10:40 AM in [place] "Reported she used eyedrops this morning, she does not not have them with her, does not know the name.  Neurology exam eye movement normal in all directions, no double vision on any directions no focal neurological deficit, decreased sensation.  Cerebellar exam is normal, gait slow. -patient's sent to ED for imaging, CT and CTA, if imaging or normal she can follow-up with Ophthalmology for further evaluation.  -I advised her to bring eyedrops with her to her PCP or ophthalmology  Discussed my impression with her PCP and with the patient."        Medical Decision Making    Xi Moise is a 84 y.o. female who presents for referral for expedited imaging of the head though no focal deficits or abnormality on my exam other than dilated fixed pupils.  Denies any vision changes.  Nothing out of the normal to her regimen though of note she did use some sort of topical anti-itch agent yesterday on her dog and believe that this could have gone into her eyes as well perhaps causing this response.            Medications   iohexoL (OMNIPAQUE 350) injection 100 mL (100 mLs Intravenous Given 9/30/24 1401)       ED Course as of 09/30/24 1615   Mon Sep 30, 2024   1153 Plan:     -patient's sent to ED for imaging, CT and CTA, if imaging or normal she can follow-up with Ophthalmology for further evaluation.  -I advised her to bring eyedrops with her to her PCP or " ophthalmology  Discussed my impression with her PCP and with the patient.   [DT]   1327 CT Head Without Contrast  . No convincing acute intracranial abnormalities noting sequela of chronic microvascular ischemic change and senescent change. [HL]   1527 CTA Head and Neck (xpd)  CT head:     Stable CT scan of the head.  Further evaluation with MRI of the brain, as clinically warranted.     CTA head:     No large vessel occlusion in the intracranial circulation.     CTA neck:     No hemodynamically significant stenosis of the neck vessels.     Additional findings:     Bilateral thyroid nodules.  Outpatient thyroid ultrasound may be obtained for evaluation.     Nonspecific mediastinal lymph node enlargement.   [HL]      ED Course User Index  [DT] Leonarda Guzman, NP  [HL] Chyna Butterfield,        I did offer patient expedited ophthalmology appointment today which she declined stating she was ready to go home as she was had a full day of doctor's appointments.  States she was already made an appointment to see her ophthalmologist tomorrow for further assessment.      FINAL IMPRESSION    Final diagnoses:  [R29.818] Acute focal neurological deficit       DISPOSITION  Patient discharge in stable condition             DISCLAIMER: This note was prepared with [employer] voice recognition transcription software. Garbled syntax, mangled pronouns, and other bizarre constructions may be attributed to that software system.             Chyna Butterfield,   10/08/24 1257

## 2024-09-30 NOTE — PROGRESS NOTES
Subjective:       Patient ID: Xi Moise is a 84 y.o. female.    Chief Complaint: Follow-up (3 months F/U)        HPI WITH ASSESSMENT AND PLAN OF CARE:      Patient is an 84 year old white female with Aortic Atherosclerosis, History of Stroke, Type 2 Diabetes, CKD Stage 3, diabetic neuropathy, Hypertension/Hypertensive heart disease, Hyperlipidemia, moderate COPD/Asthma with Bronchiectasis with history of Mycobacterium avium-intracellulare infection, Calcified granuloma of lung, Recurrent Depression, chronic GERD, frequent falls, with recent orbital fracture in January 2024 treated by [facility] and followed by [facility], and Osteopenia that is here today for 3 month follow up with fasting lab results.       Aortic Atherosclerosis and CAD  Seen on CT chest 4/28/2016.  Still present on recent CT 3/29/22  ON Plavix 75 mg daily,  Atorvastatin 80 mg daily, Lisinopril 5 mg daily, Toprol XL 25 mg daily   .6 - ran out of medication  Start back on Atorvastatin 80 mg daily  Recheck in 3 months.          History of Stroke  Stroke around 2016  On Plavix 75 mg daily  Has unsteady gait with multiple falls in the past year - most recent fall causing orbital fracture in January 2024 - she is unsteady and needed a rolling lightweight walker for better stability.  Walker received from [facility].  9/30/2024 visit:  Right pupil 5 mm and non-reactive  Left pupil 4 mm and reactive to 3 mm  She does reports some double vision on yesterday  She has been seeing eye specialist Dr. Rashid at [facility] - last visit March 2024 but I do NOT/did not note the dilated, non-reactive pupil at previous visits - this is new as far as I am aware.  Rest of neurological exam okay - equal strength, ROM.  Clear speech.  Had NEGATIVE RPR testing September 2023.  Urgent Referral to Neurology for further evaluation  Neurology appt this morning at 10:40 AM in [place]            Frequent Falls  In November 2023 - hospitalized  for syncope and orthostatic hypotension  In January 2024 - fall with orbital fracture - states she tripped over a cement stone  Bilateral carotid ultrasound 3/5/24: Impression: NO evidence of a hemodynamically significant carotid bifurcation stenosis.  March 2024:  tripped in attic and hurt back but did not fall  Patient had a rollator walker with seat but very heavy and difficulty loading and unloading.  Ordered lightweight everyday walker- DME request sent and received lightweight walker.  No falls since last visit.   PUPIL EXAM ABNORMAL TODAY and does report some double vision on yesterday. She states she sees eye specialist Dr. Rashid with [facility]  for double visit in past BUT abnormal pupil testing new.  Refer to neurology - appt otday          Type 2 Diabetes with CKD 3  Taking NPH insulin 26 units twice daily before meals  Metformin stopped due to frequent diarrhea and diarrhea that resolved when med stopped  NO SGLT2 due to recurrent UTIs  Fasting blood glucose 126 with hemoglobin A1c 7.7%   Stopped the Glipizide 5 mg that Nephrologist ordered in June 2024 because I am adjusting her insulin doses and do not want to risk hypoglycemic episodes.  Will keep blood sugar log and bring me in readings every 3 weeks to monitor and adjust medications.  Recheck labs/visit in 3 months.                   CKD Stage 3  Creatinine 1.05 with eGFR 53 Feb. 2023  Creatinine  1.15 with DROP eGFR 47 in July 2023 - REFERRED Nephrology for monitoring  Started seeing Dr. Tillman on 7/24/2023   Last Nephrology visit 6/17/24  Current eGFR 49.5%  Next appt in 11/18/2024 with Dr. Tillman              Diabetic Neuropathy  On Gabapentin 300 mg twice daily  She has been on this medication and dose for years  Continue at present dose for now.  Stable.        Hypertension/hypertensive heart disease  Taking Lisinopril 5 mg daily, Metoprolol XL 25 mg daily, Amlodipine 5 mg daily  BP borderline high today Stay on current medications  "for now, take as directed.  Monitor BP at home and send me BP log sheet every 3 to 4 weeks with blood sugar log for monitoring.  Recheck 3 months.  /78   Pulse 65   Temp 97.7 °F (36.5 °C) (Temporal)   Ht 5' 8" (1.727 m)   Wt 78.1 kg (172 lb 2.9 oz)   SpO2 95%   BMI 26.18 kg/m²          Hyperlipidemia   Prescribed atorvastatin 80 mg daily   Ran out of medication  GET BACK ON Atorvastatin 80 mg daily  Will recheck in 3 months - make sure to take medication every day.               Bronchiectasis, COPD/Asthma Overlap, History of Pulmonary MAC, SHANTI positive  Noted on last Pulmonary Progress note - Dr. Lee at [facility] - see under encounters  Patient was followed by Dr. Alannah Noe with [facility]  but now followed by [facility]   Last Pulmonology visit with Dr. Mullins  on 3/28/2024 - advised to follow up in 6 months.  Reports only on Albuterol inhaler prn  Reports NO controller inhalers  Keep upcoming pulmonology appt.           Calcified Granuloma of Lung  Seen on CT chest 12/17/2015  Stable.           Recurrent depression  Currently taking Lexapro 20 mg daily  Controlled at present time.  Recheck in 6 months.        Osteopenia  Last DEXA scan 3/5/24: Osteopenia  Continue taking calcium with vitamin-D supplementation  Recheck in 3 years.        Chronic GERD with small hiatal hernia  Previously taking Protonix 40 mg daily  NOT currently taking medication and has no complaints.  Small sliding-type hiatal hernia seen on CT abd/pelvis 7/26/2023  Stable.      Senile Purpura  + bruising to forearms  Advised on wearing protective clothing and other preventative measures.    Lab Visit on 09/16/2024   Component Date Value Ref Range Status    Sodium 09/16/2024 142  136 - 145 mmol/L Final    Potassium 09/16/2024 4.0  3.5 - 5.1 mmol/L Final    Chloride 09/16/2024 103  95 - 110 mmol/L Final    CO2 09/16/2024 29  23 - 29 mmol/L Final    Glucose 09/16/2024 126 (H)  70 - 110 mg/dL Final    BUN 09/16/2024 25 " (H)  8 - 23 mg/dL Final    Creatinine 09/16/2024 1.1  0.5 - 1.4 mg/dL Final    Calcium 09/16/2024 9.6  8.7 - 10.5 mg/dL Final    Total Protein 09/16/2024 6.8  6.0 - 8.4 g/dL Final    Albumin 09/16/2024 3.7  3.5 - 5.2 g/dL Final    Total Bilirubin 09/16/2024 0.5  0.1 - 1.0 mg/dL Final    Comment: For infants and newborns, interpretation of results should be based  on gestational age, weight and in agreement with clinical  observations.    Premature Infant recommended reference ranges:  Up to 24 hours.............<8.0 mg/dL  Up to 48 hours............<12.0 mg/dL  3-5 days..................<15.0 mg/dL  6-29 days.................<15.0 mg/dL      Alkaline Phosphatase 09/16/2024 99  55 - 135 U/L Final    AST 09/16/2024 14  10 - 40 U/L Final    ALT 09/16/2024 11  10 - 44 U/L Final    eGFR 09/16/2024 49.5 (A)  >60 mL/min/1.73 m^2 Final    Anion Gap 09/16/2024 10  8 - 16 mmol/L Final    Hemoglobin A1C 09/16/2024 7.7 (H)  4.0 - 5.6 % Final    Comment: ADA Screening Guidelines:  5.7-6.4%  Consistent with prediabetes  >or=6.5%  Consistent with diabetes    High levels of fetal hemoglobin interfere with the HbA1C  assay. Heterozygous hemoglobin variants (HbS, HgC, etc)do  not significantly interfere with this assay.   However, presence of multiple variants may affect accuracy.      Estimated Avg Glucose 09/16/2024 174 (H)  68 - 131 mg/dL Final    Cholesterol 09/16/2024 280 (H)  120 - 199 mg/dL Final    Comment: The National Cholesterol Education Program (NCEP) has set the  following guidelines (reference ranges) for Cholesterol:  Optimal.....................<200 mg/dL  Borderline High.............200-239 mg/dL  High........................> or = 240 mg/dL      Triglycerides 09/16/2024 172 (H)  30 - 150 mg/dL Final    Comment: The National Cholesterol Education Program (NCEP) has set the  following guidelines (reference values) for triglycerides:  Normal......................<150 mg/dL  Borderline High.............150-199  "mg/dL  High........................200-499 mg/dL      HDL 09/16/2024 61  40 - 75 mg/dL Final    Comment: The National Cholesterol Education Program (NCEP) has set the  following guidelines (reference values) for HDL Cholesterol:  Low...............<40 mg/dL  Optimal...........>60 mg/dL      LDL Cholesterol 09/16/2024 184.6 (H)  63.0 - 159.0 mg/dL Final    Comment: The National Cholesterol Education Program (NCEP) has set the  following guidelines (reference values) for LDL Cholesterol:  Optimal.......................<130 mg/dL  Borderline High...............130-159 mg/dL  High..........................160-189 mg/dL  Very High.....................>190 mg/dL      HDL/Cholesterol Ratio 09/16/2024 21.8  20.0 - 50.0 % Final    Total Cholesterol/HDL Ratio 09/16/2024 4.6  2.0 - 5.0 Final    Non-HDL Cholesterol 09/16/2024 219  mg/dL Final    Comment: Risk category and Non-HDL cholesterol goals:  Coronary heart disease (CHD)or equivalent (10-year risk of CHD >20%):  Non-HDL cholesterol goal     <130 mg/dL  Two or more CHD risk factors and 10-year risk of CHD <= 20%:  Non-HDL cholesterol goal     <160 mg/dL  0 to 1 CHD risk factor:  Non-HDL cholesterol goal     <190 mg/dL                    Vitals:    09/30/24 0852 09/30/24 0921   BP: (!) 140/80 138/78   BP Location: Left arm    Patient Position: Sitting    Pulse: 65    Temp: 97.7 °F (36.5 °C)    TempSrc: Temporal    SpO2: 95%    Weight: 78.1 kg (172 lb 2.9 oz)    Height: 5' 8" (1.727 m)          Diagnoses this Encounter:         ICD-10-CM ICD-9-CM   1. Pupil reaction absent  H57.09 379.49   2. Pupil diameter unequal  H57.02 379.41   3. History of CVA (cerebrovascular accident) without residual deficits  Z86.73 V12.54   4. Type 2 diabetes mellitus with stage 3a chronic kidney disease, with long-term current use of insulin  E11.22 250.40    N18.31 585.3    Z79.4 V58.67   5. Diabetic polyneuropathy associated with type 2 diabetes mellitus  E11.42 250.60     357.2   6. CKD stage " 3 due to type 2 diabetes mellitus  E11.22 250.40    N18.30 585.3   7. 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 and with stage 3a chronic kidney disease  I13.10 404.90    N18.31 585.3   8. Hyperlipidemia associated with type 2 diabetes mellitus  E11.69 250.80    E78.5 272.4   9. Aortic atherosclerosis  I70.0 440.0   10. Atherosclerosis of native coronary artery of native heart with angina pectoris  I25.119 414.01     413.9   11. Senile purpura  D69.2 287.2   12. Recurrent major depressive disorder, in partial remission  F33.41 296.35   13. COPD, moderate  J44.9 496   14. Mild intermittent asthma without complication  J45.20 493.90   15. Calcified granuloma of lung  J84.10 515   16. Bronchiectasis without complication  J47.9 494.0   17. History of MAC infection  Z86.19 V12.09   18. Long term (current) use of insulin  Z79.4 V58.67   19. Chronic GERD  K21.9 530.81   20. Osteopenia, unspecified location  M85.80 733.90   21. Multiple falls  R29.6 V15.88   22. Flu vaccine need  Z23 V04.81       Orders Placed This Encounter    Ambulatory referral/consult to Neurology    influenza (adjuvanted) (Fluad) 45 mcg/0.5 mL IM vaccine (> or = 66 yo) 0.5 mL        Follow up for see Neurology TODAY for pupil abnormalities; 3 month fasting labs and office visit..       I spent a total of 60 minutes on the day of the visit.This includes face to face time and non-face to face time preparing to see the patient (eg, review of tests), obtaining and/or reviewing separately obtained history, documenting clinical information in the electronic or other health record, independently interpreting results and communicating results to the patient/family/caregiver, or care coordinator.       Patient's Medications   New Prescriptions    No medications on file   Previous Medications    ALBUTEROL (PROVENTIL/VENTOLIN HFA) 90 MCG/ACTUATION INHALER    Inhale 2 puffs into the lungs every 4 (four) hours as needed.    AMLODIPINE (NORVASC) 5  MG TABLET    Take 1 tablet (5 mg total) by mouth once daily.    ASCORBIC ACID, VITAMIN C, (VITAMIN C) 500 MG TABLET    Take 500 mg by mouth once daily.    ATORVASTATIN (LIPITOR) 80 MG TABLET    Take 1 tablet (80 mg total) by mouth every evening.    BLOOD-GLUCOSE SENSOR (DEXCOM G7 SENSOR) TONI    Change sensor every 7 days.    CLOPIDOGREL (PLAVIX) 75 MG TABLET    Take 1 tablet (75 mg total) by mouth once daily.    COLCHICINE (COLCRYS) 0.6 MG TABLET    Take 1 tablet (0.6 mg total) by mouth daily as needed (take while in acute gout flare).    CYANOCOBALAMIN (VITAMIN B-12) 500 MCG TABLET    Take 500 mcg by mouth once daily.    D-MANNOSE 500 MG CAP    Take 500 mg by mouth 2 (two) times a day.    ESCITALOPRAM OXALATE (LEXAPRO) 20 MG TABLET    Take 1 tablet (20 mg total) by mouth once daily.    FLUTICASONE (FLONASE) 50 MCG/ACTUATION NASAL SPRAY    1 spray by Each Nare route once daily.    GABAPENTIN (NEURONTIN) 300 MG CAPSULE    Take 1 capsule (300 mg total) by mouth 2 (two) times daily.    INSULIN  UNIT/ML INJECTION    Inject 26 Units into the skin 2 (two) times daily before meals.    LISINOPRIL (PRINIVIL,ZESTRIL) 5 MG TABLET    Take 1 tablet (5 mg total) by mouth once daily.    MAGNESIUM 250 MG TAB    Take 500 mg by mouth once daily.    MECLIZINE (ANTIVERT) 25 MG TABLET    Take 1 tablet (25 mg total) by mouth 2 (two) times daily as needed.    METOPROLOL SUCCINATE (TOPROL-XL) 25 MG 24 HR TABLET    TAKE 1 TABLET BY MOUTH EVERY DAY    MULTIVIT-MIN/FA/LYCOPEN/LUTEIN (SENTRY SENIOR ORAL)    Take 1 tablet by mouth once daily.    ONDANSETRON (ZOFRAN) 8 MG TABLET    Take 1 tablet (8 mg total) by mouth every 8 (eight) hours as needed for Nausea.    VITAMIN D (VITAMIN D3) 1000 UNITS TAB    Take 1,000 Units by mouth once daily.   Modified Medications    No medications on file   Discontinued Medications    CHOLECALCIFEROL, VITAMIN D3, (VITAMIN D3) 50 MCG (2,000 UNIT) TAB    Take 1 tablet (2,000 Units total) by mouth once a  week.    CYANOCOBALAMIN (VITAMIN B-12) 1000 MCG TABLET    Take 500 mcg by mouth once daily.    CYCLOBENZAPRINE (FLEXERIL) 10 MG TABLET    TAKE 1 TABLET BY MOUTH THREE TIMES A DAY AS NEEDED FOR MUSCLE SPASMS         Review of Systems   Constitutional:  Negative for chills, fatigue and fever.   HENT:  Negative for congestion, postnasal drip and sinus pressure.    Eyes:  Positive for visual disturbance.   Respiratory:  Negative for chest tightness and shortness of breath.    Cardiovascular:  Negative for chest pain.   Gastrointestinal:  Negative for abdominal pain.   Psychiatric/Behavioral:  Negative for dysphoric mood. The patient is not nervous/anxious.          Objective:        Physical Exam  Constitutional:       General: She is not in acute distress.     Appearance: Normal appearance. She is not toxic-appearing or diaphoretic.      Comments: Body mass index is 26.18 kg/m².     HENT:      Nose: No congestion.      Mouth/Throat:      Pharynx: No posterior oropharyngeal erythema.   Eyes:      Extraocular Movements:      Right eye: Normal extraocular motion.      Left eye: Normal extraocular motion.      Pupils: Pupils are unequal.      Right eye: Pupil is not reactive.      Comments: Pupils unequal; right 5 mm and left 4 mm  RIGHT not reactive   Left 4 mm to 3 mm reactive   Cardiovascular:      Rate and Rhythm: Normal rate and regular rhythm.   Pulmonary:      Effort: Pulmonary effort is normal. No respiratory distress.   Abdominal:      General: There is no distension.   Skin:     General: Skin is warm and dry.   Neurological:      Mental Status: She is alert and oriented to person, place, and time.      Motor: No weakness.      Comments: Equal strength and ROM to all extremities; no weakness  No facial droop, clear speech.   Psychiatric:         Mood and Affect: Mood normal.         Behavior: Behavior normal.             Past Medical History:   Diagnosis Date    Asthma     Chronic kidney disease (CKD), stage III  (moderate) 2016    COPD, moderate     Depression 2016    Diabetes mellitus     Diabetic polyneuropathy associated with type 2 diabetes mellitus 2023    History of CVA (cerebrovascular accident) without residual deficits 2016    History of MAC infection 2016    Hyperlipidemia associated with type 2 diabetes mellitus 2016    Hypertension     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    Lipidemia     Long term (current) use of insulin 2023    Meniere disease 2016    Multiple falls 2023    Pulmonary Mycobacterium avium-intracellulare infection 2016    follow[facility] Pul[facility]ologist at [facility]    Type 2 diabetes mellitus with stage 2 chronic kidney disease, without long-term current use of insulin 10/28/2015       Past Surgical History:   Procedure Laterality Date    ANKLE SURGERY Left     CATARACT EXTRACTION, BILATERAL       SECTION      x 3    eyelid surgery      HYSTERECTOMY         Family History   Problem Relation Name Age of Onset    Depression Mother      Bipolar disorder Mother      Cancer Father          skin cancer    Diabetes Father      Hypertension Father      Hyperlipidemia Father      Heart disease Father      Gout Father      Nephrolithiasis Father      Hypertension Sister Evith     Gout Sister Evith     Diabetes Sister Evith     Cancer Sister Evith         rectal cancer    Hypertension Sister Yumiko     Hyperlipidemia Sister Yumiko     Heart disease Sister Yumiko         stents x 4    Cancer Sister Yumiko         pituitary gland cancer    Hypertension Brother      Hyperlipidemia Brother      Heart disease Brother      Hypertension Daughter      Early death Maternal Aunt         Social History     Socioeconomic History    Marital status: Single   Tobacco Use    Smoking status: Never     Passive exposure: Past    Smokeless tobacco: Never   Substance and Sexual Activity    Alcohol  use: No    Drug use: No     Social Drivers of Health     Financial Resource Strain: Medium Risk (1/7/2024)    Overall Financial Resource Strain (CARDIA)     Difficulty of Paying Living Expenses: Somewhat hard   Food Insecurity: Food Insecurity Present (1/7/2024)    Hunger Vital Sign     Worried About Running Out of Food in the Last Year: Sometimes true     Ran Out of Food in the Last Year: Never true   Transportation Needs: Unmet Transportation Needs (1/7/2024)    PRAPARE - Transportation     Lack of Transportation (Medical): Yes     Lack of Transportation (Non-Medical): Yes   Physical Activity: Inactive (1/7/2024)    Exercise Vital Sign     Days of Exercise per Week: 0 days     Minutes of Exercise per Session: 0 min   Stress: Stress Concern Present (1/7/2024)    [employer] [place] [employer] - Occupational Stress Questionnaire     Feeling of Stress : Rather much   Housing Stability: Unknown (1/7/2024)    Housing Stability Vital Sign     Unable to Pay for Housing in the Last Year: Patient declined     Number of Places Lived in the Last Year: 1     Unstable Housing in the Last Year: No

## 2024-09-30 NOTE — DISCHARGE INSTRUCTIONS
Please follow-up closely with your ophthalmologist.  Please return for any vision changes or new neurologic symptoms including headache, numbness, weakness or return of double vision.

## 2024-09-30 NOTE — PROGRESS NOTES
GENERAL NEUROLOGY VISIT   Referred by TITI Corado for unresponsive pupils and anisocoria.   History:    Patient is a 84 y.o.  female who was referred by TITI Corado for evaluation eye problem .   past medical history including hypertension, diabetes with long-term complication including diabetes neuropathy, history of CVA without residual deficit, Meniere's disease, COPD, aortic atheroma, hyperlipidemia, GERD, sciatica on the right side and weakness of both lower extremities.    Recent orbital fracture after a fall, left eye, evaluated by Ophthalmology and no emergent surgery required, conservative treatment then follow-up.  So ENT and recommendation for no surgery.    exam today with me unequal pupils, right dilated and non-reactive; left pupil 4 mm and reactive 3mm. she complained of double vision and she has been having frequent fall present. I did not note unequal non-reactive pupils at last visit. She did NOT advise me of eye drops that would affect pupil reaction - so with the frequent falls and unsteady gait -       Sent from PCP office for reported unequal pupils, right dilated and non reactive, left dilated and reactive.  Patient also complained of double vision after she left the PCP office.  Reported she used eyedrops this morning, she does not not have them with her, does not know the name.  Neurology exam eye movement normal in all directions, no double vision on any directions no focal neurological deficit, decreased sensation.  Cerebellar exam is normal, gait slow.      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    Lipidemia     Long term (current) use of insulin 2023    Meniere disease 2016    Multiple falls 2023    Pulmonary Mycobacterium avium-intracellulare infection 2016    followed by Pulmonologist at [facility]    Type 2 diabetes mellitus with stage 2 chronic kidney disease, without long-term current use of insulin 10/28/2015       Past Surgical History:   Procedure Laterality Date    ANKLE SURGERY Left     CATARACT EXTRACTION, BILATERAL       SECTION      x 3    eyelid surgery      HYSTERECTOMY         Social History     Socioeconomic History    Marital status: Single   Tobacco Use    Smoking status: Never     Passive exposure: Past    Smokeless tobacco: Never   Substance and Sexual Activity    Alcohol use: No    Drug use: No     Social Drivers of Health     Financial Resource Strain: Medium Risk (2024)    Overall Financial Resource Strain (CARDIA)     Difficulty of Paying Living Expenses: Somewhat hard   Food Insecurity: Food Insecurity Present (2024)    Hunger Vital Sign     Worried About Running Out of Food in the Last Year: Sometimes true     Ran Out of Food in the Last Year: Never true   Transportation Needs: Unmet Transportation Needs (2024)    PRAPARE - Transportation     Lack of Transportation (Medical): Yes     Lack of Transportation (Non-Medical): Yes   Physical Activity: Inactive (2024)    Exercise Vital Sign     Days of Exercise per Week: 0 days     Minutes of Exercise per Session: 0 min   Stress: Stress Concern Present (2024)    [employer] - Occupational Stress Questionnaire     Feeling of Stress : Rather much   Housing Stability: Unknown (2024)    Housing Stability Vital Sign     Unable to Pay for Housing in the Last Year: Patient declined     Number of Places Lived in the Last Year:  1     Unstable Housing in the Last Year: No       Review of patient's allergies indicates:   Allergen Reactions    Bactrim [sulfamethoxazole-trimethoprim] Hives    Sulfa (sulfonamide antibiotics) Hives       Current Outpatient Medications on File Prior to Visit   Medication Sig Dispense Refill    albuterol (PROVENTIL/VENTOLIN HFA) 90 mcg/actuation inhaler Inhale 2 puffs into the lungs every 4 (four) hours as needed.      amLODIPine (NORVASC) 5 MG tablet Take 1 tablet (5 mg total) by mouth once daily. 90 tablet 3    ascorbic acid, vitamin C, (VITAMIN C) 500 MG tablet Take 500 mg by mouth once daily.      atorvastatin (LIPITOR) 80 MG tablet Take 1 tablet (80 mg total) by mouth every evening. 90 tablet 0    blood-glucose sensor (DEXCOM G7 SENSOR) Divine Change sensor every 7 days. (Patient not taking: Reported on 9/30/2024) 3 each 3    clopidogreL (PLAVIX) 75 mg tablet Take 1 tablet (75 mg total) by mouth once daily. 90 tablet 3    colchicine (COLCRYS) 0.6 mg tablet Take 1 tablet (0.6 mg total) by mouth daily as needed (take while in acute gout flare). 30 tablet 3    cyanocobalamin (VITAMIN B-12) 500 MCG tablet Take 500 mcg by mouth once daily.      d-mannose 500 mg Cap Take 500 mg by mouth 2 (two) times a day.      EScitalopram oxalate (LEXAPRO) 20 MG tablet Take 1 tablet (20 mg total) by mouth once daily. 90 tablet 0    fluticasone (FLONASE) 50 mcg/actuation nasal spray 1 spray by Each Nare route once daily.      gabapentin (NEURONTIN) 300 MG capsule Take 1 capsule (300 mg total) by mouth 2 (two) times daily. 180 capsule 3    insulin  unit/mL injection Inject 26 Units into the skin 2 (two) times daily before meals. 30 mL 3    lisinopriL (PRINIVIL,ZESTRIL) 5 MG tablet Take 1 tablet (5 mg total) by mouth once daily. 90 tablet 3    magnesium 250 mg Tab Take 500 mg by mouth once daily.      meclizine (ANTIVERT) 25 mg tablet Take 1 tablet (25 mg total) by mouth 2 (two) times daily as needed. 180 tablet 0     metoprolol succinate (TOPROL-XL) 25 MG 24 hr tablet TAKE 1 TABLET BY MOUTH EVERY DAY 90 tablet 1    multivit-min/FA/lycopen/lutein (SENTRY SENIOR ORAL) Take 1 tablet by mouth once daily.      ondansetron (ZOFRAN) 8 MG tablet Take 1 tablet (8 mg total) by mouth every 8 (eight) hours as needed for Nausea. 30 tablet 0    vitamin D (VITAMIN D3) 1000 units Tab Take 1,000 Units by mouth once daily.      [DISCONTINUED] cholecalciferol, vitamin D3, (VITAMIN D3) 50 mcg (2,000 unit) Tab Take 1 tablet (2,000 Units total) by mouth once a week. 12 tablet 3    [DISCONTINUED] cyanocobalamin (VITAMIN B-12) 1000 MCG tablet Take 500 mcg by mouth once daily.      [DISCONTINUED] cyclobenzaprine (FLEXERIL) 10 MG tablet TAKE 1 TABLET BY MOUTH THREE TIMES A DAY AS NEEDED FOR MUSCLE SPASMS       Current Facility-Administered Medications on File Prior to Visit   Medication Dose Route Frequency Provider Last Rate Last Admin    [COMPLETED] influenza (adjuvanted) (Fluad) 45 mcg/0.5 mL IM vaccine (> or = 64 yo) 0.5 mL  0.5 mL Intramuscular 1 time in Clinic/HOD    0.5 mL at 09/30/24 1003        Family history:  @FAM@    Review Of Systems     Constitutional Negative for fevers, chills, weigh loss   HEENT Negative for hearing loss, dysphagia, sore throat, diplopia   Respiratory Negative for shortness of breath, cough    Cardiovascular Negative for chest pain, palpitations    Gastrointestinal Negative for constipation, diarrhea, early satiety    Skin Negative for rashes    Musculoskeletal Negative for joint pains, myalgias.   Neurological See Above    Psychological Negative for sleep disturbances.    Heme/Lymph Negative for easy bruising, easy bleeding    Endocrine Negative for polyuria, polydypsia     Physical Exam:     Physical Examination  There were no vitals taken for this visit.  There is no height or weight on file to calculate BMI.      Neurological Exam  Mental Status:   Alert and oriented to name, date, location, president.    Naming/Fluency/Comprehension/Repetition intact.   Recent/remote memory, registration, attention span/concentration, fund of knowledge intact by history.    CN:   II, III, IV, VI:  Pupils are dilated, equal 4 mm with a sluggish reaction, EOMI, no nystagmus, fundus not visualized due to inadequate dilation.V: intact to fine touch, temperature, pain.VII: symmetrical facial movement, nice smile, no drooping.VIII: grossly intact to hearing.IX, X: symmetrical palate, normal palatal elevation.XI: 5/5 SCM and 5/5 trapezius.XII: tongue midline, 5/5, no deviation or fasciculation.           Motor:    ShAb ElbEx ElbFle WrE WrFle Interos  HipFl KnExt KnFlex FtDors FtPlant   Left 5 5 5 5 5 5 5 5 5 5 5 5   Right 5 5 5 5 5 5 5 5 5 5 5 5   Tone: Normal tone in UE and LE, no atrophy, no fasciculation, no tremor no pronator drift.                Reflexes:    Bicep Tricep Brachioradial Patellar Achilles   Left 2+ 2+ 2+ 2+ 1+   Right 2+ 2+ 2+ 2+ 1+   No Clonus, down going toes b/l.    Sensation: on both UEs and LEs    Light Touch: Normal.Pinprick: Normal.Proprioception: Normal.Vibration: Normal.Temperature: Normal.    Coordination:    Tremor: Absent.Dysmetria: Absent.Heel to Shin: Normal.Nose to Finger: Normal.    Gait:  Slow slow gait, reported baseline        Interval/Previous Work-up:     Impression:   Ms. Moise with multiple medical issues, presented as a referral from PCP for abnormal eye exam.  Per evaluation, patient with bilateral 4 mm sluggish reaction to light.  Patient denies any neurological problem, vision problem other reported some blurriness when she drove from the PCP clinic to here.  Patient reported using eyedrops this morning, she does not know the name, she does not the eye drop with her.  Neurology exam unremarkable for any focal deficit suggest stroke.  Suspect eye drop related symptoms however it is appropriate to investigate with imaging to rule out serious problems.    Plan:     -patient's sent to ED for  imaging, CT and CTA, if imaging or normal she can follow-up with Ophthalmology for further evaluation.  -I advised her to bring eyedrops with her to her PCP or ophthalmology  Discussed my impression with her PCP and with the patient.    Time spent on this encounter:  40 minutes. This includes face to face time and non-face to face time preparing to see the patient (eg, review of tests), obtaining and/or reviewing separately obtained history, documenting clinical information in the electronic or other health record, independently interpreting results and communicating results to the patient/family/caregiver, or care coordinator.     Alannah Cervantes MD, M.B.Ch.B  Neurology, Vascular neurology  [facility]

## 2024-10-01 LAB
LEFT EYE DM RETINOPATHY: NEGATIVE
RIGHT EYE DM RETINOPATHY: NEGATIVE

## 2024-12-07 DIAGNOSIS — Z79.4 TYPE 2 DIABETES MELLITUS WITH STAGE 3A CHRONIC KIDNEY DISEASE, WITH LONG-TERM CURRENT USE OF INSULIN: ICD-10-CM

## 2024-12-07 DIAGNOSIS — N18.31 HYPERTENSIVE HEART AND KIDNEY DISEASE WITHOUT HEART FAILURE AND WITH STAGE 3A CHRONIC KIDNEY DISEASE: ICD-10-CM

## 2024-12-07 DIAGNOSIS — Z86.73 HISTORY OF CVA (CEREBROVASCULAR ACCIDENT) WITHOUT RESIDUAL DEFICITS: Chronic | ICD-10-CM

## 2024-12-07 DIAGNOSIS — I13.10 HYPERTENSIVE HEART AND KIDNEY DISEASE WITHOUT HEART FAILURE AND WITH STAGE 3A CHRONIC KIDNEY DISEASE: ICD-10-CM

## 2024-12-07 DIAGNOSIS — N18.31 TYPE 2 DIABETES MELLITUS WITH STAGE 3A CHRONIC KIDNEY DISEASE, WITH LONG-TERM CURRENT USE OF INSULIN: ICD-10-CM

## 2024-12-07 DIAGNOSIS — E11.22 TYPE 2 DIABETES MELLITUS WITH STAGE 3A CHRONIC KIDNEY DISEASE, WITH LONG-TERM CURRENT USE OF INSULIN: ICD-10-CM

## 2024-12-07 DIAGNOSIS — E11.42 DIABETIC POLYNEUROPATHY ASSOCIATED WITH TYPE 2 DIABETES MELLITUS: ICD-10-CM

## 2024-12-07 DIAGNOSIS — F33.41 RECURRENT MAJOR DEPRESSIVE DISORDER, IN PARTIAL REMISSION: ICD-10-CM

## 2024-12-09 RX ORDER — ESCITALOPRAM OXALATE 20 MG/1
20 TABLET ORAL DAILY
Qty: 90 TABLET | Refills: 0 | Status: SHIPPED | OUTPATIENT
Start: 2024-12-09

## 2024-12-09 RX ORDER — LISINOPRIL 5 MG/1
5 TABLET ORAL DAILY
Qty: 90 TABLET | Refills: 0 | Status: SHIPPED | OUTPATIENT
Start: 2024-12-09

## 2024-12-09 RX ORDER — METOPROLOL SUCCINATE 25 MG/1
25 TABLET, EXTENDED RELEASE ORAL DAILY
Qty: 90 TABLET | Refills: 0 | Status: SHIPPED | OUTPATIENT
Start: 2024-12-09

## 2024-12-09 RX ORDER — CLOPIDOGREL BISULFATE 75 MG/1
75 TABLET ORAL DAILY
Qty: 90 TABLET | Refills: 0 | Status: SHIPPED | OUTPATIENT
Start: 2024-12-09 | End: 2025-12-04

## 2024-12-09 RX ORDER — GABAPENTIN 300 MG/1
300 CAPSULE ORAL 2 TIMES DAILY
Qty: 180 CAPSULE | Refills: 0 | Status: SHIPPED | OUTPATIENT
Start: 2024-12-09 | End: 2025-12-04

## 2024-12-09 RX ORDER — AMLODIPINE BESYLATE 5 MG/1
5 TABLET ORAL DAILY
Qty: 90 TABLET | Refills: 0 | Status: SHIPPED | OUTPATIENT
Start: 2024-12-09 | End: 2025-12-09

## 2024-12-09 RX ORDER — ONDANSETRON HYDROCHLORIDE 8 MG/1
8 TABLET, FILM COATED ORAL EVERY 8 HOURS PRN
Qty: 30 TABLET | Refills: 0 | Status: SHIPPED | OUTPATIENT
Start: 2024-12-09

## 2024-12-24 DIAGNOSIS — E11.22 TYPE 2 DIABETES MELLITUS WITH STAGE 3A CHRONIC KIDNEY DISEASE, WITH LONG-TERM CURRENT USE OF INSULIN: ICD-10-CM

## 2024-12-24 DIAGNOSIS — N18.31 TYPE 2 DIABETES MELLITUS WITH STAGE 3A CHRONIC KIDNEY DISEASE, WITH LONG-TERM CURRENT USE OF INSULIN: ICD-10-CM

## 2024-12-24 DIAGNOSIS — Z86.73 HISTORY OF CVA (CEREBROVASCULAR ACCIDENT) WITHOUT RESIDUAL DEFICITS: Chronic | ICD-10-CM

## 2024-12-24 DIAGNOSIS — Z79.4 TYPE 2 DIABETES MELLITUS WITH STAGE 3A CHRONIC KIDNEY DISEASE, WITH LONG-TERM CURRENT USE OF INSULIN: ICD-10-CM

## 2024-12-26 DIAGNOSIS — E11.69 HYPERLIPIDEMIA ASSOCIATED WITH TYPE 2 DIABETES MELLITUS: ICD-10-CM

## 2024-12-26 DIAGNOSIS — N18.31 HYPERTENSIVE HEART AND KIDNEY DISEASE WITHOUT HEART FAILURE AND WITH STAGE 3A CHRONIC KIDNEY DISEASE: ICD-10-CM

## 2024-12-26 DIAGNOSIS — E78.5 HYPERLIPIDEMIA ASSOCIATED WITH TYPE 2 DIABETES MELLITUS: ICD-10-CM

## 2024-12-26 DIAGNOSIS — I13.10 HYPERTENSIVE HEART AND KIDNEY DISEASE WITHOUT HEART FAILURE AND WITH STAGE 3A CHRONIC KIDNEY DISEASE: ICD-10-CM

## 2024-12-26 RX ORDER — LISINOPRIL 5 MG/1
5 TABLET ORAL
Qty: 90 TABLET | Refills: 0 | Status: SHIPPED | OUTPATIENT
Start: 2024-12-26

## 2024-12-26 RX ORDER — CLOPIDOGREL BISULFATE 75 MG/1
75 TABLET ORAL DAILY
Qty: 90 TABLET | Refills: 3 | Status: SHIPPED | OUTPATIENT
Start: 2024-12-26 | End: 2025-12-21

## 2024-12-26 RX ORDER — ATORVASTATIN CALCIUM 80 MG/1
80 TABLET, FILM COATED ORAL NIGHTLY
Qty: 90 TABLET | Refills: 0 | Status: SHIPPED | OUTPATIENT
Start: 2024-12-26

## 2024-12-30 ENCOUNTER — OFFICE VISIT (OUTPATIENT)
Dept: FAMILY MEDICINE | Facility: CLINIC | Age: 84
End: 2024-12-30
Payer: MEDICARE

## 2024-12-30 VITALS
TEMPERATURE: 98 F | SYSTOLIC BLOOD PRESSURE: 136 MMHG | WEIGHT: 176.13 LBS | DIASTOLIC BLOOD PRESSURE: 70 MMHG | OXYGEN SATURATION: 95 % | HEART RATE: 72 BPM | HEIGHT: 68 IN | BODY MASS INDEX: 26.69 KG/M2

## 2024-12-30 DIAGNOSIS — E11.22 CKD STAGE 3 DUE TO TYPE 2 DIABETES MELLITUS: ICD-10-CM

## 2024-12-30 DIAGNOSIS — E78.5 HYPERLIPIDEMIA ASSOCIATED WITH TYPE 2 DIABETES MELLITUS: ICD-10-CM

## 2024-12-30 DIAGNOSIS — R07.89 LEFT-SIDED CHEST WALL PAIN: ICD-10-CM

## 2024-12-30 DIAGNOSIS — Z79.4 TYPE 2 DIABETES MELLITUS WITH STAGE 3A CHRONIC KIDNEY DISEASE, WITH LONG-TERM CURRENT USE OF INSULIN: Primary | ICD-10-CM

## 2024-12-30 DIAGNOSIS — I13.10 HYPERTENSIVE HEART AND KIDNEY DISEASE WITHOUT HEART FAILURE AND WITH STAGE 3A CHRONIC KIDNEY DISEASE: ICD-10-CM

## 2024-12-30 DIAGNOSIS — N18.30 CKD STAGE 3 DUE TO TYPE 2 DIABETES MELLITUS: ICD-10-CM

## 2024-12-30 DIAGNOSIS — E11.69 HYPERLIPIDEMIA ASSOCIATED WITH TYPE 2 DIABETES MELLITUS: ICD-10-CM

## 2024-12-30 DIAGNOSIS — N18.31 TYPE 2 DIABETES MELLITUS WITH STAGE 3A CHRONIC KIDNEY DISEASE, WITH LONG-TERM CURRENT USE OF INSULIN: Primary | ICD-10-CM

## 2024-12-30 DIAGNOSIS — E11.22 TYPE 2 DIABETES MELLITUS WITH STAGE 3A CHRONIC KIDNEY DISEASE, WITH LONG-TERM CURRENT USE OF INSULIN: Primary | ICD-10-CM

## 2024-12-30 DIAGNOSIS — N18.31 HYPERTENSIVE HEART AND KIDNEY DISEASE WITHOUT HEART FAILURE AND WITH STAGE 3A CHRONIC KIDNEY DISEASE: ICD-10-CM

## 2024-12-30 DIAGNOSIS — R29.6 FREQUENT FALLS: ICD-10-CM

## 2024-12-30 DIAGNOSIS — Z79.4 LONG TERM (CURRENT) USE OF INSULIN: ICD-10-CM

## 2024-12-30 PROCEDURE — 3075F SYST BP GE 130 - 139MM HG: CPT | Mod: CPTII,S$GLB,, | Performed by: NURSE PRACTITIONER

## 2024-12-30 PROCEDURE — 3078F DIAST BP <80 MM HG: CPT | Mod: CPTII,S$GLB,, | Performed by: NURSE PRACTITIONER

## 2024-12-30 PROCEDURE — 1101F PT FALLS ASSESS-DOCD LE1/YR: CPT | Mod: CPTII,S$GLB,, | Performed by: NURSE PRACTITIONER

## 2024-12-30 PROCEDURE — 99214 OFFICE O/P EST MOD 30 MIN: CPT | Mod: S$GLB,,, | Performed by: NURSE PRACTITIONER

## 2024-12-30 PROCEDURE — 1160F RVW MEDS BY RX/DR IN RCRD: CPT | Mod: CPTII,S$GLB,, | Performed by: NURSE PRACTITIONER

## 2024-12-30 PROCEDURE — 99999 PR PBB SHADOW E&M-EST. PATIENT-LVL V: CPT | Mod: PBBFAC,,, | Performed by: NURSE PRACTITIONER

## 2024-12-30 PROCEDURE — 1159F MED LIST DOCD IN RCRD: CPT | Mod: CPTII,S$GLB,, | Performed by: NURSE PRACTITIONER

## 2024-12-30 PROCEDURE — 1126F AMNT PAIN NOTED NONE PRSNT: CPT | Mod: CPTII,S$GLB,, | Performed by: NURSE PRACTITIONER

## 2024-12-30 PROCEDURE — 3288F FALL RISK ASSESSMENT DOCD: CPT | Mod: CPTII,S$GLB,, | Performed by: NURSE PRACTITIONER

## 2024-12-31 NOTE — PROGRESS NOTES
Subjective:       Patient ID: Xi Moise is a 84 y.o. female.    Chief Complaint: Follow-up (3 months F/U) and Chest Pain (Left rib pain for past month)        HPI WITH ASSESSMENT AND PLAN OF CARE:      Patient is an 84 year old white female with Aortic Atherosclerosis, History of Stroke, Type 2 Diabetes, CKD Stage 3, diabetic neuropathy, Hypertension/Hypertensive heart disease, Hyperlipidemia, moderate COPD/Asthma with Bronchiectasis with history of Mycobacterium avium-intracellulare infection, Calcified granuloma of lung, Recurrent Depression, chronic GERD, frequent falls, with recent orbital fracture in January 2024 treated by [facility] and followed by [facility], and Osteopenia that is here today for 3 month follow up with fasting lab results.        Aortic Atherosclerosis and CAD  Seen on CT chest 4/28/2016.  Still present on CT 3/29/22  ON Plavix 75 mg daily,  Atorvastatin 80 mg daily, Lisinopril 5 mg daily, Toprol XL 25 mg daily   .6- ran out of medication  Was supposed to start back on Atorvastatin 80 mg daily in September but realized she was not on medication  TAKE ATORVASTATIN 80 mg daily  Recheck in 3 months.          History of Stroke  Stroke around 2016  On Plavix 75 mg daily  Has unsteady gait with multiple falls in the past year - most recent fall causing orbital fracture in January 2024 - she is unsteady and needed a rolling lightweight walker for better stability.  Walker received from [facility].  9/30/2024 visit:  Right pupil 5 mm and non-reactive  Left pupil 4 mm and reactive to 3 mm  She does reports some double vision on yesterday  She has been seeing eye specialist Dr. Rashid at [facility] - last visit March 2024 but I do NOT/did not note the dilated, non-reactive pupil at previous visits - this is new as far as I am aware.  Rest of neurological exam okay - equal strength, ROM.  Clear speech.  Had NEGATIVE RPR testing September 2023.  Urgent Referral to  Neurology for further evaluation  Neurology appt 9/30/2024:  He determined patient used some eye drops that morning that she did not tell us about  Sent for CT and CTA same day:  CT head:  Stable CT scan of the head.  Further evaluation with MRI of the brain, as clinically warranted.  CTA head:  No large vessel occlusion in the intracranial circulation.  CTA neck:  No hemodynamically significant stenosis of the neck vessels.              Frequent Falls  In November 2023 - hospitalized for syncope and orthostatic hypotension  In January 2024 - fall with orbital fracture - states she tripped over a cement stone  Bilateral carotid ultrasound 3/5/24: Impression: NO evidence of a hemodynamically significant carotid bifurcation stenosis.  March 2024:  tripped in attic and hurt back but did not fall  Patient had a rollator walker with seat but very heavy and difficulty loading and unloading.  Ordered lightweight everyday walker- DME request sent and received lightweight walker.  No falls since last visit.   PUPIL EXAM ABNORMAL 9/30/24 and Neurology evaluate - unremarkable CT/CTA head and neck  Seen eye specialist Dr. Rashid with [facility] on 10/1/2024         Type 2 Diabetes with CKD 3  Taking NPH insulin 26 units twice daily before meals  Metformin stopped due to frequent diarrhea and diarrhea that resolved when med stopped  NO SGLT2 due to recurrent UTIs  Fasting blood glucose 84 with hemoglobin A1c 6.9%   Recheck in 3 months.                    CKD Stage 3  Creatinine 1.05 with eGFR 53 Feb. 2023  Creatinine  1.15 with DROP eGFR 47 in July 2023 - REFERRED Nephrology for monitoring  Started seeing Dr. Tillman on 7/24/2023   Last Nephrology visit 6/17/24  Current eGFR > 60                  Diabetic Neuropathy  On Gabapentin 300 mg twice daily  She has been on this medication and dose for years  Continue at present dose for now.  Stable.        Hypertension/hypertensive heart disease  Taking Lisinopril 5 mg daily,  "Metoprolol XL 25 mg daily, Amlodipine 5 mg daily  BP borderline high today Stay on current medications for now, take as directed.  Monitor BP at home and send me BP log sheet every 3 to 4 weeks with blood sugar log for monitoring.  Recheck 3 months.  /78   Pulse 65   Temp 97.7 °F (36.5 °C) (Temporal)   Ht 5' 8" (1.727 m)   Wt 78.1 kg (172 lb 2.9 oz)   SpO2 95%   BMI 26.18 kg/m²          Hyperlipidemia   Prescribed atorvastatin 80 mg daily   Ran out of medication  .6- ran out of medication  Was supposed to start back on Atorvastatin 80 mg daily in September but realized she was not on medication  TAKE ATORVASTATIN 80 mg daily  Recheck in 3 months.                Bronchiectasis, COPD/Asthma Overlap, History of Pulmonary MAC, SHANTI positive  Noted on last Pulmonary Progress note - Dr. Lee at [facility] - see under encounters  Patient was followed by Dr. Alannah Noe with [facility]  but now followed by [facility]   Last Pulmonology visit with Dr. Mullins  on 3/28/2024 - advised to follow up in 6 months.  Reports only on Albuterol inhaler prn  Reports NO controller inhalers  Keep upcoming pulmonology appt.            Calcified Granuloma of Lung  Seen on CT chest 12/17/2015  Stable.           Recurrent depression  Currently taking Lexapro 20 mg daily  Controlled at present time.  Recheck in 6 months.        Osteopenia  Last DEXA scan 3/5/24: Osteopenia  Continue taking calcium with vitamin-D supplementation  Recheck in 3 years.        Chronic GERD with small hiatal hernia  Previously taking Protonix 40 mg daily  NOT currently taking medication and has no complaints.  Small sliding-type hiatal hernia seen on CT abd/pelvis 7/26/2023  Stable.        Senile Purpura  + bruising to forearms  Advised on wearing protective clothing and other preventative measures.      Left-Sided Rib Pain  Reports left sided rib pain for past month  No recent fall in past month  Pain is improved past 2 days  Patient " has history of multiple falls  Will get rib xray to further evaluate.    Lab Visit on 12/24/2024   Component Date Value Ref Range Status    Sodium 12/24/2024 142  136 - 145 mmol/L Final    Potassium 12/24/2024 4.2  3.5 - 5.1 mmol/L Final    Chloride 12/24/2024 106  95 - 110 mmol/L Final    CO2 12/24/2024 27  23 - 29 mmol/L Final    Glucose 12/24/2024 84  70 - 110 mg/dL Final    BUN 12/24/2024 28 (H)  8 - 23 mg/dL Final    Creatinine 12/24/2024 0.9  0.5 - 1.4 mg/dL Final    Calcium 12/24/2024 9.2  8.7 - 10.5 mg/dL Final    Total Protein 12/24/2024 6.9  6.0 - 8.4 g/dL Final    Albumin 12/24/2024 3.8  3.5 - 5.2 g/dL Final    Total Bilirubin 12/24/2024 0.4  0.1 - 1.0 mg/dL Final    Comment: For infants and newborns, interpretation of results should be based  on gestational age, weight and in agreement with clinical  observations.    Premature Infant recommended reference ranges:  Up to 24 hours.............<8.0 mg/dL  Up to 48 hours............<12.0 mg/dL  3-5 days..................<15.0 mg/dL  6-29 days.................<15.0 mg/dL      Alkaline Phosphatase 12/24/2024 102  40 - 150 U/L Final    AST 12/24/2024 15  10 - 40 U/L Final    ALT 12/24/2024 13  10 - 44 U/L Final    eGFR 12/24/2024 >60.0  >60 mL/min/1.73 m^2 Final    Anion Gap 12/24/2024 9  8 - 16 mmol/L Final    Hemoglobin A1C 12/24/2024 6.9 (H)  4.0 - 5.6 % Final    Comment: ADA Screening Guidelines:  5.7-6.4%  Consistent with prediabetes  >or=6.5%  Consistent with diabetes    High levels of fetal hemoglobin interfere with the HbA1C  assay. Heterozygous hemoglobin variants (HbS, HgC, etc)do  not significantly interfere with this assay.   However, presence of multiple variants may affect accuracy.      Estimated Avg Glucose 12/24/2024 151 (H)  68 - 131 mg/dL Final    Cholesterol 12/24/2024 260 (H)  120 - 199 mg/dL Final    Comment: The National Cholesterol Education Program (NCEP) has set the  following guidelines (reference ranges) for  "Cholesterol:  Optimal.....................<200 mg/dL  Borderline High.............200-239 mg/dL  High........................> or = 240 mg/dL      Triglycerides 12/24/2024 142  30 - 150 mg/dL Final    Comment: The National Cholesterol Education Program (NCEP) has set the  following guidelines (reference values) for triglycerides:  Normal......................<150 mg/dL  Borderline High.............150-199 mg/dL  High........................200-499 mg/dL      HDL 12/24/2024 53  40 - 75 mg/dL Final    Comment: The National Cholesterol Education Program (NCEP) has set the  following guidelines (reference values) for HDL Cholesterol:  Low...............<40 mg/dL  Optimal...........>60 mg/dL      LDL Cholesterol 12/24/2024 178.6 (H)  63.0 - 159.0 mg/dL Final    Comment: The National Cholesterol Education Program (NCEP) has set the  following guidelines (reference values) for LDL Cholesterol:  Optimal.......................<130 mg/dL  Borderline High...............130-159 mg/dL  High..........................160-189 mg/dL  Very High.....................>190 mg/dL      HDL/Cholesterol Ratio 12/24/2024 20.4  20.0 - 50.0 % Final    Total Cholesterol/HDL Ratio 12/24/2024 4.9  2.0 - 5.0 Final    Non-HDL Cholesterol 12/24/2024 207  mg/dL Final    Comment: Risk category and Non-HDL cholesterol goals:  Coronary heart disease (CHD)or equivalent (10-year risk of CHD >20%):  Non-HDL cholesterol goal     <130 mg/dL  Two or more CHD risk factors and 10-year risk of CHD <= 20%:  Non-HDL cholesterol goal     <160 mg/dL  0 to 1 CHD risk factor:  Non-HDL cholesterol goal     <190 mg/dL           Vitals:    12/30/24 1021 12/30/24 1107   BP: (!) 140/72 136/70   BP Location: Right arm    Patient Position: Sitting    Pulse: 72    Temp: 97.7 °F (36.5 °C)    TempSrc: Temporal    SpO2: 95%    Weight: 79.9 kg (176 lb 2.4 oz)    Height: 5' 8" (1.727 m)          Diagnoses this Encounter:         ICD-10-CM ICD-9-CM   1. Type 2 diabetes mellitus with " stage 3a chronic kidney disease, with long-term current use of insulin  E11.22 250.40    N18.31 585.3    Z79.4 V58.67   2. Long term (current) use of insulin  Z79.4 V58.67   3. CKD stage 3 due to type 2 diabetes mellitus  E11.22 250.40    N18.30 585.3   4. 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 Hyperlipidemia associated with type 2 diabetes mellitus  E11.69 250.80    E78.5 272.4   6. Left-sided chest wall pain  R07.89 786.52   7. Frequent falls  R29.6 V15.88       Orders Placed This Encounter    X-Ray Ribs 2 View Left    Hemoglobin A1C    Comprehensive Metabolic Panel    Lipid Panel        Follow up in about 3 months (around 3/30/2025) for fasting labs and follow up.     Patient's Medications   New Prescriptions    No medications on file   Previous Medications    ALBUTEROL (PROVENTIL/VENTOLIN HFA) 90 MCG/ACTUATION INHALER    Inhale 2 puffs into the lungs every 4 (four) hours as needed.    AMLODIPINE (NORVASC) 5 MG TABLET    Take 1 tablet (5 mg total) by mouth once daily.    ASCORBIC ACID, VITAMIN C, (VITAMIN C) 500 MG TABLET    Take 500 mg by mouth once daily.    ATORVASTATIN (LIPITOR) 80 MG TABLET    TAKE 1 TABLET BY MOUTH EVERY EVENING    BLOOD-GLUCOSE SENSOR (DEXCOM G7 SENSOR) TONI    Change sensor every 7 days.    CLOPIDOGREL (PLAVIX) 75 MG TABLET    Take 1 tablet (75 mg total) by mouth once daily.    COLCHICINE (COLCRYS) 0.6 MG TABLET    Take 1 tablet (0.6 mg total) by mouth daily as needed (take while in acute gout flare).    CYANOCOBALAMIN (VITAMIN B-12) 500 MCG TABLET    Take 500 mcg by mouth once daily.    D-MANNOSE 500 MG CAP    Take 500 mg by mouth 2 (two) times a day.    ESCITALOPRAM OXALATE (LEXAPRO) 20 MG TABLET    Take 1 tablet (20 mg total) by mouth once daily.    FLUTICASONE (FLONASE) 50 MCG/ACTUATION NASAL SPRAY    1 spray by Each Nare route once daily.    GABAPENTIN (NEURONTIN) 300 MG CAPSULE    Take  1 capsule (300 mg total) by mouth 2 (two) times daily.    INSULIN  UNIT/ML INJECTION    Inject 26 Units into the skin 2 (two) times daily before meals.    LISINOPRIL (PRINIVIL,ZESTRIL) 5 MG TABLET    TAKE 1 TABLET BY MOUTH EVERY DAY    MAGNESIUM 250 MG TAB    Take 500 mg by mouth once daily.    MECLIZINE (ANTIVERT) 25 MG TABLET    Take 1 tablet (25 mg total) by mouth 2 (two) times daily as needed.    METOPROLOL SUCCINATE (TOPROL-XL) 25 MG 24 HR TABLET    Take 1 tablet (25 mg total) by mouth once daily.    MULTIVIT-MIN/FA/LYCOPEN/LUTEIN (SENTRY SENIOR ORAL)    Take 1 tablet by mouth once daily.    ONDANSETRON (ZOFRAN) 8 MG TABLET    Take 1 tablet (8 mg total) by mouth every 8 (eight) hours as needed for Nausea.    VITAMIN D (VITAMIN D3) 1000 UNITS TAB    Take 1,000 Units by mouth once daily.   Modified Medications    No medications on file   Discontinued Medications    No medications on file         Review of Systems   HENT: Negative.     Respiratory:  Negative for cough, chest tightness and shortness of breath.    Cardiovascular:  Positive for chest pain.        Left rib pain         Objective:        Physical Exam  Constitutional:       General: She is not in acute distress.     Appearance: Normal appearance. She is not toxic-appearing or diaphoretic.      Comments: Body mass index is 26.78 kg/m².     Cardiovascular:      Rate and Rhythm: Normal rate and regular rhythm.      Heart sounds: Normal heart sounds.   Pulmonary:      Effort: Pulmonary effort is normal. No respiratory distress.      Breath sounds: Normal breath sounds.   Chest:          Comments: Left-sided rib tenderness present.  Chest wall movement equal, some tenderness present over lower ribs anterior - xray to further evaluate.  Neurological:      Mental Status: She is alert and oriented to person, place, and time.   Psychiatric:         Mood and Affect: Mood normal.         Behavior: Behavior normal.             Past Medical History:    Diagnosis Date    Asthma     Chronic kidney disease (CKD), stage III (moderate) 2016    COPD, moderate     Depression 2016    Diabetes mellitus     Diabetic polyneuropathy associated with type 2 diabetes mellitus 2023    History of CVA (cerebrovascular accident) without residual deficits 2016    History of MAC infection 2016    Hyperlipidemia associated with type 2 diabetes mellitus 2016    Hypertension     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    Lipidemia     Long term (current) use of insulin 2023    Meniere disease 2016    Multiple falls 2023    Pulmonary Mycobacterium avium-intracellulare infection 2016    followed by Pulmonologist at [facility]    Type 2 diabetes mellitus with stage 2 chronic kidney disease, without long-term current use of insulin 10/28/2015       Past Surgical History:   Procedure Laterality Date    ANKLE SURGERY Left     CATARACT EXTRACTION, BILATERAL       SECTION      x 3    eyelid surgery      HYSTERECTOMY      SKIN CANCER EXCISION Left     left arm cancer excision - Dr. Richardson - unknown type of skin cancer       Family History   Problem Relation Name Age of Onset    Depression Mother      Bipolar disorder Mother      Cancer Father          skin cancer    Diabetes Father      Hypertension Father      Hyperlipidemia Father      Heart disease Father      Gout Father      Nephrolithiasis Father      Hypertension Sister Evith     Gout Sister Evith     Diabetes Sister Evith     Cancer Sister Evith         rectal cancer    Hypertension Sister Yumiko     Hyperlipidemia Sister Yumiko     Heart disease Sister Yumiko         stents x 4    Cancer Sister Yumiko         pituitary gland cancer    Hypertension Brother      Hyperlipidemia Brother      Heart disease Brother      Hypertension Daughter      Early death Maternal Aunt         Social History      Socioeconomic History    Marital status: Single   Tobacco Use    Smoking status: Never     Passive exposure: Past    Smokeless tobacco: Never   Substance and Sexual Activity    Alcohol use: No    Drug use: No     Social Drivers of Health     Financial Resource Strain: Medium Risk (1/7/2024)    Overall Financial Resource Strain (CARDIA)     Difficulty of Paying Living Expenses: Somewhat hard   Food Insecurity: Food Insecurity Present (1/7/2024)    Hunger Vital Sign     Worried About Running Out of Food in the Last Year: Sometimes true     Ran Out of Food in the Last Year: Never true   Transportation Needs: Unmet Transportation Needs (1/7/2024)    PRAPARE - Transportation     Lack of Transportation (Medical): Yes     Lack of Transportation (Non-Medical): Yes   Physical Activity: Inactive (1/7/2024)    Exercise Vital Sign     Days of Exercise per Week: 0 days     Minutes of Exercise per Session: 0 min   Stress: Stress Concern Present (1/7/2024)    [employer] [place] [employer] - Occupational Stress Questionnaire     Feeling of Stress : Rather much   Housing Stability: Unknown (1/7/2024)    Housing Stability Vital Sign     Unable to Pay for Housing in the Last Year: Patient declined     Number of Places Lived in the Last Year: 1     Unstable Housing in the Last Year: No

## 2025-01-02 ENCOUNTER — PATIENT MESSAGE (OUTPATIENT)
Dept: FAMILY MEDICINE | Facility: CLINIC | Age: 85
End: 2025-01-02
Payer: MEDICARE

## 2025-01-02 DIAGNOSIS — R07.89 LEFT-SIDED CHEST WALL PAIN: Primary | ICD-10-CM

## 2025-01-03 NOTE — TELEPHONE ENCOUNTER
Dr. Mullins,    Chronic Lung patient of yours.    Started with acute left-sided chest wall pains around 1 month ago, tenderness over rib cage.  Rib xray showed no acute fracture.  Lungs showed Probable discoid atelectasis left lung base. Mild blunting left costophrenic angle. No significant change since 06/17/2024. She was not having breathing issues while at my visit.    Was NOT SURE how you would like to treat since there was no significant change since June 2024.    Can you please advise.    I will refer to cardiologist to evaluate as well due to family request.    MANAS Bernal

## 2025-01-09 NOTE — PROGRESS NOTES
"[facility]  Pulmonary Clinic Note      History of Present Illness:  Xi Moise is a 84 y.o. female with PMHx bronchiectasis, pulmonary MAC, and asthma who presents to clinic for follow up. Last seen in our clinic 3/28/24. Previously followed by Dr. Jaffe and Dr. Lee at [facility].     Per Dr. Lee's note in 2021, last sputum cx on record showed no growth. Patient is a lifelong nonsmoker. Uses albuterol and symbicort for her asthma.     Reports a painful feeling under the L ribs with palpable "knots". Reports occasional relief from walking around. States the pain is throbbing and feels tight. Also endorses swelling and increase in L breast size.      Reports breathing has been overall stable over the past year. No issues with cough right now but does note congestion. Does note that she gets winded when walking to and from bathroom. Up to date on all of her vaccines. Lifelong nonsmoker but significant second hand exposure.     Pulmonary/Cardiology Testing:    PFT 4/24/24  FVC 2.75 (98%)  FEV1 1.27 (60%)  FEV1/FVC 46 (LLN 61)  DLCO 91%  No response to BD    PFT 4/3/17  FVC 2.71 (92%)  FEV1 1.34 (65%)  FEV1/FVC 49  TLC 5.99 (108%)  DLCO 67%  No response to BD    CT Chest 9/24/2020  Tracheobronchial tree reveals mild calcification. There is a posterior left lung apex granuloma. There are multiple peripheral tree-in-bud type opacities.  These are evident bilaterally and diffusely.  Subpleural fibrotic type markings are evident in both lung bases. The overall appearance is similar to the previous exam (2017)  The lung bases reveal minor bronchial wall thickening, left lower lobe more pronounced than right.    TTE 11/24/2023    Left Ventricle: The left ventricle is normal in size. Normal wall thickness. There is concentric remodeling. There is hyperdynamic systolic function. Biplane (2D) method of discs ejection fraction is 76%.    Right Ventricle: Normal right ventricular " cavity size. Wall thickness is normal. Right ventricle wall motion  is normal. Systolic function is normal.    Mitral Valve: There is mild mitral annular calcification present.    Pulmonary Artery: The estimated pulmonary artery systolic pressure is 25 mmHg.    IVC/SVC: Normal venous pressure at 3 mmHg.    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 Richardson - unknown type of skin cancer     Family History   Problem Relation Name Age of Onset    Depression Mother      Bipolar disorder Mother      Cancer Father          skin cancer    Diabetes Father      Hypertension Father      Hyperlipidemia Father      Heart disease Father      Gout Father      Nephrolithiasis Father      Hypertension Sister Evith     Gout Sister Evith     Diabetes Sister Evith     Cancer Sister Evith         rectal cancer    Hypertension  Sister Yumiko     Hyperlipidemia Sister Yumiko     Heart disease Sister Yumiok         stents x 4    Cancer Sister Yumiko         pituitary gland cancer    Hypertension Brother      Hyperlipidemia Brother      Heart disease Brother      Hypertension Daughter      Early death Maternal Aunt       Social History     Socioeconomic History    Marital status: Single   Tobacco Use    Smoking status: Never     Passive exposure: Past    Smokeless tobacco: Never   Substance and Sexual Activity    Alcohol use: No    Drug use: No     Social Drivers of Health     Financial Resource Strain: Medium Risk (1/7/2024)    Overall Financial Resource Strain (CARDIA)     Difficulty of Paying Living Expenses: Somewhat hard   Food Insecurity: Food Insecurity Present (1/7/2024)    Hunger Vital Sign     Worried About Running Out of Food in the Last Year: Sometimes true     Ran Out of Food in the Last Year: Never true   Transportation Needs: Unmet Transportation Needs (1/7/2024)    PRAPARE - Transportation     Lack of Transportation (Medical): Yes     Lack of Transportation (Non-Medical): Yes   Physical Activity: Inactive (1/7/2024)    Exercise Vital Sign     Days of Exercise per Week: 0 days     Minutes of Exercise per Session: 0 min   Stress: Stress Concern Present (1/7/2024)    [employer] - Occupational Stress Questionnaire     Feeling of Stress : Rather much   Housing Stability: Unknown (1/7/2024)    Housing Stability Vital Sign     Unable to Pay for Housing in the Last Year: Patient declined     Number of Places Lived in the Last Year: 1     Unstable Housing in the Last Year: No     Review of patient's allergies indicates:   Allergen Reactions    Bactrim [sulfamethoxazole-trimethoprim] Hives    Sulfa (sulfonamide antibiotics) Hives       Review of Systems:  Review of Systems   Constitutional:  Negative for chills, fever and weight loss.   HENT:  Negative for sinus pain and sore throat.    Respiratory:   "Positive for cough. Negative for sputum production and shortness of breath.    Cardiovascular:  Negative for chest pain and palpitations.   Gastrointestinal:  Negative for heartburn, nausea and vomiting.   Musculoskeletal:         "Knot" under diaphragm   Skin:  Negative for itching and rash.   Neurological:  Negative for loss of consciousness and headaches.   All other systems reviewed and are negative.         OBJECTIVE:     Vital Signs  Vitals:    01/10/25 1004   BP: 129/77   BP Location: Right arm   Pulse: 76   Weight: 79.9 kg (176 lb 4.1 oz)     Body mass index is 26.8 kg/m².    Physical Exam:  Physical Exam  Vitals reviewed.   Constitutional:       General: She is not in acute distress.     Appearance: She is normal weight. She is not toxic-appearing.   HENT:      Head: Normocephalic and atraumatic.   Eyes:      Extraocular Movements: Extraocular movements intact.      Conjunctiva/sclera: Conjunctivae normal.   Cardiovascular:      Pulses: Normal pulses.      Heart sounds: Normal heart sounds.   Pulmonary:      Effort: No respiratory distress.      Breath sounds: Normal breath sounds. No wheezing or rhonchi.   Abdominal:      Comments: Palpable soft tissue swelling under L ribs, tender to touch. No warmth.    Musculoskeletal:      Cervical back: Normal range of motion and neck supple.   Skin:     General: Skin is warm and dry.      Capillary Refill: Capillary refill takes less than 2 seconds.   Neurological:      Mental Status: She is alert and oriented to person, place, and time. Mental status is at baseline.          Laboratory:  CBC  Lab Results   Component Value Date    WBC 9.73 09/30/2024    HGB 13.8 09/30/2024    HCT 41.8 09/30/2024     09/30/2024    MCV 89 09/30/2024    RDW 13.1 09/30/2024     BMP  Lab Results   Component Value Date     12/24/2024    K 4.2 12/24/2024     12/24/2024    CO2 27 12/24/2024    BUN 28 (H) 12/24/2024    CREATININE 0.9 12/24/2024    GLU 84 12/24/2024    " CALCIUM 9.2 12/24/2024    MG 2.0 06/17/2024    PHOS 3.9 06/17/2024     LFTs  Lab Results   Component Value Date    PROT 6.9 12/24/2024    ALBUMIN 3.8 12/24/2024    BILITOT 0.4 12/24/2024    AST 15 12/24/2024    ALKPHOS 102 12/24/2024    ALT 13 12/24/2024         ASSESSMENT/PLAN:       Problem List Items Addressed This Visit       History of MAC infection (Chronic)    Current Assessment & Plan     - s/p long term abx therapy, last sputums in 2021 clear  - symptoms well controlled presently  - no need to work up further at this time         COPD, moderate    Current Assessment & Plan     - COPD/Asthma overlap  - see plan for asthma         Bronchiectasis without complication - Primary    Current Assessment & Plan     - last seen by Dr. Jaffe and Dr. Lee in 2021  - sputums cleared at that time, not on active therapy  - symptoms well controlled at present  - ordered inhaled hypertonic nebs for help with airway clearance  - CT chest pending, will hold on sputum cx for now pending results         Relevant Medications    sodium chloride 3% 3 % nebulizer solution    Other Relevant Orders    CT Chest Abdomen Pelvis Without Contrast (XPD)    Mild intermittent asthma without complication    Current Assessment & Plan     - stable, doing well on symbicort and albuterol  - continue current medications  - reordered nebulizer for home          Relevant Medications    budesonide-formoterol 80-4.5 mcg (SYMBICORT) 80-4.5 mcg/actuation HFAA    Other Relevant Orders    NEBULIZER FOR HOME USE    Soft tissue swelling    Current Assessment & Plan     - 6 months pain under L ribs with associate palpable soft tissue swelling and increased size of L breast  - painful to touch, no erythema  - unclear etiology, but pain is limiting her daily life  - xray ribs not remarkable  - will obtain CT Chest and abdomen, further workup and plan pending these results          Follow up in 6 months    Danette Tyler MD  Pulmonary/Critical Care  [facility]

## 2025-01-10 ENCOUNTER — TELEPHONE (OUTPATIENT)
Dept: PULMONOLOGY | Facility: CLINIC | Age: 85
End: 2025-01-10

## 2025-01-10 ENCOUNTER — OFFICE VISIT (OUTPATIENT)
Dept: CARDIOLOGY | Facility: CLINIC | Age: 85
End: 2025-01-10
Payer: MEDICARE

## 2025-01-10 ENCOUNTER — OFFICE VISIT (OUTPATIENT)
Dept: PULMONOLOGY | Facility: CLINIC | Age: 85
End: 2025-01-10
Payer: MEDICARE

## 2025-01-10 VITALS
SYSTOLIC BLOOD PRESSURE: 129 MMHG | HEART RATE: 76 BPM | BODY MASS INDEX: 26.8 KG/M2 | WEIGHT: 176.25 LBS | DIASTOLIC BLOOD PRESSURE: 77 MMHG

## 2025-01-10 VITALS
HEIGHT: 68 IN | HEART RATE: 68 BPM | SYSTOLIC BLOOD PRESSURE: 152 MMHG | WEIGHT: 178.44 LBS | OXYGEN SATURATION: 97 % | DIASTOLIC BLOOD PRESSURE: 70 MMHG | BODY MASS INDEX: 27.04 KG/M2

## 2025-01-10 DIAGNOSIS — R07.89 LEFT-SIDED CHEST WALL PAIN: ICD-10-CM

## 2025-01-10 DIAGNOSIS — J47.9 BRONCHIECTASIS WITHOUT COMPLICATION: Primary | ICD-10-CM

## 2025-01-10 DIAGNOSIS — R22.9 SOFT TISSUE SWELLING: ICD-10-CM

## 2025-01-10 DIAGNOSIS — I25.119 ATHEROSCLEROSIS OF NATIVE CORONARY ARTERY OF NATIVE HEART WITH ANGINA PECTORIS: ICD-10-CM

## 2025-01-10 DIAGNOSIS — J45.20 MILD INTERMITTENT ASTHMA WITHOUT COMPLICATION: ICD-10-CM

## 2025-01-10 DIAGNOSIS — I13.10 HYPERTENSIVE HEART AND KIDNEY DISEASE WITHOUT HEART FAILURE AND WITH STAGE 3A CHRONIC KIDNEY DISEASE: ICD-10-CM

## 2025-01-10 DIAGNOSIS — N18.31 HYPERTENSIVE HEART AND KIDNEY DISEASE WITHOUT HEART FAILURE AND WITH STAGE 3A CHRONIC KIDNEY DISEASE: ICD-10-CM

## 2025-01-10 DIAGNOSIS — J44.9 COPD, MODERATE: ICD-10-CM

## 2025-01-10 DIAGNOSIS — Z86.73 HISTORY OF CVA (CEREBROVASCULAR ACCIDENT) WITHOUT RESIDUAL DEFICITS: Primary | Chronic | ICD-10-CM

## 2025-01-10 DIAGNOSIS — Z86.19 HISTORY OF MAC INFECTION: Chronic | ICD-10-CM

## 2025-01-10 LAB
OHS QRS DURATION: 74 MS
OHS QTC CALCULATION: 458 MS

## 2025-01-10 PROCEDURE — 99999 PR PBB SHADOW E&M-EST. PATIENT-LVL IV: CPT | Mod: PBBFAC,,, | Performed by: INTERNAL MEDICINE

## 2025-01-10 PROCEDURE — 99999 PR PBB SHADOW E&M-EST. PATIENT-LVL III: CPT | Mod: PBBFAC,,, | Performed by: STUDENT IN AN ORGANIZED HEALTH CARE EDUCATION/TRAINING PROGRAM

## 2025-01-10 RX ORDER — SODIUM CHLORIDE FOR INHALATION 3 %
4 VIAL, NEBULIZER (ML) INHALATION
Qty: 4 ML | Refills: 11 | Status: SHIPPED | OUTPATIENT
Start: 2025-01-10

## 2025-01-10 RX ORDER — BUDESONIDE AND FORMOTEROL FUMARATE DIHYDRATE 80; 4.5 UG/1; UG/1
2 AEROSOL RESPIRATORY (INHALATION) 2 TIMES DAILY
Qty: 6.9 G | Refills: 11 | Status: SHIPPED | OUTPATIENT
Start: 2025-01-10 | End: 2026-01-10

## 2025-01-10 RX ORDER — COLCHICINE 0.6 MG/1
0.6 TABLET ORAL 2 TIMES DAILY
Qty: 30 TABLET | Refills: 2 | Status: SHIPPED | OUTPATIENT
Start: 2025-01-10 | End: 2025-05-10

## 2025-01-10 RX ORDER — PANTOPRAZOLE SODIUM 40 MG/1
40 TABLET, DELAYED RELEASE ORAL DAILY
Qty: 30 TABLET | Refills: 2 | Status: SHIPPED | OUTPATIENT
Start: 2025-01-10 | End: 2026-01-10

## 2025-01-10 NOTE — ASSESSMENT & PLAN NOTE
- s/p long term abx therapy, last sputums in 2021 clear  - symptoms well controlled presently  - no need to work up further at this time

## 2025-01-10 NOTE — ASSESSMENT & PLAN NOTE
- last seen by Dr. Jaffe and Dr. Lee in 2021  - sputums cleared at that time, not on active therapy  - symptoms well controlled at present  - ordered inhaled hypertonic nebs for help with airway clearance  - CT chest pending, will hold on sputum cx for now pending results   [address] THERAP[address]  [] EVALUATION  [x] DAILY NOTE (LAND) [] DAILY NOTE (AQUATIC )   [] PROGRESS NOTE [] DISCHARGE NOTE    [x] OUTPATIENT REHABILITATION CENTER [facility]   [] [place] [id number]    [] [facility]   [] David Bachelor    Date: 10/11/2022  Patient Name:  Janice Churchill  : 1973  MRN: [id number]  CSN: [id number]    Referring Practitioner Kelby Shone, MD   Diagnosis Left Rotator cuff tear   Treatment Diagnosis Decreased ROM, strength, and pain Left UE s/p RCR   Date of Evaluation 22      Functional Outcome Measure Used UEFS   Functional Outcome Score  (22)       Insurance: Primary: Payor: U[employer] /  /  / ,   Secondary:    Authorization Information: No precert needed, 60 vs. Combined of OT/PT/ST   Visit # 16, 8/10 for PN; (progress note 22)   Visits Allowed: 20   Recertification Date: Oct. 10, 2022   Physician Follow-Up: 2022   Physician Orders: Use type I RCR protocol, no bicep resistance x6 weeks   Pertinent History: Pt. Was here for OT for left shoulder pain earlier this year but did not have a significant improvement. Pt. Then had further testing which showed a rotator cuff tear. Pt. Underwent surgery 7/15/22 for RCR and Bicep tenodesis. SUBJECTIVE: Patient reports increased soreness, had to take a muscle relaxer last night. Reports first time in a while re-taking her medication. Reports no increased activity. OBJECTIVE:  TREATMENT   Precautions: Follow Dr. Shahid Estrella Type 1 RCR protocol with     Pain: 4/10 pointing upper trap and anterior shoulder      X in shaded column indicates Activity Completed Today   Modalities Parameters/  Location  Notes/Comments                     Manual Therapy Time/  Technique  Notes/Comments   PROM left shoulder all planes to tolerance  x With MHP this date; Large improvement in tolerance throughout all end ranges this date.  Discomfort remains, but no catch or guarded movement    Sidelying IASTM to L upper trap, levator scap, medial scap border, subscapularis, pec minor,VERY gently cross friction on bicep      Gentle scapular and GH mobilizations  x    Exercises   Sets/  Sec Reps  Notes/Comments   AAROM left elbow; AROM forearm, wrist and hand 1 10 ea     Pendulums all directions 1 10 ea  Warm up    Seated scapular pinches, backward shoulder rolls 1 10 ea x    Table slides 1 10     Pulleys for shoulder flexion to tolerance 1 10 x Warm up   Supine dowel julius ER, flexion with LUE in neutral, chest press 1 10 ea x Cues for ER   Supine ceiling circles fwd/bwd 1 10 x    Supine SA punch no weight  1 10 x    Submax isometrics all directions in standing 5 sec 5 ea     Leaning over rows LUE, and leaning over abduction - no weight 1 10 ea     Sidelying ER with elbow at side no weight, abduction no weight  1 10 ea  Contact guard support for abduction - good tolerance   Supine active flexion from side to tolerance with CGA 1 5     Bicep curl, tricep extension supine peach band  1 10 ea x Fair tolerance, grimace demonstrated    Seated edge of mat- B ER no resistance  1 10 x           Activities Time    Notes/Comments   kinesiotape 2 \"I\" strips anterior to posterior for mechanical correction       Removed pain pump and changed post op bandages              Specific Interventions Next Treatment: Per Dr. Tiff Porter type 1 RCR protocol    Activity/Treatment Tolerance:  [x]  Patient tolerated treatment well  []  Patient limited by fatigue  []  Patient limited by pain   []  Patient limited by other medical complications  []  Other:     Assessment: Patient is progressing slowly towards goals. Will be 13 week post op on Friday with continued fluctuating pain and poor tolerance with gentle strengthening. [facility] this date did improve overall tolerance throughout session.      Areas for Improvement: impaired ROM, impaired strength, and pain  Prognosis: good    GOALS:  Patient Goal: to decrease pain in left shoulder for return to normal activities    Short Term Goals:  Time Frame: 4 weeks  Pt. Will demo independence with HEP for LUE for improved motion, decreased pain and eventual strength for return to normal routines - GOAL MET 8/18/22 WITH PT. INDEPENDENT WITH PENDULUMS, TABLE SLIDES, SCAPULAR EXERCISES, SUPINE DOWEL FOR CHEST PRESS AND ER, AND LEANING OVER/PRONE ROWS  Continue goal with upgrades per protocol as pt. Can tolerate    Pt. Will demo improved PROM left shoulder for flexion= 95, and ER= 35 for ease with eventual AROM and bathing and dressing - GOAL MET 8/18/22 WITH PASSIVE LEFT SHOULDER FLEXION = 120, ER AT 90= 38, IR AT 90= 45, AND ABD= 90  Revised goal: Pt. Will demo improved PROM left shoulder to flexion= 145, ER = 60 at 45 degrees abd, IR 60 at 45 degrees abd, and ABD= 120 for ease with AROM for bathing and dressing    Pt. Will report decreased pain left shoulder to no greater than 5/10 with PROM for ease with HEP and sleeping - GOAL NOT MET 8/18/22 WITH PT. REPORTING 6/10 PAIN UPON ARRIVAL TO THERAPY WITHOUT ANY MOVEMENT. PT. HAS BEEN REPORTING INCREASED ANTERIOR SHOULDER PAIN AND HAS BEEN REPORTING THAT THIS IS WORSENING AND SO DUE TO THIS, NO RESISTIVE ACTIVITIES HAVE BEEN ADDED TO PT'S THERAPY SESSION OR HEP. Long Term Goals:  Time Frame: 12 weeks  Pt. Will demo functional AROM left shoulder to be able to reach into first shelf of upper cupboard to obtain lightweight items for cooking - NOT MET - PT. 5 WEEKS POST OP  Pt. Will be able to drive using LUE to hold and turn steering wheel - NOT MET- PT. 5 WEEKS POST OP  Pt. Will be able to use LUE for gardening tasks with minimal to no pain or difficulty NOT MET - 5 WEEKS POST OP    Patient Education:   []  HEP/Education Completed: Discussed the possibility of over use with left UE. Encouraged ice and heat for pain management.      [employer] for HEP: pendulums all directions, scap pinches, table slides, AROM elbow, forearm, wrist and hand - handouts given  8/11/22 added leaning over rows, supine dowel for chest press, ER to HEP    []  No new Education completed  [x]  Reviewed Prior HEP; activity modifications   [x]  Patient verbalized and/or demonstrated understanding of education provided. []  Patient unable to verbalize and/or demonstrate understanding of education provided. Will continue education. [x]  Barriers to learning: none    PLAN:  Treatment Recommendations: Strengthening, Range of Motion, Manual Therapy - Soft Tissue Mobilization, Home Exercise Program, and Self-Care Education and Training per treatment protocol    []  Plan of care initiated. Plan to see patient 2 times per week for 12 weeks to address the treatment planned outlined above.   [x]  Continue with current plan of care  []  Modify plan of care as follows:    []  Hold pending physician visit  []  Discharge    Time In 1500   Time Out 1540   Timed Code Minutes: 40 min   Total Treatment Time: 40 min       Kris MARTINS #[id number]

## 2025-01-10 NOTE — ASSESSMENT & PLAN NOTE
- stable, doing well on symbicort and albuterol  - continue current medications  - reordered nebulizer for home

## 2025-01-10 NOTE — ASSESSMENT & PLAN NOTE
- 6 months pain under L ribs with associate palpable soft tissue swelling and increased size of L breast  - painful to touch, no erythema  - unclear etiology, but pain is limiting her daily life  - xray ribs not remarkable  - will obtain CT Chest and abdomen, further workup and plan pending these results

## 2025-01-15 ENCOUNTER — HOSPITAL ENCOUNTER (OUTPATIENT)
Dept: RADIOLOGY | Facility: HOSPITAL | Age: 85
Discharge: HOME | End: 2025-01-15
Attending: STUDENT IN AN ORGANIZED HEALTH CARE EDUCATION/TRAINING PROGRAM
Payer: MEDICARE

## 2025-01-15 DIAGNOSIS — J47.9 BRONCHIECTASIS WITHOUT COMPLICATION: ICD-10-CM

## 2025-01-15 PROCEDURE — 74176 CT ABD & PELVIS W/O CONTRAST: CPT | Mod: 26,,, | Performed by: RADIOLOGY

## 2025-01-15 PROCEDURE — 71250 CT THORAX DX C-: CPT | Mod: 26,,, | Performed by: RADIOLOGY

## 2025-01-15 PROCEDURE — 74176 CT ABD & PELVIS W/O CONTRAST: CPT | Mod: TC

## 2025-01-22 RX ORDER — COLCHICINE 0.6 MG/1
0.6 TABLET ORAL 2 TIMES DAILY
Qty: 60 TABLET | Refills: 0 | Status: SHIPPED | OUTPATIENT
Start: 2025-01-22 | End: 2025-05-22

## 2025-01-23 ENCOUNTER — PATIENT OUTREACH (OUTPATIENT)
Dept: ADMINISTRATIVE | Facility: HOSPITAL | Age: 85
End: 2025-01-23
Payer: MEDICARE

## 2025-01-23 NOTE — PROGRESS NOTES
01/23/2025  VB chart audit performed. Care Everywhere updates requested and reviewed  Overdue HM topic chart audit and/or requested. LINKS triggered and reconciled. Media reviewed Health Maintenance Topic(s) Outreach Outcomes & Actions Taken:    Blood Pressure - Outreach Outcomes & Actions Taken  : Patient Will Call Back or Send Portal Message with Reading     Additional Notes:       Care Management, Digital Medicine, and/or Education Referrals  Next Steps - Referral Actions: N/A

## 2025-01-23 NOTE — LETTER
"       AUTHORIZATION FOR RELEASE OF   CONFIDENTIAL INFORMATION    Dear [facility] ,    We are seeing Xi Moise, date of birth 1940, in the clinic at [facility]. Dolores Corado NP is the patient's PCP. iX Moise has an outstanding lab/procedure at the time we reviewed her chart. In order to help keep her health information updated, she has authorized us to request the following medical record(s):                                              (X)  DIABETIC EYE EXAM                                         Please fax records to [facility], Colwart, Anne B., NP,      [facility]                                                        Please Fax to [facility] at [phone]     Thank you for your help, Liz Barba LPREYES-CCC. If I can be of any assistance you can call at [phone]                     Patient Name: Xi Moise  : 1940  Patient Phone #: [phone]                  Xi Moise  MRN: [id number]  : 1940  Age: 84 y.o.  Sex: female       HOSPITAL AUTHORIZATION      A. Consent for Examination and Treatment: I hereby authorize the providers and employees of [facility] ("[facility] to provide medical treatment/services which includes, but is not limited to, performing and administering tests and diagnostic procedures that are deemed necessary, including, but not limited to, imaging examinations, blood tests and other laboratory procedures as may be required by the hospital, clinic, or may be ordered by my physician(s) or persons working under the general and/or special instructions of my physician(s).  I understand and agree that this consent covers all authorized persons, including but not limited to residents, nurse practitioners, physicians' assistants, specialists, consultants and independently contracted physicians who are called upon by the physician in charge to carry out the diagnostic procedures and medical or " surgical treatment.  I hereby authorize [facility] to retain or dispose of any specimens or tissue, should there be such remaining from any test or procedure.  I hereby authorize and give consent for [facility] providers and employees to take photographs, images or videotapes of such diagnostic, surgical or treatment procedures of Patient as may be required by [facility] or as may be ordered by a physician. I further acknowledge and agree that [facility] may use cameras or other devices for patient monitoring  I am aware that the practice of medicine is not an exact science, and I acknowledge that no guarantees have been made to me as to the outcome of any tests, procedures or treatment.  As part of your [facility] delivery, you will be offered a Covid-19 vaccine. Certain eligibility criteria may be supported under Emergency Use Authorization (EUA). Please let your medical team know if you wish to receive the Covid-19 vaccine during this hospitalization.       B. Authorization for Release of Information: I understand that my insurance company and/or their agents may need information necessary to make determinations about payment/reimbursement. I hereby provide authorization to release to all insurance companies, their successors, assignees, other parties with whom they may have contracted, or others acting on their behalf, that are involved with payment for any hospital and/or clinic charges incurred by the patient, any information that they request and deem necessary for payment/reimbursement, and/or quality review. I further authorize the release of my health information to physicians or other health care practitioners on staff who are involved in my health care now and in the future, and to other health care providers, entities, or institutions for the purpose of my continued care and treatment, including referrals.     C. Medicare Patient's Certification and Authorization to Release Information and Payment Request:  I certify that the information given by me in applying for payment under Title XVIII of the Social Security Act is correct. I authorize any [place] of medical or other information about me to release to the Social Security Administration or its intermediaries or carriers, any information needed for this or a related Medicare claim. I request that payment of authorized benefits be made on my behalf.        REGISTRATION AUTHORIZATION  Form No. 1084 (Rev. 1/6/2023) Page 1 of 3           [facility]     D. Assignment of Insurance Benefits: I hereby authorize all insurance companies, health plans, defined benefit plans, health insurers or any entity that is or may be responsible for payment of my medical expenses to pay all hospital and medical benefits now due, and to become due and payable to me under any hospital benefits, sick benefits, injury benefits or any other benefit for services rendered to me, including Major Medical Benefits, direct to [facility] and all independently contracted physicians. I assign any and all rights that I may have against any and all insurance companies, health plans, defined benefit plans, health insurers or any entity that is or may be responsible for payment of my medical expenses, including, but not limited to any right to appeal a denial of a claim, any right to bring any action, lawsuit, administrative proceeding, or other cause of action on my behalf. I specifically assign my right to pursue litigation against any and all insurance companies, health plans, defined benefit plans, health insurers or any entity that is or may be responsible for payment of my medical expenses based upon a refusal to pay charges.     E. Valuables: It is understood and agreed that [facility] is not liable for the damage to or loss of any money, jewelry, documents, dentures, eye glasses, hearing aids, prosthetics, or other property of value.     F. Computer Equipment: I understand and agree that should  I choose to use computer equipment owned by [facility] or if I choose to access the Internet via [facility]'s network, I do so at my own risk. [facility] is not responsible for any damage to my computer equipment or to any damages of any type that might arise from my loss of equipment or data.     G. Acceptance of Financial Responsibility: I agree that in consideration of the services and supplies that have been or will be furnished to the patient, I am hereby obligated to pay all charges made for or on the account of the patient according to the standard rates (in effect at the time the services and supplies are delivered) established by [facility], including its Patient Financial Assistance Policy to the extent it is applicable. I understand that I am responsible for all charges, or portions thereof, not covered by insurance or other sources. Patient refunds will be distributed only after balances at all [facility] facilities are paid.     H. Communication Authorization: I hereby authorize [facility] and its representatives, along with any billing service or  who may work on their behalf, to contact me on my cell phone and/or home phone using prerecorded messages, artificial voice messages, automatic telephone dialing devices or other computer assisted technology, or by electronic mail, text messaging, or by any other form of electronic communication. This includes, but is not limited to, appointment reminders, yearly physical exam reminders, preventive care reminders, patient campaigns, welcome calls, and calls about account balances on my account or any account on which I am listed as a guarantor. I understand I have the right to opt out of these communications at any time.     I.  Relationship Between Facility and Physician: I understand that some, but not all, providers furnishing services to the patient are not employees or agents of [facility]. The patient is under the care and supervision of his/her attending  physician, and it is the responsibility of the facility and its nursing staff to carry out the instructions of such physicians. It is the responsibility of the patient's physician/designee to obtain the patient's informed consent, when required, for medical or surgical treatment, special diagnostic or therapeutic procedures, or hospital services rendered for the patient under the special instructions of the physician/designee. Pharmacy services may be provided by individuals located outside of the facility.           REGISTRATION AUTHORIZATION  Form No. 1084 (Rev. 1/6/2023 Page 2 of 3        [facility]     J.  Notice of Privacy Practices: I acknowledge I have received a copy of [facility]'s Notice of Privacy Practices.     K. Facility Directory: I have discussed with the organization my desire to be either included or excluded in the facility directory. I understand that if my choice is to opt-out of being identified in the facility directory that the facility will not provide any information about me such as my condition (e.g. fair, stable, etc.) or my location in the facility (eg room number, department).        L. Immunizations: [facility] shares immunization information with state sponsored health departments to help you and your doctor keep track of your immunization records. By signing, you consent to have this information shared with the health department in your state:                                [place] - LINKS ([place] Immunization Network for Kids Statewide)                                [place] - MIIX ([place] Immunization Information eXchange)                                [place] - ImmPRINT (Immunization Patient Registry with Integrated Technology)     M. TERM: This authorization is valid for this and subsequent care/treatment I receive at [facility] and will remain valid unless/until revoked in writing by me.     N. [facility]: As used in this document, [facility]  means all [facility] owned and managed facilities, including, but not limited to, all health centers, surgery centers, clinics, urgent care centers, and hospitals.               Patient/Legal Gaurdian Signature     This signature was collected at 09/30/2024     Xi Moise     Self  _______________________________   Printed Name/Relationship to Patient                              [facility] complies with applicable Federal civil rights laws and does not discriminate on the basis of race, color, national origin, age, disability, or sex.  ATENCIÓN: si habla bridger, tiene a good disposición servicios gratuitos de asistencia lingüística. Doni roblero [phone].  CHÚ Ý: N?u b?n nói Ti?ng Vi?t, có các d?ch v? h? tr? ngôn ng? mi?n phí dành cho b?n. G?i s? [phone].        REGISTRATION AUTHORIZATION  Form No. 1084 (Rev. 1/6/2023) Page 3 of 3

## 2025-01-24 ENCOUNTER — PATIENT OUTREACH (OUTPATIENT)
Dept: ADMINISTRATIVE | Facility: HOSPITAL | Age: 85
End: 2025-01-24
Payer: MEDICARE

## 2025-02-04 ENCOUNTER — HOSPITAL ENCOUNTER (OUTPATIENT)
Dept: CARDIOLOGY | Facility: HOSPITAL | Age: 85
Discharge: HOME | End: 2025-02-04
Attending: INTERNAL MEDICINE
Payer: MEDICARE

## 2025-02-04 VITALS — HEIGHT: 68 IN | WEIGHT: 178 LBS | BODY MASS INDEX: 26.98 KG/M2

## 2025-02-04 DIAGNOSIS — Z86.73 HISTORY OF CVA (CEREBROVASCULAR ACCIDENT) WITHOUT RESIDUAL DEFICITS: Chronic | ICD-10-CM

## 2025-02-04 PROCEDURE — 93306 TTE W/DOPPLER COMPLETE: CPT | Mod: 26,,, | Performed by: INTERNAL MEDICINE

## 2025-02-04 PROCEDURE — 93306 TTE W/DOPPLER COMPLETE: CPT

## 2025-02-05 DIAGNOSIS — N18.31 HYPERTENSIVE HEART AND KIDNEY DISEASE WITHOUT HEART FAILURE AND WITH STAGE 3A CHRONIC KIDNEY DISEASE: ICD-10-CM

## 2025-02-05 DIAGNOSIS — F33.41 RECURRENT MAJOR DEPRESSIVE DISORDER, IN PARTIAL REMISSION: ICD-10-CM

## 2025-02-05 DIAGNOSIS — Z86.73 HISTORY OF CVA (CEREBROVASCULAR ACCIDENT) WITHOUT RESIDUAL DEFICITS: Chronic | ICD-10-CM

## 2025-02-05 DIAGNOSIS — Z79.4 TYPE 2 DIABETES MELLITUS WITH STAGE 3A CHRONIC KIDNEY DISEASE, WITH LONG-TERM CURRENT USE OF INSULIN: ICD-10-CM

## 2025-02-05 DIAGNOSIS — I13.10 HYPERTENSIVE HEART AND KIDNEY DISEASE WITHOUT HEART FAILURE AND WITH STAGE 3A CHRONIC KIDNEY DISEASE: ICD-10-CM

## 2025-02-05 DIAGNOSIS — N18.31 TYPE 2 DIABETES MELLITUS WITH STAGE 3A CHRONIC KIDNEY DISEASE, WITH LONG-TERM CURRENT USE OF INSULIN: ICD-10-CM

## 2025-02-05 DIAGNOSIS — E78.5 HYPERLIPIDEMIA ASSOCIATED WITH TYPE 2 DIABETES MELLITUS: ICD-10-CM

## 2025-02-05 DIAGNOSIS — E11.42 DIABETIC POLYNEUROPATHY ASSOCIATED WITH TYPE 2 DIABETES MELLITUS: ICD-10-CM

## 2025-02-05 DIAGNOSIS — E11.22 TYPE 2 DIABETES MELLITUS WITH STAGE 3A CHRONIC KIDNEY DISEASE, WITH LONG-TERM CURRENT USE OF INSULIN: ICD-10-CM

## 2025-02-05 DIAGNOSIS — E11.69 HYPERLIPIDEMIA ASSOCIATED WITH TYPE 2 DIABETES MELLITUS: ICD-10-CM

## 2025-02-05 LAB
APICAL FOUR CHAMBER EJECTION FRACTION: 58 %
APICAL TWO CHAMBER EJECTION FRACTION: 62 %
ASCENDING AORTA: 3 CM
AV INDEX (PROSTH): 1.01
AV MEAN GRADIENT: 3 MMHG
AV PEAK GRADIENT: 5 MMHG
AV VALVE AREA BY VELOCITY RATIO: 2.8 CM²
AV VALVE AREA: 2.9 CM²
AV VELOCITY RATIO: 1
BSA FOR ECHO PROCEDURE: 1.97 M2
CV ECHO LV RWT: 0.38 CM
DOP CALC AO PEAK VEL: 1.1 M/S
DOP CALC AO VTI: 27.6 CM
DOP CALC LVOT AREA: 2.8 CM2
DOP CALC LVOT DIAMETER: 1.9 CM
DOP CALC LVOT PEAK VEL: 1.1 M/S
DOP CALC LVOT STROKE VOLUME: 79.3 CM3
DOP CALC MV VTI: 28.4 CM
DOP CALCLVOT PEAK VEL VTI: 28 CM
E WAVE DECELERATION TIME: 402 MSEC
E/A RATIO: 0.62
E/E' RATIO: 9 M/S
ECHO LV POSTERIOR WALL: 0.8 CM (ref 0.6–1.1)
FRACTIONAL SHORTENING: 35.7 % (ref 28–44)
INTERVENTRICULAR SEPTUM: 0.9 CM (ref 0.6–1.1)
IVC DIAMETER: 1.27 CM
LEFT ATRIUM AREA SYSTOLIC (APICAL 2 CHAMBER): 13.66 CM2
LEFT ATRIUM AREA SYSTOLIC (APICAL 4 CHAMBER): 12.28 CM2
LEFT ATRIUM VOLUME INDEX MOD: 17 ML/M2
LEFT ATRIUM VOLUME MOD: 33 ML
LEFT INTERNAL DIMENSION IN SYSTOLE: 2.7 CM (ref 2.1–4)
LEFT VENTRICLE DIASTOLIC VOLUME INDEX: 41.09 ML/M2
LEFT VENTRICLE DIASTOLIC VOLUME: 80.12 ML
LEFT VENTRICLE END DIASTOLIC VOLUME APICAL 2 CHAMBER: 37.55 ML
LEFT VENTRICLE END DIASTOLIC VOLUME APICAL 4 CHAMBER: 66.89 ML
LEFT VENTRICLE END SYSTOLIC VOLUME APICAL 2 CHAMBER: 33.77 ML
LEFT VENTRICLE END SYSTOLIC VOLUME APICAL 4 CHAMBER: 28.95 ML
LEFT VENTRICLE MASS INDEX: 56.3 G/M2
LEFT VENTRICLE SYSTOLIC VOLUME INDEX: 14.4 ML/M2
LEFT VENTRICLE SYSTOLIC VOLUME: 28.08 ML
LEFT VENTRICULAR INTERNAL DIMENSION IN DIASTOLE: 4.2 CM (ref 3.5–6)
LEFT VENTRICULAR MASS: 109.8 G
LV LATERAL E/E' RATIO: 9 M/S
LV SEPTAL E/E' RATIO: 9 M/S
LVED V (TEICH): 80.12 ML
LVES V (TEICH): 28.08 ML
LVOT MG: 2.65 MMHG
LVOT MV: 0.78 CM/S
MV MEAN GRADIENT: 2 MMHG
MV PEAK A VEL: 1.02 M/S
MV PEAK E VEL: 0.63 M/S
MV PEAK GRADIENT: 5 MMHG
MV STENOSIS PRESSURE HALF TIME: 116.6 MS
MV VALVE AREA BY CONTINUITY EQUATION: 2.79 CM2
MV VALVE AREA P 1/2 METHOD: 1.89 CM2
OHS CV RV/LV RATIO: 0.86 CM
OHS LV EJECTION FRACTION SIMPSONS BIPLANE MOD: 60 %
PISA TR MAX VEL: 2.2 M/S
PV MV: 0.65 M/S
PV PEAK GRADIENT: 3 MMHG
PV PEAK VELOCITY: 0.83 M/S
RA PRESSURE ESTIMATED: 3 MMHG
RA VOL SYS: 33.01 ML
RIGHT ATRIAL AREA: 13.4 CM2
RIGHT ATRIUM VOLUME AREA LENGTH APICAL 4 CHAMBER: 32.72 ML
RIGHT VENTRICLE DIASTOLIC BASEL DIMENSION: 3.6 CM
RV TB RVSP: 5 MMHG
RV TISSUE DOPPLER FREE WALL SYSTOLIC VELOCITY 1 (APICAL 4 CHAMBER VIEW): 9.21 CM/S
SINUS: 2.88 CM
STJ: 2.73 CM
TDI LATERAL: 0.07 M/S
TDI SEPTAL: 0.07 M/S
TDI: 0.07 M/S
TR MAX PG: 19 MMHG
TRICUSPID ANNULAR PLANE SYSTOLIC EXCURSION: 2.16 CM
TV REST PULMONARY ARTERY PRESSURE: 22 MMHG
Z-SCORE OF LEFT VENTRICULAR DIMENSION IN END DIASTOLE: -2.8
Z-SCORE OF LEFT VENTRICULAR DIMENSION IN END SYSTOLE: -1.87

## 2025-02-06 ENCOUNTER — OFFICE VISIT (OUTPATIENT)
Dept: FAMILY MEDICINE | Facility: CLINIC | Age: 85
End: 2025-02-06
Payer: MEDICARE

## 2025-02-06 ENCOUNTER — PATIENT MESSAGE (OUTPATIENT)
Dept: PULMONOLOGY | Facility: CLINIC | Age: 85
End: 2025-02-06
Payer: MEDICARE

## 2025-02-06 VITALS
HEART RATE: 67 BPM | BODY MASS INDEX: 26.3 KG/M2 | OXYGEN SATURATION: 97 % | TEMPERATURE: 98 F | WEIGHT: 173.5 LBS | HEIGHT: 68 IN | SYSTOLIC BLOOD PRESSURE: 130 MMHG | DIASTOLIC BLOOD PRESSURE: 64 MMHG

## 2025-02-06 DIAGNOSIS — R31.9 URINARY TRACT INFECTION WITH HEMATURIA, SITE UNSPECIFIED: ICD-10-CM

## 2025-02-06 DIAGNOSIS — M54.50 ACUTE BILATERAL LOW BACK PAIN WITHOUT SCIATICA: ICD-10-CM

## 2025-02-06 DIAGNOSIS — N39.0 URINARY TRACT INFECTION WITH HEMATURIA, SITE UNSPECIFIED: ICD-10-CM

## 2025-02-06 DIAGNOSIS — R82.998 LEUKOCYTES IN URINE: Primary | ICD-10-CM

## 2025-02-06 LAB
BILIRUB SERPL-MCNC: NORMAL MG/DL
BLOOD URINE, POC: NORMAL
COLOR, POC UA: NORMAL
GLUCOSE UR QL STRIP: NORMAL
KETONES UR QL STRIP: NORMAL
LEUKOCYTE ESTERASE URINE, POC: POSITIVE
NITRITE, POC UA: NORMAL
PH, POC UA: NORMAL
PROTEIN, POC: NORMAL
SPECIFIC GRAVITY, POC UA: 1.01
UROBILINOGEN, POC UA: NORMAL

## 2025-02-06 PROCEDURE — 87086 URINE CULTURE/COLONY COUNT: CPT | Performed by: NURSE PRACTITIONER

## 2025-02-06 PROCEDURE — 1126F AMNT PAIN NOTED NONE PRSNT: CPT | Mod: CPTII,S$GLB,, | Performed by: NURSE PRACTITIONER

## 2025-02-06 PROCEDURE — 3078F DIAST BP <80 MM HG: CPT | Mod: CPTII,S$GLB,, | Performed by: NURSE PRACTITIONER

## 2025-02-06 PROCEDURE — 3075F SYST BP GE 130 - 139MM HG: CPT | Mod: CPTII,S$GLB,, | Performed by: NURSE PRACTITIONER

## 2025-02-06 PROCEDURE — 1101F PT FALLS ASSESS-DOCD LE1/YR: CPT | Mod: CPTII,S$GLB,, | Performed by: NURSE PRACTITIONER

## 2025-02-06 PROCEDURE — 99214 OFFICE O/P EST MOD 30 MIN: CPT | Mod: S$GLB,,, | Performed by: NURSE PRACTITIONER

## 2025-02-06 PROCEDURE — 1159F MED LIST DOCD IN RCRD: CPT | Mod: CPTII,S$GLB,, | Performed by: NURSE PRACTITIONER

## 2025-02-06 PROCEDURE — 99999 PR PBB SHADOW E&M-EST. PATIENT-LVL V: CPT | Mod: PBBFAC,,, | Performed by: NURSE PRACTITIONER

## 2025-02-06 PROCEDURE — 3288F FALL RISK ASSESSMENT DOCD: CPT | Mod: CPTII,S$GLB,, | Performed by: NURSE PRACTITIONER

## 2025-02-06 PROCEDURE — 81001 URINALYSIS AUTO W/SCOPE: CPT | Mod: S$GLB,,, | Performed by: NURSE PRACTITIONER

## 2025-02-06 RX ORDER — ONDANSETRON HYDROCHLORIDE 8 MG/1
8 TABLET, FILM COATED ORAL EVERY 8 HOURS PRN
Qty: 30 TABLET | Refills: 3 | Status: SHIPPED | OUTPATIENT
Start: 2025-02-06

## 2025-02-06 RX ORDER — ATORVASTATIN CALCIUM 80 MG/1
80 TABLET, FILM COATED ORAL NIGHTLY
Qty: 90 TABLET | Refills: 3 | Status: SHIPPED | OUTPATIENT
Start: 2025-02-06

## 2025-02-06 RX ORDER — GABAPENTIN 300 MG/1
300 CAPSULE ORAL 2 TIMES DAILY
Qty: 180 CAPSULE | Refills: 3 | Status: SHIPPED | OUTPATIENT
Start: 2025-02-06 | End: 2026-02-01

## 2025-02-06 RX ORDER — CLOPIDOGREL BISULFATE 75 MG/1
75 TABLET ORAL DAILY
Qty: 90 TABLET | Refills: 3 | Status: SHIPPED | OUTPATIENT
Start: 2025-02-06 | End: 2026-02-01

## 2025-02-06 RX ORDER — NITROFURANTOIN 25; 75 MG/1; MG/1
100 CAPSULE ORAL 2 TIMES DAILY
Qty: 14 CAPSULE | Refills: 0 | Status: SHIPPED | OUTPATIENT
Start: 2025-02-06 | End: 2025-02-13

## 2025-02-06 RX ORDER — METOPROLOL SUCCINATE 25 MG/1
25 TABLET, EXTENDED RELEASE ORAL DAILY
Qty: 90 TABLET | Refills: 3 | Status: SHIPPED | OUTPATIENT
Start: 2025-02-06

## 2025-02-06 RX ORDER — NEOMYCIN SULFATE, POLYMYXIN B SULFATE AND DEXAMETHASONE 3.5; 10000; 1 MG/ML; [USP'U]/ML; MG/ML
1 SUSPENSION/ DROPS OPHTHALMIC 4 TIMES DAILY
COMMUNITY
Start: 2024-12-30

## 2025-02-06 RX ORDER — LISINOPRIL 5 MG/1
5 TABLET ORAL DAILY
Qty: 90 TABLET | Refills: 3 | Status: SHIPPED | OUTPATIENT
Start: 2025-02-06

## 2025-02-06 RX ORDER — ESCITALOPRAM OXALATE 20 MG/1
20 TABLET ORAL DAILY
Qty: 90 TABLET | Refills: 3 | Status: SHIPPED | OUTPATIENT
Start: 2025-02-06

## 2025-02-06 RX ORDER — AMLODIPINE BESYLATE 5 MG/1
5 TABLET ORAL DAILY
Qty: 90 TABLET | Refills: 3 | Status: SHIPPED | OUTPATIENT
Start: 2025-02-06 | End: 2026-02-06

## 2025-02-06 NOTE — PROGRESS NOTES
"Subjective:       Patient ID: Xi Moise is a 84 y.o. female.    Chief Complaint: Urinary Tract Infection        HPI WITH ASSESSMENT AND PLAN OF CARE:      ###URGENT CARE VISIT###    Patient is an 84 year old white female with Aortic Atherosclerosis, History of Stroke, Type 2 Diabetes, CKD Stage 3, diabetic neuropathy, Hypertension/Hypertensive heart disease, Hyperlipidemia, moderate COPD/Asthma with Bronchiectasis with history of Mycobacterium avium-intracellulare infection, Calcified granuloma of lung, Recurrent Depression, chronic GERD, frequent falls, with recent orbital fracture in January 2024 treated by [facility] and followed by [facility], and Osteopenia that is here today for  complaint of UTI and low back pain    UTI  Reports started with lower back pain over 1 week ago but did not associated with UTI until started with mild urinary symptoms few days ago.  Took AZO with little relief  Office Visit on 02/06/2025   Component Date Value Ref Range Status    Color, UA 02/06/2025 Dark Danette   Final    Spec Grav UA 02/06/2025 1.015   Final    pH, UA 02/06/2025 6+   Final    WBC, UA 02/06/2025 positive   Final    Blood, UA 02/06/2025 trace   Final     +1 WBCs and Positive Nitrites with trace blood present on dipstick that I read myself.  Treating with Macrobid 100 mg BID x 7 days  Sent off for urine culture.       Vitals:    02/06/25 0918   BP: 130/64   BP Location: Right arm   Patient Position: Sitting   Pulse: 67   Temp: 97.9 °F (36.6 °C)   TempSrc: Temporal   SpO2: 97%   Weight: 78.7 kg (173 lb 8 oz)   Height: 5' 8" (1.727 m)         Diagnoses this Encounter:         ICD-10-CM ICD-9-CM   1. Leukocytes in urine  R82.998 791.7   2. Acute bilateral low back pain without sciatica  M54.50 724.2     338.19   3. Urinary tract infection with hematuria, site unspecified  N39.0 599.0    R31.9 599.70       Orders Placed This Encounter    Urine Culture High Risk    POCT urinalysis, dipstick or tablet reag "    nitrofurantoin, macrocrystal-monohydrate, (MACROBID) 100 MG capsule        No follow-ups on file.     Patient's Medications   New Prescriptions    NITROFURANTOIN, MACROCRYSTAL-MONOHYDRATE, (MACROBID) 100 MG CAPSULE    Take 1 capsule (100 mg total) by mouth 2 (two) times daily. for 7 days   Previous Medications    ALBUTEROL (PROVENTIL/VENTOLIN HFA) 90 MCG/ACTUATION INHALER    Inhale 2 puffs into the lungs every 4 (four) hours as needed.    AMLODIPINE (NORVASC) 5 MG TABLET    Take 1 tablet (5 mg total) by mouth once daily.    ASCORBIC ACID, VITAMIN C, (VITAMIN C) 500 MG TABLET    Take 500 mg by mouth once daily.    ATORVASTATIN (LIPITOR) 80 MG TABLET    TAKE 1 TABLET BY MOUTH EVERY EVENING    BUDESONIDE-FORMOTEROL 80-4.5 MCG (SYMBICORT) 80-4.5 MCG/ACTUATION HFAA    Inhale 2 puffs into the lungs 2 (two) times a day. Controller    CLOPIDOGREL (PLAVIX) 75 MG TABLET    Take 1 tablet (75 mg total) by mouth once daily.    COLCHICINE (COLCRYS) 0.6 MG TABLET    Take 1 tablet (0.6 mg total) by mouth 2 (two) times a day.    CYANOCOBALAMIN (VITAMIN B-12) 500 MCG TABLET    Take 500 mcg by mouth once daily.    D-MANNOSE 500 MG CAP    Take 500 mg by mouth 2 (two) times a day.    ESCITALOPRAM OXALATE (LEXAPRO) 20 MG TABLET    Take 1 tablet (20 mg total) by mouth once daily.    FLUTICASONE (FLONASE) 50 MCG/ACTUATION NASAL SPRAY    1 spray by Each Nare route once daily.    GABAPENTIN (NEURONTIN) 300 MG CAPSULE    Take 1 capsule (300 mg total) by mouth 2 (two) times daily.    INSULIN  UNIT/ML INJECTION    Inject 26 Units into the skin 2 (two) times daily before meals.    LISINOPRIL (PRINIVIL,ZESTRIL) 5 MG TABLET    TAKE 1 TABLET BY MOUTH EVERY DAY    MAGNESIUM 250 MG TAB    Take 500 mg by mouth once daily.    MECLIZINE (ANTIVERT) 25 MG TABLET    Take 1 tablet (25 mg total) by mouth 2 (two) times daily as needed.    METOPROLOL SUCCINATE (TOPROL-XL) 25 MG 24 HR TABLET    Take 1 tablet (25 mg total) by mouth once daily.     MULTIVIT-MIN/FA/LYCOPEN/LUTEIN (SENTRY SENIOR ORAL)    Take 1 tablet by mouth once daily.    NEOMYCIN-POLYMYXIN-DEXAMETHASONE (MAXITROL) 3.5MG/ML-10,000 UNIT/ML-0.1 % DRPS    Place 1 drop into both eyes 4 (four) times daily.    ONDANSETRON (ZOFRAN) 8 MG TABLET    Take 1 tablet (8 mg total) by mouth every 8 (eight) hours as needed for Nausea.    SODIUM CHLORIDE 3% 3 % NEBULIZER SOLUTION    Take 4 mLs by nebulization as needed for Other or Cough.    VITAMIN D (VITAMIN D3) 1000 UNITS TAB    Take 1,000 Units by mouth once daily.   Modified Medications    No medications on file   Discontinued Medications    BLOOD-GLUCOSE SENSOR (DEXCOM G7 SENSOR) TONI    Change sensor every 7 days.    PANTOPRAZOLE (PROTONIX) 40 MG TABLET    Take 1 tablet (40 mg total) by mouth once daily.         Review of Systems   Genitourinary:  Positive for dysuria and frequency.   Musculoskeletal:  Positive for gait problem and myalgias.         Objective:        Physical Exam  Constitutional:       General: She is not in acute distress.     Appearance: Normal appearance. She is not toxic-appearing or diaphoretic.      Comments: Body mass index is 26.38 kg/m².     Cardiovascular:      Rate and Rhythm: Normal rate and regular rhythm.      Heart sounds: Normal heart sounds.   Pulmonary:      Effort: Pulmonary effort is normal. No respiratory distress.      Breath sounds: Normal breath sounds.   Neurological:      Mental Status: She is alert and oriented to person, place, and time.   Psychiatric:         Mood and Affect: Mood normal.         Behavior: Behavior normal.             Past Medical History:   Diagnosis Date    Asthma     Chronic kidney disease (CKD), stage III (moderate) 04/28/2016    COPD, moderate     Depression 04/29/2016    Diabetes mellitus     Diabetic polyneuropathy associated with type 2 diabetes mellitus 02/09/2023    History of CVA (cerebrovascular accident) without residual deficits 04/28/2016    History of MAC infection  2016    Hyperlipidemia associated with type 2 diabetes mellitus 2016    Hypertension     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    Lipidemia     Long term (current) use of insulin 2023    Meniere disease 2016    Multiple falls 2023    Pulmonary Mycobacterium avium-intracellulare infection 2016    followed by Pulmonologist at [facility]    Type 2 diabetes mellitus with stage 2 chronic kidney disease, without long-term current use of insulin 10/28/2015       Past Surgical History:   Procedure Laterality Date    ANKLE SURGERY Left     CATARACT EXTRACTION, BILATERAL       SECTION      x 3    eyelid surgery      HYSTERECTOMY      SKIN CANCER EXCISION Left     left arm cancer excision - Dr. Richardson - unknown type of skin cancer       Family History   Problem Relation Name Age of Onset    Depression Mother      Bipolar disorder Mother      Cancer Father          skin cancer    Diabetes Father      Hypertension Father      Hyperlipidemia Father      Heart disease Father      Gout Father      Nephrolithiasis Father      Hypertension Sister Evith     Gout Sister Evith     Diabetes Sister Evith     Cancer Sister Evith         rectal cancer    Hypertension Sister Yumiko     Hyperlipidemia Sister Yumiko     Heart disease Sister Yumiko         stents x 4    Cancer Sister Yumiko         pituitary gland cancer    Hypertension Brother      Hyperlipidemia Brother      Heart disease Brother      Hypertension Daughter      Early death Maternal Aunt         Social History     Socioeconomic History    Marital status: Single   Tobacco Use    Smoking status: Never     Passive exposure: Past    Smokeless tobacco: Never   Substance and Sexual Activity    Alcohol use: No    Drug use: No     Social Drivers of Health     Financial Resource Strain: Medium Risk (2024)    Overall Financial Resource Strain (CARDIA)     Difficulty of  Paying Living Expenses: Somewhat hard   Food Insecurity: Food Insecurity Present (1/7/2024)    Hunger Vital Sign     Worried About Running Out of Food in the Last Year: Sometimes true     Ran Out of Food in the Last Year: Never true   Transportation Needs: Unmet Transportation Needs (1/7/2024)    PRAPARE - Transportation     Lack of Transportation (Medical): Yes     Lack of Transportation (Non-Medical): Yes   Physical Activity: Inactive (1/7/2024)    Exercise Vital Sign     Days of Exercise per Week: 0 days     Minutes of Exercise per Session: 0 min   Stress: Stress Concern Present (1/7/2024)    [employer] - Occupational Stress Questionnaire     Feeling of Stress : Rather much   Housing Stability: Unknown (1/7/2024)    Housing Stability Vital Sign     Unable to Pay for Housing in the Last Year: Patient declined     Number of Places Lived in the Last Year: 1     Unstable Housing in the Last Year: No

## 2025-02-07 LAB
BACTERIA UR CULT: NORMAL
BACTERIA UR CULT: NORMAL

## 2025-02-10 NOTE — PROGRESS NOTES
Advise patient that her urine culture showed multiple organism but none in predominance - this means that the specimen was contaminated and not a good clean catch, MID-STREAM collection.  IF her symptoms are resolved, no further action needed but if still having urinary symptoms - need to collect another urine and send off for JUST CULTURE - with stricter collection instructions - wipe 3 times front to back and urine in toilet first and then in cup. [] : Resident [[email or web address]] : Patient seen and examined. I agree w/ the resident's assessment and plan.    HEENT: PERRLA, EOMI NECK: NO LAD, SUPPLE CVS: +S1/S2 RRR, POSSIBLE MURMUR  RESP: CTA B/L ABD: +BS, SOFT, NONTENDER, NO GUARDING OR RIGIDITY LE: NO CALF TENDERNESS ON PALPATION B/L, NO LE EDEMA B/L CN 2-12 INTACT, NORMAL STRENGTH UPPER AND LOWER EXT B/L 5/5, NORMAL RAPID ALTERNATING MOVEMENTS AND FINGER TO NOSE  CPE -Labs -Offered HIV/ STD/ Hep C Screening  -Mammogram appt 10/2024 given script  -Colonoscopy Screening up to date 2022  -Advised annual ophthalmology, dental and dermatology visits -Advised monthly breast exams -Annual depression screening - negative     prominent R axillary lymphadenopathy, R breast densities in 9-11 o clock region and L breast densities in 1-3 o clock region noted on exam. us breasts mammogram  Hashimoto's Disease: continue levothyroxine 75mg po daily checking tfts w/endocrine  HLD recent labs reviewed from June 2024 continue atorvastatin 10mg po qhs  possible murmur, intermittent palpitations last one 6mos ago cardio for echo/eval, will f/u results  flu vaccine given right arm IM no adverse reactions noted, consent obtained  advised if patient doesnt hear from me 1 week after testing to call office for results , patient agreed w/plan and understood.

## 2025-03-07 ENCOUNTER — HOSPITAL ENCOUNTER (OUTPATIENT)
Dept: CARDIOLOGY | Facility: HOSPITAL | Age: 85
Discharge: HOME | End: 2025-03-07
Attending: INTERNAL MEDICINE
Payer: MEDICARE

## 2025-03-07 DIAGNOSIS — Z86.73 HISTORY OF CVA (CEREBROVASCULAR ACCIDENT) WITHOUT RESIDUAL DEFICITS: Chronic | ICD-10-CM

## 2025-03-07 LAB
LEFT ANT TIBIAL SYS PSV: 68 CM/S
LEFT CFA PSV: 120 CM/S
LEFT DORSALIS PEDIS PSV: 92 CM/S
LEFT PERONEAL SYS PSV: 93 CM/S
LEFT POST TIBIAL SYS PSV: 87 CM/S
LEFT PROFUNDA SYS PSV: 68 CM/S
LEFT SUPER FEMORAL DIST SYS PSV: 128 CM/S
LEFT SUPER FEMORAL MID SYS PSV: 111 CM/S
LEFT SUPER FEMORAL PROX SYS PSV: 96 CM/S
LEFT TIB/PER TRUNK SYS PSV: 79 CM/S
RIGHT ANT TIBIAL SYS PSV: 79 CM/S
RIGHT CFA PSV: 129 CM/S
RIGHT DORSALIS PEDIS PSV: 55 CM/S
RIGHT PERONEAL SYS PSV: 47 CM/S
RIGHT POPLITEAL PSV: 116 CM/S
RIGHT POST TIBIAL SYS PSV: 94 CM/S
RIGHT SUPER FEMORAL DIST SYS PSV: 104 CM/S
RIGHT SUPER FEMORAL MID SYS PSV: 99 CM/S
RIGHT SUPER FEMORAL PROX SYS PSV: 86 CM/S
RIGHT TIB/PER TRUNK SYS PSV: 70 CM/S

## 2025-03-07 PROCEDURE — 93925 LOWER EXTREMITY STUDY: CPT | Mod: 26,,, | Performed by: INTERNAL MEDICINE

## 2025-03-07 PROCEDURE — 93925 LOWER EXTREMITY STUDY: CPT

## 2025-03-19 ENCOUNTER — RESULTS FOLLOW-UP (OUTPATIENT)
Dept: CARDIOLOGY | Facility: CLINIC | Age: 85
End: 2025-03-19

## 2025-04-08 LAB
LEFT EYE DM RETINOPATHY: NEGATIVE
RIGHT EYE DM RETINOPATHY: NEGATIVE

## 2025-04-09 ENCOUNTER — PATIENT OUTREACH (OUTPATIENT)
Dept: ADMINISTRATIVE | Facility: HOSPITAL | Age: 85
End: 2025-04-09
Payer: MEDICARE

## 2025-04-09 NOTE — PROGRESS NOTES
Health Maintenance Topic(s) Outreach Outcomes & Actions Taken:    Eye Exam - Outreach Outcomes & Actions Taken  : Diabetic Eye External Records Uploaded, Care Team & History Updated if Applicable

## 2025-05-13 ENCOUNTER — PATIENT OUTREACH (OUTPATIENT)
Dept: ADMINISTRATIVE | Facility: HOSPITAL | Age: 85
End: 2025-05-13
Payer: MEDICARE

## 2025-05-13 DIAGNOSIS — N18.31 TYPE 2 DIABETES MELLITUS WITH STAGE 3A CHRONIC KIDNEY DISEASE, WITH LONG-TERM CURRENT USE OF INSULIN: Primary | ICD-10-CM

## 2025-05-13 DIAGNOSIS — E11.22 TYPE 2 DIABETES MELLITUS WITH STAGE 3A CHRONIC KIDNEY DISEASE, WITH LONG-TERM CURRENT USE OF INSULIN: Primary | ICD-10-CM

## 2025-05-13 DIAGNOSIS — Z79.4 TYPE 2 DIABETES MELLITUS WITH STAGE 3A CHRONIC KIDNEY DISEASE, WITH LONG-TERM CURRENT USE OF INSULIN: Primary | ICD-10-CM

## 2025-05-20 ENCOUNTER — OFFICE VISIT (OUTPATIENT)
Dept: FAMILY MEDICINE | Facility: CLINIC | Age: 85
End: 2025-05-20
Payer: MEDICARE

## 2025-05-20 ENCOUNTER — TELEPHONE (OUTPATIENT)
Dept: FAMILY MEDICINE | Facility: CLINIC | Age: 85
End: 2025-05-20
Payer: MEDICARE

## 2025-05-20 VITALS
WEIGHT: 178 LBS | OXYGEN SATURATION: 95 % | HEART RATE: 83 BPM | TEMPERATURE: 98 F | HEIGHT: 68 IN | DIASTOLIC BLOOD PRESSURE: 70 MMHG | BODY MASS INDEX: 26.98 KG/M2 | SYSTOLIC BLOOD PRESSURE: 150 MMHG

## 2025-05-20 DIAGNOSIS — R09.82 POSTNASAL DRIP: ICD-10-CM

## 2025-05-20 DIAGNOSIS — I13.10 HYPERTENSIVE HEART AND KIDNEY DISEASE WITHOUT HEART FAILURE AND WITH STAGE 3A CHRONIC KIDNEY DISEASE: ICD-10-CM

## 2025-05-20 DIAGNOSIS — R10.2 ACUTE SUPRAPUBIC PAIN: Primary | ICD-10-CM

## 2025-05-20 DIAGNOSIS — N39.0 URINARY TRACT INFECTION WITH HEMATURIA, SITE UNSPECIFIED: ICD-10-CM

## 2025-05-20 DIAGNOSIS — N18.30 CKD STAGE 3 DUE TO TYPE 2 DIABETES MELLITUS: ICD-10-CM

## 2025-05-20 DIAGNOSIS — E11.22 CKD STAGE 3 DUE TO TYPE 2 DIABETES MELLITUS: ICD-10-CM

## 2025-05-20 DIAGNOSIS — R31.9 URINARY TRACT INFECTION WITH HEMATURIA, SITE UNSPECIFIED: ICD-10-CM

## 2025-05-20 DIAGNOSIS — N18.31 HYPERTENSIVE HEART AND KIDNEY DISEASE WITHOUT HEART FAILURE AND WITH STAGE 3A CHRONIC KIDNEY DISEASE: ICD-10-CM

## 2025-05-20 DIAGNOSIS — R05.9 COUGH, UNSPECIFIED TYPE: ICD-10-CM

## 2025-05-20 LAB
BILIRUB SERPL-MCNC: NEGATIVE MG/DL
BLOOD, POC UA: NORMAL
GLUCOSE UR QL STRIP: NEGATIVE
KETONES UR QL STRIP: NEGATIVE
LEUKOCYTE ESTERASE URINE, POC: NORMAL
NITRITE, POC UA: POSITIVE
PH, POC UA: 6
PROTEIN, POC: NEGATIVE
SPECIFIC GRAVITY, POC UA: 1.01
UROBILINOGEN, POC UA: 0.2

## 2025-05-20 PROCEDURE — 3077F SYST BP >= 140 MM HG: CPT | Mod: CPTII,S$GLB,, | Performed by: NURSE PRACTITIONER

## 2025-05-20 PROCEDURE — 1159F MED LIST DOCD IN RCRD: CPT | Mod: CPTII,S$GLB,, | Performed by: NURSE PRACTITIONER

## 2025-05-20 PROCEDURE — 3078F DIAST BP <80 MM HG: CPT | Mod: CPTII,S$GLB,, | Performed by: NURSE PRACTITIONER

## 2025-05-20 PROCEDURE — 99214 OFFICE O/P EST MOD 30 MIN: CPT | Mod: S$GLB,,, | Performed by: NURSE PRACTITIONER

## 2025-05-20 PROCEDURE — 2023F DILAT RTA XM W/O RTNOPTHY: CPT | Mod: CPTII,S$GLB,, | Performed by: NURSE PRACTITIONER

## 2025-05-20 PROCEDURE — 1101F PT FALLS ASSESS-DOCD LE1/YR: CPT | Mod: CPTII,S$GLB,, | Performed by: NURSE PRACTITIONER

## 2025-05-20 PROCEDURE — 87086 URINE CULTURE/COLONY COUNT: CPT | Performed by: NURSE PRACTITIONER

## 2025-05-20 PROCEDURE — G2211 COMPLEX E/M VISIT ADD ON: HCPCS | Mod: S$GLB,,, | Performed by: NURSE PRACTITIONER

## 2025-05-20 PROCEDURE — 3288F FALL RISK ASSESSMENT DOCD: CPT | Mod: CPTII,S$GLB,, | Performed by: NURSE PRACTITIONER

## 2025-05-20 PROCEDURE — 99999 PR PBB SHADOW E&M-EST. PATIENT-LVL V: CPT | Mod: PBBFAC,,, | Performed by: NURSE PRACTITIONER

## 2025-05-20 PROCEDURE — 81003 URINALYSIS AUTO W/O SCOPE: CPT | Mod: QW,S$GLB,, | Performed by: NURSE PRACTITIONER

## 2025-05-20 PROCEDURE — 1126F AMNT PAIN NOTED NONE PRSNT: CPT | Mod: CPTII,S$GLB,, | Performed by: NURSE PRACTITIONER

## 2025-05-20 PROCEDURE — 1160F RVW MEDS BY RX/DR IN RCRD: CPT | Mod: CPTII,S$GLB,, | Performed by: NURSE PRACTITIONER

## 2025-05-20 RX ORDER — NITROFURANTOIN 25; 75 MG/1; MG/1
100 CAPSULE ORAL 2 TIMES DAILY
Qty: 14 CAPSULE | Refills: 0 | Status: SHIPPED | OUTPATIENT
Start: 2025-05-20 | End: 2025-05-27

## 2025-05-20 RX ORDER — METOPROLOL SUCCINATE 25 MG/1
25 TABLET, EXTENDED RELEASE ORAL 2 TIMES DAILY
Qty: 200 TABLET | Refills: 3 | Status: SHIPPED | OUTPATIENT
Start: 2025-05-20 | End: 2026-06-24

## 2025-05-20 NOTE — PROGRESS NOTES
Subjective:       Patient ID: Xi Moise is a 85 y.o. female.    Chief Complaint: Urinary Tract Infection (Low back pain/pressure/lower abdomen pain) and Cough        HPI WITH ASSESSMENT AND PLAN OF CARE:      Patient is an 85 year old white female with Aortic Atherosclerosis, History of Stroke, Type 2 Diabetes, CKD Stage 3, diabetic neuropathy, Hypertension/Hypertensive heart disease, Hyperlipidemia, moderate COPD/Asthma with Bronchiectasis with history of Mycobacterium avium-intracellulare infection, Calcified granuloma of lung, Recurrent Depression, chronic GERD, frequent falls, with recent orbital fracture in January 2024 treated by [facility] and followed by [facility], and Osteopenia that is here today for  complaint of UTI and low back pain.  Patient also has complaint of cough and post-nasal drip.  Patient's blood pressure is also uncontrolled today.  Patient also had cancelled her follow up with Cardiology and Nephrology and needs to reschedule.     UTI  Started with lower back pain and suprapubic pressure with urinary frequency and urgency 2 days ago  UA + for UTI - small leukocytes, + nitrite and trace blood  Will treat with Macrobid 100 mg BID x 7 days and send urine off for culture  Office Visit on 05/20/2025   Component Date Value Ref Range Status    WBC, UA 05/20/2025 Small   Final    Nitrite, UA 05/20/2025 Positive   Final    Urobilinogen, UA 05/20/2025 0.2   Final    Protein, POC 05/20/2025 Negative   Final    pH, UA 05/20/2025 6.0   Final    Blood, UA 05/20/2025 trace   Final    Spec Grav UA 05/20/2025 1.010   Final    Ketones, UA 05/20/2025 Negative   Final    Bilirubin, POC 05/20/2025 Negative   Final    Glucose, UA 05/20/2025 Negative   Final         Post-Nasal Drip and Cough  Started for the past week   Pale boggy nasal turbinates with clear nasal discharge and + PND  Advised to take 24 hour zyrtec or allegra, flonase nasal spray every single day.      Hypertension/hypertensive  "heart disease  Taking Lisinopril 5 mg daily, Metoprolol XL 25 mg daily, Amlodipine 5 mg daily  BP high today and high last several visits  Will increase the Metoprolol XL 25 mg to TWICE daily, stay on Lisinopril and Amlodipine at present doses  Recheck in couple weeks when she comes in for 6 month check up.  Patient is also overdue for her cardiology follow up stating she had to cancel her appt - will reschedule.      CKD Stage 3  Creatinine 1.05 with eGFR 53 Feb. 2023  Creatinine  1.15 with DROP eGFR 47 in July 2023 - REFERRED Nephrology for monitoring  Started seeing Dr. Tillman on 7/24/2023   Last Nephrology visit 6/17/24  Current eGFR > 60  Overdue for follow up - patient will call to schedule.  I will get nephrology labs while getting my labs for upcoming visit.    BP Readings from Last 3 Encounters:   05/20/25 (!) 150/70   02/06/25 130/64   01/10/25 (!) 152/70        Vitals:    05/20/25 0934 05/20/25 1003   BP: (!) 160/88 (!) 150/70   BP Location: Right arm    Patient Position: Sitting    Pulse: 83    Temp: 97.9 °F (36.6 °C)    TempSrc: Temporal    SpO2: 95%    Weight: 80.7 kg (178 lb 0.3 oz)    Height: 5' 8" (1.727 m)          Diagnoses this Encounter:         ICD-10-CM ICD-9-CM   1. Acute suprapubic pain  R10.2 789.09     338.19   2. Urinary tract infection with hematuria, site unspecified  N39.0 599.0    R31.9 599.70   3. 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 Postnasal drip  R09.82 784.91   5. Cough, unspecified type  R05.9 786.2   6. CKD stage 3 due to type 2 diabetes mellitus  E11.22 250.40    N18.30 585.3       Orders Placed This Encounter    Urine Culture High Risk    POCT Urinalysis    nitrofurantoin, macrocrystal-monohydrate, (MACROBID) 100 MG capsule    metoprolol succinate (TOPROL-XL) 25 MG 24 hr tablet        Follow up in about 2 weeks (around 6/3/2025) for fasting labs and 6 month follow up.     I " spent a total of 33 minutes on the day of the visit.  This includes face to face time and non-face to face time preparing to see the patient (eg, review of tests), obtaining and/or reviewing separately obtained history, documenting clinical information in the electronic or other health record, independently interpreting results and communicating results to the patient/family/caregiver, or care coordinator.     Patient's Medications   New Prescriptions    NITROFURANTOIN, MACROCRYSTAL-MONOHYDRATE, (MACROBID) 100 MG CAPSULE    Take 1 capsule (100 mg total) by mouth 2 (two) times daily. for 7 days   Previous Medications    ALBUTEROL (PROVENTIL/VENTOLIN HFA) 90 MCG/ACTUATION INHALER    Inhale 2 puffs into the lungs every 4 (four) hours as needed.    AMLODIPINE (NORVASC) 5 MG TABLET    Take 1 tablet (5 mg total) by mouth once daily.    ASCORBIC ACID, VITAMIN C, (VITAMIN C) 500 MG TABLET    Take 500 mg by mouth once daily.    ATORVASTATIN (LIPITOR) 80 MG TABLET    Take 1 tablet (80 mg total) by mouth every evening.    BUDESONIDE-FORMOTEROL 80-4.5 MCG (SYMBICORT) 80-4.5 MCG/ACTUATION HFAA    Inhale 2 puffs into the lungs 2 (two) times a day. Controller    CLOPIDOGREL (PLAVIX) 75 MG TABLET    Take 1 tablet (75 mg total) by mouth once daily.    COLCHICINE (COLCRYS) 0.6 MG TABLET    Take 1 tablet (0.6 mg total) by mouth 2 (two) times a day.    CYANOCOBALAMIN (VITAMIN B-12) 500 MCG TABLET    Take 500 mcg by mouth once daily.    D-MANNOSE 500 MG CAP    Take 500 mg by mouth 2 (two) times a day.    ESCITALOPRAM OXALATE (LEXAPRO) 20 MG TABLET    Take 1 tablet (20 mg total) by mouth once daily.    FLUTICASONE (FLONASE) 50 MCG/ACTUATION NASAL SPRAY    1 spray by Each Nare route once daily.    GABAPENTIN (NEURONTIN) 300 MG CAPSULE    Take 1 capsule (300 mg total) by mouth 2 (two) times daily.    INSULIN  UNIT/ML INJECTION    Inject 26 Units into the skin 2 (two) times daily before meals.    LISINOPRIL (PRINIVIL,ZESTRIL) 5 MG  TABLET    Take 1 tablet (5 mg total) by mouth once daily.    MAGNESIUM 250 MG TAB    Take 500 mg by mouth once daily.    MECLIZINE (ANTIVERT) 25 MG TABLET    Take 1 tablet (25 mg total) by mouth 2 (two) times daily as needed.    MULTIVIT-MIN/FA/LYCOPEN/LUTEIN (SENTRY SENIOR ORAL)    Take 1 tablet by mouth once daily.    NEOMYCIN-POLYMYXIN-DEXAMETHASONE (MAXITROL) 3.5MG/ML-10,000 UNIT/ML-0.1 % DRPS    Place 1 drop into both eyes 4 (four) times daily.    ONDANSETRON (ZOFRAN) 8 MG TABLET    Take 1 tablet (8 mg total) by mouth every 8 (eight) hours as needed for Nausea.    SODIUM CHLORIDE 3% 3 % NEBULIZER SOLUTION    Take 4 mLs by nebulization as needed for Other or Cough.    VITAMIN D (VITAMIN D3) 1000 UNITS TAB    Take 1,000 Units by mouth once daily.   Modified Medications    Modified Medication Previous Medication    METOPROLOL SUCCINATE (TOPROL-XL) 25 MG 24 HR TABLET metoprolol succinate (TOPROL-XL) 25 MG 24 hr tablet       Take 1 tablet (25 mg total) by mouth 2 (two) times daily.    Take 1 tablet (25 mg total) by mouth once daily.   Discontinued Medications    No medications on file         Review of Systems   HENT:  Positive for congestion, postnasal drip and rhinorrhea.    Cardiovascular:  Positive for leg swelling.   Genitourinary:  Positive for frequency and urgency.         Objective:        Physical Exam  Constitutional:       General: She is not in acute distress.     Appearance: Normal appearance. She is not toxic-appearing or diaphoretic.      Comments: Body mass index is 27.07 kg/m².       HENT:      Nose: Congestion and rhinorrhea present.      Right Turbinates: Pale.      Left Turbinates: Pale.      Mouth/Throat:      Pharynx: Posterior oropharyngeal erythema present.   Eyes:      Conjunctiva/sclera: Conjunctivae normal.   Cardiovascular:      Rate and Rhythm: Normal rate and regular rhythm.      Heart sounds: Normal heart sounds.   Pulmonary:      Effort: Pulmonary effort is normal. No respiratory  distress.      Breath sounds: Normal breath sounds.   Neurological:      Mental Status: She is alert and oriented to person, place, and time.   Psychiatric:         Mood and Affect: Mood normal.         Behavior: Behavior normal.             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    Lipidemia     Long term (current) use of insulin 2023    Meniere disease 2016    Multiple falls 2023    Pulmonary Mycobacterium avium-intracellulare infection 2016    followed by Pulmonologist at [facility]    Type 2 diabetes mellitus with stage 2 chronic kidney disease, without long-term current use of insulin 10/28/2015       Past Surgical History:   Procedure Laterality Date    ANKLE SURGERY Left     CATARACT EXTRACTION, BILATERAL       SECTION      x 3    eyelid surgery      HYSTERECTOMY      SKIN CANCER EXCISION Left     left arm cancer excision - Dr. Richardson - unknown type of skin cancer       Family History   Problem Relation Name Age of Onset    Depression Mother      Bipolar disorder Mother      Cancer Father          skin cancer    Diabetes Father      Hypertension Father      Hyperlipidemia Father      Heart disease Father      Gout Father      Nephrolithiasis Father      Hypertension Sister Evith     Gout Sister Evith     Diabetes Sister Evith     Cancer Sister Evith         rectal cancer    Hypertension Sister Yumiko     Hyperlipidemia Sister Yumiko     Heart  disease Sister Yumiko         stents x 4    Cancer Sister Yumiko         pituitary gland cancer    Hypertension Brother      Hyperlipidemia Brother      Heart disease Brother      Hypertension Daughter      Early death Maternal Aunt         Social History     Socioeconomic History    Marital status: Single   Tobacco Use    Smoking status: Never     Passive exposure: Past    Smokeless tobacco: Never   Substance and Sexual Activity    Alcohol use: No    Drug use: No     Social Drivers of Health     Financial Resource Strain: Medium Risk (1/7/2024)    Overall Financial Resource Strain (CARDIA)     Difficulty of Paying Living Expenses: Somewhat hard   Food Insecurity: Food Insecurity Present (1/7/2024)    Hunger Vital Sign     Worried About Running Out of Food in the Last Year: Sometimes true     Ran Out of Food in the Last Year: Never true   Transportation Needs: Unmet Transportation Needs (1/7/2024)    PRAPARE - Transportation     Lack of Transportation (Medical): Yes     Lack of Transportation (Non-Medical): Yes   Physical Activity: Inactive (1/7/2024)    Exercise Vital Sign     Days of Exercise per Week: 0 days     Minutes of Exercise per Session: 0 min   Stress: Stress Concern Present (1/7/2024)    [employer] - Occupational Stress Questionnaire     Feeling of Stress : Rather much   Housing Stability: High Risk (4/10/2025)    Received from [facility] SDOH Screening     In the past year, have you been unable to get any of the following when you really needed them? choose all that apply.: Utilities (electric, gas, and water)

## 2025-05-20 NOTE — TELEPHONE ENCOUNTER
----- Message from Jessica sent at 5/20/2025  8:11 AM CDT -----  Type:  Patient Returning CallWho Called:Pt Would the patient rather a call back or a response via [facility]? Call [facility] Call Back Number:[phone] Information: patient states she have a bladder infection and need something sent to the pharmacy

## 2025-05-23 ENCOUNTER — OFFICE VISIT (OUTPATIENT)
Dept: CARDIOLOGY | Facility: CLINIC | Age: 85
End: 2025-05-23
Payer: MEDICARE

## 2025-05-23 ENCOUNTER — TELEPHONE (OUTPATIENT)
Dept: FAMILY MEDICINE | Facility: CLINIC | Age: 85
End: 2025-05-23
Payer: MEDICARE

## 2025-05-23 ENCOUNTER — RESULTS FOLLOW-UP (OUTPATIENT)
Dept: FAMILY MEDICINE | Facility: CLINIC | Age: 85
End: 2025-05-23

## 2025-05-23 VITALS
SYSTOLIC BLOOD PRESSURE: 143 MMHG | WEIGHT: 173.06 LBS | HEIGHT: 68 IN | DIASTOLIC BLOOD PRESSURE: 78 MMHG | OXYGEN SATURATION: 97 % | BODY MASS INDEX: 26.23 KG/M2 | HEART RATE: 61 BPM

## 2025-05-23 DIAGNOSIS — I13.10 HYPERTENSIVE HEART AND KIDNEY DISEASE WITHOUT HEART FAILURE AND WITH STAGE 3A CHRONIC KIDNEY DISEASE: ICD-10-CM

## 2025-05-23 DIAGNOSIS — E11.22 CKD STAGE 3 DUE TO TYPE 2 DIABETES MELLITUS: ICD-10-CM

## 2025-05-23 DIAGNOSIS — N18.31 HYPERTENSIVE HEART AND KIDNEY DISEASE WITHOUT HEART FAILURE AND WITH STAGE 3A CHRONIC KIDNEY DISEASE: ICD-10-CM

## 2025-05-23 DIAGNOSIS — M79.89 BILATERAL SWELLING OF FEET: ICD-10-CM

## 2025-05-23 DIAGNOSIS — N18.30 CKD STAGE 3 DUE TO TYPE 2 DIABETES MELLITUS: ICD-10-CM

## 2025-05-23 DIAGNOSIS — Z86.73 HISTORY OF CVA (CEREBROVASCULAR ACCIDENT) WITHOUT RESIDUAL DEFICITS: Primary | ICD-10-CM

## 2025-05-23 LAB — BACTERIA UR CULT: ABNORMAL

## 2025-05-23 PROCEDURE — 99999 PR PBB SHADOW E&M-EST. PATIENT-LVL III: CPT | Mod: PBBFAC,,, | Performed by: INTERNAL MEDICINE

## 2025-05-23 RX ORDER — AMLODIPINE BESYLATE 5 MG/1
5 TABLET ORAL DAILY
COMMUNITY

## 2025-05-23 NOTE — TELEPHONE ENCOUNTER
Called and spoke with pt in regards of MANAS Corado's message. Pt verbalized understanding of message.

## 2025-05-23 NOTE — TELEPHONE ENCOUNTER
----- Message from Dolores Corado NP sent at 5/23/2025  4:04 PM CDT -----  Advise patient that the Macrobid prescribed for antibiotic will treat infection - take antibiotic until completed.  ----- Message -----  From: Celestina Ledezma MA  Sent: 5/20/2025  10:13 AM CDT  To: Dolores Corado NP

## 2025-05-23 NOTE — PROGRESS NOTES
Subjective:   @Patient ID:  Xi Moise is a 85 y.o. female who presents for  evaluation of No chief complaint on file.      HPI:      84-year-old female active medical problems include    -  pulmonary MAC, bronchiectasis, followed up with Dr. Tyler  in pulmonology.  Recent CTA abdomen shows mild hiatal hernia, stable small cystic area on pancreatic head, mild chronic interstitial/fibrotic lung changes.  -  CVA no residual deficits on Plavix, CVA more than 8 years ago,  -  no significant carotid stenosis  -  COPD  -  gout.  Started on colchicine 0.6 mg twice a day for chest pain during last visit  -  hypertension on lisinopril and Toprol-XL  -  hyperlipidemia LDL as high as 184 in the past, now down to 88.  On Lipitor 80 mg daily.        Seen in clinic for left-sided pleuritic type chest pain.  And atypical  claudication symptoms.  Here to discuss test results.  Significant improvement in pain with taking colchicine.      Results for orders placed during the hospital encounter of 11/24/23    Echo    Interpretation Summary    Left Ventricle: The left ventricle is normal in size. Normal wall thickness. There is concentric remodeling. There is hyperdynamic systolic function. Biplane (2D) method of discs ejection fraction is 76%.    Right Ventricle: Normal right ventricular cavity size. Wall thickness is normal. Right ventricle wall motion  is normal. Systolic function is normal.    Mitral Valve: There is mild mitral annular calcification present.    Pulmonary Artery: The estimated pulmonary artery systolic pressure is 25 mmHg.    IVC/SVC: Normal venous pressure at 3 mmHg.      No results found for this or any previous visit.      No results found for this or any previous visit.      Please document below the medical necessity for continuous telemetry monitoring or discontinue the current order if appropriate.    Current rhythm from flowsheet:               Patient Active Problem List    Diagnosis Date Noted    Soft  tissue swelling 01/10/2025    Sciatica of right side 03/20/2024     Pain to right lower back/sciatica pain  Around March 12th, 2024: tripped while in her walk-in attic from tripping over storage bags but no complete fall. States she did NOT hit her head. But she did start with pain to right lower back/buttock radiating into leg.  Negative SLRs   + DTRs.  Already on Gabapentin 300 mg BID - advised to increase to three times daily  Topical rubs and Tylenol  Stay away from NSAIDs - due to CKD      Hiatal hernia 03/04/2024     Small sliding-type hiatal hernia seen on CT abd/pelvis 7/26/2023  Stable.        Mild intermittent asthma without complication 10/10/2023    Senile purpura 10/10/2023    Long term (current) use of insulin 06/16/2023    Multiple falls 06/16/2023     In November 2023 - hospitalized for syncope and orthostatic hypotension  In January 2024 - fall with orbital fracture - states she tripped over a cement stone  Bilateral carotid ultrasound 3/5/24: Impression: NO evidence of a hemodynamically significant carotid bifurcation stenosis.  March 2024:  tripped in attic and hurt back but did not fall  Patient has a rollator walker with seat but very heavy and difficulty loading and unloading.  Patient requesting a lightweight everyday walker- [facility] request sent and received lightweight walker.  No falls since last visit.       Bronchiectasis without complication 03/10/2023    Calcified granuloma of lung 03/10/2023     Seen on CT chest 12/17/2015  Stable.        Chronic GERD 03/10/2023     Previously taking Protonix 40 mg daily  NOT currently taking medication and has no complaints.  Small sliding-type hiatal hernia seen on CT abd/pelvis 7/26/2023  Stable.          Osteopenia 03/10/2023     Last DEXA scan 3/5/24: Osteopenia  Continue taking calcium with vitamin-D supplementation  Recheck in 3 years.        Aortic atherosclerosis 03/09/2023     Seen on CT chest 4/28/2016.  Still present on recent CT 3/29/22  ON  Plavix 75 mg daily,  Atorvastatin 80 mg daily, Lisinopril 5 mg daily, Toprol XL 25 mg daily   Stable.        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  Recheck 3 months.  BP Readings from Last 3 Encounters:   06/20/24 (!) 144/67   06/17/24 132/70           Diabetic polyneuropathy associated with type 2 diabetes mellitus 02/09/2023     On Gabapentin 300 mg twice daily  She has been on this medication and dose for years  Continue at present dose for now.  Stable.        Atherosclerosis of native coronary artery of native heart with angina pectoris 02/09/2023     Seen on CT chest 4/28/2016.  Still present on recent CT 3/29/22  ON Plavix 75 mg daily,  Atorvastatin 80 mg daily, Lisinopril 5 mg daily, Toprol XL 25 mg daily   Stable.        Allergic rhinitis 10/31/2018    Weakness of both lower extremities 09/09/2018    Abnormal chest x-ray with multiple lung nodules     COPD, moderate     Recurrent major depressive disorder, in partial remission 04/29/2016      Currently taking Lexapro 20 mg daily  Controlled at present time.  Recheck in 6 months.        Hyperlipidemia associated with type 2 diabetes mellitus 04/28/2016     Taking atorvastatin 80 mg daily   LDL 93 but just getting back to taking medications regularly.  LDL was 77.4 when she was taking medication correctly  Will recheck in 3 months - make sure to take medication every day.        CKD stage 3 due to type 2 diabetes mellitus 04/28/2016     Creatinine 1.05 with eGFR 53 Feb. 2023  Creatinine  1.15 with DROP eGFR 47 in July 2023 - REFERRED Nephrology for monitoring  Started seeing Dr. Tillman on 7/24/2023          Meniere disease 2016    History of CVA (cerebrovascular accident) without residual deficits 2016     Stroke around 2016  On Plavix 75 mg daily      History of MAC infection 2016    Type 2 diabetes mellitus with stage 3 chronic kidney disease, with long-term current use of insulin 10/28/2015     Class: Chronic    HTN, goal below 140/90 10/28/2015     Class: Chronic           Right Arm BP - Sittin/76  Left Arm BP - Sittin/78        LAST HbA1c  Lab Results   Component Value Date    HGBA1C 6.8 (H) 2025       Lipid panel  Lab Results   Component Value Date    CHOL 177 2025    CHOL 260 (H) 2024    CHOL 189 2024     Lab Results   Component Value Date    HDL 60 2025    HDL 53 2024    HDL 48 2024     Lab Results   Component Value Date    LDLCALC 88.6 2025    LDLCALC 178.6 (H) 2024    LDLCALC 90.0 2024     Lab Results   Component Value Date    TRIG 142 2025    TRIG 142 2024    TRIG 255 (H) 2024     Lab Results   Component Value Date    CHOLHDL 33.9 2025    CHOLHDL 20.4 2024    CHOLHDL 25.4 2024            Review of Systems   Constitutional: Negative for chills and fever.   HENT:  Negative for hearing loss and nosebleeds.    Eyes:  Negative for blurred vision.   Cardiovascular:  Negative for chest pain, claudication, leg swelling and palpitations.   Respiratory:  Negative for hemoptysis and shortness of breath.    Hematologic/Lymphatic: Negative for bleeding problem.   Skin:  Negative for itching.   Musculoskeletal:  Negative for falls.   Gastrointestinal:  Negative for abdominal pain and hematochezia.   Genitourinary:  Negative for hematuria.   Neurological:  Negative for dizziness and loss of balance.   Psychiatric/Behavioral:  Negative for altered mental status and depression.        Objective:   Physical Exam  Constitutional:       Appearance: She is well-developed.   HENT:      Head:  Normocephalic and atraumatic.   Eyes:      Conjunctiva/sclera: Conjunctivae normal.   Neck:      Vascular: No carotid bruit or JVD.   Cardiovascular:      Rate and Rhythm: Normal rate and regular rhythm.      Pulses:           Carotid pulses are 2+ on the right side and 2+ on the left side.       Radial pulses are 2+ on the right side and 2+ on the left side.      Heart sounds: Murmur heard.      No friction rub. No gallop.      Comments:   Left lateral rib area with tenderness to palpation.  Possible swelling/mass  Pulmonary:      Effort: Pulmonary effort is normal. No respiratory distress.      Breath sounds: Normal breath sounds. No stridor. No wheezing.   Musculoskeletal:      Cervical back: Neck supple.      Right lower leg: Edema present.   Skin:     General: Skin is warm and dry.   Neurological:      Mental Status: She is alert and oriented to person, place, and time.   Psychiatric:         Behavior: Behavior normal.         Assessment:     1. History of CVA (cerebrovascular accident) without residual deficits    2. CKD stage 3 due to type 2 diabetes mellitus    3. 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 Bilateral swelling of feet        Plan:       -  CTA abdomen and chest without any concerning features.  Significant improvement in the pain with taking colchicine 0.6 mg twice a day.  Continue colchicine 0.6 mg twice a day for total of 3 months.  Recommend management of gout as per primary care physician.  -continue Plavix for history of CVA.  -mild right ankle edema.  Likely from amlodipine.  Patient does not want to switch any medications at this point.  Continue amlodipine and lisinopril.  -echocardiogram with normal systolic and diastolic function.  RVSP of 22 mm Hg.  -no ischemic workup in the absence of any chest pain.  Previously had pleuritic type chest pain.  -follow up within 6 months to 1 year    Pertinent cardiac images and EKG  reviewed independently.    Continue with current medical plan and lifestyle changes.  Return sooner for concerns or questions. If symptoms persist go to the ED  I have reviewed all pertinent data including patient's medical history in detail and updated the computerized patient record.     No orders of the defined types were placed in this encounter.      Follow up as scheduled.     She expressed verbal understanding and agreed with the plan    Patient's Medications   New Prescriptions    No medications on file   Previous Medications    ALBUTEROL (PROVENTIL/VENTOLIN HFA) 90 MCG/ACTUATION INHALER    Inhale 2 puffs into the lungs every 4 (four) hours as needed.    AMLODIPINE (NORVASC) 5 MG TABLET    Take 5 mg by mouth once daily.    ASCORBIC ACID, VITAMIN C, (VITAMIN C) 500 MG TABLET    Take 500 mg by mouth once daily.    ATORVASTATIN (LIPITOR) 80 MG TABLET    Take 1 tablet (80 mg total) by mouth every evening.    BUDESONIDE-FORMOTEROL 80-4.5 MCG (SYMBICORT) 80-4.5 MCG/ACTUATION HFAA    Inhale 2 puffs into the lungs 2 (two) times a day. Controller    CLOPIDOGREL (PLAVIX) 75 MG TABLET    Take 1 tablet (75 mg total) by mouth once daily.    COLCHICINE (COLCRYS) 0.6 MG TABLET    Take 1 tablet (0.6 mg total) by mouth 2 (two) times a day.    CYANOCOBALAMIN (VITAMIN B-12) 500 MCG TABLET    Take 500 mcg by mouth once daily.    D-MANNOSE 500 MG CAP    Take 500 mg by mouth 2 (two) times a day.    ESCITALOPRAM OXALATE (LEXAPRO) 20 MG TABLET    Take 1 tablet (20 mg total) by mouth once daily.    FLUTICASONE (FLONASE) 50 MCG/ACTUATION NASAL SPRAY    1 spray by Each Nare route once daily.    GABAPENTIN (NEURONTIN) 300 MG CAPSULE    Take 1 capsule (300 mg total) by mouth 2 (two) times daily.    INSULIN  UNIT/ML INJECTION    Inject 26 Units into the skin 2 (two) times daily before meals.    LISINOPRIL (PRINIVIL,ZESTRIL) 5 MG TABLET    Take 1 tablet (5 mg total) by mouth once daily.    MAGNESIUM 250 MG TAB    Take 500 mg by  mouth once daily.    MECLIZINE (ANTIVERT) 25 MG TABLET    Take 1 tablet (25 mg total) by mouth 2 (two) times daily as needed.    METOPROLOL SUCCINATE (TOPROL-XL) 25 MG 24 HR TABLET    Take 1 tablet (25 mg total) by mouth 2 (two) times daily.    MULTIVIT-MIN/FA/LYCOPEN/LUTEIN (SENTRY SENIOR ORAL)    Take 1 tablet by mouth once daily.    NEOMYCIN-POLYMYXIN-DEXAMETHASONE (MAXITROL) 3.5MG/ML-10,000 UNIT/ML-0.1 % DRPS    Place 1 drop into both eyes 4 (four) times daily.    NITROFURANTOIN, MACROCRYSTAL-MONOHYDRATE, (MACROBID) 100 MG CAPSULE    Take 1 capsule (100 mg total) by mouth 2 (two) times daily. for 7 days    ONDANSETRON (ZOFRAN) 8 MG TABLET    Take 1 tablet (8 mg total) by mouth every 8 (eight) hours as needed for Nausea.    SODIUM CHLORIDE 3% 3 % NEBULIZER SOLUTION    Take 4 mLs by nebulization as needed for Other or Cough.    VITAMIN D (VITAMIN D3) 1000 UNITS TAB    Take 1,000 Units by mouth once daily.   Modified Medications    No medications on file   Discontinued Medications    AMLODIPINE (NORVASC) 5 MG TABLET    Take 1 tablet (5 mg total) by mouth once daily.        Parmjit Cabrera M.D

## 2025-05-23 NOTE — PROGRESS NOTES
Advise patient that the Macrobid prescribed for antibiotic will treat infection - take antibiotic until completed.

## 2025-05-29 ENCOUNTER — OFFICE VISIT (OUTPATIENT)
Dept: FAMILY MEDICINE | Facility: CLINIC | Age: 85
End: 2025-05-29
Payer: MEDICARE

## 2025-05-29 VITALS
WEIGHT: 174.94 LBS | TEMPERATURE: 98 F | HEIGHT: 68 IN | BODY MASS INDEX: 26.51 KG/M2 | OXYGEN SATURATION: 96 % | DIASTOLIC BLOOD PRESSURE: 70 MMHG | SYSTOLIC BLOOD PRESSURE: 132 MMHG | HEART RATE: 92 BPM

## 2025-05-29 DIAGNOSIS — Z86.73 HISTORY OF CVA (CEREBROVASCULAR ACCIDENT) WITHOUT RESIDUAL DEFICITS: Primary | Chronic | ICD-10-CM

## 2025-05-29 DIAGNOSIS — Z79.4 TYPE 2 DIABETES MELLITUS WITH STAGE 3A CHRONIC KIDNEY DISEASE, WITH LONG-TERM CURRENT USE OF INSULIN: ICD-10-CM

## 2025-05-29 DIAGNOSIS — N18.31 TYPE 2 DIABETES MELLITUS WITH STAGE 3A CHRONIC KIDNEY DISEASE, WITH LONG-TERM CURRENT USE OF INSULIN: ICD-10-CM

## 2025-05-29 DIAGNOSIS — N18.31 HYPERTENSIVE HEART AND KIDNEY DISEASE WITHOUT HEART FAILURE AND WITH STAGE 3A CHRONIC KIDNEY DISEASE: ICD-10-CM

## 2025-05-29 DIAGNOSIS — E11.22 TYPE 2 DIABETES MELLITUS WITH STAGE 3A CHRONIC KIDNEY DISEASE, WITH LONG-TERM CURRENT USE OF INSULIN: ICD-10-CM

## 2025-05-29 DIAGNOSIS — I13.10 HYPERTENSIVE HEART AND KIDNEY DISEASE WITHOUT HEART FAILURE AND WITH STAGE 3A CHRONIC KIDNEY DISEASE: ICD-10-CM

## 2025-05-29 DIAGNOSIS — E11.69 HYPERLIPIDEMIA ASSOCIATED WITH TYPE 2 DIABETES MELLITUS: ICD-10-CM

## 2025-05-29 DIAGNOSIS — E78.5 HYPERLIPIDEMIA ASSOCIATED WITH TYPE 2 DIABETES MELLITUS: ICD-10-CM

## 2025-05-29 PROBLEM — R22.9 SOFT TISSUE SWELLING: Status: RESOLVED | Noted: 2025-01-10 | Resolved: 2025-05-29

## 2025-05-29 PROBLEM — M54.31 SCIATICA OF RIGHT SIDE: Status: RESOLVED | Noted: 2024-03-20 | Resolved: 2025-05-29

## 2025-05-29 PROBLEM — R29.898 WEAKNESS OF BOTH LOWER EXTREMITIES: Status: RESOLVED | Noted: 2018-09-09 | Resolved: 2025-05-29

## 2025-05-29 PROCEDURE — 99999 PR PBB SHADOW E&M-EST. PATIENT-LVL V: CPT | Mod: PBBFAC,,, | Performed by: NURSE PRACTITIONER

## 2025-05-29 RX ORDER — AMLODIPINE BESYLATE 5 MG/1
5 TABLET ORAL DAILY
Qty: 90 TABLET | Refills: 3 | Status: SHIPPED | OUTPATIENT
Start: 2025-05-29

## 2025-05-29 RX ORDER — ATORVASTATIN CALCIUM 80 MG/1
80 TABLET, FILM COATED ORAL NIGHTLY
Qty: 90 TABLET | Refills: 3 | Status: SHIPPED | OUTPATIENT
Start: 2025-05-29

## 2025-05-29 RX ORDER — METOPROLOL SUCCINATE 25 MG/1
25 TABLET, EXTENDED RELEASE ORAL DAILY
Qty: 100 TABLET | Refills: 3 | Status: SHIPPED | OUTPATIENT
Start: 2025-05-29 | End: 2025-09-06

## 2025-05-29 NOTE — PROGRESS NOTES
Subjective:       Patient ID: Xi Moise is a 85 y.o. female.    Chief Complaint: Follow-up (6 months F/U) and Nasal Congestion        HPI WITH ASSESSMENT AND PLAN OF CARE:      Patient is an 85 year old white female with Aortic Atherosclerosis, History of Stroke, Type 2 Diabetes, CKD Stage 3, diabetic neuropathy, Hypertension/Hypertensive heart disease, Hyperlipidemia, moderate COPD/Asthma with Bronchiectasis with history of Mycobacterium avium-intracellulare infection, Calcified granuloma of lung, Recurrent Depression, chronic GERD, frequent falls, with recent orbital fracture in January 2024 treated by [facility] and followed by [facility], and Osteopenia that is here today for 6 month follow up with fasting lab results.          Aortic Atherosclerosis and CAD  Seen on CT chest 4/28/2016.  Still present on CT 3/29/22  ON Plavix 75 mg daily,  Atorvastatin 80 mg daily, Lisinopril 5 mg daily, Amlodipine 5 mg daily, and Toprol XL 25 mg daily   .6 December 2024- ran out of medication  Back on Atorvastatin 80 mg daily and levels are now controlled  Recheck in 6 months.            History of Stroke  Stroke around 2016  On Plavix 75 mg daily  Has unsteady gait with multiple falls in the past year - most recent fall causing orbital fracture in January 2024 - she is unsteady and needed a rolling lightweight walker for better stability.  Walker received from [facility].  9/30/2024 visit:  Right pupil 5 mm and non-reactive  Left pupil 4 mm and reactive to 3 mm  She does reports some double vision on yesterday  She has been seeing eye specialist Dr. Rashid at [facility] - last visit March 2024 but I do NOT/did not note the dilated, non-reactive pupil at previous visits - this is new as far as I am aware.  Rest of neurological exam okay - equal strength, ROM.  Clear speech.  Had NEGATIVE RPR testing September 2023.  Urgent Referral to Neurology for further evaluation  Neurology appt  9/30/2024:  He determined patient used some eye drops that morning that she did not tell us about  Sent for CT and CTA same day:  CT head:  Stable CT scan of the head.  Further evaluation with MRI of the brain, as clinically warranted.  CTA head:  No large vessel occlusion in the intracranial circulation.  CTA neck:  No hemodynamically significant stenosis of the neck vessels.  5/29/2025 visit with me:   Ambulates with rollator and doing well.  Stable.        Frequent Falls  In November 2023 - hospitalized for syncope and orthostatic hypotension  In January 2024 - fall with orbital fracture - states she tripped over a cement stone  Bilateral carotid ultrasound 3/5/24: Impression: NO evidence of a hemodynamically significant carotid bifurcation stenosis.  March 2024:  tripped in attic and hurt back but did not fall  Patient had a rollator walker with seat but very heavy and difficulty loading and unloading.  Ordered lightweight everyday walker- DME request sent and received lightweight walker.  No falls since last visit.   PUPIL EXAM ABNORMAL 9/30/24 and Neurology evaluate - unremarkable CT/CTA head and neck  Seen eye specialist Dr. Rashid with [facility] on 10/1/2024   NO RECENT falls since using rollator for ambulation.        Type 2 Diabetes with CKD 3  Taking NPH insulin 26 units twice daily before meals  Metformin stopped due to frequent diarrhea and diarrhea that resolved when med stopped  NO SGLT2 due to recurrent UTIs  Fasting blood glucose 121 with hemoglobin A1c 6.8%   Recheck in 6 months.                   CKD Stage 3  Creatinine 1.05 with eGFR 53 Feb. 2023  Creatinine  1.15 with DROP eGFR 47 in July 2023 - REFERRED Nephrology for monitoring  Started seeing Dr. Tillman on 7/24/2023   Last Nephrology visit 6/17/24; next visit June 2025  Current eGFR 55                   Diabetic Neuropathy  On Gabapentin 300 mg twice daily  She has been on this medication and dose for years  Continue at present dose  "for now.  Stable.        Hypertension/hypertensive heart disease  Taking Lisinopril 5 mg daily, Metoprolol XL 25 mg daily, Amlodipine 5 mg daily  /70 (BP Location: Right arm, Patient Position: Sitting)   Pulse 92   Temp 97.9 °F (36.6 °C) (Temporal)   Ht 5' 8" (1.727 m)   Wt 79.3 kg (174 lb 15 oz)   SpO2 96%   BMI 26.60 kg/m²   Stable.         Hyperlipidemia   Prescribed atorvastatin 80 mg daily   12/2024: .6- ran out of medication  5/2025: on medication, LDL 88.6 - stay on same dose  Recheck in 6 months.                Bronchiectasis, COPD/Asthma Overlap, History of Pulmonary MAC, SHANTI positive  Noted on last Pulmonary Progress note - Dr. Lee at [facility] - see under encounters  Patient was followed by Dr. Alannah Noe with [facility]  but now followed by [facility]   Last Pulmonology visit with KARLENE  on 1/10/2025- advised to follow up in 6 months.  Reports only on Albuterol inhaler prn  Reports NO controller inhalers  Due for pulmonology follow up in July 2025.            Calcified Granuloma of Lung  Seen on CT chest 12/17/2015  Stable.           Recurrent depression  Currently taking Lexapro 20 mg daily  Controlled at present time.  Recheck in 6 months.        Osteopenia  Last DEXA scan 3/5/24: Osteopenia  Continue taking calcium with vitamin-D supplementation  Recheck in 3 years.        Chronic GERD with small hiatal hernia  Previously taking Protonix 40 mg daily  NOT currently taking medication and has no complaints.  Small sliding-type hiatal hernia seen on CT abd/pelvis 7/26/2023  Stable.        Senile Purpura  + bruising to forearms  Advised on wearing protective clothing and other preventative measures.       Lab Visit on 05/21/2025   Component Date Value Ref Range Status    Urine Creatinine 05/21/2025 62.0  15.0 - 325.0 mg/dL Final    Urine Protein 05/21/2025 9  <=15 mg/dL Final    Urine Protein/Creatinine Ratio 05/21/2025 0.15  <=0.20 Final    Magnesium  05/21/2025 2.1  " 1.6 - 2.6 mg/dL Final    Phosphorus Level 05/21/2025 3.4  2.7 - 4.5 mg/dL Final    PTH Intact 05/21/2025 58.0  9.0 - 77.0 pg/mL Final    Vitamin D 05/21/2025 112 (H)  30 - 96 ng/mL Final    Vitamin D deficiency.........<10 ng/mL                              Vitamin D insufficiency......10-29 ng/mL       Vitamin D sufficiency........> or equal to 30 ng/mL  Vitamin D toxicity............>100 ng/mL      Color, UA 05/21/2025 Yellow  Straw, Danette, Yellow, Light-Orange Final    Appearance, UA 05/21/2025 Cloudy (A)  Clear Final    pH, UA 05/21/2025 8.0  5.0 - 8.0 Final    Spec Grav UA 05/21/2025 1.010  1.005 - 1.030 Final    Protein, UA 05/21/2025 Negative  Negative Final    Recommend a 24 hour urine protein or a urine protein/creatinine ratio if globulin induced proteinuria is clinically suspected.    Glucose, UA 05/21/2025 Negative  Negative Final    Ketones, UA 05/21/2025 Negative  Negative Final    Bilirubin, UA 05/21/2025 Negative  Negative Final    Blood, UA 05/21/2025 Negative  Negative Final    Nitrites, UA 05/21/2025 Negative  Negative Final    Urobilinogen, UA 05/21/2025 Negative  <2.0 EU/dL Final    Leukocyte Esterase, UA 05/21/2025 Trace (A)  Negative Final    Hemoglobin A1c 05/21/2025 6.8 (H)  4.0 - 5.6 % Final    ADA Screening Guidelines:  5.7-6.4%  Consistent with prediabetes  >=6.5%  Consistent with diabetes    High levels of fetal hemoglobin interfere with the HbA1C  assay. Heterozygous hemoglobin variants (HbS, HgC, etc)do  not significantly interfere with this assay.   However, presence of multiple variants may affect accuracy.    Estimated Average Glucose 05/21/2025 148 (H)  68 - 131 mg/dL Final    Sodium 05/21/2025 142  136 - 145 mmol/L Final    Potassium 05/21/2025 3.9  3.5 - 5.1 mmol/L Final    Chloride 05/21/2025 102  95 - 110 mmol/L Final    CO2 05/21/2025 30 (H)  23 - 29 mmol/L Final    Glucose 05/21/2025 121 (H)  70 - 110 mg/dL Final    BUN 05/21/2025 21  8 - 23 mg/dL Final    Creatinine  05/21/2025 1.0  0.5 - 1.4 mg/dL Final    Calcium 05/21/2025 9.7  8.7 - 10.5 mg/dL Final    Protein Total 05/21/2025 7.6  6.0 - 8.4 gm/dL Final    Albumin 05/21/2025 4.1  3.5 - 5.2 g/dL Final    Bilirubin Total 05/21/2025 0.9  0.1 - 1.0 mg/dL Final    For infants and newborns, interpretation of results should be based   on gestational age, weight and in agreement with clinical   observations.    Premature Infant recommended reference ranges:   0-24 hours:  <8.0 mg/dL   24-48 hours: <12.0 mg/dL   3-5 days:    <15.0 mg/dL   6-29 days:   <15.0 mg/dL    ALP 05/21/2025 107  40 - 150 unit/L Final    AST 05/21/2025 26  11 - 45 unit/L Final    ALT 05/21/2025 21  10 - 44 unit/L Final    Anion Gap 05/21/2025 10  8 - 16 mmol/L Final    UNABLE TO CALCULATE    eGFR 05/21/2025 55 (L)  >60 mL/min/1.73/m2 Final    Estimated GFR calculated using the CKD-EPI creatinine (2021) equation.    Cholesterol Total 05/21/2025 177  120 - 199 mg/dL Final    The National Cholesterol Education Program (NCEP) has set the  following guidelines (reference ranges) for Cholesterol:  Optimal.....................<200 mg/dL  Borderline High.............200-239 mg/dL  High........................> or = 240 mg/dL    Triglyceride 05/21/2025 142  30 - 150 mg/dL Final    The National Cholesterol Education Program (NCEP) has set the  following guidelines (reference values) for triglycerides:  Normal......................<150 mg/dL  Borderline High.............150-199 mg/dL  High........................200-499 mg/dL    HDL Cholesterol 05/21/2025 60  40 - 75 mg/dL Final    The National Cholesterol Education Program (NCEP) has set the   following guidelines (reference values) for HDL Cholesterol:   Low...............<40 mg/dL   Optimal...........>60 mg/dL    LDL Cholesterol 05/21/2025 88.6  63.0 - 159.0 mg/dL Final    The National Cholesterol Education Program (NCEP) has set the  following guidelines (reference values) for LDL  Cholesterol:  Optimal.......................<130 mg/dL  Borderline High...............130-159 mg/dL  High..........................160-189 mg/dL  Very High.....................>190 mg/dL  LDL calculated using the Friedewald equation.    HDL/Cholesterol Ratio 05/21/2025 33.9  20.0 - 50.0 % Final    Cholesterol/HDL Ratio 05/21/2025 3.0  2.0 - 5.0 Final    Non HDL Cholesterol 05/21/2025 117  mg/dL Final    Risk category and Non-HDL cholesterol goals:  Coronary heart disease (CHD)or equivalent (10-year risk of CHD >20%):  Non-HDL cholesterol goal     <130 mg/dL  Two or more CHD risk factors and 10-year risk of CHD <= 20%:  Non-HDL cholesterol goal     <160 mg/dL  0 to 1 CHD risk factor:  Non-HDL cholesterol goal     <190 mg/dL    Urine Microalbumin 05/21/2025 19.0    ug/mL Final    Urine Creatinine 05/21/2025 64.0  15.0 - 325.0 mg/dL Final    Microalbumin/Creatinine Ratio Urine 05/21/2025 29.7  <=30.0 ug/mg Final    WBC 05/21/2025 7.75  3.90 - 12.70 K/uL Final    RBC 05/21/2025 4.95  4.00 - 5.40 M/uL Final    HGB 05/21/2025 14.8  12.0 - 16.0 gm/dL Final    HCT 05/21/2025 45.0  37.0 - 48.5 % Final    MCV 05/21/2025 91  82 - 98 fL Final    MCH 05/21/2025 29.9  27.0 - 31.0 pg Final    MCHC 05/21/2025 32.9  32.0 - 36.0 g/dL Final    RDW 05/21/2025 12.8  11.5 - 14.5 % Final    Platelet Count 05/21/2025 271  150 - 450 K/uL Final    MPV 05/21/2025 8.4 (L)  9.2 - 12.9 fL Final    Nucleated RBC 05/21/2025 0  <=0 /100 WBC Final    Neut % 05/21/2025 51.9  38 - 73 % Final    Lymph % 05/21/2025 33.7  18 - 48 % Final    Mono % 05/21/2025 8.9  4 - 15 % Final    Eos % 05/21/2025 4.6  <=8 % Final    Basophil % 05/21/2025 0.6  <=1.9 % Final    Imm Grans % 05/21/2025 0.3  0.0 - 0.5 % Final    Neut # 05/21/2025 4.02  1.8 - 7.7 K/uL Final    Lymph # 05/21/2025 2.61  1 - 4.8 K/uL Final    Mono # 05/21/2025 0.69  0.3 - 1 K/uL Final    Eos # 05/21/2025 0.36  <=0.5 K/uL Final    Baso # 05/21/2025 0.05  <=0.2 K/uL Final    Imm Grans # 05/21/2025  "0.02  0.00 - 0.04 K/uL Final    Mild elevation in immature granulocytes is non specific and can be seen in a variety of conditions including stress response, acute inflammation, trauma and pregnancy. Correlation with other laboratory and clinical findings is essential.    RBC, UA 05/21/2025 3  0 - 4 /HPF Final    WBC, UA 05/21/2025 12 (H)  0 - 5 /HPF Final    Bacteria, UA 05/21/2025 Rare  None, Rare, Occasional /HPF Final    Squamous Epithelial Cells, UA 05/21/2025 2  /HPF Final    Microscopic Comment 05/21/2025    Final    Other formed elements not mentioned in the report are not present in the microscopic examination.   Office Visit on 05/20/2025   Component Date Value Ref Range Status    WBC, UA 05/20/2025 Small   Final    Nitrite, UA 05/20/2025 Positive   Final    Urobilinogen, UA 05/20/2025 0.2   Final    Protein, POC 05/20/2025 Negative   Final    pH, UA 05/20/2025 6.0   Final    Blood, UA 05/20/2025 trace   Final    Spec Grav UA 05/20/2025 1.010   Final    Ketones, UA 05/20/2025 Negative   Final    Bilirubin, POC 05/20/2025 Negative   Final    Glucose, UA 05/20/2025 Negative   Final    Urine Culture 05/20/2025 >100,000 cfu/ml Escherichia coli (A)   Final    No other significant isolate.         Vitals:    05/29/25 1459   BP: 132/70   BP Location: Right arm   Patient Position: Sitting   Pulse: 92   Temp: 97.9 °F (36.6 °C)   TempSrc: Temporal   SpO2: 96%   Weight: 79.3 kg (174 lb 15 oz)   Height: 5' 8" (1.727 m)         Diagnoses this Encounter:         ICD-10-CM ICD-9-CM   1. History of CVA (cerebrovascular accident) without residual deficits  Z86.73 V12.54   2. 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 and with stage 3a chronic kidney disease  I13.10 404.90    N18.31 585.3   3. Type 2 diabetes mellitus with stage 3a chronic kidney disease, with long-term current use of insulin  E11.22 250.40    N18.31 585.3    Z79.4 V58.67   4. Hyperlipidemia associated with type 2 diabetes mellitus  E11.69 " 250.80    E78.5 272.4       Orders Placed This Encounter    Comprehensive Metabolic Panel    Hemoglobin A1C    Lipid Panel    amLODIPine (NORVASC) 5 MG tablet    metoprolol succinate (TOPROL-XL) 25 MG 24 hr tablet    atorvastatin (LIPITOR) 80 MG tablet        Follow up in about 6 months (around 11/29/2025) for fasting labs and follow up.     I spent a total of 44 minutes on the day of the visit.  This includes face to face time and non-face to face time preparing to see the patient (eg, review of tests), obtaining and/or reviewing separately obtained history, documenting clinical information in the electronic or other health record, independently interpreting results and communicating results to the patient/family/caregiver, or care coordinator.     Patient's Medications   New Prescriptions    No medications on file   Previous Medications    ALBUTEROL (PROVENTIL/VENTOLIN HFA) 90 MCG/ACTUATION INHALER    Inhale 2 puffs into the lungs every 4 (four) hours as needed.    ASCORBIC ACID, VITAMIN C, (VITAMIN C) 500 MG TABLET    Take 500 mg by mouth once daily.    BUDESONIDE-FORMOTEROL 80-4.5 MCG (SYMBICORT) 80-4.5 MCG/ACTUATION HFAA    Inhale 2 puffs into the lungs 2 (two) times a day. Controller    CLOPIDOGREL (PLAVIX) 75 MG TABLET    Take 1 tablet (75 mg total) by mouth once daily.    COLCHICINE (COLCRYS) 0.6 MG TABLET    Take 1 tablet (0.6 mg total) by mouth 2 (two) times a day.    CYANOCOBALAMIN (VITAMIN B-12) 500 MCG TABLET    Take 500 mcg by mouth once daily.    D-MANNOSE 500 MG CAP    Take 500 mg by mouth 2 (two) times a day.    ESCITALOPRAM OXALATE (LEXAPRO) 20 MG TABLET    Take 1 tablet (20 mg total) by mouth once daily.    FLUTICASONE (FLONASE) 50 MCG/ACTUATION NASAL SPRAY    1 spray by Each Nare route once daily.    GABAPENTIN (NEURONTIN) 300 MG CAPSULE    Take 1 capsule (300 mg total) by mouth 2 (two) times daily.    INSULIN  UNIT/ML INJECTION    Inject 26 Units into the skin 2 (two) times daily before  meals.    LISINOPRIL (PRINIVIL,ZESTRIL) 5 MG TABLET    Take 1 tablet (5 mg total) by mouth once daily.    MAGNESIUM 250 MG TAB    Take 500 mg by mouth once daily.    MECLIZINE (ANTIVERT) 25 MG TABLET    Take 1 tablet (25 mg total) by mouth 2 (two) times daily as needed.    MULTIVIT-MIN/FA/LYCOPEN/LUTEIN (SENTRY SENIOR ORAL)    Take 1 tablet by mouth once daily.    NEOMYCIN-POLYMYXIN-DEXAMETHASONE (MAXITROL) 3.5MG/ML-10,000 UNIT/ML-0.1 % DRPS    Place 1 drop into both eyes 4 (four) times daily.    ONDANSETRON (ZOFRAN) 8 MG TABLET    Take 1 tablet (8 mg total) by mouth every 8 (eight) hours as needed for Nausea.    SODIUM CHLORIDE 3% 3 % NEBULIZER SOLUTION    Take 4 mLs by nebulization as needed for Other or Cough.    VITAMIN D (VITAMIN D3) 1000 UNITS TAB    Take 1,000 Units by mouth once daily.   Modified Medications    Modified Medication Previous Medication    AMLODIPINE (NORVASC) 5 MG TABLET amLODIPine (NORVASC) 5 MG tablet       Take 1 tablet (5 mg total) by mouth once daily.    Take 5 mg by mouth once daily.    ATORVASTATIN (LIPITOR) 80 MG TABLET atorvastatin (LIPITOR) 80 MG tablet       Take 1 tablet (80 mg total) by mouth every evening.    Take 1 tablet (80 mg total) by mouth every evening.    METOPROLOL SUCCINATE (TOPROL-XL) 25 MG 24 HR TABLET metoprolol succinate (TOPROL-XL) 25 MG 24 hr tablet       Take 1 tablet (25 mg total) by mouth once daily.    Take 1 tablet (25 mg total) by mouth 2 (two) times daily.   Discontinued Medications    No medications on file         Review of Systems   Constitutional:  Negative for fatigue and fever.   HENT:  Positive for congestion, postnasal drip and sinus pressure.    Respiratory:  Positive for wheezing. Negative for choking, chest tightness and shortness of breath.    Cardiovascular:  Negative for chest pain and leg swelling.         Objective:        Physical Exam  Constitutional:       General: She is not in acute distress.     Appearance: Normal appearance. She is  not ill-appearing.      Comments: Body mass index is 26.6 kg/m².     HENT:      Head: Normocephalic.      Right Ear: Tympanic membrane, ear canal and external ear normal.      Left Ear: Tympanic membrane, ear canal and external ear normal.      Nose: Congestion and rhinorrhea present.      Mouth/Throat:      Mouth: Mucous membranes are moist.      Pharynx: No oropharyngeal exudate.   Eyes:      Pupils: Pupils are equal, round, and reactive to light.   Cardiovascular:      Rate and Rhythm: Normal rate and regular rhythm.      Pulses: Normal pulses.      Heart sounds: Normal heart sounds. No murmur heard.  Pulmonary:      Effort: Pulmonary effort is normal. No respiratory distress.      Breath sounds: Normal breath sounds. No wheezing.   Abdominal:      General: Abdomen is flat. Bowel sounds are normal.      Palpations: Abdomen is soft.      Tenderness: There is no abdominal tenderness.      Hernia: No hernia is present.   Musculoskeletal:         General: Normal range of motion.      Cervical back: Normal range of motion.   Skin:     General: Skin is warm and dry.      Capillary Refill: Capillary refill takes less than 2 seconds.   Neurological:      Mental Status: She is alert and oriented to person, place, and time.   Psychiatric:         Mood and Affect: Mood normal.         Behavior: Behavior normal.         Thought Content: Thought content normal.         Judgment: Judgment normal.             Past Medical History:   Diagnosis Date    Asthma     Chronic kidney disease (CKD), stage III (moderate) 04/28/2016    COPD, moderate     Depression 04/29/2016    Diabetes mellitus     Diabetic polyneuropathy associated with type 2 diabetes mellitus 02/09/2023    History of CVA (cerebrovascular accident) without residual deficits 04/28/2016    History of MAC infection 04/28/2016    Hyperlipidemia associated with type 2 diabetes mellitus 04/28/2016    Hypertension     Hypertensive heart and kidney disease without heart  failure and with stage 2 chronic kidney disease 2023    Lipidemia     Long term (current) use of insulin 2023    Meniere disease 2016    Multiple falls 2023    Pulmonary Mycobacterium avium-intracellulare infection 2016    followed by Pulmonologist at [facility]    Type 2 diabetes mellitus with stage 2 chronic kidney disease, without long-term current use of insulin 10/28/2015       Past Surgical History:   Procedure Laterality Date    ANKLE SURGERY Left     CATARACT EXTRACTION, BILATERAL       SECTION      x 3    eyelid surgery      HYSTERECTOMY      SKIN CANCER EXCISION Left     left arm cancer excision - Dr. Richardson - unknown type of skin cancer       Family History   Problem Relation Name Age of Onset    Depression Mother      Bipolar disorder Mother      Cancer Father          skin cancer    Diabetes Father      Hypertension Father      Hyperlipidemia Father      Heart disease Father      Gout Father      Nephrolithiasis Father      Hypertension Sister Evith     Gout Sister Evith     Diabetes Sister Evith     Cancer Sister Evith         rectal cancer    Hypertension Sister Yumiko     Hyperlipidemia Sister Yumiko     Heart disease Sister Yumiko         stents x 4    Cancer Sister Yumiko         pituitary gland cancer    Hypertension Brother      Hyperlipidemia Brother      Heart disease Brother      Hypertension Daughter      Early death Maternal Aunt         Social History     Socioeconomic History    Marital status: Single   Tobacco Use    Smoking status: Never     Passive exposure: Past    Smokeless tobacco: Never   Substance and Sexual Activity    Alcohol use: No    Drug use: No     Social Drivers of Health     Financial Resource Strain: Medium Risk (2025)    Overall Financial Resource Strain (CARDIA)     Difficulty of Paying Living Expenses: Somewhat hard   Food Insecurity: No Food Insecurity (2025)    Hunger Vital Sign     Worried About Running Out of Food in the  Last Year: Never true     Ran Out of Food in the Last Year: Never true   Transportation Needs: No Transportation Needs (5/22/2025)    PRAPARE - Transportation     Lack of Transportation (Medical): No     Lack of Transportation (Non-Medical): No   Physical Activity: Unknown (5/22/2025)    Exercise Vital Sign     Days of Exercise per Week: Patient declined     Minutes of Exercise per Session: 0 min   Stress: Stress Concern Present (5/22/2025)    [employer] - Occupational Stress Questionnaire     Feeling of Stress : Very much   Housing Stability: Low Risk  (5/22/2025)    Housing Stability Vital Sign     Unable to Pay for Housing in the Last Year: No     Number of Times Moved in the Last Year: 0     Homeless in the Last Year: No   Recent Concern: Housing Stability - High Risk (4/10/2025)    Received from [facility] SDOH Screening     In the past year, have you been unable to get any of the following when you really needed them? choose all that apply.: Utilities (electric, gas, and water)

## 2025-05-30 ENCOUNTER — TELEPHONE (OUTPATIENT)
Facility: CLINIC | Age: 85
End: 2025-05-30
Payer: MEDICARE

## 2025-05-30 NOTE — TELEPHONE ENCOUNTER
Patient confirmed appt for 07/07/2025 at 10:00.    ----- Message from Riri sent at 5/30/2025 11:25 AM CDT -----  Type:  Appointment RequestName of Caller:Kerline is the first available appointment?n/aSymptoms:CheckBe Call Back Number: [phone] Information:

## 2025-06-20 ENCOUNTER — TELEPHONE (OUTPATIENT)
Facility: CLINIC | Age: 85
End: 2025-06-20
Payer: MEDICARE

## 2025-06-20 NOTE — TELEPHONE ENCOUNTER
Patient was scheduled for 06/20/2025 at 4:00, patient has been moved   to 07/11/2025 at 9:00 due to the provider being out/pt confirmed

## 2025-06-21 DIAGNOSIS — Z79.4 TYPE 2 DIABETES MELLITUS WITH STAGE 3A CHRONIC KIDNEY DISEASE, WITH LONG-TERM CURRENT USE OF INSULIN: ICD-10-CM

## 2025-06-21 DIAGNOSIS — N18.31 TYPE 2 DIABETES MELLITUS WITH STAGE 3A CHRONIC KIDNEY DISEASE, WITH LONG-TERM CURRENT USE OF INSULIN: ICD-10-CM

## 2025-06-21 DIAGNOSIS — E11.22 TYPE 2 DIABETES MELLITUS WITH STAGE 3A CHRONIC KIDNEY DISEASE, WITH LONG-TERM CURRENT USE OF INSULIN: ICD-10-CM

## 2025-06-23 RX ORDER — INSULIN HUMAN 100 [IU]/ML
INJECTION, SUSPENSION SUBCUTANEOUS
Qty: 20 ML | Refills: 1 | Status: SHIPPED | OUTPATIENT
Start: 2025-06-23

## 2025-07-08 ENCOUNTER — OFFICE VISIT (OUTPATIENT)
Facility: CLINIC | Age: 85
End: 2025-07-08
Payer: MEDICARE

## 2025-07-08 ENCOUNTER — OFFICE VISIT (OUTPATIENT)
Dept: FAMILY MEDICINE | Facility: CLINIC | Age: 85
End: 2025-07-08
Payer: MEDICARE

## 2025-07-08 VITALS
HEIGHT: 68 IN | TEMPERATURE: 99 F | SYSTOLIC BLOOD PRESSURE: 140 MMHG | DIASTOLIC BLOOD PRESSURE: 80 MMHG | HEART RATE: 71 BPM | OXYGEN SATURATION: 98 % | BODY MASS INDEX: 26.62 KG/M2 | WEIGHT: 175.63 LBS

## 2025-07-08 VITALS
OXYGEN SATURATION: 94 % | DIASTOLIC BLOOD PRESSURE: 69 MMHG | BODY MASS INDEX: 26.87 KG/M2 | HEIGHT: 68 IN | HEART RATE: 70 BPM | SYSTOLIC BLOOD PRESSURE: 122 MMHG | WEIGHT: 177.31 LBS

## 2025-07-08 DIAGNOSIS — J47.9 BRONCHIECTASIS WITHOUT COMPLICATION: ICD-10-CM

## 2025-07-08 DIAGNOSIS — N18.31 TYPE 2 DIABETES MELLITUS WITH STAGE 3A CHRONIC KIDNEY DISEASE, WITH LONG-TERM CURRENT USE OF INSULIN: ICD-10-CM

## 2025-07-08 DIAGNOSIS — J45.20 MILD INTERMITTENT ASTHMA WITHOUT COMPLICATION: Primary | ICD-10-CM

## 2025-07-08 DIAGNOSIS — R31.9 URINARY TRACT INFECTION WITH HEMATURIA, SITE UNSPECIFIED: Primary | ICD-10-CM

## 2025-07-08 DIAGNOSIS — E11.22 TYPE 2 DIABETES MELLITUS WITH STAGE 3A CHRONIC KIDNEY DISEASE, WITH LONG-TERM CURRENT USE OF INSULIN: ICD-10-CM

## 2025-07-08 DIAGNOSIS — E11.22 CKD STAGE 3 DUE TO TYPE 2 DIABETES MELLITUS: ICD-10-CM

## 2025-07-08 DIAGNOSIS — N39.0 URINARY TRACT INFECTION WITH HEMATURIA, SITE UNSPECIFIED: Primary | ICD-10-CM

## 2025-07-08 DIAGNOSIS — I13.10 HYPERTENSIVE HEART AND KIDNEY DISEASE WITHOUT HEART FAILURE AND WITH STAGE 3A CHRONIC KIDNEY DISEASE: ICD-10-CM

## 2025-07-08 DIAGNOSIS — N18.31 HYPERTENSIVE HEART AND KIDNEY DISEASE WITHOUT HEART FAILURE AND WITH STAGE 3A CHRONIC KIDNEY DISEASE: ICD-10-CM

## 2025-07-08 DIAGNOSIS — N18.30 CKD STAGE 3 DUE TO TYPE 2 DIABETES MELLITUS: ICD-10-CM

## 2025-07-08 DIAGNOSIS — Z79.4 TYPE 2 DIABETES MELLITUS WITH STAGE 3A CHRONIC KIDNEY DISEASE, WITH LONG-TERM CURRENT USE OF INSULIN: ICD-10-CM

## 2025-07-08 LAB
BACTERIA #/AREA URNS AUTO: ABNORMAL /HPF
BILIRUB SERPL-MCNC: NEGATIVE MG/DL
BILIRUB UR QL STRIP.AUTO: NEGATIVE
BLOOD URINE, POC: NORMAL
CLARITY UR: ABNORMAL
CLARITY, POC UA: NORMAL
COLOR UR AUTO: YELLOW
COLOR, POC UA: NORMAL
GLUCOSE UR QL STRIP: NEGATIVE
GLUCOSE UR QL STRIP: NEGATIVE
HGB UR QL STRIP: ABNORMAL
HYALINE CASTS UR QL AUTO: 0 /LPF (ref 0–1)
KETONES UR QL STRIP: ABNORMAL
KETONES UR QL STRIP: NEGATIVE
LEUKOCYTE ESTERASE UR QL STRIP: ABNORMAL
LEUKOCYTE ESTERASE URINE, POC: NORMAL
MICROSCOPIC COMMENT: ABNORMAL
NITRITE UR QL STRIP: NEGATIVE
NITRITE, POC UA: POSITIVE
NON-SQ EPI CELLS #/AREA URNS AUTO: 8 /HPF
PH UR STRIP: 6 [PH]
PH, POC UA: 6
PROT UR QL STRIP: ABNORMAL
PROTEIN, POC: 100
RBC #/AREA URNS AUTO: 24 /HPF (ref 0–4)
SP GR UR STRIP: 1.02
SPECIFIC GRAVITY, POC UA: 1.02
SQUAMOUS #/AREA URNS AUTO: 6 /HPF
UROBILINOGEN UR STRIP-ACNC: NEGATIVE EU/DL
UROBILINOGEN, POC UA: 1
WBC #/AREA URNS AUTO: >100 /HPF (ref 0–5)
WBC CLUMPS UR QL AUTO: ABNORMAL

## 2025-07-08 PROCEDURE — 2023F DILAT RTA XM W/O RTNOPTHY: CPT | Mod: CPTII,S$GLB,, | Performed by: PEDIATRICS

## 2025-07-08 PROCEDURE — 3074F SYST BP LT 130 MM HG: CPT | Mod: CPTII,S$GLB,, | Performed by: STUDENT IN AN ORGANIZED HEALTH CARE EDUCATION/TRAINING PROGRAM

## 2025-07-08 PROCEDURE — 1160F RVW MEDS BY RX/DR IN RCRD: CPT | Mod: CPTII,S$GLB,, | Performed by: PEDIATRICS

## 2025-07-08 PROCEDURE — 3288F FALL RISK ASSESSMENT DOCD: CPT | Mod: CPTII,S$GLB,, | Performed by: STUDENT IN AN ORGANIZED HEALTH CARE EDUCATION/TRAINING PROGRAM

## 2025-07-08 PROCEDURE — 1125F AMNT PAIN NOTED PAIN PRSNT: CPT | Mod: CPTII,S$GLB,, | Performed by: PEDIATRICS

## 2025-07-08 PROCEDURE — 1159F MED LIST DOCD IN RCRD: CPT | Mod: CPTII,S$GLB,, | Performed by: PEDIATRICS

## 2025-07-08 PROCEDURE — 1101F PT FALLS ASSESS-DOCD LE1/YR: CPT | Mod: CPTII,S$GLB,, | Performed by: STUDENT IN AN ORGANIZED HEALTH CARE EDUCATION/TRAINING PROGRAM

## 2025-07-08 PROCEDURE — 1159F MED LIST DOCD IN RCRD: CPT | Mod: CPTII,S$GLB,, | Performed by: STUDENT IN AN ORGANIZED HEALTH CARE EDUCATION/TRAINING PROGRAM

## 2025-07-08 PROCEDURE — 3288F FALL RISK ASSESSMENT DOCD: CPT | Mod: CPTII,S$GLB,, | Performed by: PEDIATRICS

## 2025-07-08 PROCEDURE — 81003 URINALYSIS AUTO W/O SCOPE: CPT | Performed by: PEDIATRICS

## 2025-07-08 PROCEDURE — 87186 SC STD MICRODIL/AGAR DIL: CPT | Performed by: PEDIATRICS

## 2025-07-08 PROCEDURE — 99213 OFFICE O/P EST LOW 20 MIN: CPT | Mod: S$GLB,,, | Performed by: STUDENT IN AN ORGANIZED HEALTH CARE EDUCATION/TRAINING PROGRAM

## 2025-07-08 PROCEDURE — 81002 URINALYSIS NONAUTO W/O SCOPE: CPT | Mod: S$GLB,,, | Performed by: PEDIATRICS

## 2025-07-08 PROCEDURE — 99999 PR PBB SHADOW E&M-EST. PATIENT-LVL IV: CPT | Mod: PBBFAC,,, | Performed by: STUDENT IN AN ORGANIZED HEALTH CARE EDUCATION/TRAINING PROGRAM

## 2025-07-08 PROCEDURE — 3078F DIAST BP <80 MM HG: CPT | Mod: CPTII,S$GLB,, | Performed by: STUDENT IN AN ORGANIZED HEALTH CARE EDUCATION/TRAINING PROGRAM

## 2025-07-08 PROCEDURE — 1101F PT FALLS ASSESS-DOCD LE1/YR: CPT | Mod: CPTII,S$GLB,, | Performed by: PEDIATRICS

## 2025-07-08 PROCEDURE — 3077F SYST BP >= 140 MM HG: CPT | Mod: CPTII,S$GLB,, | Performed by: PEDIATRICS

## 2025-07-08 PROCEDURE — 3079F DIAST BP 80-89 MM HG: CPT | Mod: CPTII,S$GLB,, | Performed by: PEDIATRICS

## 2025-07-08 PROCEDURE — 99214 OFFICE O/P EST MOD 30 MIN: CPT | Mod: S$GLB,,, | Performed by: PEDIATRICS

## 2025-07-08 PROCEDURE — 99999 PR PBB SHADOW E&M-EST. PATIENT-LVL IV: CPT | Mod: PBBFAC,,, | Performed by: PEDIATRICS

## 2025-07-08 PROCEDURE — 1125F AMNT PAIN NOTED PAIN PRSNT: CPT | Mod: CPTII,S$GLB,, | Performed by: STUDENT IN AN ORGANIZED HEALTH CARE EDUCATION/TRAINING PROGRAM

## 2025-07-08 RX ORDER — PANTOPRAZOLE SODIUM 40 MG/1
40 TABLET, DELAYED RELEASE ORAL
COMMUNITY
Start: 2025-07-03

## 2025-07-08 RX ORDER — AMOXICILLIN AND CLAVULANATE POTASSIUM 875; 125 MG/1; MG/1
1 TABLET, FILM COATED ORAL 2 TIMES DAILY
Qty: 14 TABLET | Refills: 0 | Status: SHIPPED | OUTPATIENT
Start: 2025-07-08 | End: 2025-07-15

## 2025-07-08 NOTE — PROGRESS NOTES
[facility]  Pulmonary Clinic Note      History of Present Illness:  Xi Moise is a 85 y.o. female with PMHx bronchiectasis, pulmonary MAC, and asthma who presents to clinic for follow up of pulmonary issues. Previously followed by Dr. Jaffe and Dr. Lee at [facility].     Patient reports her breathing feels much better since starting symbicort. Has no complaints today. Denies fevers, chills, SOB, wheeze, recent illness, sick contacts.     Pertinent History:  History obtained by patient interview and supplemented by chart review.   Pulm medications: symbicort, albuterol  Tobacco/Vape hx: lifelong nonsmoker  EtOH/illicit drug use: denies  Occupational hx: retired  GERD: on PPI  Allergies/Sinus: denies    Pulmonary/Cardiology Testing:  I have independently reviewed the following studies:    PFT 4/24/24  FVC 2.75 (98%)  FEV1 1.27 (60%)  FEV1/FVC 46 (LLN 61)  DLCO 91%  No response to BD     PFT 4/3/17  FVC 2.71 (92%)  FEV1 1.34 (65%)  FEV1/FVC 49  TLC 5.99 (108%)  DLCO 67%  No response to BD     CT Chest 9/24/2020  Tracheobronchial tree reveals mild calcification. There is a posterior left lung apex granuloma. There are multiple peripheral tree-in-bud type opacities.  These are evident bilaterally and diffusely.  Subpleural fibrotic type markings are evident in both lung bases. The overall appearance is similar to the previous exam (2017)  The lung bases reveal minor bronchial wall thickening, left lower lobe more pronounced than right.     TTE 11/24/2023    Left Ventricle: The left ventricle is normal in size. Normal wall thickness. There is concentric remodeling. There is hyperdynamic systolic function. Biplane (2D) method of discs ejection fraction is 76%.    Right Ventricle: Normal right ventricular cavity size. Wall thickness is normal. Right ventricle wall motion  is normal. Systolic function is normal.    Mitral Valve: There is mild mitral annular calcification  present.    Pulmonary Artery: The estimated pulmonary artery systolic pressure is 25 mmHg.    IVC/SVC: Normal venous pressure at 3 mmHg.    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    Lipidemia     Long term (current) use of insulin 2023    Meniere disease 2016    Multiple falls 2023    Pulmonary Mycobacterium avium-intracellulare infection 2016    followed by Pulmonologist at [facility]    Type 2 diabetes mellitus with stage 2 chronic kidney disease, without long-term current use of insulin 10/28/2015     Past Surgical History:   Procedure Laterality Date    ANKLE SURGERY Left     CATARACT EXTRACTION, BILATERAL       SECTION      x 3    eyelid surgery      HYSTERECTOMY      SKIN CANCER EXCISION Left     left arm cancer excision - Dr. Richardson - unknown type of skin cancer     Family History   Problem Relation Name Age of Onset    Depression Mother      Bipolar disorder Mother      Cancer Father          skin cancer    Diabetes Father      Hypertension Father      Hyperlipidemia Father      Heart disease Father      Gout Father      Nephrolithiasis Father      Hypertension Sister Evith     Gout Sister Evith     Diabetes Sister Evith     Cancer Sister Evith         rectal cancer    Hypertension Sister Yumiko     Hyperlipidemia Sister Yumiko     Heart disease Sister Yumiko         stents x 4    Cancer Sister Yumiko         pituitary gland cancer    Hypertension  "Brother      Hyperlipidemia Brother      Heart disease Brother      Hypertension Daughter      Early death Maternal Aunt       Social History[1]  Review of patient's allergies indicates:   Allergen Reactions    Bactrim [sulfamethoxazole-trimethoprim] Hives    Sulfa (sulfonamide antibiotics) Hives       Review of Systems:  Review of Systems   Constitutional:  Negative for chills, fever, malaise/fatigue and weight loss.   HENT:  Negative for congestion and sore throat.    Respiratory:  Negative for cough, shortness of breath and wheezing.    Cardiovascular:  Negative for chest pain and palpitations.   Gastrointestinal:  Negative for abdominal pain, heartburn, nausea and vomiting.   All other systems reviewed and are negative.         OBJECTIVE:     Vital Signs  Vitals:    07/08/25 1343   BP: 122/69   BP Location: Left arm   Patient Position: Sitting   Pulse: 70   SpO2: (!) 94%   Weight: 80.4 kg (177 lb 4.8 oz)   Height: 5' 8" (1.727 m)     Body mass index is 26.96 kg/m².    Physical Exam:  Physical Exam  Vitals reviewed.   Constitutional:       General: She is not in acute distress.     Appearance: She is not ill-appearing.   HENT:      Head: Normocephalic and atraumatic.      Mouth/Throat:      Mouth: Mucous membranes are moist.   Eyes:      Extraocular Movements: Extraocular movements intact.      Conjunctiva/sclera: Conjunctivae normal.   Cardiovascular:      Pulses: Normal pulses.      Heart sounds: Normal heart sounds.   Pulmonary:      Effort: Pulmonary effort is normal. No respiratory distress.      Breath sounds: Normal breath sounds. No wheezing or rhonchi.   Skin:     General: Skin is warm and dry.      Capillary Refill: Capillary refill takes less than 2 seconds.   Neurological:      Mental Status: She is alert and oriented to person, place, and time.          Laboratory:  CBC  Lab Results   Component Value Date    WBC 7.75 05/21/2025    HGB 14.8 05/21/2025    HCT 45.0 05/21/2025     05/21/2025    " MCV 91 05/21/2025    RDW 12.8 05/21/2025     BMP  Lab Results   Component Value Date     05/21/2025    K 3.9 05/21/2025     05/21/2025    CO2 30 (H) 05/21/2025    BUN 21 05/21/2025    CREATININE 1.0 05/21/2025     (H) 05/21/2025    CALCIUM 9.7 05/21/2025    MG 2.1 05/21/2025    PHOS 3.4 05/21/2025     LFTs  Lab Results   Component Value Date    PROT 7.6 05/21/2025    ALBUMIN 4.1 05/21/2025    BILITOT 0.9 05/21/2025    AST 26 05/21/2025    ALKPHOS 107 05/21/2025    ALT 21 05/21/2025       All diagnostic tests were reviewed personally, including labs, chest xrays, ct chests, echocardiograms and pulmonary function tests.     ASSESSMENT/PLAN:     Problem List Items Addressed This Visit       Bronchiectasis without complication    Current Assessment & Plan   - last seen by Dr. Jaffe and Dr. Lee in 2021  - sputums cleared at that time, not on active therapy  - symptoms well controlled at present  - continue inhaled hypertonic nebs for help with airway clearance         Mild intermittent asthma without complication - Primary    Current Assessment & Plan   - stable, well controlled asthma.   - continue symbicort and albuterol.          Relevant Orders    NEBULIZER KIT (SUPPLIES) FOR HOME USE     Return to clinic in one year     Danette Tyler MD  Pulmonary/Critical Care  [facility]         [1]   Social History  Socioeconomic History    Marital status: Single   Tobacco Use    Smoking status: Never     Passive exposure: Past    Smokeless tobacco: Never   Substance and Sexual Activity    Alcohol use: No    Drug use: No     Social Drivers of Health     Financial Resource Strain: Medium Risk (5/22/2025)    Overall Financial Resource Strain (CARDIA)     Difficulty of Paying Living Expenses: Somewhat hard   Food Insecurity: No Food Insecurity (5/22/2025)    Hunger Vital Sign     Worried About Running Out of Food in the Last Year: Never true     Ran Out of Food in the Last Year: Never true   Transportation  Needs: No Transportation Needs (5/22/2025)    PRAPARE - Transportation     Lack of Transportation (Medical): No     Lack of Transportation (Non-Medical): No   Physical Activity: Unknown (5/22/2025)    Exercise Vital Sign     Days of Exercise per Week: Patient declined     Minutes of Exercise per Session: 0 min   Stress: Stress Concern Present (5/22/2025)    [employer] - Occupational Stress Questionnaire     Feeling of Stress : Very much   Housing Stability: Low Risk  (5/22/2025)    Housing Stability Vital Sign     Unable to Pay for Housing in the Last Year: No     Number of Times Moved in the Last Year: 0     Homeless in the Last Year: No   Recent Concern: Housing Stability - High Risk (4/10/2025)    Received from [facility] SDOH Screening     In the past year, have you been unable to get any of the following when you really needed them? choose all that apply.: Utilities (electric, gas, and water)

## 2025-07-08 NOTE — PROGRESS NOTES
Subjective:      Patient ID: Xi Moise is a 85 y.o. female.    Chief Complaint: Urinary Tract Infection (X 2 days)    Patient reports painful urination beginning yesterday. Feels like when she gets stressed about her daughter symptoms come. She took azo yesterday for pain, and has been drinking lots of water. Also has felt dizziness in the last two days, flare up of meniere's disease. Has not been taking allegra recently, but cardiologist recommended it for allergies.    Urinary Tract Infection   Associated symptoms include flank pain. Pertinent negatives include no chills, nausea, vomiting or rash.     Review of Systems   Constitutional:  Negative for chills, fatigue and fever.   HENT:  Negative for congestion, ear pain, postnasal drip, rhinorrhea, sinus pressure, sinus pain, sneezing and sore throat.    Respiratory:  Negative for cough, chest tightness, shortness of breath and wheezing.    Cardiovascular:  Negative for chest pain and palpitations.   Gastrointestinal:  Negative for diarrhea, nausea and vomiting.   Genitourinary:  Positive for dysuria and flank pain. Negative for difficulty urinating.   Musculoskeletal:  Negative for arthralgias.   Skin:  Negative for rash.   Neurological:  Negative for dizziness and headaches.   Psychiatric/Behavioral:  Negative for confusion.         Current Outpatient Medications on File Prior to Visit   Medication Sig    albuterol (PROVENTIL/VENTOLIN HFA) 90 mcg/actuation inhaler Inhale 2 puffs into the lungs every 4 (four) hours as needed.    amLODIPine (NORVASC) 5 MG tablet Take 1 tablet (5 mg total) by mouth once daily.    ascorbic acid, vitamin C, (VITAMIN C) 500 MG tablet Take 500 mg by mouth once daily.    atorvastatin (LIPITOR) 80 MG tablet Take 1 tablet (80 mg total) by mouth every evening.    budesonide-formoterol 80-4.5 mcg (SYMBICORT) 80-4.5 mcg/actuation HFAA Inhale 2 puffs into the lungs 2 (two) times a day. Controller    clopidogreL (PLAVIX) 75 mg tablet Take 1  tablet (75 mg total) by mouth once daily.    cyanocobalamin (VITAMIN B-12) 500 MCG tablet Take 500 mcg by mouth once daily.    d-mannose 500 mg Cap Take 500 mg by mouth 2 (two) times a day.    EScitalopram oxalate (LEXAPRO) 20 MG tablet Take 1 tablet (20 mg total) by mouth once daily.    fluticasone (FLONASE) 50 mcg/actuation nasal spray 1 spray by Each Nare route once daily.    gabapentin (NEURONTIN) 300 MG capsule Take 1 capsule (300 mg total) by mouth 2 (two) times daily.    HUMULIN N NPH U-100 INSULIN 100 unit/mL injection INJECT 26 UNITS INTO THE SKIN 2 (TWO) TIMES DAILY BEFORE MEALS.    lisinopriL (PRINIVIL,ZESTRIL) 5 MG tablet Take 1 tablet (5 mg total) by mouth once daily.    magnesium 250 mg Tab Take 500 mg by mouth once daily.    meclizine (ANTIVERT) 25 mg tablet Take 1 tablet (25 mg total) by mouth 2 (two) times daily as needed.    metoprolol succinate (TOPROL-XL) 25 MG 24 hr tablet Take 1 tablet (25 mg total) by mouth once daily.    neomycin-polymyxin-dexamethasone (MAXITROL) 3.5mg/mL-10,000 unit/mL-0.1 % DrpS Place 1 drop into both eyes 4 (four) times daily.    ondansetron (ZOFRAN) 8 MG tablet Take 1 tablet (8 mg total) by mouth every 8 (eight) hours as needed for Nausea.    pantoprazole (PROTONIX) 40 MG tablet Take 40 mg by mouth.    sodium chloride 3% 3 % nebulizer solution Take 4 mLs by nebulization as needed for Other or Cough.    vitamin D (VITAMIN D3) 1000 units Tab Take 1,000 Units by mouth once daily.    colchicine (COLCRYS) 0.6 mg tablet Take 1 tablet (0.6 mg total) by mouth 2 (two) times a day.    [DISCONTINUED] multivit-min/FA/lycopen/lutein (SENTRY SENIOR ORAL) Take 1 tablet by mouth once daily.     No current facility-administered medications on file prior to visit.        Objective:     Vitals:    07/08/25 1152   BP: (!) 140/80   Pulse: 71   Temp: 98.6 °F (37 °C)      Physical Exam  Constitutional:       General: She is not in acute distress.     Appearance: Normal appearance.   HENT:       Head: Normocephalic and atraumatic.      Right Ear: Tympanic membrane, ear canal and external ear normal.      Left Ear: Tympanic membrane, ear canal and external ear normal.      Nose: Nose normal.      Mouth/Throat:      Mouth: Mucous membranes are moist.      Pharynx: Oropharynx is clear.   Eyes:      Extraocular Movements: Extraocular movements intact.      Conjunctiva/sclera: Conjunctivae normal.      Pupils: Pupils are equal, round, and reactive to light.   Cardiovascular:      Rate and Rhythm: Normal rate and regular rhythm.      Pulses: Normal pulses.      Heart sounds: Normal heart sounds.   Pulmonary:      Effort: Pulmonary effort is normal. No respiratory distress.      Breath sounds: Normal breath sounds. No wheezing.   Abdominal:      General: Abdomen is flat. Bowel sounds are normal.   Musculoskeletal:         General: No swelling. Normal range of motion.      Cervical back: Normal range of motion and neck supple.   Skin:     General: Skin is warm and dry.      Findings: No rash.   Neurological:      Mental Status: She is alert and oriented to person, place, and time.      Gait: Gait normal.   Psychiatric:         Mood and Affect: Mood normal.         Behavior: Behavior normal.        Assessment:         1. Urinary tract infection with hematuria, site unspecified    2. 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 CKD stage 3 due to type 2 diabetes mellitus    4. Type 2 diabetes mellitus with stage 3a chronic kidney disease, with long-term current use of insulin          Plan:   1. Urinary tract infection with hematuria, site unspecified  - Urine culture  - Urinalysis  - POCT urine dipstick without microscope  - amoxicillin-clavulanate 875-125mg (AUGMENTIN) 875-125 mg per tablet; Take 1 tablet by mouth 2 (two) times daily. for 7 days  Dispense: 14 tablet; Refill: 0    2. 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 and with  stage 3a chronic kidney disease  - Urine culture  - Urinalysis  - POCT urine dipstick without microscope  - amoxicillin-clavulanate 875-125mg (AUGMENTIN) 875-125 mg per tablet; Take 1 tablet by mouth 2 (two) times daily. for 7 days  Dispense: 14 tablet; Refill: 0    3. CKD stage 3 due to type 2 diabetes mellitus  - Urine culture  - Urinalysis  - POCT urine dipstick without microscope  - amoxicillin-clavulanate 875-125mg (AUGMENTIN) 875-125 mg per tablet; Take 1 tablet by mouth 2 (two) times daily. for 7 days  Dispense: 14 tablet; Refill: 0    4. Type 2 diabetes mellitus with stage 3a chronic kidney disease, with long-term current use of insulin  - Urine culture  - Urinalysis  - POCT urine dipstick without microscope  - amoxicillin-clavulanate 875-125mg (AUGMENTIN) 875-125 mg per tablet; Take 1 tablet by mouth 2 (two) times daily. for 7 days  Dispense: 14 tablet; Refill: 0       -Drink plenty of water  -Use azo as needed for bladder spasms/pain  -Use cotton underwear and keep area clean and dry  -Shower immediately following physical exercise/sweating  -Complete entire course of antibiotics  -Urine sent for culture, will contact if change in medication is required  -Follow up as needed or if symptoms persist         Florencio Guerrero NP   [facility]  7/8/25

## 2025-07-09 NOTE — ASSESSMENT & PLAN NOTE
- last seen by Dr. Jaffe and Dr. Lee in 2021  - sputums cleared at that time, not on active therapy  - symptoms well controlled at present  - continue inhaled hypertonic nebs for help with airway clearance

## 2025-07-11 LAB — BACTERIA UR CULT: ABNORMAL

## 2025-07-15 ENCOUNTER — PATIENT MESSAGE (OUTPATIENT)
Dept: FAMILY MEDICINE | Facility: CLINIC | Age: 85
End: 2025-07-15
Payer: MEDICARE

## 2025-07-28 DIAGNOSIS — Z00.00 ENCOUNTER FOR MEDICARE ANNUAL WELLNESS EXAM: ICD-10-CM

## 2025-08-05 ENCOUNTER — TELEPHONE (OUTPATIENT)
Dept: FAMILY MEDICINE | Facility: CLINIC | Age: 85
End: 2025-08-05
Payer: MEDICARE

## 2025-08-05 ENCOUNTER — OFFICE VISIT (OUTPATIENT)
Dept: FAMILY MEDICINE | Facility: CLINIC | Age: 85
End: 2025-08-05
Payer: MEDICARE

## 2025-08-05 VITALS
WEIGHT: 179.44 LBS | DIASTOLIC BLOOD PRESSURE: 76 MMHG | HEART RATE: 73 BPM | SYSTOLIC BLOOD PRESSURE: 134 MMHG | HEIGHT: 68 IN | BODY MASS INDEX: 27.19 KG/M2 | TEMPERATURE: 98 F | OXYGEN SATURATION: 96 %

## 2025-08-05 DIAGNOSIS — E11.22 CKD STAGE 3 DUE TO TYPE 2 DIABETES MELLITUS: ICD-10-CM

## 2025-08-05 DIAGNOSIS — N18.31 TYPE 2 DIABETES MELLITUS WITH STAGE 3A CHRONIC KIDNEY DISEASE, WITH LONG-TERM CURRENT USE OF INSULIN: ICD-10-CM

## 2025-08-05 DIAGNOSIS — Z79.4 TYPE 2 DIABETES MELLITUS WITH STAGE 3A CHRONIC KIDNEY DISEASE, WITH LONG-TERM CURRENT USE OF INSULIN: ICD-10-CM

## 2025-08-05 DIAGNOSIS — E11.22 TYPE 2 DIABETES MELLITUS WITH STAGE 3A CHRONIC KIDNEY DISEASE, WITH LONG-TERM CURRENT USE OF INSULIN: ICD-10-CM

## 2025-08-05 DIAGNOSIS — N39.0 URINARY TRACT INFECTION WITHOUT HEMATURIA, SITE UNSPECIFIED: Primary | ICD-10-CM

## 2025-08-05 DIAGNOSIS — N18.30 CKD STAGE 3 DUE TO TYPE 2 DIABETES MELLITUS: ICD-10-CM

## 2025-08-05 LAB
BILIRUB SERPL-MCNC: ABNORMAL MG/DL
BLOOD URINE, POC: ABNORMAL
CLARITY, POC UA: ABNORMAL
COLOR, POC UA: ABNORMAL
GLUCOSE UR QL STRIP: 500
KETONES UR QL STRIP: ABNORMAL
LEUKOCYTE ESTERASE URINE, POC: ABNORMAL
NITRITE, POC UA: ABNORMAL
PH, POC UA: 5.5
PROTEIN, POC: 100
SPECIFIC GRAVITY, POC UA: 1.02
UROBILINOGEN, POC UA: 0.2

## 2025-08-05 PROCEDURE — 87086 URINE CULTURE/COLONY COUNT: CPT | Performed by: PEDIATRICS

## 2025-08-05 PROCEDURE — 1101F PT FALLS ASSESS-DOCD LE1/YR: CPT | Mod: CPTII,S$GLB,, | Performed by: PEDIATRICS

## 2025-08-05 PROCEDURE — 99214 OFFICE O/P EST MOD 30 MIN: CPT | Mod: S$GLB,,, | Performed by: PEDIATRICS

## 2025-08-05 PROCEDURE — 3288F FALL RISK ASSESSMENT DOCD: CPT | Mod: CPTII,S$GLB,, | Performed by: PEDIATRICS

## 2025-08-05 PROCEDURE — 99999 PR PBB SHADOW E&M-EST. PATIENT-LVL V: CPT | Mod: PBBFAC,,, | Performed by: PEDIATRICS

## 2025-08-05 PROCEDURE — 1159F MED LIST DOCD IN RCRD: CPT | Mod: CPTII,S$GLB,, | Performed by: PEDIATRICS

## 2025-08-05 PROCEDURE — 1126F AMNT PAIN NOTED NONE PRSNT: CPT | Mod: CPTII,S$GLB,, | Performed by: PEDIATRICS

## 2025-08-05 PROCEDURE — 2023F DILAT RTA XM W/O RTNOPTHY: CPT | Mod: CPTII,S$GLB,, | Performed by: PEDIATRICS

## 2025-08-05 PROCEDURE — 1160F RVW MEDS BY RX/DR IN RCRD: CPT | Mod: CPTII,S$GLB,, | Performed by: PEDIATRICS

## 2025-08-05 PROCEDURE — 3075F SYST BP GE 130 - 139MM HG: CPT | Mod: CPTII,S$GLB,, | Performed by: PEDIATRICS

## 2025-08-05 PROCEDURE — 81002 URINALYSIS NONAUTO W/O SCOPE: CPT | Mod: S$GLB,,, | Performed by: PEDIATRICS

## 2025-08-05 PROCEDURE — 3078F DIAST BP <80 MM HG: CPT | Mod: CPTII,S$GLB,, | Performed by: PEDIATRICS

## 2025-08-05 RX ORDER — CIPROFLOXACIN 500 MG/1
500 TABLET, FILM COATED ORAL 2 TIMES DAILY
Qty: 14 TABLET | Refills: 0 | Status: SHIPPED | OUTPATIENT
Start: 2025-08-05 | End: 2025-08-12

## 2025-08-05 RX ORDER — CALCIUM CARB/VITAMIN D3/VIT K1 500-100-40
1 TABLET,CHEWABLE ORAL 2 TIMES DAILY
Qty: 100 EACH | Refills: 3 | Status: SHIPPED | OUTPATIENT
Start: 2025-08-05

## 2025-08-05 NOTE — TELEPHONE ENCOUNTER
Copied from CRM #[id number]. Topic: General Inquiry - Patient Advice  >> Aug 5, 2025  8:53 AM Nancy wrote:  Type:  Cece    Who Called:pt  Who Left Message for Patient:Celestina  Does the patient know what this is regarding?:Bladder Infection  Would the patient rather a call back or a response via [facility]? call  Best Call Back Number:[phone]  Additional Information:

## 2025-08-05 NOTE — PROGRESS NOTES
Subjective:      Patient ID: Xi Moise is a 85 y.o. female.    Chief Complaint: Urinary Tract Infection    History of Present Illness    CHIEF COMPLAINT:  Xi presents today with urinary symptoms and inability to urinate.    HISTORY OF PRESENT ILLNESS:  She reports acute urinary symptoms with significant discomfort. Pain intensity was initially at level 9, causing significant distress. She experienced difficulty urinating, going to the bathroom 5 times with minimal output. Symptoms disrupted normal sleep pattern, resulting in sleeping in a chair instead of bed last night. She took medication for pain which caused red-colored urine. Prior to appointment, she drank three large glasses of water. She currently reports reduced pain level but notes unusual urinary symptoms. She typically wakes to urinate at 1:10, 3:00, 3:30, and 5:30 AM, but last night did not wake until 8:30 AM. She denies current pain but acknowledges previous severe urinary discomfort. She reports occasional back pain associated with urinary symptoms.    GENITOURINARY HISTORY:  She has a long-standing history of recurrent urinary tract infections and Stage III kidney disease. She currently sees aurologists and nephrologist: Dr. Tracy Hutson and Dr. Kelsey. She was cleared by her urologists in June for another year. She experiences back pain associated with urinary infections and notes that infections have been a persistent issue throughout her life. She acknowledges uncertainty about whether her most recent UTI has completely resolved.    MEDICATIONS:  She currently takes insulin requiring two shots per day and requires small insulin syringes for administration.    ALLERGIES:  Bactrim and sulfa drugs.    SOCIAL HISTORY:  She reports consuming significant amounts of herbal tea as part of her daily fluid intake and attempts to maintain hydration through tea and water consumption.      ROS:  General: -fever, -chills, -fatigue, -weight gain, -weight  loss  Eyes: -vision changes, -redness, -discharge  ENT: -ear pain, -nasal congestion, -sore throat  Cardiovascular: -chest pain, -palpitations, -lower extremity edema  Respiratory: -cough, -shortness of breath  Gastrointestinal: -abdominal pain, -nausea, -vomiting, -diarrhea, -constipation, -blood in stool  Genitourinary: +dysuria, +hematuria, -frequency, +painful urination, +difficulty urinating, +nocturia  Musculoskeletal: -joint pain, -muscle pain, +back pain  Skin: -rash, -lesion  Neurological: -headache, -dizziness, -numbness, -tingling  Psychiatric: +anxiety, -depression, -sleep difficulty          Current Outpatient Medications on File Prior to Visit   Medication Sig    albuterol (PROVENTIL/VENTOLIN HFA) 90 mcg/actuation inhaler Inhale 2 puffs into the lungs every 4 (four) hours as needed.    amLODIPine (NORVASC) 5 MG tablet Take 1 tablet (5 mg total) by mouth once daily.    ascorbic acid, vitamin C, (VITAMIN C) 500 MG tablet Take 500 mg by mouth once daily.    atorvastatin (LIPITOR) 80 MG tablet Take 1 tablet (80 mg total) by mouth every evening.    budesonide-formoterol 80-4.5 mcg (SYMBICORT) 80-4.5 mcg/actuation HFAA Inhale 2 puffs into the lungs 2 (two) times a day. Controller    clopidogreL (PLAVIX) 75 mg tablet Take 1 tablet (75 mg total) by mouth once daily.    colchicine (COLCRYS) 0.6 mg tablet Take 1 tablet (0.6 mg total) by mouth 2 (two) times a day.    cyanocobalamin (VITAMIN B-12) 500 MCG tablet Take 500 mcg by mouth once daily.    d-mannose 500 mg Cap Take 500 mg by mouth 2 (two) times a day.    EScitalopram oxalate (LEXAPRO) 20 MG tablet Take 1 tablet (20 mg total) by mouth once daily.    fluticasone (FLONASE) 50 mcg/actuation nasal spray 1 spray by Each Nare route once daily.    gabapentin (NEURONTIN) 300 MG capsule Take 1 capsule (300 mg total) by mouth 2 (two) times daily.    HUMULIN N NPH U-100 INSULIN 100 unit/mL injection INJECT 26 UNITS INTO THE SKIN 2 (TWO) TIMES DAILY BEFORE MEALS.     lisinopriL (PRINIVIL,ZESTRIL) 5 MG tablet Take 1 tablet (5 mg total) by mouth once daily.    magnesium 250 mg Tab Take 500 mg by mouth once daily.    meclizine (ANTIVERT) 25 mg tablet Take 1 tablet (25 mg total) by mouth 2 (two) times daily as needed.    metoprolol succinate (TOPROL-XL) 25 MG 24 hr tablet Take 1 tablet (25 mg total) by mouth once daily.    neomycin-polymyxin-dexamethasone (MAXITROL) 3.5mg/mL-10,000 unit/mL-0.1 % DrpS Place 1 drop into both eyes 4 (four) times daily.    ondansetron (ZOFRAN) 8 MG tablet Take 1 tablet (8 mg total) by mouth every 8 (eight) hours as needed for Nausea.    pantoprazole (PROTONIX) 40 MG tablet Take 40 mg by mouth.    sodium chloride 3% 3 % nebulizer solution Take 4 mLs by nebulization as needed for Other or Cough.    vitamin D (VITAMIN D3) 1000 units Tab Take 1,000 Units by mouth once daily.     No current facility-administered medications on file prior to visit.        Objective:     Vitals:    08/05/25 1031   BP: 134/76   Pulse: 73   Temp: 98.1 °F (36.7 °C)      Physical Exam    General: No acute distress. Well-developed. Well-nourished.  Eyes: EOMI. Sclerae anicteric.  HENT: Normocephalic. Atraumatic. Nares patent. Moist oral mucosa.  Ears: Bilateral TMs clear. Bilateral EACs clear.  Cardiovascular: Regular rate. Regular rhythm. No murmurs. No rubs. No gallops. Normal S1, S2.  Respiratory: Normal respiratory effort. Clear to auscultation bilaterally. No rales. No rhonchi. No wheezing.  Abdomen: Soft. Non-tender. Non-distended. Normoactive bowel sounds. +Tenderness to pelvic area. No flank pain.  Musculoskeletal: No  obvious deformity.  Extremities: No lower extremity edema.  Neurological: Alert & oriented x3. No slurred speech. Normal gait.  Psychiatric: Normal mood. Normal affect. Good insight. Good judgment.  Skin: Warm. Dry. No rash.  Back: No costovertebral angle tenderness.          Assessment:         1. Urinary tract infection without hematuria, site unspecified   "  2. Type 2 diabetes mellitus with stage 3a chronic kidney disease, with long-term current use of insulin    3. CKD stage 3 due to type 2 diabetes mellitus          Plan:   1. Urinary tract infection without hematuria, site unspecified  - ciprofloxacin HCl (CIPRO) 500 MG tablet; Take 1 tablet (500 mg total) by mouth 2 (two) times daily. for 7 days  Dispense: 14 tablet; Refill: 0  - Urine Culture High Risk ($$)    2. Type 2 diabetes mellitus with stage 3a chronic kidney disease, with long-term current use of insulin  - insulin syringe-needle U-100 0.3 mL 31 gauge x 5/16" Syrg; 1 Units by Misc.(Non-Drug; Combo Route) route 2 (two) times a day.  Dispense: 100 each; Refill: 3  - Urine Culture High Risk ($$)    3. CKD stage 3 due to type 2 diabetes mellitus  - Urine Culture High Risk ($$)     Assessment & Plan    URINARY TRACT INFECTION:  - Confirmed urinary tract infection today with kidney irritation.  - Sent urine for culture to identify the specific pathogen.  - Xi reports difficulty urinating, frequent urination at night (nocturia), and back pain, all consistent with UTI symptoms.  - Prescribed Cipro due to patient's allergy to Bactrim.  - Instructed patient to contact the office if there are any changes or concerns while on antibiotics.    HEMATURIA:  - Xi reports blood in urine (hematuria).  - This could indicate a bladder issue rather than just an infection.  - Discussed recurrent infections and potential bladder issues related to the hematuria.  - Recommend follow up w/ urology for further evaluation.    CHRONIC KIDNEY DISEASE:  - Xi has Stage III chronic kidney disease with kidney irritation noted today.  - Irritation possibly due to dehydration or worsening condition.  - Discussed kidney health and emphasized the need to stay hydrated.    DIABETES WITH CHRONIC KIDNEY DISEASE:  - Evaluated chronic kidney disease related to diabetes.  - Xi requires insulin needles for long-term diabetes " management.  - Discussed insulin requirements and prescribed insulin syringes (30 gauge, box of 100) through mail order.    ALLERGY TO SULFONAMIDES:  - Xi is allergic to Bactrim and sulfur (sulfonamides).  - This allergy requires alternative antibiotics, which is why Cipro was prescribed for the current UTI instead of sulfonamide-based medications.    GENERAL RECOMMENDATIONS:  - Xi to increase water intake and avoid drinking natural teas as they can dehydrate urine.  - Specifically recommended children's Pedialyte as an acceptable hydration option.    FOLLOW-UP:  - Referred to urologist for further evaluation due to recurrent infections, hematuria, and to assess kidney health.  - Considered possibility of underlying bladder issue due to frequent recurrence of UTIs.  - Noted that the patient is currently under care of multiple urologists.              This note was generated with the assistance of ambient listening technology. Verbal consent was obtained by the patient and accompanying visitor(s) for the recording of patient appointment to facilitate this note. I attest to having reviewed and edited the generated note for accuracy, though some syntax or spelling errors may persist. Please contact the author of this note for any clarification.          Florencio Guerrero NP   [facility]  8/5/25

## 2025-08-08 LAB — BACTERIA UR CULT: ABNORMAL

## 2025-08-11 ENCOUNTER — OFFICE VISIT (OUTPATIENT)
Dept: FAMILY MEDICINE | Facility: CLINIC | Age: 85
End: 2025-08-11
Payer: MEDICARE

## 2025-08-11 VITALS
HEIGHT: 68 IN | SYSTOLIC BLOOD PRESSURE: 138 MMHG | WEIGHT: 176.94 LBS | TEMPERATURE: 98 F | BODY MASS INDEX: 26.82 KG/M2 | OXYGEN SATURATION: 93 % | DIASTOLIC BLOOD PRESSURE: 62 MMHG | HEART RATE: 62 BPM

## 2025-08-11 DIAGNOSIS — N18.31 HYPERTENSIVE HEART AND KIDNEY DISEASE WITHOUT HEART FAILURE AND WITH STAGE 3A CHRONIC KIDNEY DISEASE: ICD-10-CM

## 2025-08-11 DIAGNOSIS — I25.119 ATHEROSCLEROSIS OF NATIVE CORONARY ARTERY OF NATIVE HEART WITH ANGINA PECTORIS: ICD-10-CM

## 2025-08-11 DIAGNOSIS — E78.5 HYPERLIPIDEMIA ASSOCIATED WITH TYPE 2 DIABETES MELLITUS: ICD-10-CM

## 2025-08-11 DIAGNOSIS — M85.80 OSTEOPENIA, UNSPECIFIED LOCATION: ICD-10-CM

## 2025-08-11 DIAGNOSIS — E11.69 HYPERLIPIDEMIA ASSOCIATED WITH TYPE 2 DIABETES MELLITUS: ICD-10-CM

## 2025-08-11 DIAGNOSIS — H81.09 MENIERE'S DISEASE, UNSPECIFIED LATERALITY: Chronic | ICD-10-CM

## 2025-08-11 DIAGNOSIS — J47.9 BRONCHIECTASIS WITHOUT COMPLICATION: ICD-10-CM

## 2025-08-11 DIAGNOSIS — J45.20 MILD INTERMITTENT ASTHMA WITHOUT COMPLICATION: ICD-10-CM

## 2025-08-11 DIAGNOSIS — J98.4 CALCIFIED GRANULOMA OF LUNG: ICD-10-CM

## 2025-08-11 DIAGNOSIS — N18.30 CKD STAGE 3 DUE TO TYPE 2 DIABETES MELLITUS: ICD-10-CM

## 2025-08-11 DIAGNOSIS — D69.2 SENILE PURPURA: ICD-10-CM

## 2025-08-11 DIAGNOSIS — Z79.4 LONG TERM (CURRENT) USE OF INSULIN: ICD-10-CM

## 2025-08-11 DIAGNOSIS — E11.22 CKD STAGE 3 DUE TO TYPE 2 DIABETES MELLITUS: ICD-10-CM

## 2025-08-11 DIAGNOSIS — F33.41 RECURRENT MAJOR DEPRESSIVE DISORDER, IN PARTIAL REMISSION: ICD-10-CM

## 2025-08-11 DIAGNOSIS — Z00.00 ENCOUNTER FOR MEDICARE ANNUAL WELLNESS EXAM: Primary | ICD-10-CM

## 2025-08-11 DIAGNOSIS — I13.10 HYPERTENSIVE HEART AND KIDNEY DISEASE WITHOUT HEART FAILURE AND WITH STAGE 3A CHRONIC KIDNEY DISEASE: ICD-10-CM

## 2025-08-11 DIAGNOSIS — I70.0 AORTIC ATHEROSCLEROSIS: ICD-10-CM

## 2025-08-11 DIAGNOSIS — K21.9 CHRONIC GERD: ICD-10-CM

## 2025-08-11 DIAGNOSIS — J44.9 COPD, MODERATE: ICD-10-CM

## 2025-08-11 DIAGNOSIS — E11.42 DIABETIC POLYNEUROPATHY ASSOCIATED WITH TYPE 2 DIABETES MELLITUS: ICD-10-CM

## 2025-08-11 PROCEDURE — 1159F MED LIST DOCD IN RCRD: CPT | Mod: CPTII,S$GLB,, | Performed by: NURSE PRACTITIONER

## 2025-08-11 PROCEDURE — 99999 PR PBB SHADOW E&M-EST. PATIENT-LVL V: CPT | Mod: PBBFAC,,, | Performed by: NURSE PRACTITIONER

## 2025-08-11 PROCEDURE — 3288F FALL RISK ASSESSMENT DOCD: CPT | Mod: CPTII,S$GLB,, | Performed by: NURSE PRACTITIONER

## 2025-08-11 PROCEDURE — 1158F ADVNC CARE PLAN TLK DOCD: CPT | Mod: CPTII,S$GLB,, | Performed by: NURSE PRACTITIONER

## 2025-08-11 PROCEDURE — 3078F DIAST BP <80 MM HG: CPT | Mod: CPTII,S$GLB,, | Performed by: NURSE PRACTITIONER

## 2025-08-11 PROCEDURE — G0439 PPPS, SUBSEQ VISIT: HCPCS | Mod: S$GLB,,, | Performed by: NURSE PRACTITIONER

## 2025-08-11 PROCEDURE — 3075F SYST BP GE 130 - 139MM HG: CPT | Mod: CPTII,S$GLB,, | Performed by: NURSE PRACTITIONER

## 2025-08-11 PROCEDURE — 1126F AMNT PAIN NOTED NONE PRSNT: CPT | Mod: CPTII,S$GLB,, | Performed by: NURSE PRACTITIONER

## 2025-08-11 PROCEDURE — 1170F FXNL STATUS ASSESSED: CPT | Mod: CPTII,S$GLB,, | Performed by: NURSE PRACTITIONER

## 2025-08-11 PROCEDURE — 1160F RVW MEDS BY RX/DR IN RCRD: CPT | Mod: CPTII,S$GLB,, | Performed by: NURSE PRACTITIONER

## 2025-08-11 PROCEDURE — 2023F DILAT RTA XM W/O RTNOPTHY: CPT | Mod: CPTII,S$GLB,, | Performed by: NURSE PRACTITIONER

## 2025-08-11 PROCEDURE — 1101F PT FALLS ASSESS-DOCD LE1/YR: CPT | Mod: CPTII,S$GLB,, | Performed by: NURSE PRACTITIONER

## 2025-08-17 DIAGNOSIS — F33.41 RECURRENT MAJOR DEPRESSIVE DISORDER, IN PARTIAL REMISSION: ICD-10-CM

## 2025-08-19 RX ORDER — ESCITALOPRAM OXALATE 20 MG/1
20 TABLET ORAL DAILY
Qty: 90 TABLET | Refills: 3 | Status: SHIPPED | OUTPATIENT
Start: 2025-08-19

## 2025-08-24 DIAGNOSIS — I13.10 HYPERTENSIVE HEART AND KIDNEY DISEASE WITHOUT HEART FAILURE AND WITH STAGE 3A CHRONIC KIDNEY DISEASE: ICD-10-CM

## 2025-08-24 DIAGNOSIS — N18.31 HYPERTENSIVE HEART AND KIDNEY DISEASE WITHOUT HEART FAILURE AND WITH STAGE 3A CHRONIC KIDNEY DISEASE: ICD-10-CM

## 2025-08-25 RX ORDER — LISINOPRIL 5 MG/1
5 TABLET ORAL
Qty: 90 TABLET | Refills: 3 | Status: SHIPPED | OUTPATIENT
Start: 2025-08-25

## 2025-08-28 ENCOUNTER — OFFICE VISIT (OUTPATIENT)
Dept: UROLOGY | Facility: CLINIC | Age: 85
End: 2025-08-28
Payer: MEDICARE

## 2025-08-28 VITALS
HEART RATE: 66 BPM | DIASTOLIC BLOOD PRESSURE: 69 MMHG | WEIGHT: 175.5 LBS | HEIGHT: 68 IN | OXYGEN SATURATION: 94 % | SYSTOLIC BLOOD PRESSURE: 148 MMHG | BODY MASS INDEX: 26.6 KG/M2

## 2025-08-28 DIAGNOSIS — R39.15 URINARY URGENCY: ICD-10-CM

## 2025-08-28 DIAGNOSIS — M54.50 ACUTE MIDLINE LOW BACK PAIN WITHOUT SCIATICA: ICD-10-CM

## 2025-08-28 DIAGNOSIS — Z87.440 HISTORY OF RECURRENT UTIS: Primary | ICD-10-CM

## 2025-08-28 LAB
BILIRUB UR QL STRIP.AUTO: NEGATIVE
CLARITY UR: CLEAR
COLOR UR AUTO: YELLOW
GLUCOSE UR QL STRIP: ABNORMAL
HGB UR QL STRIP: NEGATIVE
HYALINE CASTS UR QL AUTO: 3 /LPF (ref 0–1)
KETONES UR QL STRIP: NEGATIVE
LEUKOCYTE ESTERASE UR QL STRIP: ABNORMAL
MICROSCOPIC COMMENT: ABNORMAL
NITRITE UR QL STRIP: NEGATIVE
PH UR STRIP: 6 [PH]
PROT UR QL STRIP: NEGATIVE
RBC #/AREA URNS AUTO: 3 /HPF (ref 0–4)
SP GR UR STRIP: 1.02
SQUAMOUS #/AREA URNS AUTO: 2 /HPF
UROBILINOGEN UR STRIP-ACNC: NEGATIVE EU/DL
WBC #/AREA URNS AUTO: 2 /HPF (ref 0–5)

## 2025-08-28 PROCEDURE — 81001 URINALYSIS AUTO W/SCOPE: CPT | Performed by: NURSE PRACTITIONER

## 2025-08-28 PROCEDURE — 87086 URINE CULTURE/COLONY COUNT: CPT | Performed by: NURSE PRACTITIONER

## 2025-08-28 PROCEDURE — 99999 PR PBB SHADOW E&M-EST. PATIENT-LVL V: CPT | Mod: PBBFAC,,, | Performed by: NURSE PRACTITIONER

## 2025-08-28 RX ORDER — DICLOFENAC SODIUM 75 MG/1
75 TABLET, DELAYED RELEASE ORAL 2 TIMES DAILY PRN
Qty: 20 TABLET | Refills: 0 | Status: SHIPPED | OUTPATIENT
Start: 2025-08-28 | End: 2025-09-07

## 2025-08-30 LAB — BACTERIA UR CULT: NORMAL

## 2025-09-02 ENCOUNTER — TELEPHONE (OUTPATIENT)
Dept: UROLOGY | Facility: CLINIC | Age: 85
End: 2025-09-02
Payer: MEDICARE

## 2025-09-02 DIAGNOSIS — Z87.440 HISTORY OF RECURRENT UTIS: ICD-10-CM

## 2025-09-02 DIAGNOSIS — M54.50 ACUTE MIDLINE LOW BACK PAIN WITHOUT SCIATICA: ICD-10-CM

## 2025-09-02 DIAGNOSIS — R39.15 URINARY URGENCY: ICD-10-CM

## 2025-09-02 DIAGNOSIS — N39.41 URGE URINARY INCONTINENCE: Primary | ICD-10-CM
